# Patient Record
Sex: FEMALE | Race: WHITE | HISPANIC OR LATINO | Employment: OTHER | URBAN - METROPOLITAN AREA
[De-identification: names, ages, dates, MRNs, and addresses within clinical notes are randomized per-mention and may not be internally consistent; named-entity substitution may affect disease eponyms.]

---

## 2017-01-23 RX ORDER — LEVOTHYROXINE SODIUM 50 UG/1
TABLET ORAL
Qty: 30 TABLET | Refills: 11 | Status: SHIPPED | OUTPATIENT
Start: 2017-01-23 | End: 2017-11-20 | Stop reason: SDUPTHER

## 2017-01-25 ENCOUNTER — ANTI-COAG VISIT (OUTPATIENT)
Dept: CARDIOLOGY | Facility: CLINIC | Age: 82
End: 2017-01-25
Payer: MEDICARE

## 2017-01-25 DIAGNOSIS — I48.0 PAROXYSMAL ATRIAL FIBRILLATION: ICD-10-CM

## 2017-01-25 DIAGNOSIS — Z79.01 LONG TERM (CURRENT) USE OF ANTICOAGULANTS: Primary | ICD-10-CM

## 2017-01-25 DIAGNOSIS — Z86.718 HISTORY OF DVT OF LOWER EXTREMITY: ICD-10-CM

## 2017-01-25 LAB
CTP QC/QA: YES
INR PPP: 2.2 (ref 1.5–2.5)

## 2017-01-25 PROCEDURE — 85610 PROTHROMBIN TIME: CPT | Mod: QW,S$GLB,,

## 2017-01-25 NOTE — PROGRESS NOTES
INR remains therapeutic. Patient is in a wheelchair today. C/o pain. No medication changes. Will continue current dose and diet until follow-up.

## 2017-01-25 NOTE — MR AVS SNAPSHOT
[facility]  [address] [place] [address]  Phone: [phone]  Fax: [phone]                  Josy Posey   2017 1:30 PM   Anti-coag visit    Description:  Female : 1924   Provider:  Vandana Childress PharmD   [facility]           Diagnoses this Visit        Comments    Long term (current) use of anticoagulants    -  Primary     History of DVT of lower extremity         Paroxysmal atrial fibrillation                To Do List           Future Appointments        Provider Department Dept Phone    2017 1:30 PM Vandana Childress, [facility] [phone]    3/10/2017 10:00 AM [facility], CARDIOLOGY [facility] - Arrhythmia [phone]      Goals (5 Years of Data)     None      [facility]     [facility] -  Assistance  Registered nurses in the [facility] provide clinical advisement, health education, appointment booking, and other advisory services.  Call for this free service at [phone].             Medications           Message regarding Medications     Verify the changes and/or additions to your medication regime listed below are the same as discussed with your clinician today.  If any of these changes or additions are incorrect, please notify your healthcare provider.             Verify that the below list of medications is an accurate representation of the medications you are currently taking.  If none reported, the list may be blank. If incorrect, please contact your healthcare provider. Carry this list with you in case of emergency.           Current Medications     alendronate (FOSAMAX) 70 MG tablet take 1 tablet by mouth every week    ascorbic acid (VITAMIN C) 1000 MG tablet Take 1 tablet by mouth once daily once daily.    biotin 2,500 mcg Cap Take by mouth.    econazole nitrate 1 % cream     escitalopram oxalate (LEXAPRO) 10 MG tablet take 1 tablet by mouth once daily    estradiol  (ESTRACE) 0.01 % (0.1 mg/gram) vaginal cream Place 1 g vaginally 3 (three) times a week. Place small amount on the outside of the urethra nightly for 3 weeks , then twice weekly    fluticasone (FLONASE) 50 mcg/actuation nasal spray instill 2 sprays into each nostril once daily    folic acid (FOLVITE) 1 MG tablet Take 1 tablet by mouth once daily once daily.    iron-vitamin C 100-250 mg, ICAR-C, (ICAR-C) 100-250 mg Tab Take 1 tablet by mouth once daily.    levothyroxine (SYNTHROID) 50 MCG tablet take 1 tablet by mouth once daily    midodrine (PROAMATINE) 10 MG tablet take 1 tablet by mouth four times a day    omega-3 fatty acids-vitamin E (FISH OIL) 1,000 mg Cap Take by mouth once daily.    pravastatin (PRAVACHOL) 80 MG tablet Take 1 tablet (80 mg total) by mouth once daily.    propafenone (RHTHYMOL) 150 MG Tab 75 mg(half tablet) every 8 hrs    spironolactone (ALDACTONE) 25 MG tablet take 1 tablet by mouth once daily    triamcinolone acetonide 0.1% (KENALOG) 0.1 % cream apply THINLY to affected area ON LEGS, ARMS, AND TRUCK twice a day if needed for eczema    vitamin B12-folic acid 0.5-1 mg Tab Take 1 tablet by mouth once daily once daily.    warfarin (COUMADIN) 1 MG tablet Take 1/2 tablet Monday through Saturday and NONE on Sunday as directed by the coumadin clinic.           Clinical Reference Information           Allergies as of 1/25/2017     Sulfa (Sulfonamide Antibiotics)    Morphine    Dronedarone      Immunizations Administered on Date of Encounter - 1/25/2017     None      Orders Placed During Today's Visit      Normal Orders This Visit    POCT INR          1/25/2017  1:23 PM - [facility]      Component Results     Component Value Flag Ref Range Units Status    INR 2.2  1.5 - 2.5  Final     Acceptable Yes    Final      December 2016 Details    Sun Mon Tue Wed Thu Fri Sat         1               2               3                 4               5               6               7                8               9               10                 11               12               13               14               15               16               17                 18               19               20               21               22               23               24                 25               26      0.5 mg         27      0.5 mg         28   1.8   0.5 mg   See details      29      0.5 mg         30      0.5 mg         31      0.5 mg          Date Details   12/28 Last INR check   INR: 1.8                 January 2017 Details    Sun Mon Tue Wed Thu Fri Sat     1      0 mg         2      0.5 mg         3      0.5 mg         4      0.5 mg         5      0.5 mg         6      0.5 mg         7      0.5 mg           8      0 mg         9      0.5 mg         10      0.5 mg         11      0.5 mg         12      0.5 mg         13      0.5 mg         14      0.5 mg           15      0 mg         16      0.5 mg         17      0.5 mg         18      0.5 mg         19      0.5 mg         20      0.5 mg         21      0.5 mg           22      0 mg         23      0.5 mg         24      0.5 mg         25   2.2   0.5 mg   See details      26      0.5 mg         27      0.5 mg         28      0.5 mg           29      0 mg         30      0.5 mg         31      0.5 mg              Date Details   01/25 This INR check   INR: 2.2                     How to take your warfarin dose     To take:  0.5 mg Take 0.5 of a 1 mg tablet.           February 2017 Details    Sun Mon Tue Wed Thu Fri Sat        1      0.5 mg         2      0.5 mg         3      0.5 mg         4      0.5 mg           5      0 mg         6      0.5 mg         7      0.5 mg         8      0.5 mg         9      0.5 mg         10      0.5 mg         11      0.5 mg           12      0 mg         13      0.5 mg         14      0.5 mg         15      0.5 mg         16      0.5 mg         17      0.5 mg         18      0.5 mg           19      0  mg         20      0.5 mg         21      0.5 mg         22      0.5 mg         23      0.5 mg         24      0.5 mg         25      0.5 mg           26      0 mg         27      0.5 mg         28      0.5 mg              Date Details   No additional details            How to take your warfarin dose     To take:  0.5 mg Take 0.5 of a 1 mg tablet.           March 2017 Details    Sun Mon Tue Wed Thu Fri Sat        1            2               3               4                 5               6               7               8               9               10               11                 12               13               14               15               16               17               18                 19               20               21               22               23               24               25                 26               27               28               29               30               31                 Date Details   No additional details    Date of next INR:  3/1/2017         How to take your warfarin dose     To take:  0.5 mg Take 0.5 of a 1 mg tablet.           Anticoagulation Summary as of 1/25/2017     Maintenance plan 0 mg on Sun; 0.5 mg (1 mg x 0.5) all other days    Full instructions 0 mg on Sun; 0.5 mg all other days    Next INR check 3/1/2017      Anticoagulation Episode Summary     Comments       Patient Findings     Negatives Signs/symptoms of thrombosis, Signs/symptoms of bleeding, Laboratory test error suspected, Change in health, Change in alcohol use, Change in activity, Upcoming invasive procedure, Emergency department visit, Upcoming dental procedure, Missed doses, Extra doses, Change in medications, Change in diet/appetite, Hospital admission, Bruising, Other complaints

## 2017-03-01 ENCOUNTER — ANTI-COAG VISIT (OUTPATIENT)
Dept: CARDIOLOGY | Facility: CLINIC | Age: 82
End: 2017-03-01
Payer: MEDICARE

## 2017-03-01 DIAGNOSIS — Z86.718 HISTORY OF DVT OF LOWER EXTREMITY: ICD-10-CM

## 2017-03-01 DIAGNOSIS — I48.91 ATRIAL FIBRILLATION, UNSPECIFIED TYPE: ICD-10-CM

## 2017-03-01 DIAGNOSIS — Z79.01 LONG TERM (CURRENT) USE OF ANTICOAGULANTS: Primary | ICD-10-CM

## 2017-03-01 DIAGNOSIS — Z95.0 CARDIAC PACEMAKER IN SITU: Primary | ICD-10-CM

## 2017-03-01 DIAGNOSIS — I44.2 CHB (COMPLETE HEART BLOCK): ICD-10-CM

## 2017-03-01 DIAGNOSIS — I48.0 PAROXYSMAL ATRIAL FIBRILLATION: ICD-10-CM

## 2017-03-01 LAB — INR PPP: 2.2 (ref 1.5–2.5)

## 2017-03-01 PROCEDURE — 85610 PROTHROMBIN TIME: CPT | Mod: QW,S$GLB,,

## 2017-03-01 NOTE — MR AVS SNAPSHOT
[facility]  [address] [place] [address]  Phone: [phone]  Fax: [phone]                  Josy Posey   3/1/2017 1:30 PM   Anti-coag visit    Description:  Female : 1924   Provider:  Vandana Childress PharmD   Department:  [facility]           Diagnoses this Visit        Comments    Long term (current) use of anticoagulants    -  Primary     History of DVT of lower extremity         Paroxysmal atrial fibrillation                To Do List           Future Appointments        Provider Department Dept Phone    3/1/2017 1:30 PM [facility] [phone]    3/17/2017 10:45 AM [facility] Arrhythmia Procedures [phone]    2017 1:30 PM [facility]and[facility] Childress [facility]D [facility] [phone]      Goals (5 Years of Data)     None      [facility]     [facility] On Call Nurse [facility] Emergency Department Line -  Assistance  Registered nurses in the [facility] On Call Center provide clinical advisement, health education, appointment booking, and other advisory services.  Call for this free service at [phone].             Medications           Message regarding Medications     Verify the changes and/or additions to your medication regime listed below are the same as discussed with your clinician today.  If any of these changes or additions are incorrect, please notify your healthcare provider.             Verify that the below list of medications is an accurate representation of the medications you are currently taking.  If none reported, the list may be blank. If incorrect, please contact your healthcare provider. Carry this list with you in case of emergency.           Current Medications     alendronate (FOSAMAX) 70 MG tablet take 1 tablet by mouth every week    ascorbic acid (VITAMIN C) 1000 MG tablet Take 1 tablet by mouth once daily once daily.    biotin 2,500 mcg Cap Take by mouth.    econazole nitrate 1 % cream     escitalopram  oxalate (LEXAPRO) 10 MG tablet take 1 tablet by mouth once daily    estradiol (ESTRACE) 0.01 % (0.1 mg/gram) vaginal cream Place 1 g vaginally 3 (three) times a week. Place small amount on the outside of the urethra nightly for 3 weeks , then twice weekly    fluticasone (FLONASE) 50 mcg/actuation nasal spray instill 2 sprays into each nostril once daily    folic acid (FOLVITE) 1 MG tablet Take 1 tablet by mouth once daily once daily.    iron-vitamin C 100-250 mg, ICAR-C, (ICAR-C) 100-250 mg Tab Take 1 tablet by mouth once daily.    levothyroxine (SYNTHROID) 50 MCG tablet take 1 tablet by mouth once daily    midodrine (PROAMATINE) 10 MG tablet take 1 tablet by mouth four times a day    omega-3 fatty acids-vitamin E (FISH OIL) 1,000 mg Cap Take by mouth once daily.    pravastatin (PRAVACHOL) 80 MG tablet Take 1 tablet (80 mg total) by mouth once daily.    propafenone (RHTHYMOL) 150 MG Tab 75 mg(half tablet) every 8 hrs    spironolactone (ALDACTONE) 25 MG tablet take 1 tablet by mouth once daily    triamcinolone acetonide 0.1% (KENALOG) 0.1 % cream apply THINLY to affected area ON LEGS, ARMS, AND TRUCK twice a day if needed for eczema    vitamin B12-folic acid 0.5-1 mg Tab Take 1 tablet by mouth once daily once daily.    warfarin (COUMADIN) 1 MG tablet Take 1/2 tablet Monday through Saturday and NONE on Sunday as directed by the coumadin clinic.           Clinical Reference Information           Allergies as of 3/1/2017     Sulfa (Sulfonamide Antibiotics)    Morphine    Dronedarone      Immunizations Administered on Date of Encounter - 3/1/2017     None      Orders Placed During Today's Visit      Normal Orders This Visit    POCT INR          3/1/2017  1:17 PM - Vandana Childress, PharmD      Component Results     Component Value Flag Ref Range Units Status    INR 2.2  1.5 - 2.5  Final      January 2017 Details    Sun Mon Tue Wed Thu Fri Sat     1               2               3               4               5                6               7                 8               9               10               11               12               13               14                 15               16               17               18               19               20               21                 22               23               24               25   2.2   0.5 mg   See details      26      0.5 mg         27      0.5 mg         28      0.5 mg           29      0 mg         30      0.5 mg         31      0.5 mg              Date Details   01/25 Last INR check   INR: 2.2                 February 2017 Details    Sun Mon Tue Wed Thu Fri Sat        1      0.5 mg         2      0.5 mg         3      0.5 mg         4      0.5 mg           5      0 mg         6      0.5 mg         7      0.5 mg         8      0.5 mg         9      0.5 mg         10      0.5 mg         11      0.5 mg           12      0 mg         13      0.5 mg         14      0.5 mg         15      0.5 mg         16      0.5 mg         17      0.5 mg         18      0.5 mg           19      0 mg         20      0.5 mg         21      0.5 mg         22      0.5 mg         23      0.5 mg         24      0.5 mg         25      0.5 mg           26      0 mg         27      0.5 mg         28      0.5 mg              Date Details   No additional details              March 2017 Details    Sun Mon Tue Wed Thu Fri Sat        1   2.2   0.5 mg   See details      2      0.5 mg         3      0.5 mg         4      0.5 mg           5      0 mg         6      0.5 mg         7      0.5 mg         8      0.5 mg         9      0.5 mg         10      0.5 mg         11      0.5 mg           12      0 mg         13      0.5 mg         14      0.5 mg         15      0.5 mg         16      0.5 mg         17      0.5 mg         18      0.5 mg           19      0 mg         20      0.5 mg         21      0.5 mg         22      0.5 mg         23      0.5 mg         24      0.5 mg         25      0.5  mg           26      0 mg         27      0.5 mg         28      0.5 mg         29      0.5 mg         30      0.5 mg         31      0.5 mg           Date Details   03/01 This INR check   INR: 2.2                     How to take your warfarin dose     To take:  0.5 mg Take 0.5 of a 1 mg tablet.           April 2017 Details    Sun Mon Tue Wed Thu Fri Sat           1      0.5 mg           2      0 mg         3      0.5 mg         4      0.5 mg         5            6               7               8                 9               10               11               12               13               14               15                 16               17               18               19               20               21               22                 23               24               25               26               27               28               29                 30                      Date Details   No additional details    Date of next INR:  4/5/2017         How to take your warfarin dose     To take:  0.5 mg Take 0.5 of a 1 mg tablet.           Anticoagulation Summary as of 3/1/2017     Maintenance plan 0 mg on Sun; 0.5 mg (1 mg x 0.5) all other days    Full instructions 0 mg on Sun; 0.5 mg all other days    Next INR check 4/5/2017      Anticoagulation Episode Summary     Comments       Patient Findings     Negatives Signs/symptoms of thrombosis, Signs/symptoms of bleeding, Laboratory test error suspected, Change in health, Change in alcohol use, Change in activity, Upcoming invasive procedure, Emergency department visit, Upcoming dental procedure, Missed doses, Extra doses, Change in medications, Change in diet/appetite, Hospital admission, Bruising, Other complaints      Language Assistance Services     ATTENTION: Language assistance services are available, free of charge. Please call [phone].      ATENCIÓN: Si habla irvin, tiene a patel disposición servicios gratuitos de asistencia lingüística.  [place] [phone].     DORITA Ý: N?u b?n nói Ti?ng Vi?t, có các d?ch v? h? tr? ngôn ng? mi?n phí dành cho b?n. G?i s? [phone].         [facility] complies with applicable Federal civil rights laws and does not discriminate on the basis of race, color, national origin, age, disability, or sex.

## 2017-03-17 ENCOUNTER — CLINICAL SUPPORT (OUTPATIENT)
Dept: CARDIOLOGY | Facility: CLINIC | Age: 82
End: 2017-03-17
Payer: MEDICARE

## 2017-03-17 DIAGNOSIS — I48.91 ATRIAL FIBRILLATION, UNSPECIFIED TYPE: ICD-10-CM

## 2017-03-17 DIAGNOSIS — Z95.0 CARDIAC PACEMAKER IN SITU: ICD-10-CM

## 2017-03-17 DIAGNOSIS — I44.2 CHB (COMPLETE HEART BLOCK): ICD-10-CM

## 2017-03-17 PROCEDURE — 93000 ELECTROCARDIOGRAM COMPLETE: CPT | Mod: S$GLB,,, | Performed by: INTERNAL MEDICINE

## 2017-03-17 PROCEDURE — 93281 PM DEVICE PROGR EVAL MULTI: CPT | Mod: 26,,, | Performed by: INTERNAL MEDICINE

## 2017-03-20 RX ORDER — ESCITALOPRAM OXALATE 10 MG/1
TABLET ORAL
Qty: 30 TABLET | Refills: 10 | Status: SHIPPED | OUTPATIENT
Start: 2017-03-20 | End: 2017-11-20 | Stop reason: SDUPTHER

## 2017-04-05 ENCOUNTER — ANTI-COAG VISIT (OUTPATIENT)
Dept: CARDIOLOGY | Facility: CLINIC | Age: 82
End: 2017-04-05
Payer: MEDICARE

## 2017-04-05 DIAGNOSIS — Z79.01 LONG TERM (CURRENT) USE OF ANTICOAGULANTS: Primary | ICD-10-CM

## 2017-04-05 LAB — INR PPP: 2 (ref 1.5–2.5)

## 2017-04-05 PROCEDURE — 85610 PROTHROMBIN TIME: CPT | Mod: QW,S$GLB,,

## 2017-04-05 NOTE — MR AVS SNAPSHOT
[facility]  [address] [place] [address]  Phone: [phone]  Fax: [phone]                  Josy Posey   2017 1:30 PM   Anti-coag visit    Description:  Female : 1924   Provider:  Vandana Childress, PharmD   Department:  [facility] - Coumadin           Diagnoses this Visit        Comments    Long term (current) use of anticoagulants    -  Primary            To Do List           Future Appointments        Provider Department Dept Phone    2017 1:30 PM Leslie Alvarado MD [facility] Cardiology [phone]      Goals (5 Years of Data)     None      [facility]     [facility] On Call Nurse Care Line -  Assistance  Unless otherwise directed by your provider, please contact [facility], our nurse care line that is available for  assistance.     Registered nurses in the [facility] Center provide: appointment scheduling, clinical advisement, health education, and other advisory services.  Call: [phone] (toll free)               Medications           Message regarding Medications     Verify the changes and/or additions to your medication regime listed below are the same as discussed with your clinician today.  If any of these changes or additions are incorrect, please notify your healthcare provider.             Verify that the below list of medications is an accurate representation of the medications you are currently taking.  If none reported, the list may be blank. If incorrect, please contact your healthcare provider. Carry this list with you in case of emergency.           Current Medications     alendronate (FOSAMAX) 70 MG tablet take 1 tablet by mouth every week    ascorbic acid (VITAMIN C) 1000 MG tablet Take 1 tablet by mouth once daily once daily.    biotin 2,500 mcg Cap Take by mouth.    econazole nitrate 1 % cream     escitalopram oxalate (LEXAPRO) 10 MG tablet take 1 tablet by mouth once daily    estradiol (ESTRACE) 0.01 % (0.1 mg/gram) vaginal  cream Place 1 g vaginally 3 (three) times a week. Place small amount on the outside of the urethra nightly for 3 weeks , then twice weekly    fluticasone (FLONASE) 50 mcg/actuation nasal spray instill 2 sprays into each nostril once daily    folic acid (FOLVITE) 1 MG tablet Take 1 tablet by mouth once daily once daily.    iron-vitamin C 100-250 mg, ICAR-C, (ICAR-C) 100-250 mg Tab Take 1 tablet by mouth once daily.    levothyroxine (SYNTHROID) 50 MCG tablet take 1 tablet by mouth once daily    midodrine (PROAMATINE) 10 MG tablet take 1 tablet by mouth four times a day    omega-3 fatty acids-vitamin E (FISH OIL) 1,000 mg Cap Take by mouth once daily.    pravastatin (PRAVACHOL) 80 MG tablet Take 1 tablet (80 mg total) by mouth once daily.    propafenone (RHTHYMOL) 150 MG Tab 75 mg(half tablet) every 8 hrs    spironolactone (ALDACTONE) 25 MG tablet take 1 tablet by mouth once daily    triamcinolone acetonide 0.1% (KENALOG) 0.1 % cream apply THINLY to affected area ON LEGS, ARMS, AND TRUCK twice a day if needed for eczema    vitamin B12-folic acid 0.5-1 mg Tab Take 1 tablet by mouth once daily once daily.    warfarin (COUMADIN) 1 MG tablet Take 1/2 tablet Monday through Saturday and NONE on Sunday as directed by the coumadin clinic.           Clinical Reference Information           Allergies as of 4/5/2017     Sulfa (Sulfonamide Antibiotics)    Morphine    Dronedarone      Immunizations Administered on Date of Encounter - 4/5/2017     None      Orders Placed During Today's Visit      Normal Orders This Visit    POCT INR          4/5/2017  1:42 PM - Vandana Childress, PharmD      Component Results     Component Value Flag Ref Range Units Status    INR 2.0  1.5 - 2.5  Final      March 2017 Details    Sun Mon Tue Wed Thu Fri Sat        1   2.2   0.5 mg   See details      2      0.5 mg         3      0.5 mg         4      0.5 mg           5      0 mg         6      0.5 mg         7      0.5 mg         8      0.5 mg          9      0.5 mg         10      0.5 mg         11      0.5 mg           12      0 mg         13      0.5 mg         14      0.5 mg         15      0.5 mg         16      0.5 mg         17      0.5 mg         18      0.5 mg           19      0 mg         20      0.5 mg         21      0.5 mg         22      0.5 mg         23      0.5 mg         24      0.5 mg         25      0.5 mg           26      0 mg         27      0.5 mg         28      0.5 mg         29      0.5 mg         30      0.5 mg         31      0.5 mg           Date Details   03/01 Last INR check   INR: 2.2                 April 2017 Details    Sun Mon Tue Wed Thu Fri Sat           1      0.5 mg           2      0 mg         3      0.5 mg         4      0.5 mg         5   2.0   0.5 mg   See details      6      0.5 mg         7      0.5 mg         8      0.5 mg           9      0 mg         10      0.5 mg         11      0.5 mg         12      0.5 mg         13      0.5 mg         14      0.5 mg         15      0.5 mg           16      0 mg         17      0.5 mg         18      0.5 mg         19      0.5 mg         20      0.5 mg         21      0.5 mg         22      0.5 mg           23      0 mg         24      0.5 mg         25      0.5 mg         26      0.5 mg         27      0.5 mg         28      0.5 mg         29      0.5 mg           30      0 mg                Date Details   04/05 This INR check   INR: 2.0                     How to take your warfarin dose     To take:  0.5 mg Take 0.5 of a 1 mg tablet.           May 2017 Details    Sun Mon Tue Wed Thu Fri Sat      1      0.5 mg         2      0.5 mg         3      0.5 mg         4      0.5 mg         5      0.5 mg         6      0.5 mg           7      0 mg         8      0.5 mg         9      0.5 mg         10            11               12               13                 14               15               16               17               18               19               20                  21               22               23               24               25               26               27                 28               29               30               31                   Date Details   No additional details    Date of next INR:  5/10/2017         How to take your warfarin dose     To take:  0.5 mg Take 0.5 of a 1 mg tablet.           Anticoagulation Summary as of 4/5/2017     Maintenance plan 0 mg on Sun; 0.5 mg (1 mg x 0.5) all other days    Full instructions 0 mg on Sun; 0.5 mg all other days    Next INR check 5/10/2017      Anticoagulation Episode Summary     Comments       Patient Findings     Negatives Signs/symptoms of thrombosis, Signs/symptoms of bleeding, Laboratory test error suspected, Change in health, Change in alcohol use, Change in activity, Upcoming invasive procedure, Emergency department visit, Upcoming dental procedure, Missed doses, Extra doses, Change in medications, Change in diet/appetite, Hospital admission, Bruising, Other complaints      Language Assistance Services     ATTENTION: Language assistance services are available, free of charge. Please call [phone].      ATENCIÓN: Si habla irvin, tiene a patel disposición servicios gratuitos de asistencia lingüística. Llame al [phone].     CHÚ Ý: N?u b?n nói Ti?ng Vi?t, có các d?ch v? h? tr? ngôn ng? mi?n phí dành cho b?n. G?i s? [phone].         [facility] complies with applicable Federal civil rights laws and does not discriminate on the basis of race, color, national origin, age, disability, or sex.

## 2017-04-27 ENCOUNTER — HOSPITAL ENCOUNTER (OUTPATIENT)
Dept: RADIOLOGY | Facility: HOSPITAL | Age: 82
Discharge: HOME | End: 2017-04-27
Attending: FAMILY MEDICINE
Payer: MEDICARE

## 2017-04-27 ENCOUNTER — OFFICE VISIT (OUTPATIENT)
Dept: INTERNAL MEDICINE | Facility: CLINIC | Age: 82
End: 2017-04-27
Payer: MEDICARE

## 2017-04-27 VITALS
TEMPERATURE: 99 F | DIASTOLIC BLOOD PRESSURE: 66 MMHG | BODY MASS INDEX: 27.19 KG/M2 | HEIGHT: 63 IN | HEART RATE: 75 BPM | OXYGEN SATURATION: 97 % | SYSTOLIC BLOOD PRESSURE: 120 MMHG | WEIGHT: 153.44 LBS

## 2017-04-27 DIAGNOSIS — B35.4 TINEA CORPORIS: ICD-10-CM

## 2017-04-27 DIAGNOSIS — J01.90 ACUTE NON-RECURRENT SINUSITIS, UNSPECIFIED LOCATION: Primary | ICD-10-CM

## 2017-04-27 DIAGNOSIS — R05.9 COUGH: ICD-10-CM

## 2017-04-27 DIAGNOSIS — R50.9 FEVER, UNSPECIFIED FEVER CAUSE: ICD-10-CM

## 2017-04-27 DIAGNOSIS — R50.9 FEVER, UNSPECIFIED FEVER CAUSE: Primary | ICD-10-CM

## 2017-04-27 LAB
FLUAV AG SPEC QL IA: NEGATIVE
FLUBV AG SPEC QL IA: NEGATIVE
SPECIMEN SOURCE: NORMAL

## 2017-04-27 PROCEDURE — 1159F MED LIST DOCD IN RCRD: CPT | Mod: S$GLB,,, | Performed by: PHYSICIAN ASSISTANT

## 2017-04-27 PROCEDURE — 1160F RVW MEDS BY RX/DR IN RCRD: CPT | Mod: S$GLB,,, | Performed by: PHYSICIAN ASSISTANT

## 2017-04-27 PROCEDURE — 71020 XR CHEST PA AND LATERAL: CPT | Mod: 26,,, | Performed by: RADIOLOGY

## 2017-04-27 PROCEDURE — 71020 XR CHEST PA AND LATERAL: CPT | Mod: TC,PO

## 2017-04-27 PROCEDURE — 1157F ADVNC CARE PLAN IN RCRD: CPT | Mod: S$GLB,,, | Performed by: PHYSICIAN ASSISTANT

## 2017-04-27 PROCEDURE — 99999 PR PBB SHADOW E&M-EST. PATIENT-LVL V: CPT | Mod: PBBFAC,,, | Performed by: PHYSICIAN ASSISTANT

## 2017-04-27 PROCEDURE — 1126F AMNT PAIN NOTED NONE PRSNT: CPT | Mod: S$GLB,,, | Performed by: PHYSICIAN ASSISTANT

## 2017-04-27 PROCEDURE — 99213 OFFICE O/P EST LOW 20 MIN: CPT | Mod: S$GLB,,, | Performed by: PHYSICIAN ASSISTANT

## 2017-04-27 RX ORDER — AMOXICILLIN AND CLAVULANATE POTASSIUM 875; 125 MG/1; MG/1
1 TABLET, FILM COATED ORAL 2 TIMES DAILY
Qty: 20 TABLET | Refills: 0 | Status: SHIPPED | OUTPATIENT
Start: 2017-04-27 | End: 2017-05-08

## 2017-04-27 RX ORDER — CLOTRIMAZOLE AND BETAMETHASONE DIPROPIONATE 10; .64 MG/G; MG/G
CREAM TOPICAL 2 TIMES DAILY
Qty: 45 G | Refills: 1 | Status: SHIPPED | OUTPATIENT
Start: 2017-04-27 | End: 2018-07-06 | Stop reason: SDUPTHER

## 2017-04-27 NOTE — PATIENT INSTRUCTIONS
Fungal Skin Infection (Tinea)  A fungal infection is when too much fungus grows on or in the body. Fungus normally lives on the skin in small amounts and does not cause harm. But when too much grows on the skin, it causes an infection. This is also known as tinea. Fungal skin infections are common and not often serious.  The infection often starts as a small red area the size of a pea. The skin may turn dry and flaky. The area may itch. As the fungus grows, it spreads out in a red [place]. Because of how it looks, fungal skin infection is often called ringworm, but it is not caused by a worm. Fungal skin infections can occur on many parts of the body. They can grow on the head, chest, arms, or legs. They can occur on the buttocks. On the feet, fungal infection is known as athletes foot. It causes itchy, sometimes painful sores between the toes and the bottom or sides of the feet. In the groin, the rash is called jock itch.  People with weakened immune systems can get a fungal infection more easily. This includes people with diabetes or HIV, or who are being treated for cancer. In these cases, the fungal infection can spread and cause severe illness. Fungal infections are also more common in people who are obese.  In most cases, treatment is done with antifungal cream or ointment. If the infection is on your scalp, you may take oral medication. In some cases, a tiny piece of the skin (biopsy) may be taken. This is so it can be tested in a lab.  Common fungal infections are treated with creams on the skin or oral medicine.  Home care  Follow all instructions when using antifungal cream or ointment on your skin. The health care provider may advise using cornstarch powder to keep your skin dry or petroleum jelly to provide a barrier.  General care:  · If you were prescribed an oral medicine, read the patient information. Talk with the health care provider about the risks and side effects.  · Let your skin dry  completely after bathing. Carefully dry your feet and between your toes.  · Dress in loose cotton clothing.  · Dont scratch the affected area. This can delay healing and may spread the infection. It can also cause a bacterial infection.  · Keep your skin clean, but dont wash the skin too much. This can irritate your skin.  · Keep in mind that it may take a week before the fungus starts to go away. It can take 2 to 4 weeks to fully clear. To prevent it from coming back, use the medicine until the rash is all gone.  Follow-up care  Follow up with your health care provider if the rash does not get better after 10 days of treatment. Also follow up if the rash spreads to other parts of your body.  When to seek medical advice  Call your health care provider right away if any of these occur:  · Fever of 100.4°F (38°C) or higher  · Redness or swelling that gets worse  · Pain that gets worse  · Foul-smelling fluid leaking from the skin  Date Last Reviewed: 7/23/2014  © 8330-4160 [employer]. [address] [place] [address], [place] [address]. All rights reserved. This information is not intended as a substitute for professional medical care. Always follow your healthcare professional's instructions.

## 2017-04-27 NOTE — MR AVS SNAPSHOT
[facility] Internal Medicine  [address] [address]  Phone: [phone]  Fax: [phone]                  Josy Posey   2017 11:40 AM   Office Visit    Description:  Female : 1924   Provider:  Ania Holt PA-C   Department:  [facility] - Internal Medicine           Reason for Visit     Fever     Cough     Dizziness     Rash           Diagnoses this Visit        Comments    Fever, unspecified fever cause    -  Primary     Cough         Tinea corporis                To Do List           Future Appointments        Provider Department Dept Phone    2017 1:30 PM SUM XR2 [facility] [phone]    5/10/2017 1:30 PM Vandana Childress PharmD [facility] Coumadin [phone]    2017 1:30 PM Leslie Alvarado MD [facility] Cardiology [phone]      Goals (5 Years of Data)     None      Follow-Up and Disposition     Return if symptoms worsen or fail to improve.       These Medications        Disp Refills Start End    clotrimazole-betamethasone 1-0.05% (LOTRISONE) cream 45 g 1 2017     Apply topically 2 (two) times daily. - Topical (\[facility] [place] [facility]\"")    Pharmacy: [address] #: [phone]         [facility]     [facility] Care [facility] - 24/ Assistance  Unless otherwise directed by your provider, please contact [facility], our nurse care line that is available for 24/7 assistance.     Registered nurses in the [facility] provide: appointment scheduling, clinical advisement, health education, and other advisory services.  Call: [phone] (toll free)               Medications           Message regarding Medications     Verify the changes and/or additions to your medication regime listed below are the same as discussed with your clinician today.  If any of these changes or additions are incorrect, please notify your healthcare provider.        START taking these  NEW medications        Refills    clotrimazole-betamethasone 1-0.05% (LOTRISONE) cream 1    Sig: Apply topically 2 (two) times daily.    Class: Normal    Route: Topical (Top)           Verify that the below list of medications is an accurate representation of the medications you are currently taking.  If none reported, the list may be blank. If incorrect, please contact your healthcare provider. Carry this list with you in case of emergency.           Current Medications     alendronate (FOSAMAX) 70 MG tablet take 1 tablet by mouth every week    ascorbic acid (VITAMIN C) 1000 MG tablet Take 1 tablet by mouth once daily once daily.    econazole nitrate 1 % cream     escitalopram oxalate (LEXAPRO) 10 MG tablet take 1 tablet by mouth once daily    estradiol (ESTRACE) 0.01 % (0.1 mg/gram) vaginal cream Place 1 g vaginally 3 (three) times a week. Place small amount on the outside of the urethra nightly for 3 weeks , then twice weekly    fluticasone (FLONASE) 50 mcg/actuation nasal spray instill 2 sprays into each nostril once daily    folic acid (FOLVITE) 1 MG tablet Take 1 tablet by mouth once daily once daily.    iron-vitamin C 100-250 mg, ICAR-C, (ICAR-C) 100-250 mg Tab Take 1 tablet by mouth once daily.    levothyroxine (SYNTHROID) 50 MCG tablet take 1 tablet by mouth once daily    midodrine (PROAMATINE) 10 MG tablet take 1 tablet by mouth four times a day    omega-3 fatty acids-vitamin E (FISH OIL) 1,000 mg Cap Take by mouth once daily.    pravastatin (PRAVACHOL) 80 MG tablet Take 1 tablet (80 mg total) by mouth once daily.    propafenone (RHTHYMOL) 150 MG Tab 75 mg(half tablet) every 8 hrs    spironolactone (ALDACTONE) 25 MG tablet take 1 tablet by mouth once daily    triamcinolone acetonide 0.1% (KENALOG) 0.1 % cream apply THINLY to affected area ON LEGS, ARMS, AND TRUCK twice a day if needed for eczema    vitamin B12-folic acid 0.5-1 mg Tab Take 1 tablet by mouth once daily once daily.    warfarin (COUMADIN) 1 MG  "tablet Take 1/2 tablet Monday through Saturday and NONE on Sunday as directed by the coumadin clinic.    biotin 2,500 mcg Cap Take by mouth.    clotrimazole-betamethasone 1-0.05% (LOTRISONE) cream Apply topically 2 (two) times daily.           Clinical Reference Information           Your Vitals Were     BP Pulse Temp Height Weight SpO2    120/66 (BP Location: Right arm, Patient Position: Sitting, BP Method: Manual) 75 99 °F (37.2 °C) (Tympanic) 5' 3" (1.6 m) 69.6 kg (153 lb 7 oz) 97%    BMI                27.18 kg/m2          Blood Pressure          Most Recent Value    BP  120/66      Allergies as of 4/27/2017     Sulfa (Sulfonamide Antibiotics)    Morphine    Dronedarone      Immunizations Administered on Date of Encounter - 4/27/2017     None      Orders Placed During Today's Visit      Normal Orders This Visit    Influenza antigen Nasopharyngeal Swab     Future Labs/Procedures Expected by Expires    X-Ray Chest PA And Lateral  4/27/2017 4/27/2018      Instructions      Fungal Skin Infection (Tinea)  A fungal infection is when too much fungus grows on or in the body. Fungus normally lives on the skin in small amounts and does not cause harm. But when too much grows on the skin, it causes an infection. This is also known as tinea. Fungal skin infections are common and not often serious.  The infection often starts as a small red area the size of a pea. The skin may turn dry and flaky. The area may itch. As the fungus grows, it spreads out in a red [place]. Because of how it looks, fungal skin infection is often called ringworm, but it is not caused by a worm. Fungal skin infections can occur on many parts of the body. They can grow on the head, chest, arms, or legs. They can occur on the buttocks. On the feet, fungal infection is known as athletes foot. It causes itchy, sometimes painful sores between the toes and the bottom or sides of the feet. In the groin, the rash is called jock itch.  People with " weakened immune systems can get a fungal infection more easily. This includes people with diabetes or HIV, or who are being treated for cancer. In these cases, the fungal infection can spread and cause severe illness. Fungal infections are also more common in people who are obese.  In most cases, treatment is done with antifungal cream or ointment. If the infection is on your scalp, you may take oral medication. In some cases, a tiny piece of the skin (biopsy) may be taken. This is so it can be tested in a lab.  Common fungal infections are treated with creams on the skin or oral medicine.  Home care  Follow all instructions when using antifungal cream or ointment on your skin. The health care provider may advise using cornstarch powder to keep your skin dry or petroleum jelly to provide a barrier.  General care:  · If you were prescribed an oral medicine, read the patient information. Talk with the health care provider about the risks and side effects.  · Let your skin dry completely after bathing. Carefully dry your feet and between your toes.  · Dress in loose cotton clothing.  · Dont scratch the affected area. This can delay healing and may spread the infection. It can also cause a bacterial infection.  · Keep your skin clean, but dont wash the skin too much. This can irritate your skin.  · Keep in mind that it may take a week before the fungus starts to go away. It can take 2 to 4 weeks to fully clear. To prevent it from coming back, use the medicine until the rash is all gone.  Follow-up care  Follow up with your health care provider if the rash does not get better after 10 days of treatment. Also follow up if the rash spreads to other parts of your body.  When to seek medical advice  Call your health care provider right away if any of these occur:  · Fever of 100.4°F (38°C) or higher  · Redness or swelling that gets worse  · Pain that gets worse  · Foul-smelling fluid leaking from the skin  Date Last  Reviewed: 7/23/2014  © 7994-2942 [employer]. [address] [place] [address], [place] [address]. All rights reserved. This information is not intended as a substitute for professional medical care. Always follow your healthcare professional's instructions.             Language Assistance Services     ATTENTION: Language assistance services are available, free of charge. Please call [phone].      ATENCIÓN: Si habla español, tiene a patel disposición servicios gratuitos de asistencia lingüística. Llame al [phone].     [facility] Ý: N?u b?n nói Ti?ng Vi?t, có các d?ch v? h? tr? ngôn ng? mi?n phí dành cho b?n. G?i s? [phone].         [facility] complies with applicable Federal civil rights laws and does not discriminate on the basis of race, color, national origin, age, disability, or sex.

## 2017-04-27 NOTE — PROGRESS NOTES
"  Subjective:      Patient ID: Josy Posey is a 89 y.o. female.    Chief Complaint: Fever; Cough; Dizziness; and Rash    Fever    This is a new problem. The current episode started in the past 7 days. The problem occurs constantly. The problem has been gradually worsening. The maximum temperature noted was 99 to 99.9 F. Associated symptoms include coughing, muscle aches, a rash (under breast) and sleepiness. Pertinent negatives include no abdominal pain, chest pain, congestion, diarrhea, ear pain, headaches, nausea, sore throat, urinary pain, vomiting or wheezing. She has tried nothing for the symptoms.   Risk factors: no contaminated food, no contaminated water, no hx of cancer, no immunosuppression, no occupational exposure, no recent sickness, no recent travel and no sick contacts    Also reports an itchy rash under her breasts.     Review of Systems   Constitutional: Positive for activity change, appetite change, chills, diaphoresis, fatigue and fever. Negative for unexpected weight change.   HENT: Negative for congestion, ear pain, postnasal drip, rhinorrhea, sinus pressure, sneezing, sore throat and tinnitus.    Respiratory: Positive for cough and chest tightness. Negative for wheezing.    Cardiovascular: Negative for chest pain and leg swelling.   Gastrointestinal: Negative for abdominal distention, abdominal pain, diarrhea, nausea and vomiting.   Genitourinary: Negative for dysuria.   Skin: Positive for rash (under breast).   Neurological: Negative for headaches.     Objective:   /66 (BP Location: Right arm, Patient Position: Sitting, BP Method: Manual)  Pulse 75  Temp 99 °F (37.2 °C) (Tympanic)   Ht 5' 3" (1.6 m)  Wt 69.6 kg (153 lb 7 oz)  SpO2 97%  BMI 27.18 kg/m2    Physical Exam   Constitutional: She is oriented to person, place, and time. She appears well-developed and well-nourished.   HENT:   Head: Normocephalic and atraumatic.   Right Ear: External ear normal.   Left Ear: External " ear normal.   Nose: Nose normal.   Mouth/Throat: Oropharynx is clear and moist.   Eyes: Conjunctivae and EOM are normal. Pupils are equal, round, and reactive to light.   Neck: Normal range of motion. Neck supple.   Cardiovascular: Normal rate, regular rhythm and normal heart sounds.  Exam reveals no gallop and no friction rub.    No murmur heard.  Pulmonary/Chest: Effort normal. No accessory muscle usage. No tachypnea and no bradypnea. No respiratory distress. She has decreased breath sounds (course breath sounds). She has no wheezes. She has no rales. She exhibits no tenderness.   Abdominal: Soft. She exhibits no distension. There is no tenderness.   Musculoskeletal: Normal range of motion.   Lymphadenopathy:     She has no cervical adenopathy.   Neurological: She is alert and oriented to person, place, and time.   Skin: Skin is warm and dry. Rash noted. Rash is pustular and urticarial. There is erythema.        Psychiatric: She has a normal mood and affect. Her behavior is normal. Judgment and thought content normal.   Vitals reviewed.    Results for orders placed or performed in visit on 04/27/17   Influenza antigen Nasopharyngeal Swab   Result Value Ref Range    Influenza A Ag, EIA Negative Negative    Influenza B Ag, EIA Negative Negative    Flu A & B Source Nasopharyngeal Swab      Cxr: no acute findings  Assessment:     1. Fever, unspecified fever cause    2. Cough    3. Tinea corporis      Plan:   Fever, unspecified fever cause  -     Influenza antigen Nasopharyngeal Swab  -     X-Ray Chest PA And Lateral; Future; Expected date: 4/27/17  -flu negative, will send augmentin to pharmacy on file.   Cough  -     Influenza antigen Nasopharyngeal Swab  -     X-Ray Chest PA And Lateral; Future; Expected date: 4/27/17    Tinea corporis  -     clotrimazole-betamethasone 1-0.05% (LOTRISONE) cream; Apply topically 2 (two) times daily.  Dispense: 45 g; Refill: 1    -Educational handout on over-the-counter medications  and at-home conservative care, pertinent to the patients diagnosis today, was handed to the patient and discussed in detail.    Return if symptoms worsen or fail to improve.

## 2017-05-01 ENCOUNTER — TELEPHONE (OUTPATIENT)
Dept: CARDIOLOGY | Facility: CLINIC | Age: 82
End: 2017-05-01

## 2017-05-01 NOTE — TELEPHONE ENCOUNTER
"Received phone call from patient with complaints of dizziness, unable to walk, nauseated, right eye vision blurred, feeling weak  108/61, I think it might have been a TIA or something "if I moved my head I became dizzier"  "I don't want to stay in the hospital can I just come in tomorrow around 1:00 PM to be checked ?"    Returned phone call and scheduled appointment for 5/2/17 as requested but insisted that if symptoms continue or get worse proceed to nearest ER.      "

## 2017-05-02 ENCOUNTER — HOSPITAL ENCOUNTER (INPATIENT)
Facility: HOSPITAL | Age: 82
LOS: 1 days | Discharge: HOME | DRG: 312 | End: 2017-05-04
Attending: EMERGENCY MEDICINE | Admitting: INTERNAL MEDICINE
Payer: MEDICARE

## 2017-05-02 DIAGNOSIS — I95.1 ORTHOSTATIC HYPOTENSION DYSAUTONOMIC SYNDROME: ICD-10-CM

## 2017-05-02 DIAGNOSIS — R42 VERTIGO: Primary | ICD-10-CM

## 2017-05-02 DIAGNOSIS — R74.01 TRANSAMINITIS: ICD-10-CM

## 2017-05-02 DIAGNOSIS — G45.9 TRANSIENT CEREBRAL ISCHEMIA, UNSPECIFIED TYPE: ICD-10-CM

## 2017-05-02 DIAGNOSIS — I48.0 PAROXYSMAL ATRIAL FIBRILLATION: ICD-10-CM

## 2017-05-02 DIAGNOSIS — I50.9 CHF (CONGESTIVE HEART FAILURE): ICD-10-CM

## 2017-05-02 LAB
ALBUMIN SERPL BCP-MCNC: 3.5 G/DL
ALP SERPL-CCNC: 184 U/L
ALT SERPL W/O P-5'-P-CCNC: 169 U/L
AMMONIA PLAS-SCNC: 20 UMOL/L
ANION GAP SERPL CALC-SCNC: 10 MMOL/L
AST SERPL-CCNC: 162 U/L
BASOPHILS # BLD AUTO: 0.02 K/UL
BASOPHILS NFR BLD: 0.3 %
BILIRUB SERPL-MCNC: 1 MG/DL
BILIRUB UR QL STRIP: NEGATIVE
BNP SERPL-MCNC: 209 PG/ML
BUN SERPL-MCNC: 24 MG/DL
CALCIUM SERPL-MCNC: 9.2 MG/DL
CHLORIDE SERPL-SCNC: 107 MMOL/L
CLARITY UR: CLEAR
CO2 SERPL-SCNC: 24 MMOL/L
COLOR UR: YELLOW
CREAT SERPL-MCNC: 1.6 MG/DL
DIFFERENTIAL METHOD: ABNORMAL
EOSINOPHIL # BLD AUTO: 0.3 K/UL
EOSINOPHIL NFR BLD: 3.6 %
ERYTHROCYTE [DISTWIDTH] IN BLOOD BY AUTOMATED COUNT: 13.5 %
EST. GFR  (AFRICAN AMERICAN): 32 ML/MIN/1.73 M^2
EST. GFR  (NON AFRICAN AMERICAN): 28 ML/MIN/1.73 M^2
GLUCOSE SERPL-MCNC: 109 MG/DL
GLUCOSE UR QL STRIP: NEGATIVE
HCT VFR BLD AUTO: 37.9 %
HGB BLD-MCNC: 12.9 G/DL
HGB UR QL STRIP: NEGATIVE
INR PPP: 2.7
KETONES UR QL STRIP: NEGATIVE
LEUKOCYTE ESTERASE UR QL STRIP: NEGATIVE
LYMPHOCYTES # BLD AUTO: 1.8 K/UL
LYMPHOCYTES NFR BLD: 26.1 %
MCH RBC QN AUTO: 32.3 PG
MCHC RBC AUTO-ENTMCNC: 34 %
MCV RBC AUTO: 95 FL
MONOCYTES # BLD AUTO: 0.9 K/UL
MONOCYTES NFR BLD: 13.2 %
NEUTROPHILS # BLD AUTO: 3.9 K/UL
NEUTROPHILS NFR BLD: 56.8 %
NITRITE UR QL STRIP: NEGATIVE
PH UR STRIP: 6 [PH] (ref 5–8)
PLATELET # BLD AUTO: 193 K/UL
PMV BLD AUTO: 11.3 FL
POTASSIUM SERPL-SCNC: 4.4 MMOL/L
PROT SERPL-MCNC: 7.5 G/DL
PROT UR QL STRIP: NEGATIVE
PROTHROMBIN TIME: 27.4 SEC
RBC # BLD AUTO: 3.99 M/UL
SODIUM SERPL-SCNC: 141 MMOL/L
SP GR UR STRIP: 1.01 (ref 1–1.03)
TROPONIN I SERPL DL<=0.01 NG/ML-MCNC: 0.01 NG/ML
TROPONIN I SERPL DL<=0.01 NG/ML-MCNC: 0.03 NG/ML
URN SPEC COLLECT METH UR: NORMAL
UROBILINOGEN UR STRIP-ACNC: NEGATIVE EU/DL
WBC # BLD AUTO: 6.89 K/UL

## 2017-05-02 PROCEDURE — G0378 HOSPITAL OBSERVATION PER HR: HCPCS

## 2017-05-02 PROCEDURE — 93005 ELECTROCARDIOGRAM TRACING: CPT

## 2017-05-02 PROCEDURE — 85610 PROTHROMBIN TIME: CPT

## 2017-05-02 PROCEDURE — 96361 HYDRATE IV INFUSION ADD-ON: CPT

## 2017-05-02 PROCEDURE — 85025 COMPLETE CBC W/AUTO DIFF WBC: CPT

## 2017-05-02 PROCEDURE — 80053 COMPREHEN METABOLIC PANEL: CPT

## 2017-05-02 PROCEDURE — 25000003 PHARM REV CODE 250: Performed by: EMERGENCY MEDICINE

## 2017-05-02 PROCEDURE — 99285 EMERGENCY DEPT VISIT HI MDM: CPT | Mod: 25

## 2017-05-02 PROCEDURE — 82140 ASSAY OF AMMONIA: CPT

## 2017-05-02 PROCEDURE — 84484 ASSAY OF TROPONIN QUANT: CPT

## 2017-05-02 PROCEDURE — 96374 THER/PROPH/DIAG INJ IV PUSH: CPT

## 2017-05-02 PROCEDURE — 93010 ELECTROCARDIOGRAM REPORT: CPT | Mod: ,,, | Performed by: INTERNAL MEDICINE

## 2017-05-02 PROCEDURE — 83880 ASSAY OF NATRIURETIC PEPTIDE: CPT

## 2017-05-02 PROCEDURE — 81003 URINALYSIS AUTO W/O SCOPE: CPT

## 2017-05-02 PROCEDURE — 63600175 PHARM REV CODE 636 W HCPCS: Performed by: EMERGENCY MEDICINE

## 2017-05-02 RX ORDER — ONDANSETRON 4 MG/1
4 TABLET, ORALLY DISINTEGRATING ORAL
Status: COMPLETED | OUTPATIENT
Start: 2017-05-02 | End: 2017-05-02

## 2017-05-02 RX ORDER — HYDRALAZINE HYDROCHLORIDE 20 MG/ML
10 INJECTION INTRAMUSCULAR; INTRAVENOUS
Status: COMPLETED | OUTPATIENT
Start: 2017-05-02 | End: 2017-05-02

## 2017-05-02 RX ORDER — MECLIZINE HYDROCHLORIDE 25 MG/1
25 TABLET ORAL
Status: COMPLETED | OUTPATIENT
Start: 2017-05-02 | End: 2017-05-02

## 2017-05-02 RX ORDER — PANTOPRAZOLE SODIUM 40 MG/1
40 TABLET, DELAYED RELEASE ORAL DAILY
Status: DISCONTINUED | OUTPATIENT
Start: 2017-05-03 | End: 2017-05-04 | Stop reason: HOSPADM

## 2017-05-02 RX ORDER — ASPIRIN 325 MG
325 TABLET, DELAYED RELEASE (ENTERIC COATED) ORAL DAILY
Status: DISCONTINUED | OUTPATIENT
Start: 2017-05-03 | End: 2017-05-04 | Stop reason: HOSPADM

## 2017-05-02 RX ORDER — PRAVASTATIN SODIUM 20 MG/1
80 TABLET ORAL DAILY
Status: DISCONTINUED | OUTPATIENT
Start: 2017-05-03 | End: 2017-05-04 | Stop reason: HOSPADM

## 2017-05-02 RX ORDER — ONDANSETRON 2 MG/ML
4 INJECTION INTRAMUSCULAR; INTRAVENOUS EVERY 12 HOURS PRN
Status: DISCONTINUED | OUTPATIENT
Start: 2017-05-02 | End: 2017-05-04 | Stop reason: HOSPADM

## 2017-05-02 RX ORDER — LEVOTHYROXINE SODIUM 50 UG/1
50 TABLET ORAL
Status: DISCONTINUED | OUTPATIENT
Start: 2017-05-03 | End: 2017-05-04 | Stop reason: HOSPADM

## 2017-05-02 RX ORDER — SODIUM CHLORIDE 9 MG/ML
500 INJECTION, SOLUTION INTRAVENOUS
Status: COMPLETED | OUTPATIENT
Start: 2017-05-02 | End: 2017-05-02

## 2017-05-02 RX ORDER — PROPAFENONE HYDROCHLORIDE 150 MG/1
75 TABLET, COATED ORAL EVERY 8 HOURS
Status: DISCONTINUED | OUTPATIENT
Start: 2017-05-02 | End: 2017-05-02

## 2017-05-02 RX ADMIN — HYDRALAZINE HYDROCHLORIDE 10 MG: 20 INJECTION INTRAMUSCULAR; INTRAVENOUS at 08:05

## 2017-05-02 RX ADMIN — MECLIZINE HYDROCHLORIDE 25 MG: 25 TABLET ORAL at 04:05

## 2017-05-02 RX ADMIN — ONDANSETRON 4 MG: 4 TABLET, ORALLY DISINTEGRATING ORAL at 05:05

## 2017-05-02 RX ADMIN — SODIUM CHLORIDE 500 ML: 0.9 INJECTION, SOLUTION INTRAVENOUS at 05:05

## 2017-05-02 NOTE — ED NOTES
Received report from QUETA marin Pt. In NAD, VSS, RR equal and unlabored. Pt awaiting  Results and disposition. Pt's bed is low, locked, and call light in reach. SR up X2. Will continue to monitor pt.

## 2017-05-02 NOTE — IP AVS SNAPSHOT
[address] [place] [address]           Patient Discharge Instructions   Our goal is to set you up for success. This packet includes information on your condition, medications, and your home care.  It will help you care for yourself to prevent having to return to the hospital.     Please ask your nurse if you have any questions.      There are many details to remember when preparing to leave the hospital. Here is what you will need to do:    1. Take your medicine. If you are prescribed medications, review your Medication List on the following pages. You may have new medications to  at the pharmacy and others that you'll need to stop taking. Review the instructions for how and when to take your medications. Talk with your doctor or nurses if you are unsure of what to do.     2. Go to your follow-up appointments. Specific follow-up information is listed in the following pages. Your may be contacted by a nurse or clinical provider about future appointments. Be sure we have all of the phone numbers to reach you. Please contact your provider's office if you are unable to make an appointment.     3. Watch for warning signs. Your doctor or nurse will give you detailed warning signs to watch for and when to call for assistance. These instructions may also include educational information about your condition. If you experience any of warning signs to your health, call your doctor.               ** Verify the list of medication(s) below is accurate and up to date. Carry this with you in case of emergency. If your medications have changed, please notify your healthcare provider.             Medication List      START taking these medications        Additional Info                      meclizine 25 mg tablet   Commonly known as:  ANTIVERT   Quantity:  15 tablet   Refills:  0   Dose:  25 mg    Last time this was given:  25 mg on 5/2/2017  4:44 PM   Instructions:  Take 1  tablet (25 mg total) by mouth 3 (three) times daily as needed for Dizziness.     Begin Date    AM    Noon    PM    Bedtime         CONTINUE taking these medications        Additional Info                      alendronate 70 MG tablet   Commonly known as:  FOSAMAX   Quantity:  4 tablet   Refills:  11    Instructions:  take 1 tablet by mouth every week     Begin Date    AM    Noon    PM    Bedtime       amoxicillin-clavulanate 875-125mg 875-125 mg per tablet   Commonly known as:  AUGMENTIN   Quantity:  20 tablet   Refills:  0   Dose:  1 tablet    Instructions:  Take 1 tablet by mouth 2 (two) times daily.     Begin Date    AM    Noon    PM    Bedtime       biotin 2,500 mcg Cap   Refills:  0    Instructions:  Take by mouth.     Begin Date    AM    Noon    PM    Bedtime       clotrimazole-betamethasone 1-0.05% cream   Commonly known as:  LOTRISONE   Quantity:  45 g   Refills:  1    Instructions:  Apply topically 2 (two) times daily.     Begin Date    AM    Noon    PM    Bedtime       econazole nitrate 1 % cream   Refills:  0      Begin Date    AM    Noon    PM    Bedtime       escitalopram oxalate 10 MG tablet   Commonly known as:  LEXAPRO   Quantity:  30 tablet   Refills:  10    Instructions:  take 1 tablet by mouth once daily     Begin Date    AM    Noon    PM    Bedtime       estradiol 0.01 % (0.1 mg/gram) vaginal cream   Commonly known as:  ESTRACE   Quantity:  42.5 g   Refills:  12   Dose:  1 g    Instructions:  Place 1 g vaginally 3 (three) times a week. Place small amount on the outside of the urethra nightly for 3 weeks , then twice weekly     Begin Date    AM    Noon    PM    Bedtime       FISH OIL 1,000 mg Cap   Refills:  0   Generic drug:  omega-3 fatty acids-vitamin E    Instructions:  Take by mouth once daily.     Begin Date    AM    Noon    PM    Bedtime       fluticasone 50 mcg/actuation nasal spray   Commonly known as:  FLONASE   Quantity:  16 g   Refills:  11    Instructions:  instill 2 sprays into each  nostril once daily     Begin Date    AM    Noon    PM    Bedtime       folic acid 1 MG tablet   Commonly known as:  FOLVITE   Refills:  0   Dose:  1 tablet    Instructions:  Take 1 tablet by mouth once daily once daily.     Begin Date    AM    Noon    PM    Bedtime       iron-vitamin C 100-250 mg (ICAR-C) 100-250 mg Tab   Commonly known as:  ICAR-C   Quantity:  30 tablet   Refills:  3   Dose:  1 tablet    Instructions:  Take 1 tablet by mouth once daily.     Begin Date    AM    Noon    PM    Bedtime       levothyroxine 50 MCG tablet   Commonly known as:  SYNTHROID   Quantity:  30 tablet   Refills:  11    Last time this was given:  50 mcg on 5/4/2017  6:04 AM   Instructions:  take 1 tablet by mouth once daily     Begin Date    AM    Noon    PM    Bedtime       midodrine 10 MG tablet   Commonly known as:  PROAMATINE   Quantity:  120 tablet   Refills:  3    Last time this was given:  10 mg on 5/4/2017  6:04 AM   Instructions:  take 1 tablet by mouth four times a day     Begin Date    AM    Noon    PM    Bedtime       pravastatin 80 MG tablet   Commonly known as:  PRAVACHOL   Quantity:  30 tablet   Refills:  6   Dose:  80 mg    Last time this was given:  80 mg on 5/4/2017  9:30 AM   Instructions:  Take 1 tablet (80 mg total) by mouth once daily.     Begin Date    AM    Noon    PM    Bedtime       propafenone 150 MG Tab   Commonly known as:  RHTHYMOL   Quantity:  45 tablet   Refills:  6    Instructions:  75 mg(half tablet) every 8 hrs     Begin Date    AM    Noon    PM    Bedtime       spironolactone 25 MG tablet   Commonly known as:  ALDACTONE   Quantity:  30 tablet   Refills:  11    Instructions:  take 1 tablet by mouth once daily     Begin Date    AM    Noon    PM    Bedtime       triamcinolone acetonide 0.1% 0.1 % cream   Commonly known as:  KENALOG   Refills:  0    Instructions:  apply THINLY to affected area ON LEGS, ARMS, AND TRUCK twice a day if needed for eczema     Begin Date    AM    Noon    PM    Bedtime        vitamin B12-folic acid 0.5-1 mg Tab   Refills:  0   Dose:  1 tablet    Instructions:  Take 1 tablet by mouth once daily once daily.     Begin Date    AM    Noon    PM    Bedtime       VITAMIN C 1000 MG tablet   Refills:  0   Dose:  1 tablet   Generic drug:  ascorbic acid (vitamin C)    Instructions:  Take 1 tablet by mouth once daily once daily.     Begin Date    AM    Noon    PM    Bedtime       warfarin 1 MG tablet   Commonly known as:  COUMADIN   Quantity:  15 tablet   Refills:  3    Instructions:  Take 1/2 tablet Monday through Saturday and NONE on Sunday as directed by the coumadin clinic.     Begin Date    AM    Noon    PM    Bedtime            Where to Get Your Medications      These medications were sent to [address] [address]     Phone:  [phone]     meclizine 25 mg tablet                  Please bring to all follow up appointments:    1. A copy of your discharge instructions.  2. All medicines you are currently taking in their original bottles.  3. Identification and insurance card.    Please arrive 15 minutes ahead of scheduled appointment time.    Please call 24 hours in advance if you must reschedule your appointment and/or time.        Your Scheduled Appointments     May 10, 2017  1:30 PM CDT   Coumadin with Vandana Childress PharmD   [facility] - Coumadin ([facility])    [address]  [place] [address] [phone]            May 19, 2017  1:30 PM CDT   Established Patient Visit with Leslie Alvarado MD   [facility] - Cardiology ([facility])    [address] [place] [address] [phone]              Follow-up Information     Follow up with CHARO Rosales Jr, MD In 3 days.    Specialties:  Internal Medicine, Pediatrics    Why:  hospital follow up     Contact information:    [address] [place] [address] [phone]          Follow up with Joe Rivera,  "MD In 1 week.    Specialties:  Cardiology, Electrophysiology    Why:  hospital follow up     Contact information:    [address]  [facility] [address]  [phone]          Discharge Instructions     Future Orders    Activity as tolerated     Call MD for:  difficulty breathing or increased cough     Call MD for:  increased confusion or weakness     Call MD for:  persistent dizziness, light-headedness, or visual disturbances     Call MD for:  persistent nausea and vomiting or diarrhea     Call MD for:  severe persistent headache     Call MD for:  severe uncontrolled pain     Call MD for:  temperature >100.4     Diet general     Questions:    Total calories:      Fat restriction, if any:      Protein restriction, if any:      Na restriction, if any:  2gNa    Fluid restriction:      Additional restrictions:          Primary Diagnosis     Your primary diagnosis was:  Spinning Sensation      Admission Information     Date & Time Provider Department CSN    5/2/2017  2:51 PM Sonny Daniels MD [facility] -  [id number]      Care Providers     Provider Role Specialty Primary office phone    Sonny Daniels MD Attending Provider General Practice [phone]    Sonny Daniels MD Team Attending  General Practice [phone]      Your Vitals Were     BP Pulse Temp Resp Height Weight    166/90 (BP Location: Left arm, Patient Position: Lying, BP Method: Automatic) 76 98.1 °F (36.7 °C) 18 5' 3" (1.6 m) 68.4 kg (150 lb 11.2 oz)    SpO2 BMI             94% 26.7 kg/m2         Recent Lab Values     No lab values to display.      Allergies as of 5/4/2017        Reactions    Sulfa (Sulfonamide Antibiotics) Anaphylaxis    Other reaction(s): Angioedema    Morphine     Other reaction(s): Unknown    Dronedarone Diarrhea, Nausea Only, Rash    DIZZINESS      [facility]     [facility] Nurse Care Line - 24/7 Assistance  Unless otherwise directed by your provider, please contact [facility], our nurse care line that is " available for 24/7 assistance.     Registered nurses in the [facility] provide clinical advisement, health education, appointment booking, and other advisory services.  Call for this free service at [phone].        Advance Directives     An advance directive is a document which, in the event you are no longer able to make decisions for yourself, tells your healthcare team what kind of treatment you do or do not want to receive, or who you would like to make those decisions for you.  If you do not currently have an advance directive, [facility] encourages you to create one.  For more information call:  [phone]), [phone] ([phone]),  or log on to [email or web address].        Language Assistance Services     ATTENTION: Language assistance services are available, free of charge. Please call [phone].      ATENCIÓN: Si habla español, tiene a patel disposición servicios gratuitos de asistencia lingüística. Llame al [phone].     [facility] Ý: N?u b?n nói Ti?ng Vi?t, có các d?ch v? h? tr? ngôn ng? mi?n phí dành cho b?n. G?i s? [phone].        Heart Failure Education       Heart Failure: Being Active  You have a condition called heart failure. Being active doesnt mean that you have to wear yourself out. Even a little movement each day helps to strengthen your heart. If you cant get out to exercise, you can do simple stretching and strengthening exercises at home. These are good ways to keep you well-conditioned and prevent you and your heart from becoming excessively weak.    Ideas to get you started  · Add a little movement to things you do now. Walk to mail letters. Park your car at the far end of the parking lot and walk to the store. Walk up a flight of stairs instead of taking the elevator.  · Choose activities you enjoy. You might walk, swim, or ride an exercise bike. Things like gardening and washing the car count, too. Other possibilities include: washing  dishes, walking the dog, walking around the mall, and doing aerobic activities with friends.  · Join a group exercise program at a [facility] or [facility], a senior center, or a community center. Or look into a hospital cardiac rehabilitation program. Ask your doctor if you qualify.  Tips to keep you going  · Get up and get dressed each day. Go to a coffee shop and read a newspaper or go somewhere that you'll be in the presence of other active people. Youll feel more like being active.  · Make a plan. Choose one or more activities that you enjoy and that you can easily do. Then plan to do at least one each day. You might write your plan on a calendar.  · Go with a friend or a group if you like company. This can help you feel supported and stay motivated, too.  · Plan social events that you enjoy. This will keep you mentally engaged as well as physically motivated to do things you find pleasure in.  For your safety  · Talk with your healthcare provider before starting an exercise program.  · Exercise indoors when its too hot or too cold outside, or when the air quality is poor. Try walking at a shopping mall.  · Wear socks and sturdy shoes to maintain your balance and prevent falls.  · Start slowly. Do a few minutes several times a day at first. Increase your time and speed little by little.  · Stop and rest whenever you feel tired or get short of breath.  · Dont push yourself on days when you dont feel well.  Date Last Reviewed: 3/20/2016  © 1541-8372 [employer]. [address] [place] [address], [place] [address]. All rights reserved. This information is not intended as a substitute for professional medical care. Always follow your healthcare professional's instructions.              Heart Failure: Evaluating Your Heart  You have a condition called heart failure. To evaluate your condition, your doctor will examine you, ask questions, and do some tests. Along with looking for signs of heart failure, the doctor looks  for any other health problems that may have led to heart failure. The results of your evaluation will help your doctor form a treatment plan.  Health history and physical exam  Your visit will start with a health history. Tell the doctor about any symptoms youve noticed and about all medicines you take. Then youll have a physical exam. This includes listening to your heartbeat and breathing. Youll also be checked for swelling (edema) in your legs and neck. When you have fluid buildup or fluid in the lungs, it may be called congestive heart failure.  Diagnosing heart failure     During an echocardiogram, sound waves bounce off the heart. These are converted into a picture on the screen.   The following may be done to help your doctor form a diagnosis:  · X-rays show the size and shape of your heart. These pictures can also show fluid in your lungs.  · An electrocardiogram (ECG or EKG) shows the pattern of your heartbeat. Small pads (electrodes) are placed on your chest, arms, and legs. Wires connect the pads to the ECG machine, which records your hearts electrical signals. This can give the doctor information about heart function.  · An echocardiogram uses ultrasound waves to show the structure and movement of your heart muscle. This shows how well the heart pumps. It also shows the thickness of the heart walls, and if the heart is enlarged. It is one of the most useful, non-invasive tests as it provides information about the heart's general function. This helps your doctor make treatment decisions.  · Lab tests evaluate small amounts of blood or urine for signs of problems. A BNP lab test can help diagnose and evaluate heart failure. BNP stands for B-type natriuretic peptide. The ventricles secrete more BNP when heart failure worsens. Lab tests can also provide information about metabolic dysfunction or heart dysfunction.  Your treatment plan  Based on the results of your evaluation and tests, your doctor will  develop a treatment plan. This plan is designed to relieve some of your heart failure symptoms and help make you more comfortable. Your treatment plan may include:  · Medicine to help your heart work better and improve your quality of life  · Changes in what you eat and drink to help prevent fluid from backing up in your body  · Daily monitoring of your weight and heart failure symptoms to see how well your treatment plan is working  · Exercise to help you stay healthy  · Help with quitting smoking  · Emotional and psychological support to help adjust to the changes  · Referrals to other specialists to make sure you are being treated comprehensively  Date Last Reviewed: 3/21/2016  © 9494-0593 [employer]. [address] [place] [address], [place] [address]. All rights reserved. This information is not intended as a substitute for professional medical care. Always follow your healthcare professional's instructions.              Heart Failure: Making Changes to Your Diet  You have a condition called heart failure. When you have heart failure, excess fluid is more likely to build up in your body because your heart isn't working well. This makes the heart work harder to pump blood. Fluid buildup causes symptoms such as shortness of breath and swelling (edema). This is often referred to as congestive heart failure or CHF. Controlling the amount of salt (sodium) you eat may help stop fluid from building up. Your doctor may also tell you to reduce the amount of fluid you drink.  Reading food labels    Your healthcare provider will tell you how much sodium you can eat each day. Read food labels to keep track. Keep in mind that certain foods are high in salt. These include canned, frozen, and processed foods. Check the amount of sodium in each serving. Watch out for high-sodium ingredients. These include MSG (monosodium glutamate), baking soda, and sodium phosphate.   Eating less salt  Give yourself time to get used to  eating less salt. It may take a little while. Here are some tips to help:  · Take the saltshaker off the table. Replace it with salt-free herb mixes and spices.  · Eat fresh or plain frozen vegetables. These have much less salt than canned vegetables.  · Choose low-sodium snacks like sodium-free pretzels, crackers, or air-popped popcorn.  · Dont add salt to your food when youre cooking. Instead, season your foods with pepper, lemon, garlic, or onion.  · When you eat out, ask that your food be cooked without added salt.  · Avoid eating fried foods as these often have a great deal of salt.  If youre told to limit fluids  You may need to limit how much fluid you have to help prevent swelling. This includes anything that is liquid at room temperature, such as ice cream and soup. If your doctor tells you to limit fluid, try these tips:  · Measure drinks in a measuring cup before you drink them. This will help you meet daily goals.  · Chill drinks to make them more refreshing.  · Suck on frozen lemon wedges to quench thirst.  · Only drink when youre thirsty.  · Chew sugarless gum or suck on hard candy to keep your mouth moist.  · Weigh yourself daily to know if your body's fluid content is rising.  My sodium goal  Your healthcare provider may give you a sodium goal to meet each day. This includes sodium found in food as well as salt that you add. My goal is to eat no more than ___________ mg of sodium per day.     When to call your doctor  Call your doctor right away if you have any symptoms of worsening heart failure. These can include:  · Sudden weight gain  · Increased swelling of your legs or ankles  · Trouble breathing when youre resting or at night  · Increase in the number of pillows you have to sleep on  · Chest pain, pressure, discomfort, or pain in the jaw, neck, or back   Date Last Reviewed: 3/21/2016  © 6163-7843 [employer]. [address] [place] [address], [place] [address]. All rights reserved.  This information is not intended as a substitute for professional medical care. Always follow your healthcare professional's instructions.              Heart Failure: Medicines to Help Your Heart    You have a condition called heart failure (also known as congestive heart failure, or CHF). Your doctor will likely prescribe medicines for heart failure and any underlying health problems you have. Most heart failure patients take one or more types of medicinen. Your healthcare provider will work to find the combination of medicines that works best for you.  Heart failure medicines  Here are the most common heart failure medicines:  · ACE inhibitors lower blood pressure and decrease strain on the heart. This makes it easier for the heart to pump. Angiotensin receptor blockers have similar effects. These are prescribed for some patients instead of ACE inhibitors.  · Beta-blockers relieve stress on the heart. They also improve symptoms. They may also improve the heart's pumping action over time.  · Diuretics (also called water pills) help rid your body of excess water. This can help rid your body of swelling (edema). Having less fluid to pump means your heart doesnt have to work as hard. Some diuretics make your body lose a mineral called potassium. Your doctor will tell you if you need to take supplements or eat more foods high in potassium.  · Digoxin helps your heart pump with more strength. This helps your heart pump more blood with each beat. So, more oxygen-rich blood travels to the rest of the body.  · Aldosterone antagonists help alter hormones and decrease strain on the heart.  · Hydralazine and nitrates are two separate medicines used together to treat heart failure. They may come in one combination pill. They lower blood pressure and decrease how hard the heart has to pump.  Medicines for related conditions  Controlling other heart problems helps keep heart failure under control, too. Depending on other  heart problems you have, medicines may be prescribed to:  · Lower blood pressure (antihypertensives).  · Lower cholesterol levels (statins).  · Prevent blood clots (anticoagulants or aspirin).  · Keep the heartbeat steady (antiarrhythmics).  Date Last Reviewed: 3/5/2016  © 8049-3158 [employer]. [address] [place] [address], [place] [address]. All rights reserved. This information is not intended as a substitute for professional medical care. Always follow your healthcare professional's instructions.              Heart Failure: Procedures That May Help    The heart is a muscle that pumps oxygen-rich blood to all parts of the body. When you have heart failure, the heart is not able to pump as well as it should. Blood and fluid may back up into the lungs (congestive heart failure), and some parts of the body dont get enough oxygen-rich blood to work normally. These problems lead to the symptoms of heart failure.     Certain procedures may help the heart pump better in some cases of heart failure. Some procedures are done to treat health problems that may have caused the heart failure such as coronary artery disease or heart rhythm problems. For more serious heart failure, other options are available.  Treating artery and valve problems  If you have coronary artery disease or valve disease, procedures may be done to improve blood flow. This helps the heart pump better, which can improve heart failure symptoms. First, your doctor may do a cardiac catheterization to help detect clogged blood vessels or valve damage. During this procedure, a  thin tube (catheter) in inserted into a blood vessel and guided to the heart. There a dye is injected and a special type of X-ray (angiogram) is taken of the blood vessels. Procedures to open a blocked artery or fix damaged valves can also be done using catheterization.  · Angioplasty uses a balloon-tipped instrument at the end of the catheter. The balloon is inflated to  widen the narrowed artery. In many cases, a stent is expanded to further support the narrowed artery. A stent is a metal mesh tube.  · Valve surgery repairs or replacement of faulty valves can also be done during catheterization so blood can flow properly through the chambers of the heart.  Bypass surgery is another option to help treat blocked arteries. It uses a healthy blood vessel from elsewhere in the body. The healthy blood vessel is attached above and below the blocked area so that blood can flow around the blocked artery.  Treating heart rhythm problems  A device may be placed in the chest to help a weak heart maintain a healthy, heartbeat so the heart can pump more effectively:  · Pacemaker. A pacemaker is an implanted device that regulates your heartbeat electronically. It monitors your heart's rhythm and generates a painless electric impulse that helps the heart beat in a regular rhythm. A pacemaker is programmed to meet your specific heart rhythm needs.  · Biventricular pacing/cardiac resynchronization therapy. A type of pacemaker that paces both pumping chambers of the heart at the same time to coordinate contractions and to improve the heart's function. Some people with heart failure are candidates for this therapy.  · Implantable cardioverter defibrillator. A device similar to a pacemaker that senses when the heart is beating too fast and delivers an electrical shock to convert the fast rhythm to a normal rhythm. This can be a life saving device.  In severe cases  In more serious cases of heart failure when other treatments no longer work, other options may include:  · Ventricular assist devices (VADs). These are mechanical devices used to take over the pumping function for one or both of the heart's ventricles, or pumping chambers. A VAD may be necessary when heart failure progresses to the point that medicines and other treatments no longer help. In some cases, a VAD may be used as a bridge to  transplant.  · Heart transplant. This is replacing the diseased heart with a healthy one from a donor. This is an option for a few people who are very sick. A heart transplant is very serious and not an option for all patients. Your doctor can tell you more.  Date Last Reviewed: 3/20/2016  © 0020-9989 [employer]. [address] [place] [address], [place] [address]. All rights reserved. This information is not intended as a substitute for professional medical care. Always follow your healthcare professional's instructions.              Heart Failure: Tracking Your Weight  You have a condition called heart failure. When you have heart failure, a sudden weight gain or a steady rise in weight is a warning sign that your body is retaining too much water and salt. This could mean your heart failure is getting worse. If left untreated, it can cause problems for your lungs and result in shortness of breath. Weighing yourself each day is the best way to know if youre retaining water. If your weight goes up quickly, call your doctor. You will be given instructions on how to get rid of the excess water. You will likely need medicines and to avoid salt. This will help your heart work better.  Call your doctor if you gain more than 2 pounds in 1 day, more than 5 pounds in 1 week, or whatever weight gain you were told to report by your doctor. This is often a sign of worsening heart failure and needs to be evaluated and treated. Your doctor will tell you what to do next.   Tips for weighing yourself    · Weigh yourself at the same time each morning, wearing the same clothes. Weigh yourself after urinating and before eating.  · Use the same scale each day. Make sure the numbers are easy to read. Put the scale on a flat, hard surface -- not on a rug or carpet.  · Do not stop weighing yourself. If you forget one day, weigh again the next morning.  How to use your weight chart  · Keep your weight chart near the scale. Write  your weight on the chart as soon as you get off the scale.  · Fill in the month and the start date on the chart. Then write down your weight each day. Your chart will look like this:    · If you miss a day, leave the space blank. Weigh yourself the next day and write your weight in the next space.  · Take your weight chart with you when you go to see your doctor.  Date Last Reviewed: 3/20/2016  © 6825-9989 [employer]. [address] [place] [address]. All rights reserved. This information is not intended as a substitute for professional medical care. Always follow your healthcare professional's instructions.              Heart Failure: Warning Signs of a Flare-Up  You have a condition called heart failure. Once you have heart failure, flare-ups can happen. Below are signs that can mean your heart failure is getting worse. If you notice any of these warning signs, call your healthcare provider.  Swelling    · Your feet, ankles, or lower legs get puffier.  · You notice skin changes on your lower legs.  · Your shoes feel too tight.  · Your clothes are tighter in the waist.  · You have trouble getting rings on or off your fingers.  Shortness of breath  · You have to breathe harder even when youre doing your normal activities or when youre resting.  · You are short of breath walking up stairs or even short distances.  · You wake up at night short of breath or coughing.  · You need to use more pillows or sit up to sleep.  · You wake up tired or restless.  Other warning signs  · You feel weaker, dizzy, or more tired.  · You have chest pain or changes in your heartbeat.  · You have a cough that wont go away.  · You cant remember things or dont feel like eating.  Tracking your weight  Gaining weight is often the first warning sign that heart failure is getting worse. Gaining even a few pounds can be a sign that your body is retaining excess water and salt. Weighing yourself each day in the  morning after you urinate and before you eat, is the best way to know if you're retaining water. Get a scale that is easy to read and make sure you wear the same clothes and use the same scale every time you weigh. Your healthcare provider will show you how to track your weight. Call your doctor if you gain more than 2 pounds in 1 day, 5 pounds in 1 week, or whatever weight gain you were told to report by your doctor. This is often a sign of worsening heart failure and needs to be evaluated and treated before it compromises your breathing. Your doctor will tell you what to do next.    Date Last Reviewed: 3/15/2016  © 3019-4254 [employer]. [address] [place] [address], [place] [address]. All rights reserved. This information is not intended as a substitute for professional medical care. Always follow your healthcare professional's instructions.              Coumadin Discharge Instructions                         Chronic Kindey Disease Education              [facility] -  complies with applicable Federal civil rights laws and does not discriminate on the basis of race, color, national origin, age, disability, or sex.

## 2017-05-02 NOTE — ED NOTES
Bed rails are up and call light is within patient reach. Family at the bedside, will continue to monitor and assist.

## 2017-05-02 NOTE — ED PROVIDER NOTES
SCRIBE #1 NOTE: I, Aaron Perez, am scribing for, and in the presence of, Dennis Castillo Jr., MD. I have scribed the HPI, ROS, and PEx.     SCRIBE #2 NOTE: I, Nader Elkins/Yolanda Mann, am scribing for, and in the presence of,  Livan Rivers MD. I have scribed the remaining portions of the note not scribed by Scribe #1.     History      Chief Complaint   Patient presents with    Cerebrovascular Accident     Pts daughter states pt began exhibiting symptoms of a stroke since Saturday night.        Review of patient's allergies indicates:   Allergen Reactions    Sulfa (sulfonamide antibiotics) Anaphylaxis     Other reaction(s): Angioedema    Morphine      Other reaction(s): Unknown    Dronedarone Diarrhea, Nausea Only and Rash     DIZZINESS          HPI   HPI    5/2/2017, 2:56 PM   History obtained from the patient      History of Present Illness: Josy Posey is a 89 y.o. female patient who presents to the Emergency Department for possible CVA via [facility]. Pt reported to Cardiology clinic for further evaluation of generalized weakness and decreased hearing from her R ear which onset 3 days ago, worsening today. Symptoms are constant and moderate in severity. Sx are exacerbated by nothing and relieved by nothing. Associated sxs include dizziness and N/V onset this AM. Pt states she feels dizzy when standing and describes her sxs as the room spinning. Pt reports relief of the dizziness when sitting still. No other sxs reported. Pt states she takes coumadin daily. Patient denies any fever, N/V/D, chills, abd pain, CP, SOB, numbness, lightheadedness and all other sxs at this time. No further complaints or concerns at this time.     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 fuCT 1/3- stable,  CT's later - no residual    Lens replaced by other means 6/25/2014    Melena     Memory loss     PET scan consistent with Alzzheimers.   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    1 vessel LIMA to LAD    S/P MVR (mitral valve replacement) 9/27/2013    Skin ulcer     Squamous cell carcinoma     skin cancer X 2    Stroke     Syncope and collapse     Unspecified essential hypertension 9/16/2013    Unspecified transient cerebral ischemia     Urinary incontinence     wears briefs       Past Surgical History:  Past Surgical History:   Procedure Laterality Date    ADENOIDECTOMY      APPENDECTOMY      BREAST SURGERY  1950    right lumpectomy     CARDIAC VALVE SURGERY  10/04/2007    MVR    CATARACT EXTRACTION      CHOLECYSTECTOMY      CORONARY ARTERY BYPASS GRAFT      EYE SURGERY      cataracts bilateral    HYSTERECTOMY      for hydatiform mole    INSERT / REPLACE / REMOVE PACEMAKER  09/11/2001    TONSILLECTOMY           Family History:  Family History   Problem Relation Age of Onset    Tuberculosis Mother     Tuberculosis Father     Stroke Sister     Hypertension Sister     Stroke Brother     " Hypertension Daughter     COPD Brother        Social History:  Social History     Social History Main Topics    Smoking status: Never Smoker    Smokeless tobacco: Never Used    Alcohol use No    Drug use: No    Sexual activity: No       ROS   Review of Systems   Constitutional: Negative for chills and fever.        (+)generalized weakness   HENT: Negative for congestion and sore throat.         (+)difficulty hearing from the R ear   Respiratory: Negative for chest tightness and shortness of breath.    Cardiovascular: Negative for chest pain.   Gastrointestinal: Positive for nausea and vomiting. Negative for abdominal pain, constipation and diarrhea.   Musculoskeletal: Negative for back pain and neck pain.   Skin: Negative for rash.   Neurological: Positive for dizziness. Negative for numbness and headaches.   Psychiatric/Behavioral: Negative for agitation and confusion.   All other systems reviewed and are negative.      Physical Exam    Initial Vitals   BP Pulse Resp Temp SpO2   05/02/17 1356 05/02/17 1356 05/02/17 1356 05/02/17 1356 05/02/17 1356   166/85 75 18 97.7 °F (36.5 °C) 95 %      Physical Exam  Nursing Notes and Vital Signs Reviewed.  Constitutional: Patient is in no apparent distress. Awake and alert. Well-developed and well-nourished.  Head: Atraumatic. Normocephalic.  Eyes: PERRL. EOM intact. Conjunctivae are not pale. No scleral icterus.  ENT: Mucous membranes are moist. Oropharynx is clear and symmetric. L TM is unremarkable. Cerumen noted to the R TM.  Neck: Supple. Full ROM. No lymphadenopathy.  Cardiovascular: Regular rate. Regular rhythm. No murmurs, rubs, or gallops. Distal pulses are 2+ and symmetric.  Pulmonary/Chest: No respiratory distress. Clear to auscultation bilaterally. No wheezing, rales, or rhonchi.  Abdominal: Soft and non-distended.  There is no tenderness.  No rebound, guarding, or rigidity. Good bowel sounds.  Musculoskeletal: Moves all extremities. No obvious deformities.  "No edema. No calf tenderness.  Skin: Warm and dry.  Neurological: Patient is alert and oriented to person, place and time. Pupils ERRL and EOM normal. Decreased hearing noted on the R ear. Strength is full bilaterally; it is equal and 5/5 in bilateral upper and lower extremities. There is no pronator drift of outstretched arms. Light touch sense is intact. Speech is clear and normal.   Psychiatric: Normal affect. Good eye contact. Appropriate in content.    ED Course    Procedures  ED Vital Signs:  Vitals:    05/02/17 1356 05/02/17 1501 05/02/17 1536 05/02/17 1600   BP: (!) 166/85 (!) 159/82     Pulse: 75 75 74 75   Resp: 18 (!) 21  16   Temp: 97.7 °F (36.5 °C)      TempSrc: Oral      SpO2: 95% 96%  97%   Weight: 66.7 kg (147 lb)      Height: 5' 4" (1.626 m)       05/02/17 1732 05/02/17 1747   BP: (!) 169/81 (!) 183/90   Pulse: 75 75   Resp: (!) 21 16   Temp:     TempSrc:     SpO2: 98% 98%   Weight:     Height:         Abnormal Lab Results:  Labs Reviewed   CBC W/ AUTO DIFFERENTIAL - Abnormal; Notable for the following:        Result Value    RBC 3.99 (*)     MCH 32.3 (*)     All other components within normal limits    Narrative:     PLEASE REVIEW ORDER START TIME BEFORE MARKING SPECIMEN  COLLECTED.   COMPREHENSIVE METABOLIC PANEL - Abnormal; Notable for the following:     Creatinine 1.6 (*)     Alkaline Phosphatase 184 (*)      (*)      (*)     eGFR if  32 (*)     eGFR if non  28 (*)     All other components within normal limits    Narrative:     PLEASE REVIEW ORDER START TIME BEFORE MARKING SPECIMEN  COLLECTED.   PROTIME-INR - Abnormal; Notable for the following:     Prothrombin Time 27.4 (*)     INR 2.7 (*)     All other components within normal limits    Narrative:     PLEASE REVIEW ORDER START TIME BEFORE MARKING SPECIMEN  COLLECTED.   B-TYPE NATRIURETIC PEPTIDE - Abnormal; Notable for the following:      (*)     All other components within normal limits    " Narrative:     PLEASE REVIEW ORDER START TIME BEFORE MARKING SPECIMEN  COLLECTED.   TROPONIN I    Narrative:     PLEASE REVIEW ORDER START TIME BEFORE MARKING SPECIMEN  COLLECTED.   URINALYSIS   TROPONIN I    Narrative:     PLEASE REVIEW ORDER START TIME BEFORE MARKING SPECIMEN  COLLECTED.   AMMONIA   AMMONIA    Narrative:     PLEASE REVIEW ORDER START TIME BEFORE MARKING SPECIMEN  COLLECTED.        All Lab Results:  Results for orders placed or performed during the hospital encounter of 05/02/17   CBC auto differential   Result Value Ref Range    WBC 6.89 3.90 - 12.70 K/uL    RBC 3.99 (L) 4.00 - 5.40 M/uL    Hemoglobin 12.9 12.0 - 16.0 g/dL    Hematocrit 37.9 37.0 - 48.5 %    MCV 95 82 - 98 fL    MCH 32.3 (H) 27.0 - 31.0 pg    MCHC 34.0 32.0 - 36.0 %    RDW 13.5 11.5 - 14.5 %    Platelets 193 150 - 350 K/uL    MPV 11.3 9.2 - 12.9 fL    Gran # 3.9 1.8 - 7.7 K/uL    Lymph # 1.8 1.0 - 4.8 K/uL    Mono # 0.9 0.3 - 1.0 K/uL    Eos # 0.3 0.0 - 0.5 K/uL    Baso # 0.02 0.00 - 0.20 K/uL    Gran% 56.8 38.0 - 73.0 %    Lymph% 26.1 18.0 - 48.0 %    Mono% 13.2 4.0 - 15.0 %    Eosinophil% 3.6 0.0 - 8.0 %    Basophil% 0.3 0.0 - 1.9 %    Differential Method Automated    Comprehensive metabolic panel   Result Value Ref Range    Sodium 141 136 - 145 mmol/L    Potassium 4.4 3.5 - 5.1 mmol/L    Chloride 107 95 - 110 mmol/L    CO2 24 23 - 29 mmol/L    Glucose 109 70 - 110 mg/dL    BUN, Bld 24 10 - 30 mg/dL    Creatinine 1.6 (H) 0.5 - 1.4 mg/dL    Calcium 9.2 8.7 - 10.5 mg/dL    Total Protein 7.5 6.0 - 8.4 g/dL    Albumin 3.5 3.5 - 5.2 g/dL    Total Bilirubin 1.0 0.1 - 1.0 mg/dL    Alkaline Phosphatase 184 (H) 55 - 135 U/L     (H) 10 - 40 U/L     (H) 10 - 44 U/L    Anion Gap 10 8 - 16 mmol/L    eGFR if African American 32 (A) >60 mL/min/1.73 m^2    eGFR if non African American 28 (A) >60 mL/min/1.73 m^2   Protime-INR   Result Value Ref Range    Prothrombin Time 27.4 (H) 9.0 - 12.5 sec    INR 2.7 (H) 0.8 - 1.2   Troponin I    Result Value Ref Range    Troponin I 0.011 0.000 - 0.026 ng/mL   B-Type natriuretic peptide (BNP)   Result Value Ref Range     (H) 0 - 99 pg/mL   Urinalysis   Result Value Ref Range    Specimen UA Urine, Clean Catch     Color, UA Yellow Yellow, Straw, Tanisha    Appearance, UA Clear Clear    pH, UA 6.0 5.0 - 8.0    Specific Gravity, UA 1.010 1.005 - 1.030    Protein, UA Negative Negative    Glucose, UA Negative Negative    Ketones, UA Negative Negative    Bilirubin (UA) Negative Negative    Occult Blood UA Negative Negative    Nitrite, UA Negative Negative    Urobilinogen, UA Negative <2.0 EU/dL    Leukocytes, UA Negative Negative   Troponin I   Result Value Ref Range    Troponin I 0.025 0.000 - 0.026 ng/mL   Ammonia   Result Value Ref Range    Ammonia 20 10 - 50 umol/L         Imaging Results:  Imaging Results         X-Ray Chest AP Portable (Final result) Result time:  05/02/17 15:46:40    Final result by JAIME Ochoa Sr., MD (05/02/17 15:46:40)    Impression:        1. The lungs are clear.   2. There is a curvilinear sclerotic area in the left humeral head. This is characteristic of avascular necrosis.  3. Surgical changes  4. There is mild elevation of the left hemidiaphragm.       Electronically signed by: JAIME OCHOA MD  Date:     05/02/17  Time:    15:46     Narrative:    One-view chest x-ray    Clinical History: Chest pain    Finding: Comparison was made to prior examination performed on 4/27/2017. There are multiple sternotomy wires in place. There are multiple surgical clips projected over the mediastinum. A cardiac pacemaker is in place. The leads appear to be intact. The size of the heart is normal. The lungs are clear. There is no pneumothorax.  The costophrenic angles are sharp. There is mild elevation of the left hemidiaphragm. There is a curvilinear sclerotic area in the left humeral head.            CT Head Without Contrast (Final result) Result time:  05/02/17 14:42:55    Final result by  JAIME Ochoa Sr., MD (05/02/17 14:42:55)    Impression:      1. There is no evidence of an acute ischemic event.  2. There is no intracranial hemorrhage.    All CT scans at this facility use dose modulation, iterative reconstruction, and/or weight base dosing when appropriate to reduce radiation dose when appropriate to reduce radiation dose to as low as reasonably achievable.      Electronically signed by: JAIME OCHOA MD  Date:     05/02/17  Time:    14:42     Narrative:    CT of Head without IV contrast    History: Stroke    Technique: Standard brain CT protocol without IV contrast was performed.     Finding: Comparison was made to a prior examination performed on 10/22/2012. There is a mild amount of generalized cerebral and cerebellar atrophy. There is no evidence of an acute ischemic event. There is no intracranial hemorrhage. There is no skull fracture. The paranasal sinuses are normal in appearance.               The EKG was ordered, reviewed, and independently interpreted by the ED provider.  Interpretation time: 1515  Rate: 75 BPM  Rhythm: Electronic ventricular pacemaker  Interpretation: No STEMI.         The Emergency Provider reviewed the vital signs and test results, which are outlined above.    ED Discussion     3:52 PM: Dr. Castillo transfers care of pt to Dr. Rivers, pending lab results.    4:35 PM: Dr. Rivers at bedside with pt. Pt is resting comfortably and is in no acute distress.  Pt states she was sent from cardiology clinic appt this morning for further evaluation after having weakness and hearing loss to right ear. She reports 1 episode of N/V this morning. Finger-to-nose exam is normal. PERRL. Nystagmus to right eye. Pt reports recent use of abx for sinus infection. There is some cerumen noted to right ear canal. D/w pt all pertinent results. D/w pt any concerns expressed at this time. Answered all questions. Pt expresses understanding at this time.    5:19 PM. Discussed case with  Ana Lilia  [facility] ([facility]); who agrees with current care and management of pt and accepts admission.  Admitting Service: Hospital Medicine  Admitting Physician: Dr. Hinojosa  Admit to: obs    6:38 PM: Re-evaluated pt. I have discussed test results, shared treatment plan, and the need for admission with patient and family at bedside. Pt and family express understanding at this time and agree with all information. All questions answered. Pt and family have no further questions or concerns at this time. Pt is ready for admit.    ED Medication(s):  Medications   meclizine tablet 25 mg (25 mg Oral Given 5/2/17 1644)   ondansetron disintegrating tablet 4 mg (4 mg Oral Given 5/2/17 1743)   0.9%  NaCl infusion (500 mLs Intravenous New Bag 5/2/17 1744)       New Prescriptions    No medications on file             Medical Decision Making    Medical Decision Making:   Clinical Tests:   Lab Tests: Ordered and Reviewed  Radiological Study: Reviewed and Ordered  Medical Tests: Ordered and Reviewed           Scribe Attestation:   Scribe #1: I performed the above scribed service and the documentation accurately describes the services I performed. I attest to the accuracy of the note.    Attending:   Physician Attestation Statement for Scribe #1: I, Dennis Castillo Jr., MD, personally performed the services described in this documentation, as scribed by Aaron Perez, in my presence, and it is both accurate and complete.       Scribe Attestation:   Scribe #2: I performed the above scribed service and the documentation accurately describes the services I performed. I attest to the accuracy of the note.    Attending Attestation:           Physician Attestation for Scribe:    Physician Attestation Statement for Scribe #2: I, Livan Rivers MD, reviewed documentation, as scribed by Nader Elkins/Yolanda Mann in my presence, and it is both accurate and complete. I also acknowledge and confirm the content of the note done by Efren  #1.          Clinical Impression       ICD-10-CM ICD-9-CM   1. Vertigo R42 780.4   2. Paroxysmal atrial fibrillation I48.0 427.31   3. Transaminitis R74.0 790.4   \      Disposition:   Disposition: Placed in Observation  Condition: Prem Rivers Jr., MD  05/02/17 9968

## 2017-05-03 PROBLEM — I95.1 ORTHOSTATIC HYPOTENSION DYSAUTONOMIC SYNDROME: Status: ACTIVE | Noted: 2017-05-03

## 2017-05-03 LAB
ALBUMIN SERPL BCP-MCNC: 3.1 G/DL
ALP SERPL-CCNC: 176 U/L
ALT SERPL W/O P-5'-P-CCNC: 147 U/L
ANION GAP SERPL CALC-SCNC: 9 MMOL/L
AST SERPL-CCNC: 125 U/L
BASOPHILS # BLD AUTO: 0.02 K/UL
BASOPHILS NFR BLD: 0.3 %
BILIRUB SERPL-MCNC: 1.2 MG/DL
BUN SERPL-MCNC: 20 MG/DL
CALCIUM SERPL-MCNC: 8.7 MG/DL
CHLORIDE SERPL-SCNC: 109 MMOL/L
CO2 SERPL-SCNC: 22 MMOL/L
CREAT SERPL-MCNC: 1.4 MG/DL
DIFFERENTIAL METHOD: ABNORMAL
EOSINOPHIL # BLD AUTO: 0.3 K/UL
EOSINOPHIL NFR BLD: 4.3 %
ERYTHROCYTE [DISTWIDTH] IN BLOOD BY AUTOMATED COUNT: 13.6 %
EST. GFR  (AFRICAN AMERICAN): 38 ML/MIN/1.73 M^2
EST. GFR  (NON AFRICAN AMERICAN): 33 ML/MIN/1.73 M^2
ESTIMATED PA SYSTOLIC PRESSURE: 46.79
GLUCOSE SERPL-MCNC: 75 MG/DL
HCT VFR BLD AUTO: 36.3 %
HGB BLD-MCNC: 12.3 G/DL
LYMPHOCYTES # BLD AUTO: 1.7 K/UL
LYMPHOCYTES NFR BLD: 25.2 %
MAGNESIUM SERPL-MCNC: 1.4 MG/DL
MCH RBC QN AUTO: 32.1 PG
MCHC RBC AUTO-ENTMCNC: 33.9 %
MCV RBC AUTO: 95 FL
MONOCYTES # BLD AUTO: 0.8 K/UL
MONOCYTES NFR BLD: 12.3 %
NEUTROPHILS # BLD AUTO: 3.8 K/UL
NEUTROPHILS NFR BLD: 57.9 %
PHOSPHATE SERPL-MCNC: 2.6 MG/DL
PLATELET # BLD AUTO: 192 K/UL
PMV BLD AUTO: 11.6 FL
POTASSIUM SERPL-SCNC: 4.4 MMOL/L
PROT SERPL-MCNC: 6.6 G/DL
RBC # BLD AUTO: 3.83 M/UL
RETIRED EF AND QEF - SEE NOTES: 60 (ref 55–65)
SODIUM SERPL-SCNC: 140 MMOL/L
TROPONIN I SERPL DL<=0.01 NG/ML-MCNC: 0.01 NG/ML
WBC # BLD AUTO: 6.56 K/UL

## 2017-05-03 PROCEDURE — 25000003 PHARM REV CODE 250: Performed by: INTERNAL MEDICINE

## 2017-05-03 PROCEDURE — 80053 COMPREHEN METABOLIC PANEL: CPT

## 2017-05-03 PROCEDURE — 25000003 PHARM REV CODE 250: Performed by: EMERGENCY MEDICINE

## 2017-05-03 PROCEDURE — 85025 COMPLETE CBC W/AUTO DIFF WBC: CPT

## 2017-05-03 PROCEDURE — 25000003 PHARM REV CODE 250: Performed by: NURSE PRACTITIONER

## 2017-05-03 PROCEDURE — 93306 TTE W/DOPPLER COMPLETE: CPT | Mod: 26,,, | Performed by: INTERNAL MEDICINE

## 2017-05-03 PROCEDURE — 84484 ASSAY OF TROPONIN QUANT: CPT

## 2017-05-03 PROCEDURE — 93306 TTE W/DOPPLER COMPLETE: CPT

## 2017-05-03 PROCEDURE — 25000003 PHARM REV CODE 250

## 2017-05-03 PROCEDURE — 83735 ASSAY OF MAGNESIUM: CPT

## 2017-05-03 PROCEDURE — 21400001 HC TELEMETRY ROOM

## 2017-05-03 PROCEDURE — 84100 ASSAY OF PHOSPHORUS: CPT

## 2017-05-03 PROCEDURE — 36415 COLL VENOUS BLD VENIPUNCTURE: CPT

## 2017-05-03 RX ORDER — GUAIFENESIN/DEXTROMETHORPHAN 100-10MG/5
5 SYRUP ORAL EVERY 6 HOURS PRN
Status: DISCONTINUED | OUTPATIENT
Start: 2017-05-03 | End: 2017-05-04 | Stop reason: HOSPADM

## 2017-05-03 RX ORDER — IBUPROFEN 200 MG
24 TABLET ORAL
Status: DISCONTINUED | OUTPATIENT
Start: 2017-05-03 | End: 2017-05-04 | Stop reason: HOSPADM

## 2017-05-03 RX ORDER — GLUCAGON 1 MG
1 KIT INJECTION
Status: DISCONTINUED | OUTPATIENT
Start: 2017-05-03 | End: 2017-05-04 | Stop reason: HOSPADM

## 2017-05-03 RX ORDER — MECLIZINE HYDROCHLORIDE 25 MG/1
25 TABLET ORAL 3 TIMES DAILY PRN
Status: DISCONTINUED | OUTPATIENT
Start: 2017-05-03 | End: 2017-05-04 | Stop reason: HOSPADM

## 2017-05-03 RX ORDER — MIDODRINE HYDROCHLORIDE 2.5 MG/1
10 TABLET ORAL 3 TIMES DAILY
Status: DISCONTINUED | OUTPATIENT
Start: 2017-05-03 | End: 2017-05-04 | Stop reason: HOSPADM

## 2017-05-03 RX ORDER — SODIUM CHLORIDE 9 MG/ML
INJECTION, SOLUTION INTRAVENOUS ONCE
Status: COMPLETED | OUTPATIENT
Start: 2017-05-03 | End: 2017-05-03

## 2017-05-03 RX ORDER — SODIUM CHLORIDE 9 MG/ML
INJECTION, SOLUTION INTRAVENOUS CONTINUOUS
Status: DISCONTINUED | OUTPATIENT
Start: 2017-05-03 | End: 2017-05-04

## 2017-05-03 RX ORDER — IBUPROFEN 200 MG
16 TABLET ORAL
Status: DISCONTINUED | OUTPATIENT
Start: 2017-05-03 | End: 2017-05-04 | Stop reason: HOSPADM

## 2017-05-03 RX ADMIN — ASPIRIN 325 MG: 325 TABLET, DELAYED RELEASE ORAL at 08:05

## 2017-05-03 RX ADMIN — PRAVASTATIN SODIUM 80 MG: 20 TABLET ORAL at 08:05

## 2017-05-03 RX ADMIN — SODIUM CHLORIDE 500 ML: 0.9 INJECTION, SOLUTION INTRAVENOUS at 10:05

## 2017-05-03 RX ADMIN — LEVOTHYROXINE SODIUM 50 MCG: 50 TABLET ORAL at 05:05

## 2017-05-03 RX ADMIN — GUAIFENESIN AND DEXTROMETHORPHAN 5 ML: 100; 10 SYRUP ORAL at 05:05

## 2017-05-03 RX ADMIN — SODIUM CHLORIDE: 0.9 INJECTION, SOLUTION INTRAVENOUS at 12:05

## 2017-05-03 RX ADMIN — GUAIFENESIN AND DEXTROMETHORPHAN 5 ML: 100; 10 SYRUP ORAL at 12:05

## 2017-05-03 RX ADMIN — MIDODRINE HYDROCHLORIDE 10 MG: 2.5 TABLET ORAL at 05:05

## 2017-05-03 RX ADMIN — PANTOPRAZOLE SODIUM 40 MG: 40 TABLET, DELAYED RELEASE ORAL at 08:05

## 2017-05-03 RX ADMIN — MIDODRINE HYDROCHLORIDE 10 MG: 2.5 TABLET ORAL at 01:05

## 2017-05-03 RX ADMIN — MIDODRINE HYDROCHLORIDE 10 MG: 2.5 TABLET ORAL at 10:05

## 2017-05-03 RX ADMIN — SODIUM CHLORIDE: 0.9 INJECTION, SOLUTION INTRAVENOUS at 10:05

## 2017-05-03 NOTE — H&P
"[facility] Hospital Medicine  History & Physical    Patient Name: Josy Posey  MRN: [id number]  Admission Date: 5/2/2017  Attending Physician: Avery Hinojosa MD   Primary Care Provider: TIKA Rosales Jr, MD         Patient information was obtained from patient, past medical records and ER records.     Subjective:     Principal Problem:Vertigo    Chief Complaint:   Chief Complaint   Patient presents with    Cerebrovascular Accident     Pts daughter states pt began exhibiting symptoms of a stroke since Saturday night.         HPI: Came to the ER today for severe dizziness that started two days ago.  She was at an appointment with her Cardiologist and the nurse practitioner advised her to go.  She also has right sided blurry vision and hearing loss with unsteady gait.  This morning she had several episodes of nausea and one episode of vomiting.  While laying down and resting or sitting still she feels ok.  Sitting up or standing makes her feel like the room is spinning.  Denies any recent illness.  No fevers or chills.  Some abdominal pain, but no chest pain or shortness of breath.  Endorses generalized weakness but no specific weakness.    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 fuCT 1/3- stable,  CT's " later - no residual    Lens replaced by other means 6/25/2014    Melena     Memory loss     PET scan consistent with Alzzheimers.   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    1 vessel LIMA to LAD    S/P MVR (mitral valve replacement) 9/27/2013    Skin ulcer     Squamous cell carcinoma     skin cancer X 2    Stroke     Syncope and collapse     Unspecified essential hypertension 9/16/2013    Unspecified transient cerebral ischemia     Urinary incontinence     wears briefs       Past Surgical History:   Procedure Laterality Date    ADENOIDECTOMY      APPENDECTOMY      BREAST SURGERY  1950    right lumpectomy     CARDIAC VALVE SURGERY  10/04/2007    MVR    CATARACT EXTRACTION      CHOLECYSTECTOMY      CORONARY ARTERY BYPASS GRAFT      EYE SURGERY      cataracts bilateral    HYSTERECTOMY      for hydatiform mole    INSERT / REPLACE / REMOVE PACEMAKER  09/11/2001    TONSILLECTOMY         Review of patient's allergies indicates:   Allergen Reactions    Sulfa (sulfonamide antibiotics) Anaphylaxis     Other reaction(s): Angioedema    Morphine      Other reaction(s): Unknown    Dronedarone Diarrhea, Nausea Only and Rash     DIZZINESS         No current facility-administered medications on file prior to encounter.      Current Outpatient Prescriptions on File Prior to Encounter   Medication Sig    alendronate (FOSAMAX) 70 MG tablet take 1 tablet by mouth every week    amoxicillin-clavulanate 875-125mg (AUGMENTIN) 875-125 mg per tablet Take 1 tablet by mouth 2 (two) times daily.    ascorbic acid (VITAMIN C) 1000 MG tablet Take 1 tablet by mouth once daily once daily.    biotin 2,500 mcg Cap Take by mouth.    clotrimazole-betamethasone 1-0.05% (LOTRISONE) cream Apply topically 2  (two) times daily.    econazole nitrate 1 % cream     escitalopram oxalate (LEXAPRO) 10 MG tablet take 1 tablet by mouth once daily    fluticasone (FLONASE) 50 mcg/actuation nasal spray instill 2 sprays into each nostril once daily    folic acid (FOLVITE) 1 MG tablet Take 1 tablet by mouth once daily once daily.    iron-vitamin C 100-250 mg, ICAR-C, (ICAR-C) 100-250 mg Tab Take 1 tablet by mouth once daily.    levothyroxine (SYNTHROID) 50 MCG tablet take 1 tablet by mouth once daily    midodrine (PROAMATINE) 10 MG tablet take 1 tablet by mouth four times a day    omega-3 fatty acids-vitamin E (FISH OIL) 1,000 mg Cap Take by mouth once daily.    pravastatin (PRAVACHOL) 80 MG tablet Take 1 tablet (80 mg total) by mouth once daily.    propafenone (RHTHYMOL) 150 MG Tab 75 mg(half tablet) every 8 hrs    spironolactone (ALDACTONE) 25 MG tablet take 1 tablet by mouth once daily    triamcinolone acetonide 0.1% (KENALOG) 0.1 % cream apply THINLY to affected area ON LEGS, ARMS, AND TRUCK twice a day if needed for eczema    vitamin B12-folic acid 0.5-1 mg Tab Take 1 tablet by mouth once daily once daily.    warfarin (COUMADIN) 1 MG tablet Take 1/2 tablet Monday through Saturday and NONE on Sunday as directed by the coumadin clinic.    estradiol (ESTRACE) 0.01 % (0.1 mg/gram) vaginal cream Place 1 g vaginally 3 (three) times a week. Place small amount on the outside of the urethra nightly for 3 weeks , then twice weekly     Family History     Problem Relation (Age of Onset)    COPD Brother    Hypertension Sister, Daughter    Stroke Sister, Brother    Tuberculosis Mother, Father        Social History Main Topics    Smoking status: Never Smoker    Smokeless tobacco: Never Used    Alcohol use No    Drug use: No    Sexual activity: No     Review of Systems   Constitutional: Negative for chills and fever.   HENT: Negative for congestion and sore throat.    Eyes: Negative for visual disturbance.   Respiratory:  Negative for cough, shortness of breath and wheezing.    Cardiovascular: Negative for chest pain, palpitations and leg swelling.   Gastrointestinal: Positive for nausea and vomiting. Negative for abdominal pain, blood in stool, constipation and diarrhea.   Genitourinary: Negative for dysuria and hematuria.   Musculoskeletal: Negative for arthralgias and back pain.   Skin: Negative for rash and wound.   Neurological: Positive for dizziness, weakness and light-headedness. Negative for numbness.   Hematological: Negative for adenopathy.     Objective:     Vital Signs (Most Recent):  Temp: 98.2 °F (36.8 °C) (05/03/17 0027)  Pulse: 65 (05/03/17 0027)  Resp: 16 (05/03/17 0027)  BP: (!) 99/53 (05/03/17 0027)  SpO2: 96 % (05/03/17 0027) Vital Signs (24h Range):  Temp:  [97.7 °F (36.5 °C)-98.6 °F (37 °C)] 98.2 °F (36.8 °C)  Pulse:  [65-75] 65  Resp:  [15-21] 16  SpO2:  [95 %-98 %] 96 %  BP: ()/(53-90) 99/53     Weight: 67.3 kg (148 lb 5.9 oz)  Body mass index is 26.28 kg/(m^2).    Physical Exam   Constitutional: She is oriented to person, place, and time. She appears well-developed and well-nourished. No distress.   HENT:   Head: Normocephalic and atraumatic.   Mouth/Throat: Oropharynx is clear and moist.   Eyes: Conjunctivae and EOM are normal. Pupils are equal, round, and reactive to light.   Neck: Neck supple. No JVD present. No thyromegaly present.   Cardiovascular: Normal rate and regular rhythm.  Exam reveals no gallop and no friction rub.    No murmur heard.  Pulmonary/Chest: Effort normal and breath sounds normal. She has no wheezes. She has no rales.   Abdominal: Soft. Bowel sounds are normal. She exhibits no distension. There is no tenderness. There is no rebound and no guarding.   Musculoskeletal: Normal range of motion. She exhibits no edema or deformity.   Lymphadenopathy:     She has no cervical adenopathy.   Neurological: She is alert and oriented to person, place, and time. She has normal reflexes.  Coordination abnormal.   Skin: Skin is warm and dry. No rash noted.   Psychiatric: She has a normal mood and affect. Her behavior is normal. Judgment and thought content normal.   Nursing note and vitals reviewed.       Significant Labs:   CBC:   Recent Labs  Lab 05/02/17  1522   WBC 6.89   HGB 12.9   HCT 37.9        CMP:   Recent Labs  Lab 05/02/17  1522      K 4.4      CO2 24      BUN 24   CREATININE 1.6*   CALCIUM 9.2   PROT 7.5   ALBUMIN 3.5   BILITOT 1.0   ALKPHOS 184*   *   *   ANIONGAP 10   EGFRNONAA 28*       Significant Imaging: I have reviewed all pertinent imaging results/findings within the past 24 hours.    Assessment/Plan:     * Vertigo  Symptoms suggest BPPV but is also concerning for stroke.  CT head and Carotid US.  Neuro checks.  Check orthostatics after IV fluids.  Consider trial of Diazepam pending workup and further evaluation.  Consider consult to Neurology and Brain MRI.    Orthostatic hypotension  She is taking scheduled Midodrine but her blood pressure is high on admission.  Hold and follow blood pressures.  Recheck orthostatics in the morning.    Atrial fibrillation  Continue Coumadin and follow PT/INR.  Therapeutic at 2.7 on admission.    CKD (chronic kidney disease) stage 3, GFR 30-59 ml/min  Renal chemistries near baseline with a slightly elevated BNP.  Careful IV fluid resuscitation.    VTE Risk Mitigation         Ordered     warfarin (COUMADIN) split tablet 0.5 mg  Daily     Route:  Oral        05/02/17 2105     Medium Risk of VTE  Once      05/02/17 2105     Place sequential compression device  Until discontinued      05/02/17 2105     Place ANTELMO hose  Until discontinued      05/02/17 2105     Reason for No Pharmacological VTE Prophylaxis  Once      05/02/17 2105        Avery Hinojosa MD  Department of Hospital Medicine   [facility]

## 2017-05-03 NOTE — PLAN OF CARE
Problem: Patient Care Overview  Goal: Plan of Care Review  Outcome: Ongoing (interventions implemented as appropriate)  Patient remains free from injury or falls during shift; plan of care reviewed with patient; pt verbalized understanding; patient paced on monitor, no complaints overnight; Will continue to monitor with hourly rounding.

## 2017-05-03 NOTE — ED NOTES
No change in pt status, she is resting quietly with zero complaints and no distress noted. Pt remains awake, alert and oriented x 4.   Will continue to monitor

## 2017-05-03 NOTE — CONSULTS
"Consult Note  Neurology      Consult Requested By: Livan Rivers  Reason for Consult: dizziness    SUBJECTIVE:     History of Present Illness:  Josy Posey is a 89 y.o. female who began experiencing dizziness 4 days ago. The symptoms only occur when standing or sitting. She describes the symptoms as lightheadedness and a feeling like she might pass out. No lateralized neurological associated symptoms. She has a history of orthostasis for which she takes midodrine. Walks with a walker at baseline. Has tried ANTELMO hose before and she thinks that it helped her orthostasis.     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 fuCT 1/3- stable,  CT's later - no residual    Lens replaced by other means 6/25/2014    Melena     Memory loss     PET scan consistent with Alzzheimers.   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    1 vessel LIMA to LAD    S/P MVR (mitral valve replacement) 9/27/2013    Skin ulcer     Squamous cell carcinoma     skin cancer X 2    Stroke     Syncope and collapse     Unspecified essential " hypertension 9/16/2013    Unspecified transient cerebral ischemia     Urinary incontinence     wears briefs        Past Surgical History:   Procedure Laterality Date    ADENOIDECTOMY      APPENDECTOMY      BREAST SURGERY  1950    right lumpectomy     CARDIAC VALVE SURGERY  10/04/2007    MVR    CATARACT EXTRACTION      CHOLECYSTECTOMY      CORONARY ARTERY BYPASS GRAFT      EYE SURGERY      cataracts bilateral    HYSTERECTOMY      for hydatiform mole    INSERT / REPLACE / REMOVE PACEMAKER  09/11/2001    TONSILLECTOMY          Social History     Social History    Marital status:      Spouse name: N/A    Number of children: N/A    Years of education: N/A     Social History Main Topics    Smoking status: Never Smoker    Smokeless tobacco: Never Used    Alcohol use No    Drug use: No    Sexual activity: No     Other Topics Concern    None     Social History Narrative        FAMILY HISTORY:  Family History   Problem Relation Age of Onset    Tuberculosis Mother     Tuberculosis Father     Stroke Sister     Hypertension Sister     Stroke Brother     Hypertension Daughter     COPD Brother         ALLERGIES:  Review of patient's allergies indicates:   Allergen Reactions    Sulfa (sulfonamide antibiotics) Anaphylaxis     Other reaction(s): Angioedema    Morphine      Other reaction(s): Unknown    Dronedarone Diarrhea, Nausea Only and Rash     DIZZINESS         MEDICATIONS:  No current facility-administered medications on file prior to encounter.      Current Outpatient Prescriptions on File Prior to Encounter   Medication Sig Dispense Refill    alendronate (FOSAMAX) 70 MG tablet take 1 tablet by mouth every week 4 tablet 11    amoxicillin-clavulanate 875-125mg (AUGMENTIN) 875-125 mg per tablet Take 1 tablet by mouth 2 (two) times daily. 20 tablet 0    ascorbic acid (VITAMIN C) 1000 MG tablet Take 1 tablet by mouth once daily once daily.      biotin 2,500 mcg Cap Take by mouth.       clotrimazole-betamethasone 1-0.05% (LOTRISONE) cream Apply topically 2 (two) times daily. 45 g 1    econazole nitrate 1 % cream       escitalopram oxalate (LEXAPRO) 10 MG tablet take 1 tablet by mouth once daily 30 tablet 10    fluticasone (FLONASE) 50 mcg/actuation nasal spray instill 2 sprays into each nostril once daily 16 g 11    folic acid (FOLVITE) 1 MG tablet Take 1 tablet by mouth once daily once daily.      iron-vitamin C 100-250 mg, ICAR-C, (ICAR-C) 100-250 mg Tab Take 1 tablet by mouth once daily. 30 tablet 3    levothyroxine (SYNTHROID) 50 MCG tablet take 1 tablet by mouth once daily 30 tablet 11    midodrine (PROAMATINE) 10 MG tablet take 1 tablet by mouth four times a day 120 tablet 3    omega-3 fatty acids-vitamin E (FISH OIL) 1,000 mg Cap Take by mouth once daily.      pravastatin (PRAVACHOL) 80 MG tablet Take 1 tablet (80 mg total) by mouth once daily. 30 tablet 6    propafenone (RHTHYMOL) 150 MG Tab 75 mg(half tablet) every 8 hrs 45 tablet 6    spironolactone (ALDACTONE) 25 MG tablet take 1 tablet by mouth once daily 30 tablet 11    triamcinolone acetonide 0.1% (KENALOG) 0.1 % cream apply THINLY to affected area ON LEGS, ARMS, AND TRUCK twice a day if needed for eczema  0    vitamin B12-folic acid 0.5-1 mg Tab Take 1 tablet by mouth once daily once daily.      warfarin (COUMADIN) 1 MG tablet Take 1/2 tablet Monday through Saturday and NONE on Sunday as directed by the coumadin clinic. 15 tablet 3    estradiol (ESTRACE) 0.01 % (0.1 mg/gram) vaginal cream Place 1 g vaginally 3 (three) times a week. Place small amount on the outside of the urethra nightly for 3 weeks , then twice weekly 42.5 g 12         OBJECTIVE:     VITAL SIGNS (Last 24H):  Temp:  [97.6 °F (36.4 °C)-98.6 °F (37 °C)]   Pulse:  [65-75]   Resp:  [15-21]   BP: ()/(39-90)   SpO2:  [94 %-98 %]     Standing blood pressure: 67/39     Review of Systems:  Constitutional: Negative for chills and fever.   (+)generalized  weakness   HENT: Negative for congestion and sore throat.   (+)difficulty hearing from the R ear   Respiratory: Negative for chest tightness and shortness of breath.   Cardiovascular: Negative for chest pain.   Gastrointestinal: Positive for nausea and vomiting. Negative for abdominal pain, constipation and diarrhea.   Musculoskeletal: Negative for back pain and neck pain.   Skin: Negative for rash.   Neurological: Positive for dizziness. Negative for numbness and headaches.   Psychiatric/Behavioral: Negative for agitation and confusion.   All other systems reviewed and are negative.    Physical Exam:  Constitutional: Elderly female. Appears well-developed, well-nourished and in no acute distress.    Head: Normocephalic and atraumatic.   Mouth: Mucous membranes moist.  Eyes: No conjunctival injection.  Neck: Supple  Pulmonary/Chest: Effort normal. No respiratory distress.   Abdomen: Soft. Non-tender and non-distended.   Neurological: Alert, fluent speech, following commands, hearing impaired; CNs II-XII WNL; power 5/5, no pronator drift; sensation intact to LT; DTRs symmetrical, toes downgoing; FNF WNL  Skin: Warm and dry. No lesions.  Extremities: No clubbing, cyanosis or edema.    Laboratory:  INR 2.7      Diagnostic Results:    Exam: US CAROTID BILATERAL    Clinical History:    Transient ischemic attack.  Vertigo.    Findings:     Sonographic evaluation of the carotid systems was performed.     Extensive calcified plaque at the bilateral carotid bulbs and proximal internal carotid arteries, right greater than left.    The peak systolic velocity in the right internal carotid artery was approximately 159 cm/sec.    The peak systolic velocity in the left internal carotid artery was approximately 124 cm/sec.    Antegrade flow noted in both vertebral arteries.   Impression    Moderate bilateral proximal internal carotid artery stenosis.    Right internal carotid artery stenosis of 50-59 %  Left internal carotid artery  stenosis 20-50% - validated velocity measurements with angiographic measurements, velocity criteria are extrapolated from diameter data as defined by the Society of Radiologists in Ultrasound Consensus Conference Radiology 2003; 229;340-346.         CT of Head without IV contrast    History: Stroke    Technique: Standard brain CT protocol without IV contrast was performed.     Finding: Comparison was made to a prior examination performed on 10/22/2012. There is a mild amount of generalized cerebral and cerebellar atrophy. There is no evidence of an acute ischemic event. There is no intracranial hemorrhage. There is no skull fracture. The paranasal sinuses are normal in appearance.   Impression     1. There is no evidence of an acute ischemic event.  2. There is no intracranial hemorrhage.    All CT scans at this facility use dose modulation, iterative reconstruction, and/or weight base dosing when appropriate to reduce radiation dose when appropriate to reduce radiation dose to as low as reasonably achievable.         ASSESSMENT/PLAN:     Dizziness - most logically secondary to orthostasis     - start ANTELMO hose  - IVF hydration per hospital medicine  - continue midodrine; consider adding Florinef - patient would like cardiology to make this decision

## 2017-05-03 NOTE — ASSESSMENT & PLAN NOTE
Symptoms suggest BPPV but is also concerning for stroke.    CT head negative and Carotid US showed Moderate bilateral proximal internal carotid artery stenosis    Neuro checks   Check orthostatics after IV fluids bolous    Consider consult to Neurology and Brain MRI.  Meclizine PRN

## 2017-05-03 NOTE — PLAN OF CARE
CM meet with patient with caretaker at the bedside regarding discharge planning.  Patient reports she lives with her daughter and has a caretaker and plans to return home upon discharge.  Patient denies any post hospital needs at this time.     05/03/17 1217   Discharge Assessment   Assessment Type Discharge Planning Assessment   Confirmed/corrected address and phone number on facesheet? Yes   Assessment information obtained from? Patient   Expected Length of Stay (days) 3   Communicated expected length of stay with patient/caregiver yes   Prior to hospitilization cognitive status: Alert/Oriented   Prior to hospitalization functional status: Needs Assistance   Current cognitive status: Alert/Oriented   Current Functional Status: Needs Assistance   Arrived From home or self-care   Lives With child(gemma), adult   Able to Return to Prior Arrangements yes   Is patient able to care for self after discharge? Yes   How many people do you have in your home that can help with your care after discharge? 1   Who are your caregiver(s) and their phone number(s)? Abrahan Gray [phone]   Patient's perception of discharge disposition admitted as an inpatient;home or selfcare   Readmission Within The Last 30 Days no previous admission in last 30 days   Patient currently being followed by outpatient case management? No   Patient currently receives home health services? No   Does the patient currently use HME? Yes   Patient currently receives private duty nursing? No   Patient currently receives any other outside agency services? No   Equipment Currently Used at Home wheelchair;cane, straight;walker, rolling   Do you have any problems affording any of your prescribed medications? No   Is the patient taking medications as prescribed? yes   Do you have any financial concerns preventing you from receiving the healthcare you need? No   Does the patient have transportation to healthcare appointments? Yes   Transportation Available  family or friend will provide   On Dialysis? No   Does the patient receive services at the Coumadin Clinic? No   Are there any open cases? No   Discharge Plan A Home   Discharge Plan B Home   Patient/Family In Agreement With Plan yes

## 2017-05-03 NOTE — CONSULTS
Clinical Pharmacy Consult Note: Coumadin Dosing and Education     Josy Posey 's Coumadin will be dosed and monitored by Pharmacy at the request of Lorena Lamar NP.   Indication: Afib, DVT   Target INR is 1.5-2.5     INR: 2.7 today. Today's dose will be held. Patient's goal INR for this admission will be 2-2.5     Patient will be initiated on her home Coumadin dose of 0.5 mg per day every day except for Sunday, on which a dose will be held.  PT/INR will be monitored daily. Dose adjustments will be made accordingly.      Pharmacy has educated the patient and her daughter on Coumadin this admission. We discussed dietary recommendations and vitamin K content of certain foods as well as patient's goal INR per [facility]. Handout was given to patient's daughter. They had no further questions.     Thank you for allowing us to participate in this patient's care.     Mayelin Anguiano 5/3/2017 3:16 PM

## 2017-05-03 NOTE — ED NOTES
Pt to bed 9 with c/o dizziness upon standing; she denies chest pain or shortness of breath.   No neuro deficits noted.. no slurred speech, no facial drooping and bilat  strengths are strong and even. Pt does c/o weakness to bilat lower extremities but states its chronic and she ambulates with a cane with minimal to no assistance.   Pt reports decreased hearing to right ear. Right ear irrigated per Dr Castillo's verbal order. Pt denied any improvement in dizziness after irrigation; scant amount of ear wax removed.

## 2017-05-03 NOTE — SUBJECTIVE & OBJECTIVE
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 fuCT 1/3- stable,  CT's later - no residual    Lens replaced by other means 6/25/2014    Melena     Memory loss     PET scan consistent with Alzzheimers.   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    1 vessel LIMA to LAD    S/P MVR (mitral valve replacement) 9/27/2013    Skin ulcer     Squamous cell carcinoma     skin cancer X 2    Stroke     Syncope and collapse     Unspecified essential hypertension 9/16/2013    Unspecified transient cerebral ischemia     Urinary incontinence     wears briefs       Past Surgical History:   Procedure Laterality Date    ADENOIDECTOMY      APPENDECTOMY      BREAST SURGERY  1950    right lumpectomy     CARDIAC VALVE SURGERY  10/04/2007    MVR    CATARACT EXTRACTION      CHOLECYSTECTOMY      CORONARY ARTERY BYPASS GRAFT      EYE SURGERY      cataracts bilateral    HYSTERECTOMY      for hydatiform mole    INSERT / REPLACE / REMOVE PACEMAKER  09/11/2001    TONSILLECTOMY         Review of patient's allergies indicates: "   Allergen Reactions    Sulfa (sulfonamide antibiotics) Anaphylaxis     Other reaction(s): Angioedema    Morphine      Other reaction(s): Unknown    Dronedarone Diarrhea, Nausea Only and Rash     DIZZINESS         No current facility-administered medications on file prior to encounter.      Current Outpatient Prescriptions on File Prior to Encounter   Medication Sig    alendronate (FOSAMAX) 70 MG tablet take 1 tablet by mouth every week    amoxicillin-clavulanate 875-125mg (AUGMENTIN) 875-125 mg per tablet Take 1 tablet by mouth 2 (two) times daily.    ascorbic acid (VITAMIN C) 1000 MG tablet Take 1 tablet by mouth once daily once daily.    biotin 2,500 mcg Cap Take by mouth.    clotrimazole-betamethasone 1-0.05% (LOTRISONE) cream Apply topically 2 (two) times daily.    econazole nitrate 1 % cream     escitalopram oxalate (LEXAPRO) 10 MG tablet take 1 tablet by mouth once daily    fluticasone (FLONASE) 50 mcg/actuation nasal spray instill 2 sprays into each nostril once daily    folic acid (FOLVITE) 1 MG tablet Take 1 tablet by mouth once daily once daily.    iron-vitamin C 100-250 mg, ICAR-C, (ICAR-C) 100-250 mg Tab Take 1 tablet by mouth once daily.    levothyroxine (SYNTHROID) 50 MCG tablet take 1 tablet by mouth once daily    midodrine (PROAMATINE) 10 MG tablet take 1 tablet by mouth four times a day    omega-3 fatty acids-vitamin E (FISH OIL) 1,000 mg Cap Take by mouth once daily.    pravastatin (PRAVACHOL) 80 MG tablet Take 1 tablet (80 mg total) by mouth once daily.    propafenone (RHTHYMOL) 150 MG Tab 75 mg(half tablet) every 8 hrs    spironolactone (ALDACTONE) 25 MG tablet take 1 tablet by mouth once daily    triamcinolone acetonide 0.1% (KENALOG) 0.1 % cream apply THINLY to affected area ON LEGS, ARMS, AND TRUCK twice a day if needed for eczema    vitamin B12-folic acid 0.5-1 mg Tab Take 1 tablet by mouth once daily once daily.    warfarin (COUMADIN) 1 MG tablet Take 1/2 tablet Monday  through Saturday and NONE on Sunday as directed by the coumadin clinic.    estradiol (ESTRACE) 0.01 % (0.1 mg/gram) vaginal cream Place 1 g vaginally 3 (three) times a week. Place small amount on the outside of the urethra nightly for 3 weeks , then twice weekly     Family History     Problem Relation (Age of Onset)    COPD Brother    Hypertension Sister, Daughter    Stroke Sister, Brother    Tuberculosis Mother, Father        Social History Main Topics    Smoking status: Never Smoker    Smokeless tobacco: Never Used    Alcohol use No    Drug use: No    Sexual activity: No     Review of Systems   Constitutional: Negative for chills and fever.   HENT: Negative for congestion and sore throat.    Eyes: Negative for visual disturbance.   Respiratory: Negative for cough, shortness of breath and wheezing.    Cardiovascular: Negative for chest pain, palpitations and leg swelling.   Gastrointestinal: Positive for nausea and vomiting. Negative for abdominal pain, blood in stool, constipation and diarrhea.   Genitourinary: Negative for dysuria and hematuria.   Musculoskeletal: Negative for arthralgias and back pain.   Skin: Negative for rash and wound.   Neurological: Positive for dizziness, weakness and light-headedness. Negative for numbness.   Hematological: Negative for adenopathy.     Objective:     Vital Signs (Most Recent):  Temp: 98.2 °F (36.8 °C) (05/03/17 0027)  Pulse: 65 (05/03/17 0027)  Resp: 16 (05/03/17 0027)  BP: (!) 99/53 (05/03/17 0027)  SpO2: 96 % (05/03/17 0027) Vital Signs (24h Range):  Temp:  [97.7 °F (36.5 °C)-98.6 °F (37 °C)] 98.2 °F (36.8 °C)  Pulse:  [65-75] 65  Resp:  [15-21] 16  SpO2:  [95 %-98 %] 96 %  BP: ()/(53-90) 99/53     Weight: 67.3 kg (148 lb 5.9 oz)  Body mass index is 26.28 kg/(m^2).    Physical Exam   Constitutional: She is oriented to person, place, and time. She appears well-developed and well-nourished. No distress.   HENT:   Head: Normocephalic and atraumatic.    Mouth/Throat: Oropharynx is clear and moist.   Eyes: Conjunctivae and EOM are normal. Pupils are equal, round, and reactive to light.   Neck: Neck supple. No JVD present. No thyromegaly present.   Cardiovascular: Normal rate and regular rhythm.  Exam reveals no gallop and no friction rub.    No murmur heard.  Pulmonary/Chest: Effort normal and breath sounds normal. She has no wheezes. She has no rales.   Abdominal: Soft. Bowel sounds are normal. She exhibits no distension. There is no tenderness. There is no rebound and no guarding.   Musculoskeletal: Normal range of motion. She exhibits no edema or deformity.   Lymphadenopathy:     She has no cervical adenopathy.   Neurological: She is alert and oriented to person, place, and time. She has normal reflexes. Coordination abnormal.   Skin: Skin is warm and dry. No rash noted.   Psychiatric: She has a normal mood and affect. Her behavior is normal. Judgment and thought content normal.   Nursing note and vitals reviewed.       Significant Labs:   CBC:   Recent Labs  Lab 05/02/17  1522   WBC 6.89   HGB 12.9   HCT 37.9        CMP:   Recent Labs  Lab 05/02/17  1522      K 4.4      CO2 24      BUN 24   CREATININE 1.6*   CALCIUM 9.2   PROT 7.5   ALBUMIN 3.5   BILITOT 1.0   ALKPHOS 184*   *   *   ANIONGAP 10   EGFRNONAA 28*       Significant Imaging: I have reviewed all pertinent imaging results/findings within the past 24 hours.

## 2017-05-03 NOTE — ASSESSMENT & PLAN NOTE
Symptoms suggest BPPV but is also concerning for stroke.  CT head and Carotid US.  Neuro checks.  Check orthostatics after IV fluids.  Consider trial of Diazepam pending workup and further evaluation.  Consider consult to Neurology and Brain MRI.

## 2017-05-03 NOTE — PROGRESS NOTES
[facility] Hospital Medicine  Progress Note    Patient Name: Josy Posey  MRN: [id number]  Patient Class: IP- Inpatient   Admission Date: 5/2/2017  Length of Stay: 0 days  Attending Physician: Sonny Daniels MD  Primary Care Provider: TIKA Rosales Jr, MD        Subjective:     Principal Problem:Vertigo    HPI:  Came to the ER today for severe dizziness that started two days ago.  She was at an appointment with her Cardiologist and the nurse practitioner advised her to go.  She also has right sided blurry vision and hearing loss with unsteady gait.  This morning she had several episodes of nausea and one episode of vomiting.  While laying down and resting or sitting still she feels ok.  Sitting up or standing makes her feel like the room is spinning.  Denies any recent illness.  No fevers or chills.  Some abdominal pain, but no chest pain or shortness of breath.  Endorses generalized weakness but no specific weakness.    Hospital Course:  Josy Posey 89 y.o. Female admitted for ongoing dizziness and weakness. Patient was found to have elevated AST and ALT. Ultrasound of abdomen negative for acute findings. AST and ALT trending down. CT of head showed no acute findings. Usman Carotid ultrasound shows moderate bilateral proximal internal carotid artery stenosis. Currently patient is still dizzy and weak. Patient found to be orthostatic hypotensive this morning. 500 cc bolus of Normal Saline ordered. Dizziness worsening with sitting up in bed. Patient reports she typically runs 90's over 50's. Continue midodrine as scheduled. Meclizine ordered for dizziness.          Interval History:     Review of Systems   Constitutional: Positive for activity change. Negative for appetite change, chills, diaphoresis, fatigue and fever.   HENT: Negative.  Negative for congestion, dental problem, ear pain, sinus pressure and sore throat.    Eyes: Negative.  Negative for photophobia and pain.   Respiratory: Negative.   Negative for cough, chest tightness, shortness of breath and stridor.    Cardiovascular: Negative.  Negative for chest pain and leg swelling.   Gastrointestinal: Negative.  Negative for abdominal pain.   Endocrine: Negative.    Genitourinary: Negative.    Musculoskeletal: Negative.  Negative for back pain.   Skin: Negative.    Allergic/Immunologic: Negative.    Neurological: Positive for dizziness, weakness and light-headedness.   Hematological: Negative.    Psychiatric/Behavioral: Negative.  Negative for behavioral problems and confusion.     Objective:     Vital Signs (Most Recent):  Temp: 98.3 °F (36.8 °C) (05/03/17 0701)  Pulse: 74 (05/03/17 0710)  Resp: 18 (05/03/17 0701)  BP: (!) 67/39 (05/03/17 0710)  SpO2: (!) 94 % (05/03/17 0701) Vital Signs (24h Range):  Temp:  [97.6 °F (36.4 °C)-98.6 °F (37 °C)] 98.3 °F (36.8 °C)  Pulse:  [65-75] 74  Resp:  [15-21] 18  SpO2:  [94 %-98 %] 94 %  BP: ()/(39-90) 67/39     Weight: 67.3 kg (148 lb 5.9 oz)  Body mass index is 26.28 kg/(m^2).    Intake/Output Summary (Last 24 hours) at 05/03/17 1124  Last data filed at 05/03/17 0800   Gross per 24 hour   Intake              300 ml   Output              300 ml   Net                0 ml      Physical Exam   Constitutional: She is oriented to person, place, and time. She appears well-developed and well-nourished.   HENT:   Head: Normocephalic and atraumatic.   Eyes: Conjunctivae and EOM are normal. Pupils are equal, round, and reactive to light.   Neck: Normal range of motion. Neck supple.   Cardiovascular: Normal rate, regular rhythm, normal heart sounds and intact distal pulses.    Pulmonary/Chest: Effort normal and breath sounds normal.   Abdominal: Soft. Bowel sounds are normal.   Musculoskeletal: Normal range of motion.   Neurological: She is alert and oriented to person, place, and time. She has normal reflexes. Coordination abnormal.   Skin: Skin is warm and dry.   Psychiatric: She has a normal mood and affect. Her  behavior is normal. Judgment and thought content normal.   Nursing note and vitals reviewed.      Significant Labs:   CBC:   Recent Labs  Lab 05/02/17  1522 05/03/17  0502   WBC 6.89 6.56   HGB 12.9 12.3   HCT 37.9 36.3*    192     CMP:   Recent Labs  Lab 05/02/17  1522 05/03/17  0502    140   K 4.4 4.4    109   CO2 24 22*    75   BUN 24 20   CREATININE 1.6* 1.4   CALCIUM 9.2 8.7   PROT 7.5 6.6   ALBUMIN 3.5 3.1*   BILITOT 1.0 1.2*   ALKPHOS 184* 176*   * 125*   * 147*   ANIONGAP 10 9   EGFRNONAA 28* 33*     Cardiac Markers:   Recent Labs  Lab 05/02/17  1522   *     Coagulation:   Recent Labs  Lab 05/02/17  1522   INR 2.7*     Urine Studies:   Recent Labs  Lab 05/02/17  1817   COLORU Yellow   APPEARANCEUA Clear   PHUR 6.0   SPECGRAV 1.010   PROTEINUA Negative   GLUCUA Negative   KETONESU Negative   BILIRUBINUA Negative   OCCULTUA Negative   NITRITE Negative   UROBILINOGEN Negative   LEUKOCYTESUR Negative     All pertinent labs within the past 24 hours have been reviewed.    Significant Imaging:   Imaging Results         US Carotid Bilateral (Final result) Result time:  05/03/17 10:52:46    Final result by Isa Andrea MD (05/03/17 10:52:46)    Impression:     Moderate bilateral proximal internal carotid artery stenosis.    Right internal carotid artery stenosis of 50-59 %  Left internal carotid artery stenosis 20-50% - validated velocity measurements with angiographic measurements, velocity criteria are extrapolated from diameter data as defined by the Society of Radiologists in Ultrasound Consensus Conference Radiology 2003; 229;340-346.      Electronically signed by: ISA ANDREA MD  Date:     05/03/17  Time:    10:52     Narrative:    Exam: US CAROTID BILATERAL    Clinical History:    Transient ischemic attack.  Vertigo.    Findings:     Sonographic evaluation of the carotid systems was performed.     Extensive calcified plaque at the bilateral carotid bulbs and  proximal internal carotid arteries, right greater than left.    The peak systolic velocity in the right internal carotid artery was approximately 159 cm/sec.    The peak systolic velocity in the left internal carotid artery was approximately 124 cm/sec.    Antegrade flow noted in both vertebral arteries.            US Abdomen Limited (Final result) Result time:  05/02/17 19:48:11    Final result by Geovany Taylor III, MD (05/02/17 19:48:11)    Impression:        1. Normal limited ultrasound of right upper quadrant status post cholecystectomy.      Electronically signed by: GEOVANY TAYLOR MD  Date:     05/02/17  Time:    19:48     Narrative:    Limited ultrasound of the abdomen.    Clinical indication: Abdominal pain.    The liver is normal. Portal vein is patent. Doppler analysis shows hepatopedal flow. Right kidney is normal. The gallbladder has been removed. The common duct is not dilated.    Pancreas was obscured by bowel gas.            X-Ray Chest AP Portable (Final result) Result time:  05/02/17 15:46:40    Final result by JAIME Ochoa Sr., MD (05/02/17 15:46:40)    Impression:        1. The lungs are clear.   2. There is a curvilinear sclerotic area in the left humeral head. This is characteristic of avascular necrosis.  3. Surgical changes  4. There is mild elevation of the left hemidiaphragm.       Electronically signed by: JAIME OCHOA MD  Date:     05/02/17  Time:    15:46     Narrative:    One-view chest x-ray    Clinical History: Chest pain    Finding: Comparison was made to prior examination performed on 4/27/2017. There are multiple sternotomy wires in place. There are multiple surgical clips projected over the mediastinum. A cardiac pacemaker is in place. The leads appear to be intact. The size of the heart is normal. The lungs are clear. There is no pneumothorax.  The costophrenic angles are sharp. There is mild elevation of the left hemidiaphragm. There is a curvilinear sclerotic area  in the left humeral head.            CT Head Without Contrast (Final result) Result time:  05/02/17 14:42:55    Final result by JAIME Ochoa Sr., MD (05/02/17 14:42:55)    Impression:      1. There is no evidence of an acute ischemic event.  2. There is no intracranial hemorrhage.    All CT scans at this facility use dose modulation, iterative reconstruction, and/or weight base dosing when appropriate to reduce radiation dose when appropriate to reduce radiation dose to as low as reasonably achievable.      Electronically signed by: JAIME OCHOA MD  Date:     05/02/17  Time:    14:42     Narrative:    CT of Head without IV contrast    History: Stroke    Technique: Standard brain CT protocol without IV contrast was performed.     Finding: Comparison was made to a prior examination performed on 10/22/2012. There is a mild amount of generalized cerebral and cerebellar atrophy. There is no evidence of an acute ischemic event. There is no intracranial hemorrhage. There is no skull fracture. The paranasal sinuses are normal in appearance.            Assessment/Plan:      * Vertigo  Symptoms suggest BPPV but is also concerning for stroke.    CT head negative and Carotid US showed Moderate bilateral proximal internal carotid artery stenosis    Neuro checks   Check orthostatics after IV fluids bolous    Consider consult to Neurology and Brain MRI.  Meclizine PRN        Orthostatic hypotension  She is taking scheduled Midodrine but her blood pressure is high on admission.    Hold and follow blood pressures.    Recheck orthostatics after fluid bolus     Atrial fibrillation  Continue Coumadin and follow PT/INR.    Therapeutic at 2.7 on admission.    CKD (chronic kidney disease) stage 3, GFR 30-59 ml/min  Renal chemistries near baseline with a slightly elevated BNP.    Careful IV fluid resuscitation.    VTE Risk Mitigation         Ordered     warfarin (COUMADIN) split tablet 0.5 mg  Daily     Route:  Oral        05/02/17 8270      Medium Risk of VTE  Once      05/02/17 2105     Place sequential compression device  Until discontinued      05/02/17 2105     Place ANTELMO hose  Until discontinued      05/02/17 2105     Reason for No Pharmacological VTE Prophylaxis  Once      05/02/17 2105          Lorena Lamar NP  Department of Hospital Medicine   [facility]

## 2017-05-03 NOTE — SUBJECTIVE & OBJECTIVE
Interval History:     Review of Systems   Constitutional: Positive for activity change. Negative for appetite change, chills, diaphoresis, fatigue and fever.   HENT: Negative.  Negative for congestion, dental problem, ear pain, sinus pressure and sore throat.    Eyes: Negative.  Negative for photophobia and pain.   Respiratory: Negative.  Negative for cough, chest tightness, shortness of breath and stridor.    Cardiovascular: Negative.  Negative for chest pain and leg swelling.   Gastrointestinal: Negative.  Negative for abdominal pain.   Endocrine: Negative.    Genitourinary: Negative.    Musculoskeletal: Negative.  Negative for back pain.   Skin: Negative.    Allergic/Immunologic: Negative.    Neurological: Positive for dizziness, weakness and light-headedness.   Hematological: Negative.    Psychiatric/Behavioral: Negative.  Negative for behavioral problems and confusion.     Objective:     Vital Signs (Most Recent):  Temp: 98.3 °F (36.8 °C) (05/03/17 0701)  Pulse: 74 (05/03/17 0710)  Resp: 18 (05/03/17 0701)  BP: (!) 67/39 (05/03/17 0710)  SpO2: (!) 94 % (05/03/17 0701) Vital Signs (24h Range):  Temp:  [97.6 °F (36.4 °C)-98.6 °F (37 °C)] 98.3 °F (36.8 °C)  Pulse:  [65-75] 74  Resp:  [15-21] 18  SpO2:  [94 %-98 %] 94 %  BP: ()/(39-90) 67/39     Weight: 67.3 kg (148 lb 5.9 oz)  Body mass index is 26.28 kg/(m^2).    Intake/Output Summary (Last 24 hours) at 05/03/17 1124  Last data filed at 05/03/17 0800   Gross per 24 hour   Intake              300 ml   Output              300 ml   Net                0 ml      Physical Exam   Constitutional: She is oriented to person, place, and time. She appears well-developed and well-nourished.   HENT:   Head: Normocephalic and atraumatic.   Eyes: Conjunctivae and EOM are normal. Pupils are equal, round, and reactive to light.   Neck: Normal range of motion. Neck supple.   Cardiovascular: Normal rate, regular rhythm, normal heart sounds and intact distal pulses.     Pulmonary/Chest: Effort normal and breath sounds normal.   Abdominal: Soft. Bowel sounds are normal.   Musculoskeletal: Normal range of motion.   Neurological: She is alert and oriented to person, place, and time. She has normal reflexes. Coordination abnormal.   Skin: Skin is warm and dry.   Psychiatric: She has a normal mood and affect. Her behavior is normal. Judgment and thought content normal.   Nursing note and vitals reviewed.      Significant Labs:   CBC:   Recent Labs  Lab 05/02/17  1522 05/03/17  0502   WBC 6.89 6.56   HGB 12.9 12.3   HCT 37.9 36.3*    192     CMP:   Recent Labs  Lab 05/02/17  1522 05/03/17  0502    140   K 4.4 4.4    109   CO2 24 22*    75   BUN 24 20   CREATININE 1.6* 1.4   CALCIUM 9.2 8.7   PROT 7.5 6.6   ALBUMIN 3.5 3.1*   BILITOT 1.0 1.2*   ALKPHOS 184* 176*   * 125*   * 147*   ANIONGAP 10 9   EGFRNONAA 28* 33*     Cardiac Markers:   Recent Labs  Lab 05/02/17  1522   *     Coagulation:   Recent Labs  Lab 05/02/17  1522   INR 2.7*     Urine Studies:   Recent Labs  Lab 05/02/17  1817   COLORU Yellow   APPEARANCEUA Clear   PHUR 6.0   SPECGRAV 1.010   PROTEINUA Negative   GLUCUA Negative   KETONESU Negative   BILIRUBINUA Negative   OCCULTUA Negative   NITRITE Negative   UROBILINOGEN Negative   LEUKOCYTESUR Negative     All pertinent labs within the past 24 hours have been reviewed.    Significant Imaging:   Imaging Results         US Carotid Bilateral (Final result) Result time:  05/03/17 10:52:46    Final result by Danial Andrea MD (05/03/17 10:52:46)    Impression:     Moderate bilateral proximal internal carotid artery stenosis.    Right internal carotid artery stenosis of 50-59 %  Left internal carotid artery stenosis 20-50% - validated velocity measurements with angiographic measurements, velocity criteria are extrapolated from diameter data as defined by the Society of Radiologists in Ultrasound Consensus Conference Radiology 2003;  229;340-346.      Electronically signed by: ISA BAUGH MD  Date:     05/03/17  Time:    10:52     Narrative:    Exam: US CAROTID BILATERAL    Clinical History:    Transient ischemic attack.  Vertigo.    Findings:     Sonographic evaluation of the carotid systems was performed.     Extensive calcified plaque at the bilateral carotid bulbs and proximal internal carotid arteries, right greater than left.    The peak systolic velocity in the right internal carotid artery was approximately 159 cm/sec.    The peak systolic velocity in the left internal carotid artery was approximately 124 cm/sec.    Antegrade flow noted in both vertebral arteries.            US Abdomen Limited (Final result) Result time:  05/02/17 19:48:11    Final result by Geovany Lion III, MD (05/02/17 19:48:11)    Impression:        1. Normal limited ultrasound of right upper quadrant status post cholecystectomy.      Electronically signed by: GEOVANY LION MD  Date:     05/02/17  Time:    19:48     Narrative:    Limited ultrasound of the abdomen.    Clinical indication: Abdominal pain.    The liver is normal. Portal vein is patent. Doppler analysis shows hepatopedal flow. Right kidney is normal. The gallbladder has been removed. The common duct is not dilated.    Pancreas was obscured by bowel gas.            X-Ray Chest AP Portable (Final result) Result time:  05/02/17 15:46:40    Final result by JAIME Ochoa Sr., MD (05/02/17 15:46:40)    Impression:        1. The lungs are clear.   2. There is a curvilinear sclerotic area in the left humeral head. This is characteristic of avascular necrosis.  3. Surgical changes  4. There is mild elevation of the left hemidiaphragm.       Electronically signed by: JAIME OCHOA MD  Date:     05/02/17  Time:    15:46     Narrative:    One-view chest x-ray    Clinical History: Chest pain    Finding: Comparison was made to prior examination performed on 4/27/2017. There are multiple sternotomy wires  in place. There are multiple surgical clips projected over the mediastinum. A cardiac pacemaker is in place. The leads appear to be intact. The size of the heart is normal. The lungs are clear. There is no pneumothorax.  The costophrenic angles are sharp. There is mild elevation of the left hemidiaphragm. There is a curvilinear sclerotic area in the left humeral head.            CT Head Without Contrast (Final result) Result time:  05/02/17 14:42:55    Final result by JAIME Ochoa Sr., MD (05/02/17 14:42:55)    Impression:      1. There is no evidence of an acute ischemic event.  2. There is no intracranial hemorrhage.    All CT scans at this facility use dose modulation, iterative reconstruction, and/or weight base dosing when appropriate to reduce radiation dose when appropriate to reduce radiation dose to as low as reasonably achievable.      Electronically signed by: JAIME OCHOA MD  Date:     05/02/17  Time:    14:42     Narrative:    CT of Head without IV contrast    History: Stroke    Technique: Standard brain CT protocol without IV contrast was performed.     Finding: Comparison was made to a prior examination performed on 10/22/2012. There is a mild amount of generalized cerebral and cerebellar atrophy. There is no evidence of an acute ischemic event. There is no intracranial hemorrhage. There is no skull fracture. The paranasal sinuses are normal in appearance.

## 2017-05-03 NOTE — PROGRESS NOTES
Patient arrived to room 230 from ED via stretcher; vital signs stable; patient placed on cardiac monitor; no signs of distress noted; family at bedside; patient awake, alert, and oriented; patient and family oriented to room/unit; patient instructed to call for assistance out of bed, patient verbalized understanding

## 2017-05-03 NOTE — ASSESSMENT & PLAN NOTE
She is taking scheduled Midodrine but her blood pressure is high on admission.    Hold and follow blood pressures.    Recheck orthostatics after fluid bolus

## 2017-05-04 VITALS
HEIGHT: 63 IN | BODY MASS INDEX: 26.7 KG/M2 | HEART RATE: 75 BPM | SYSTOLIC BLOOD PRESSURE: 166 MMHG | TEMPERATURE: 98 F | DIASTOLIC BLOOD PRESSURE: 90 MMHG | OXYGEN SATURATION: 94 % | RESPIRATION RATE: 18 BRPM | WEIGHT: 150.69 LBS

## 2017-05-04 LAB
INR PPP: 2.5
PROTHROMBIN TIME: 24.8 SEC

## 2017-05-04 PROCEDURE — 25000003 PHARM REV CODE 250: Performed by: EMERGENCY MEDICINE

## 2017-05-04 PROCEDURE — 36415 COLL VENOUS BLD VENIPUNCTURE: CPT

## 2017-05-04 PROCEDURE — 25000003 PHARM REV CODE 250: Performed by: INTERNAL MEDICINE

## 2017-05-04 PROCEDURE — 85610 PROTHROMBIN TIME: CPT

## 2017-05-04 PROCEDURE — 25000003 PHARM REV CODE 250

## 2017-05-04 PROCEDURE — 25000003 PHARM REV CODE 250: Performed by: NURSE PRACTITIONER

## 2017-05-04 RX ORDER — SODIUM CHLORIDE 9 MG/ML
INJECTION, SOLUTION INTRAVENOUS CONTINUOUS
Status: DISCONTINUED | OUTPATIENT
Start: 2017-05-04 | End: 2017-05-04 | Stop reason: HOSPADM

## 2017-05-04 RX ORDER — MECLIZINE HYDROCHLORIDE 25 MG/1
25 TABLET ORAL 3 TIMES DAILY PRN
Qty: 15 TABLET | Refills: 0 | Status: SHIPPED | OUTPATIENT
Start: 2017-05-04 | End: 2018-10-31 | Stop reason: HOSPADM

## 2017-05-04 RX ADMIN — LEVOTHYROXINE SODIUM 50 MCG: 50 TABLET ORAL at 06:05

## 2017-05-04 RX ADMIN — SODIUM CHLORIDE: 0.9 INJECTION, SOLUTION INTRAVENOUS at 12:05

## 2017-05-04 RX ADMIN — MIDODRINE HYDROCHLORIDE 10 MG: 2.5 TABLET ORAL at 06:05

## 2017-05-04 RX ADMIN — ASPIRIN 325 MG: 325 TABLET, DELAYED RELEASE ORAL at 09:05

## 2017-05-04 RX ADMIN — PANTOPRAZOLE SODIUM 40 MG: 40 TABLET, DELAYED RELEASE ORAL at 09:05

## 2017-05-04 RX ADMIN — PRAVASTATIN SODIUM 80 MG: 20 TABLET ORAL at 09:05

## 2017-05-04 NOTE — PROGRESS NOTES
Pt discharged to daughter care, no distress noted, no c/o pain, heart monitor removed, IV access removed, pt wheeled to daughter car, caregiver present.

## 2017-05-04 NOTE — DISCHARGE SUMMARY
[facility] Hospital Medicine  Discharge Summary      Patient Name: Josy Posey  MRN: [id number]  Admission Date: 5/2/2017  Hospital Length of Stay: 1 days  Discharge Date and Time: 5/4/2017  7:50 PM  Attending Physician: Dr. Sonny Daniels   Discharging Provider: Gisele Banegas NP  Primary Care Provider: TIKA Rosales Jr, MD      HPI:   Came to the ER today for severe dizziness that started two days ago.  She was at an appointment with her Cardiologist and the nurse practitioner advised her to go.  She also has right sided blurry vision and hearing loss with unsteady gait.  This morning she had several episodes of nausea and one episode of vomiting.  While laying down and resting or sitting still she feels ok.  Sitting up or standing makes her feel like the room is spinning.  Denies any recent illness.  No fevers or chills.  Some abdominal pain, but no chest pain or shortness of breath.  Endorses generalized weakness but no specific weakness.    * No surgery found *      Indwelling Lines/Drains at time of discharge:   Lines/Drains/Airways          No matching active lines, drains, or airways        Hospital Course:   Josy Posey 89 y.o. Female admitted for ongoing dizziness and weakness. Patient was found to have elevated AST and ALT. Ultrasound of abdomen negative for acute findings. AST and ALT trending down. CT of head showed no acute findings. Usman Carotid ultrasound shows moderate bilateral proximal internal carotid artery stenosis. Patient found to be orthostatic with gentle hydration provided.  Coumadin for Atrial Fibrillation continued with INR therapeutic.  Dizziness improved with Meclizine.  BP increased with Midodrine.  Creatinine normalized.  Pt verbalized symptom improvement.  Pt seen and examined on the date of discharge and deemed suitable for discharge to home accompanied by family.  Current medications resumed with Midodrine and Meclizine controlled. Pt instructed to remain proper  hydration and to follow up with PCP within 3 days and Dr.Abi Robb (Electrophysiology) in 2 weeks.          Consults:       Significant Diagnostic Studies:   Imaging Results         US Carotid Bilateral (Final result) Result time:  05/03/17 10:52:46    Final result by Isa Baugh MD (05/03/17 10:52:46)    Impression:     Moderate bilateral proximal internal carotid artery stenosis.    Right internal carotid artery stenosis of 50-59 %  Left internal carotid artery stenosis 20-50% - validated velocity measurements with angiographic measurements, velocity criteria are extrapolated from diameter data as defined by the Society of Radiologists in Ultrasound Consensus Conference Radiology 2003; 229;340-346.      Electronically signed by: ISA BAUGH MD  Date:     05/03/17  Time:    10:52     Narrative:    Exam: US CAROTID BILATERAL    Clinical History:    Transient ischemic attack.  Vertigo.    Findings:     Sonographic evaluation of the carotid systems was performed.     Extensive calcified plaque at the bilateral carotid bulbs and proximal internal carotid arteries, right greater than left.    The peak systolic velocity in the right internal carotid artery was approximately 159 cm/sec.    The peak systolic velocity in the left internal carotid artery was approximately 124 cm/sec.    Antegrade flow noted in both vertebral arteries.            US Abdomen Limited (Final result) Result time:  05/02/17 19:48:11    Final result by Geovany Lion III, MD (05/02/17 19:48:11)    Impression:        1. Normal limited ultrasound of right upper quadrant status post cholecystectomy.      Electronically signed by: GEOVANY LION MD  Date:     05/02/17  Time:    19:48     Narrative:    Limited ultrasound of the abdomen.    Clinical indication: Abdominal pain.    The liver is normal. Portal vein is patent. Doppler analysis shows hepatopedal flow. Right kidney is normal. The gallbladder has been removed. The common duct is  not dilated.    Pancreas was obscured by bowel gas.            X-Ray Chest AP Portable (Final result) Result time:  05/02/17 15:46:40    Final result by JAIME Ochoa Sr., MD (05/02/17 15:46:40)    Impression:        1. The lungs are clear.   2. There is a curvilinear sclerotic area in the left humeral head. This is characteristic of avascular necrosis.  3. Surgical changes  4. There is mild elevation of the left hemidiaphragm.       Electronically signed by: JAIME OCHOA MD  Date:     05/02/17  Time:    15:46     Narrative:    One-view chest x-ray    Clinical History: Chest pain    Finding: Comparison was made to prior examination performed on 4/27/2017. There are multiple sternotomy wires in place. There are multiple surgical clips projected over the mediastinum. A cardiac pacemaker is in place. The leads appear to be intact. The size of the heart is normal. The lungs are clear. There is no pneumothorax.  The costophrenic angles are sharp. There is mild elevation of the left hemidiaphragm. There is a curvilinear sclerotic area in the left humeral head.            CT Head Without Contrast (Final result) Result time:  05/02/17 14:42:55    Final result by JAIME Ochoa Sr., MD (05/02/17 14:42:55)    Impression:      1. There is no evidence of an acute ischemic event.  2. There is no intracranial hemorrhage.    All CT scans at this facility use dose modulation, iterative reconstruction, and/or weight base dosing when appropriate to reduce radiation dose when appropriate to reduce radiation dose to as low as reasonably achievable.      Electronically signed by: JAIME OCHOA MD  Date:     05/02/17  Time:    14:42     Narrative:    CT of Head without IV contrast    History: Stroke    Technique: Standard brain CT protocol without IV contrast was performed.     Finding: Comparison was made to a prior examination performed on 10/22/2012. There is a mild amount of generalized cerebral and cerebellar atrophy. There is no  evidence of an acute ischemic event. There is no intracranial hemorrhage. There is no skull fracture. The paranasal sinuses are normal in appearance.              Pending Diagnostic Studies:     None        Final Active Diagnoses:    Diagnosis Date Noted POA    PRINCIPAL PROBLEM:  Vertigo [R42] 05/02/2017 Yes    CKD (chronic kidney disease) stage 3, GFR 30-59 ml/min [N18.3] 09/03/2014 Yes    Atrial fibrillation [I48.91] 09/27/2013 Yes    Orthostatic hypotension [I95.1] 09/27/2013 Yes      Problems Resolved During this Admission:    Diagnosis Date Noted Date Resolved POA      Discharged Condition: stable    Disposition: Home or Self Care    Follow Up:  Follow-up Information     Follow up with TIKA Rosales Jr, MD In 3 days.    Specialties:  Internal Medicine, Pediatrics    Why:  hospital follow up     Contact information:    [address]  [facility] [address] [phone]          Follow up with Joe Rivera MD In 1 week.    Specialties:  Cardiology, Electrophysiology    Why:  hospital follow up     Contact information:    [address] [facility] [address] [phone]          Patient Instructions:     Diet general   Order Specific Question Answer Comments   Na restriction, if any: 2gNa      Activity as tolerated     Call MD for:  temperature >100.4     Call MD for:  persistent nausea and vomiting or diarrhea     Call MD for:  severe uncontrolled pain     Call MD for:  difficulty breathing or increased cough     Call MD for:  severe persistent headache     Call MD for:  persistent dizziness, light-headedness, or visual disturbances     Call MD for:  increased confusion or weakness       Medications:  Reconciled Home Medications:   Discharge Medication List as of 5/4/2017  4:00 PM      START taking these medications    Details   meclizine (ANTIVERT) 25 mg tablet Take 1 tablet (25 mg total) by mouth 3 (three) times daily as needed for Dizziness., Starting 5/4/2017, Until Discontinued,  Normal         CONTINUE these medications which have NOT CHANGED    Details   alendronate (FOSAMAX) 70 MG tablet take 1 tablet by mouth every week, Normal      amoxicillin-clavulanate 875-125mg (AUGMENTIN) 875-125 mg per tablet Take 1 tablet by mouth 2 (two) times daily., Starting 4/27/2017, Until Sun 5/7/17, Normal      ascorbic acid (VITAMIN C) 1000 MG tablet Take 1 tablet by mouth once daily once daily., Until Discontinued, Historical Med      biotin 2,500 mcg Cap Take by mouth., Until Discontinued, Historical Med      clotrimazole-betamethasone 1-0.05% (LOTRISONE) cream Apply topically 2 (two) times daily., Starting 4/27/2017, Until Discontinued, Normal      econazole nitrate 1 % cream Starting 7/15/2016, Until Discontinued, Historical Med      escitalopram oxalate (LEXAPRO) 10 MG tablet take 1 tablet by mouth once daily, Normal      estradiol (ESTRACE) 0.01 % (0.1 mg/gram) vaginal cream Place 1 g vaginally 3 (three) times a week. Place small amount on the outside of the urethra nightly for 3 weeks , then twice weekly, Starting 7/15/2015, Until u 4/27/17, Normal      fluticasone (FLONASE) 50 mcg/actuation nasal spray instill 2 sprays into each nostril once daily, Normal      folic acid (FOLVITE) 1 MG tablet Take 1 tablet by mouth once daily once daily., Until Discontinued, Historical Med      iron-vitamin C 100-250 mg, ICAR-C, (ICAR-C) 100-250 mg Tab Take 1 tablet by mouth once daily., Starting 9/11/2014, Until Discontinued, Normal      levothyroxine (SYNTHROID) 50 MCG tablet take 1 tablet by mouth once daily, Normal      midodrine (PROAMATINE) 10 MG tablet take 1 tablet by mouth four times a day, Normal      omega-3 fatty acids-vitamin E (FISH OIL) 1,000 mg Cap Take by mouth once daily., Until Discontinued, Historical Med      pravastatin (PRAVACHOL) 80 MG tablet Take 1 tablet (80 mg total) by mouth once daily., Starting 12/21/2016, Until Discontinued, Normal      propafenone (RHTHYMOL) 150 MG Tab 75 mg(half  tablet) every 8 hrs, Phone In      spironolactone (ALDACTONE) 25 MG tablet take 1 tablet by mouth once daily, Normal      triamcinolone acetonide 0.1% (KENALOG) 0.1 % cream apply THINLY to affected area ON LEGS, ARMS, AND TRUCK twice a day if needed for eczema, Historical Med      vitamin B12-folic acid 0.5-1 mg Tab Take 1 tablet by mouth once daily once daily., Until Discontinued, Historical Med      warfarin (COUMADIN) 1 MG tablet Take 1/2 tablet Monday through Saturday and NONE on Sunday as directed by the coumadin clinic., Normal           Time spent on the discharge of patient: 40 minutes    Gisele Banegas NP  Department of Hospital Medicine  [facility]

## 2017-05-04 NOTE — PLAN OF CARE
Problem: Patient Care Overview  Goal: Plan of Care Review  Outcome: Ongoing (interventions implemented as appropriate)  NS infusing at 125ml. Pt v-paced on monitor.Pt c/o some dizziness when she gets up.No distress noted. VSS. Neuro checks completed.  Remained free of falls. Denies Pain and denies SOB. Sitter at bedside. Left pt bed low, call light in reach, side rails up,bed alarm on. Will continue to monitor.

## 2017-05-04 NOTE — PROGRESS NOTES
Orthostatic blood pressure performed, pt c/o mild dizziness when moving from lying to sitting position, after a few min dizziness subsided, pt assisted to standing position, c/o dizziness for a few sec, dizziness subsided.

## 2017-05-04 NOTE — CONSULTS
Pharmacy Warfarin Consult Note    INR=2.5  Hx of Afib/DVT Goal INR=1.5-2.5  Pharmacy is consulted to dose.   Patient has been educated.    Patient's home dose: Coumadin 0.5mg daily except on Sundays (no dose) .  Will skip patient's dose today.   Dose can be resumed once INR<2.5.    Thank you for allowing us to participate in this patient's care.    Luma Galindo, Pharm D 5/4/2017 10:32 AM

## 2017-05-05 ENCOUNTER — TELEPHONE (OUTPATIENT)
Dept: INTERNAL MEDICINE | Facility: CLINIC | Age: 82
End: 2017-05-05

## 2017-05-05 NOTE — PLAN OF CARE
Pt discharged home with family with no further CM needs as expected.       05/04/17 0400   Final Note   Assessment Type Final Discharge Note   Discharge Disposition Home   Discharge planning education complete? Yes   Discharge plans and expectations educations in teach back method with documentation complete? Yes   Offered [facility] -- Bedside Delivery? n/a   Discharge/Hospital Encounter Summary to (non-[facility]) PCP n/a   Referral to Outpatient Case Management complete? n/a   Referral to / orders for Home Health Complete? n/a   30 day supply of medicines given at discharge, if documented non-compliance / non-adherence? n/a   Any social issues identified prior to discharge? No   Did you assess the readiness or willingness of the family or caregiver to support self management of care? No

## 2017-05-08 ENCOUNTER — LAB VISIT (OUTPATIENT)
Dept: LAB | Facility: HOSPITAL | Age: 82
End: 2017-05-08
Attending: PEDIATRICS
Payer: MEDICARE

## 2017-05-08 ENCOUNTER — PATIENT OUTREACH (OUTPATIENT)
Dept: ADMINISTRATIVE | Facility: CLINIC | Age: 82
End: 2017-05-08
Payer: MEDICARE

## 2017-05-08 ENCOUNTER — OFFICE VISIT (OUTPATIENT)
Dept: INTERNAL MEDICINE | Facility: CLINIC | Age: 82
End: 2017-05-08
Payer: MEDICARE

## 2017-05-08 VITALS
BODY MASS INDEX: 26.95 KG/M2 | DIASTOLIC BLOOD PRESSURE: 68 MMHG | HEIGHT: 63 IN | HEART RATE: 75 BPM | WEIGHT: 152.13 LBS | SYSTOLIC BLOOD PRESSURE: 110 MMHG | OXYGEN SATURATION: 95 % | TEMPERATURE: 97 F

## 2017-05-08 DIAGNOSIS — I95.1 ORTHOSTATIC HYPOTENSION: Primary | ICD-10-CM

## 2017-05-08 DIAGNOSIS — R79.89 ELEVATED LFTS: ICD-10-CM

## 2017-05-08 DIAGNOSIS — N18.30 CKD (CHRONIC KIDNEY DISEASE) STAGE 3, GFR 30-59 ML/MIN: ICD-10-CM

## 2017-05-08 PROCEDURE — 99499 UNLISTED E&M SERVICE: CPT | Mod: S$GLB,,, | Performed by: PEDIATRICS

## 2017-05-08 PROCEDURE — 36415 COLL VENOUS BLD VENIPUNCTURE: CPT | Mod: PO

## 2017-05-08 PROCEDURE — 80074 ACUTE HEPATITIS PANEL: CPT

## 2017-05-08 PROCEDURE — 99999 PR PBB SHADOW E&M-EST. PATIENT-LVL III: CPT | Mod: PBBFAC,,, | Performed by: PEDIATRICS

## 2017-05-08 PROCEDURE — 85025 COMPLETE CBC W/AUTO DIFF WBC: CPT

## 2017-05-08 PROCEDURE — 80053 COMPREHEN METABOLIC PANEL: CPT

## 2017-05-08 NOTE — PATIENT INSTRUCTIONS
Fall Prevention  Falls often occur due to slipping, tripping or losing your balance. Millions of people fall every year and injure themselves. Here are ways to reduce your risk of falling again.  · Think about your fall, was there anything that caused your fall that can be fixed, removed, or replaced?  · Make your home safe by keeping walkways clear of objects you may trip over.  · Use non-slip pads under rugs. Do not use area rugs or small throw rugs.  · Use non-slip mats in bathtubs and showers.  · Install handrails and lights on staircases.  · Do not walk in poorly lit areas.  · Do not stand on chairs or wobbly ladders.  · Use caution when reaching overhead or looking upward. This position can cause a loss of balance.  · Be sure your shoes fit properly, have non-slip bottoms and are in good condition.   · Wear shoes both inside and out. Avoid going barefoot or wearing slippers.  · Be cautious when going up and down stairs, curbs, and when walking on uneven sidewalks.  · If your balance is poor, consider using a cane or walker.  · If your fall was related to alcohol use, stop or limit alcohol intake.   · If your fall was related to use of sleeping medicines, talk to your doctor about this. You may need to reduce your dosage at bedtime if you awaken during the night to go to the bathroom.    · To reduce the need for nighttime bathroom trips:  ¨ Avoid drinking fluids for several hours before going to bed  ¨ Empty your bladder before going to bed  ¨ Men can keep a urinal at the bedside  · Stay as active as you can. Balance, flexibility, strength, and endurance all come from exercise. They all play a role in preventing falls. Ask your healthcare provider which types of activity are right for you.  · Get your vision checked on a regular basis.  · If you have pets, know where they are before you stand up or walk so you don't trip over them.  · Use night lights.  Date Last Reviewed: 11/5/2015  © 0087-6130 [employer]. [address] [place] [address], [place] [address]. All rights reserved. This information is not intended as a substitute for professional medical care. Always follow your healthcare professional's instructions.

## 2017-05-08 NOTE — MR AVS SNAPSHOT
[facility] Internal Medicine  [address] [place] [address]  Phone: [phone]  Fax: [phone]                  Josy Posey   2017 3:20 PM   Office Visit    Description:  Female : 1924   Provider:  FAM Rosales Jr., MD   Department:  [facility] Internal Medicine           Reason for Visit     Follow-up           Diagnoses this Visit        Comments    Orthostatic hypotension    -  Primary     CKD (chronic kidney disease) stage 3, GFR 30-59 ml/min         Elevated LFTs                To Do List           Future Appointments        Provider Department Dept Phone    2017 7:30 PM LAB, SAME DAY [facility] [phone]    5/10/2017 1:30 PM Vandana Childress PharmD [facility] Coumadin [phone]    2017 1:30 PM Leslie Alvarado MD [facility] Cardiology [phone]      Goals (5 Years of Data)     None      Follow-Up and Disposition     Follow-up and Disposition History      [facility]     [facility] -  Assistance  Unless otherwise directed by your provider, please contact [facility], our nurse care line that is available for  assistance.     Registered nurses in the [facility] provide: appointment scheduling, clinical advisement, health education, and other advisory services.  Call: [phone] (toll free)               Medications           Message regarding Medications     Verify the changes and/or additions to your medication regime listed below are the same as discussed with your clinician today.  If any of these changes or additions are incorrect, please notify your healthcare provider.             Verify that the below list of medications is an accurate representation of the medications you are currently taking.  If none reported, the list may be blank. If incorrect, please contact your healthcare provider. Carry this list with you in case of emergency.           Current Medications      amoxicillin-clavulanate 875-125mg (AUGMENTIN) 875-125 mg per tablet Take 1 tablet by mouth 2 (two) times daily.    ascorbic acid (VITAMIN C) 1000 MG tablet Take 1 tablet by mouth once daily once daily.    biotin 2,500 mcg Cap Take by mouth.    clotrimazole-betamethasone 1-0.05% (LOTRISONE) cream Apply topically 2 (two) times daily.    econazole nitrate 1 % cream     escitalopram oxalate (LEXAPRO) 10 MG tablet take 1 tablet by mouth once daily    estradiol (ESTRACE) 0.01 % (0.1 mg/gram) vaginal cream Place 1 g vaginally 3 (three) times a week. Place small amount on the outside of the urethra nightly for 3 weeks , then twice weekly    fluticasone (FLONASE) 50 mcg/actuation nasal spray instill 2 sprays into each nostril once daily    folic acid (FOLVITE) 1 MG tablet Take 1 tablet by mouth once daily once daily.    iron-vitamin C 100-250 mg, ICAR-C, (ICAR-C) 100-250 mg Tab Take 1 tablet by mouth once daily.    levothyroxine (SYNTHROID) 50 MCG tablet take 1 tablet by mouth once daily    meclizine (ANTIVERT) 25 mg tablet Take 1 tablet (25 mg total) by mouth 3 (three) times daily as needed for Dizziness.    midodrine (PROAMATINE) 10 MG tablet take 1 tablet by mouth four times a day    omega-3 fatty acids-vitamin E (FISH OIL) 1,000 mg Cap Take by mouth once daily.    pravastatin (PRAVACHOL) 80 MG tablet Take 1 tablet (80 mg total) by mouth once daily.    propafenone (RHTHYMOL) 150 MG Tab 75 mg(half tablet) every 8 hrs    spironolactone (ALDACTONE) 25 MG tablet take 1 tablet by mouth once daily    triamcinolone acetonide 0.1% (KENALOG) 0.1 % cream apply THINLY to affected area ON LEGS, ARMS, AND TRUCK twice a day if needed for eczema    vitamin B12-folic acid 0.5-1 mg Tab Take 1 tablet by mouth once daily once daily.    warfarin (COUMADIN) 1 MG tablet Take 1/2 tablet Monday through Saturday and NONE on Sunday as directed by the coumadin clinic.    alendronate (FOSAMAX) 70 MG tablet take 1 tablet by mouth every week          "  Clinical Reference Information           Your Vitals Were     BP Pulse Temp Height Weight SpO2    110/68 (BP Location: Left arm, Patient Position: Standing, BP Method: Manual) 75 96.6 °F (35.9 °C) (Tympanic) 5' 3" (1.6 m) 69 kg (152 lb 1.9 oz) 95%    BMI                26.95 kg/m2          Blood Pressure          Most Recent Value    BP  110/68      Allergies as of 5/8/2017     Sulfa (Sulfonamide Antibiotics)    Morphine    Dronedarone      Immunizations Administered on Date of Encounter - 5/8/2017     None      Orders Placed During Today's Visit     Future Labs/Procedures Expected by Expires    CBC auto differential  5/8/2017 7/7/2018    Comprehensive metabolic panel  5/8/2017 7/7/2018    HEPATITIS PANEL, ACUTE  5/8/2017 7/7/2018      Language Assistance Services     ATTENTION: Language assistance services are available, free of charge. Please call [phone].      ATENCIÓN: Si habla español, tiene a patel disposición servicios gratuitos de asistencia lingüística. Llame al [phone].     CHÚ Ý: N?u b?n nói Ti?ng Vi?t, có các d?ch v? h? tr? ngôn ng? mi?n phí dành cho b?n. G?i s? [phone].         [facility] - Internal Medicine complies with applicable Federal civil rights laws and does not discriminate on the basis of race, color, national origin, age, disability, or sex.        "

## 2017-05-08 NOTE — PROGRESS NOTES
Subjective:       Patient ID: Josy Posey is a 89 y.o. female.    Chief Complaint: Follow-up (ER)    HPI Comments: F/U from [facility]. Feels beeter but still with wide swings in B/P from 70/50 to 170/100. She is less vertiginous and eating well but still has some episodes. Taking midodrine full in AM and then 1/2 at lunch and then 1/2 at supper. Is finishing augmentin for bronchitis.     Review of Systems   Constitutional: Negative for fever and unexpected weight change.   HENT: Negative for congestion and rhinorrhea.    Eyes: Negative for discharge and redness.   Respiratory: Negative for cough and wheezing.    Cardiovascular: Negative for chest pain, palpitations and leg swelling.   Gastrointestinal: Negative for constipation, diarrhea and vomiting.   Endocrine: Negative for cold intolerance, heat intolerance, polydipsia, polyphagia and polyuria.   Genitourinary: Negative for decreased urine volume, difficulty urinating and menstrual problem.   Musculoskeletal: Negative for arthralgias and joint swelling.   Skin: Negative for rash and wound.   Neurological: Positive for dizziness, weakness and light-headedness. Negative for syncope and headaches.   Psychiatric/Behavioral: Negative for behavioral problems and sleep disturbance.       Objective:      Physical Exam   Constitutional: She is oriented to person, place, and time. She appears well-developed and well-nourished. No distress.   Neck: Normal range of motion. Neck supple. No JVD present. No thyromegaly present.   Cardiovascular: Normal rate, regular rhythm and normal heart sounds.    No murmur heard.  Pulmonary/Chest: Effort normal and breath sounds normal. No respiratory distress.   Abdominal: Soft. She exhibits no distension and no mass. No hernia.   Lymphadenopathy:     She has no cervical adenopathy.   Neurological: She is alert and oriented to person, place, and time. She displays normal reflexes. No cranial nerve deficit. She exhibits normal muscle  tone. Coordination normal.   Skin: No rash noted.   Psychiatric: She has a normal mood and affect. Her behavior is normal. Judgment and thought content normal.       Assessment:       1. Orthostatic hypotension    2. CKD (chronic kidney disease) stage 3, GFR 30-59 ml/min    3. Elevated LFTs        Plan:       Orthostatic hypotension    CKD (chronic kidney disease) stage 3, GFR 30-59 ml/min    Elevated LFTs  -     Comprehensive metabolic panel; Future; Expected date: 5/8/17  -     CBC auto differential; Future; Expected date: 5/8/17  -     HEPATITIS PANEL, ACUTE; Future; Expected date: 5/8/17    I suspect she had a viral illness worsening her baseline illnesses. She is better, liberalize salt intake if she does get edema. Slow rising. Wear support hose with zipper. Keep cardiology f/u. If LFts still elevated, consider CT abd and GI consult.    Transitional Care Note    Family and/or Caretaker present at visit?  Yes.  Diagnostic tests reviewed/disposition: I have reviewed all completed as well as pending diagnostic tests at the time of discharge.  Disease/illness education: given  Home health/community services discussion/referrals: Patient does not have home health established from hospital visit.  They do not need home health.  If needed, we will set up home health for the patient.   Establishment or re-establishment of referral orders for community resources: No other necessary community resources.   Discussion with other health care providers: she has f/u in place..

## 2017-05-09 LAB
ALBUMIN SERPL BCP-MCNC: 3.5 G/DL
ALP SERPL-CCNC: 295 U/L
ALT SERPL W/O P-5'-P-CCNC: 181 U/L
ANION GAP SERPL CALC-SCNC: 11 MMOL/L
AST SERPL-CCNC: 98 U/L
BASOPHILS # BLD AUTO: 0.03 K/UL
BASOPHILS NFR BLD: 0.4 %
BILIRUB SERPL-MCNC: 1 MG/DL
BUN SERPL-MCNC: 26 MG/DL
CALCIUM SERPL-MCNC: 9.2 MG/DL
CHLORIDE SERPL-SCNC: 104 MMOL/L
CO2 SERPL-SCNC: 24 MMOL/L
CREAT SERPL-MCNC: 1.4 MG/DL
DIFFERENTIAL METHOD: ABNORMAL
EOSINOPHIL # BLD AUTO: 0.2 K/UL
EOSINOPHIL NFR BLD: 3 %
ERYTHROCYTE [DISTWIDTH] IN BLOOD BY AUTOMATED COUNT: 14.3 %
EST. GFR  (AFRICAN AMERICAN): 37.6 ML/MIN/1.73 M^2
EST. GFR  (NON AFRICAN AMERICAN): 32.6 ML/MIN/1.73 M^2
GLUCOSE SERPL-MCNC: 73 MG/DL
HCT VFR BLD AUTO: 38.3 %
HGB BLD-MCNC: 12.3 G/DL
LYMPHOCYTES # BLD AUTO: 2 K/UL
LYMPHOCYTES NFR BLD: 26.2 %
MCH RBC QN AUTO: 31.1 PG
MCHC RBC AUTO-ENTMCNC: 32.1 %
MCV RBC AUTO: 97 FL
MONOCYTES # BLD AUTO: 0.7 K/UL
MONOCYTES NFR BLD: 9.3 %
NEUTROPHILS # BLD AUTO: 4.7 K/UL
NEUTROPHILS NFR BLD: 60.5 %
PLATELET # BLD AUTO: 229 K/UL
PMV BLD AUTO: 13.1 FL
POTASSIUM SERPL-SCNC: 4.8 MMOL/L
PROT SERPL-MCNC: 7.2 G/DL
RBC # BLD AUTO: 3.95 M/UL
SODIUM SERPL-SCNC: 139 MMOL/L
WBC # BLD AUTO: 7.72 K/UL

## 2017-05-10 ENCOUNTER — TELEPHONE (OUTPATIENT)
Dept: CARDIOLOGY | Facility: CLINIC | Age: 82
End: 2017-05-10

## 2017-05-10 ENCOUNTER — ANTI-COAG VISIT (OUTPATIENT)
Dept: CARDIOLOGY | Facility: CLINIC | Age: 82
End: 2017-05-10
Payer: MEDICARE

## 2017-05-10 DIAGNOSIS — Z79.01 LONG TERM (CURRENT) USE OF ANTICOAGULANTS: Primary | ICD-10-CM

## 2017-05-10 LAB
HAV IGM SERPL QL IA: NEGATIVE
HBV CORE IGM SERPL QL IA: NEGATIVE
HBV SURFACE AG SERPL QL IA: NEGATIVE
HCV AB SERPL QL IA: NEGATIVE
INR PPP: 1.7 (ref 1.5–2.5)

## 2017-05-10 PROCEDURE — 99211 OFF/OP EST MAY X REQ PHY/QHP: CPT | Mod: 25,S$GLB,,

## 2017-05-10 PROCEDURE — 85610 PROTHROMBIN TIME: CPT | Mod: QW,S$GLB,,

## 2017-05-10 RX ORDER — WARFARIN 1 MG/1
TABLET ORAL
Qty: 15 TABLET | Refills: 3 | Status: SHIPPED | OUTPATIENT
Start: 2017-05-10 | End: 2017-08-23 | Stop reason: SDUPTHER

## 2017-05-10 NOTE — MR AVS SNAPSHOT
[facility] - Coumadin  [address] [place] [address]  Phone: [phone]  Fax: [phone]                  Josy Posey   5/10/2017 1:30 PM   Anti-coag visit    Description:  Female : 1924   Provider:  Vandana Childress, PharmDEBORAH   Department:  [facility] - Coumadin           Diagnoses this Visit        Comments    Long term (current) use of anticoagulants    -  Primary            To Do List           Future Appointments        Provider Department Dept Phone    2017 1:30 PM Leslie Alvarado MD [facility] Cardiology [phone]    2017 1:30 PM Vandana Childress PharmD [facility] Coumadin [phone]    2017 1:15 PM [facility] Arrhythmia Procedures [phone]      Goals (5 Years of Data)     None      [facility]     [facility] On [facility] Nurse Care Line -  Assistance  Unless otherwise directed by your provider, please contact [facility], our nurse care line that is available for  assistance.     Registered nurses in the [facility] provide: appointment scheduling, clinical advisement, health education, and other advisory services.  Call: [phone] (toll free)               Medications           Message regarding Medications     Verify the changes and/or additions to your medication regime listed below are the same as discussed with your clinician today.  If any of these changes or additions are incorrect, please notify your healthcare provider.             Verify that the below list of medications is an accurate representation of the medications you are currently taking.  If none reported, the list may be blank. If incorrect, please contact your healthcare provider. Carry this list with you in case of emergency.           Current Medications     alendronate (FOSAMAX) 70 MG tablet take 1 tablet by mouth every week    ascorbic acid (VITAMIN C) 1000 MG tablet Take 1 tablet by mouth once daily once daily.    biotin 2,500 mcg Cap Take by mouth.     clotrimazole-betamethasone 1-0.05% (LOTRISONE) cream Apply topically 2 (two) times daily.    econazole nitrate 1 % cream     escitalopram oxalate (LEXAPRO) 10 MG tablet take 1 tablet by mouth once daily    estradiol (ESTRACE) 0.01 % (0.1 mg/gram) vaginal cream Place 1 g vaginally 3 (three) times a week. Place small amount on the outside of the urethra nightly for 3 weeks , then twice weekly    fluticasone (FLONASE) 50 mcg/actuation nasal spray instill 2 sprays into each nostril once daily    folic acid (FOLVITE) 1 MG tablet Take 1 tablet by mouth once daily once daily.    iron-vitamin C 100-250 mg, ICAR-C, (ICAR-C) 100-250 mg Tab Take 1 tablet by mouth once daily.    levothyroxine (SYNTHROID) 50 MCG tablet take 1 tablet by mouth once daily    meclizine (ANTIVERT) 25 mg tablet Take 1 tablet (25 mg total) by mouth 3 (three) times daily as needed for Dizziness.    midodrine (PROAMATINE) 10 MG tablet take 1 tablet by mouth four times a day    omega-3 fatty acids-vitamin E (FISH OIL) 1,000 mg Cap Take by mouth once daily.    pravastatin (PRAVACHOL) 80 MG tablet Take 1 tablet (80 mg total) by mouth once daily.    propafenone (RHTHYMOL) 150 MG Tab 75 mg(half tablet) every 8 hrs    spironolactone (ALDACTONE) 25 MG tablet take 1 tablet by mouth once daily    triamcinolone acetonide 0.1% (KENALOG) 0.1 % cream apply THINLY to affected area ON LEGS, ARMS, AND TRUCK twice a day if needed for eczema    vitamin B12-folic acid 0.5-1 mg Tab Take 1 tablet by mouth once daily once daily.    warfarin (COUMADIN) 1 MG tablet Take 1/2 tablet Monday through Saturday and NONE on Sunday as directed by the coumadin clinic.           Clinical Reference Information           Allergies as of 5/10/2017     Sulfa (Sulfonamide Antibiotics)    Morphine    Dronedarone      Immunizations Administered on Date of Encounter - 5/10/2017     None      Orders Placed During Today's Visit      Normal Orders This Visit    POCT INR          5/10/2017  1:39 PM -  Vandana Childress, PharmD      Component Results     Component Value Flag Ref Range Units Status    INR 1.7  1.5 - 2.5  Final      April 2017 Details    Sun Mon Tue Wed Thu Fri Sat           1                 2               3               4               5   2.0   0.5 mg   See details      6      0.5 mg         7      0.5 mg         8      0.5 mg           9      0 mg         10      0.5 mg         11      0.5 mg         12      0.5 mg         13      0.5 mg         14      0.5 mg         15      0.5 mg           16      0 mg         17      0.5 mg         18      0.5 mg         19      0.5 mg         20      0.5 mg         21      0.5 mg         22      0.5 mg           23      0 mg         24      0.5 mg         25      0.5 mg         26      0.5 mg         27      0.5 mg         28      0.5 mg         29      0.5 mg           30      0 mg                Date Details   04/05 Last INR check   INR: 2.0                 May 2017 Details    Sun Mon Tue Wed Thu Fri Sat      1      0.5 mg         2   2.7   0.5 mg   See details      3      0.5 mg         4   2.5   0.5 mg   See details      5      0.5 mg         6      0.5 mg           7      0 mg         8      0.5 mg         9      0.5 mg         10   1.7   0.5 mg   See details      11      0.5 mg         12      0.5 mg         13      0.5 mg           14      0 mg         15      0.5 mg         16      0.5 mg         17      0.5 mg         18      0.5 mg         19      0.5 mg         20      0.5 mg           21      0 mg         22      0.5 mg         23      0.5 mg         24      0.5 mg         25      0.5 mg         26      0.5 mg         27      0.5 mg           28      0 mg         29      0.5 mg         30      0.5 mg         31      0.5 mg             Date Details   05/02 INR: 2.7   Coumadin Therapy: 2.0 - 3.0 for INR for all indicators except mechanical heart valves and antiphospholipid syndromes which should use 2.5 - 3.5.       05/04 INR: 2.5   Coumadin  Therapy: 2.0 - 3.0 for INR for all indicators except mechanical heart valves and antiphospholipid syndromes which should use 2.5 - 3.5.       05/10 This INR check   INR: 1.7                     How to take your warfarin dose     To take:  0.5 mg Take 0.5 of a 1 mg tablet.           June 2017 Details    Sun Mon Tue Wed Thu Fri Sat         1      0.5 mg         2      0.5 mg         3      0.5 mg           4      0 mg         5      0.5 mg         6      0.5 mg         7      0.5 mg         8      0.5 mg         9      0.5 mg         10      0.5 mg           11      0 mg         12      0.5 mg         13      0.5 mg         14            15               16               17                 18               19               20               21               22               23               24                 25               26               27               28               29               30                 Date Details   No additional details    Date of next INR:  6/14/2017         How to take your warfarin dose     To take:  0.5 mg Take 0.5 of a 1 mg tablet.           Anticoagulation Summary as of 5/10/2017     Maintenance plan 0 mg on Sun; 0.5 mg (1 mg x 0.5) all other days    Full instructions 0 mg on Sun; 0.5 mg all other days    Next INR check 6/14/2017      Anticoagulation Episode Summary     Comments       Language Assistance Services     ATTENTION: Language assistance services are available, free of charge. Please call [phone].      ATENCIÓN: Si habla irvin, tiene a patel disposición servicios gratuitos de asistencia lingüística. Llame al [phone].     CHÚ Ý: N?u b?n nói Ti?ng Vi?t, có các d?ch v? h? tr? ngôn ng? mi?n phí dành cho b?n. G?i s? [phone].         [facility] - [facility] complies with applicable Federal civil rights laws and does not discriminate on the basis of race, color, national origin, age, disability, or sex.

## 2017-05-10 NOTE — TELEPHONE ENCOUNTER
"----- Message from Erin Machuca MA sent at 5/8/2017 11:39 AM CDT -----      ----- Message -----     From: Nickie Ochoa RN     Sent: 5/8/2017   9:14 AM       To: [facility] ENGLISH Staff    Morning,  I just spoke with Abrahan, the daughter of Josy Posey for a "transition of care" call.  The patient was recently discharged from [facility] and was told to see Dr. Rivera in 1 week. When you get a moment, please call the patient's daughter, Abrahan at [phone] to set this appointment up?  Thanks so much.  Nickie Ochoa RN  Care Coordination Call [facility]    "

## 2017-05-10 NOTE — TELEPHONE ENCOUNTER
Patient is scheduled to see Dr. Alvarado on 5/19/17 and if Dr. Webb visit is needed will schedule.

## 2017-05-10 NOTE — PROGRESS NOTES
INR remains therapeutic. Recently discharged diagnosis of Vertigo and orthostatic hypotension now taking meclizine. No other changes in medications. Will continue dose and diet until follow-up.

## 2017-05-12 ENCOUNTER — TELEPHONE (OUTPATIENT)
Dept: INTERNAL MEDICINE | Facility: CLINIC | Age: 82
End: 2017-05-12

## 2017-05-12 DIAGNOSIS — R79.89 ELEVATED LFTS: Primary | ICD-10-CM

## 2017-05-12 NOTE — TELEPHONE ENCOUNTER
----- Message from Kallie Villeda sent at 5/12/2017 11:56 AM CDT -----  Pt is requesting a call from nurse to discuss lab results. Pt states she isn't feel well.        Please call pt back at [phone]

## 2017-05-12 NOTE — TELEPHONE ENCOUNTER
Pt called states she is feeling bad today coughing does not have a good appetite but she is eating fruit and wants to know if there is anything she can take to start having more energy? Also wants clarification on her lab results sent to her on [facility] and do you think she needs to see you for an appointment. Informed pt that I will forward this to physician to contact her on Monday.  she verbalized understanding.

## 2017-05-13 NOTE — TELEPHONE ENCOUNTER
She may still have cough for a while, use mucinex DM. LFTs are still elevated, orders for GI consult placed, staff to set up.

## 2017-05-19 ENCOUNTER — OFFICE VISIT (OUTPATIENT)
Dept: CARDIOLOGY | Facility: CLINIC | Age: 82
End: 2017-05-19
Payer: MEDICARE

## 2017-05-19 VITALS
HEIGHT: 63 IN | WEIGHT: 149.94 LBS | DIASTOLIC BLOOD PRESSURE: 64 MMHG | HEART RATE: 68 BPM | BODY MASS INDEX: 26.57 KG/M2 | SYSTOLIC BLOOD PRESSURE: 90 MMHG

## 2017-05-19 DIAGNOSIS — G62.9 POLYNEUROPATHY: ICD-10-CM

## 2017-05-19 DIAGNOSIS — I48.0 PAROXYSMAL ATRIAL FIBRILLATION: ICD-10-CM

## 2017-05-19 DIAGNOSIS — Z86.718 HISTORY OF DVT OF LOWER EXTREMITY: ICD-10-CM

## 2017-05-19 DIAGNOSIS — N18.30 CKD (CHRONIC KIDNEY DISEASE) STAGE 3, GFR 30-59 ML/MIN: ICD-10-CM

## 2017-05-19 DIAGNOSIS — Z95.0 STATUS CARDIAC PACEMAKER: ICD-10-CM

## 2017-05-19 DIAGNOSIS — I95.1 ORTHOSTATIC HYPOTENSION DYSAUTONOMIC SYNDROME: ICD-10-CM

## 2017-05-19 DIAGNOSIS — I25.10 CORONARY ARTERY DISEASE INVOLVING NATIVE CORONARY ARTERY OF NATIVE HEART WITHOUT ANGINA PECTORIS: Primary | ICD-10-CM

## 2017-05-19 DIAGNOSIS — Z86.73 H/O: CVA (CEREBROVASCULAR ACCIDENT): ICD-10-CM

## 2017-05-19 DIAGNOSIS — R42 VERTIGO: ICD-10-CM

## 2017-05-19 DIAGNOSIS — E03.4 HYPOTHYROIDISM DUE TO ACQUIRED ATROPHY OF THYROID: ICD-10-CM

## 2017-05-19 DIAGNOSIS — Z79.01 CHRONIC ANTICOAGULATION: ICD-10-CM

## 2017-05-19 DIAGNOSIS — I51.89 LEFT VENTRICULAR DIASTOLIC DYSFUNCTION WITH PRESERVED SYSTOLIC FUNCTION: ICD-10-CM

## 2017-05-19 DIAGNOSIS — Z95.2 S/P MITRAL VALVE REPLACEMENT: ICD-10-CM

## 2017-05-19 DIAGNOSIS — I77.9 RIGHT-SIDED CAROTID ARTERY DISEASE: ICD-10-CM

## 2017-05-19 PROCEDURE — 1159F MED LIST DOCD IN RCRD: CPT | Mod: S$GLB,,, | Performed by: INTERNAL MEDICINE

## 2017-05-19 PROCEDURE — 1157F ADVNC CARE PLAN IN RCRD: CPT | Mod: S$GLB,,, | Performed by: INTERNAL MEDICINE

## 2017-05-19 PROCEDURE — 1160F RVW MEDS BY RX/DR IN RCRD: CPT | Mod: S$GLB,,, | Performed by: INTERNAL MEDICINE

## 2017-05-19 PROCEDURE — 99214 OFFICE O/P EST MOD 30 MIN: CPT | Mod: S$GLB,,, | Performed by: INTERNAL MEDICINE

## 2017-05-19 PROCEDURE — 99999 PR PBB SHADOW E&M-EST. PATIENT-LVL III: CPT | Mod: PBBFAC,,, | Performed by: INTERNAL MEDICINE

## 2017-05-19 PROCEDURE — 99499 UNLISTED E&M SERVICE: CPT | Mod: S$GLB,,, | Performed by: INTERNAL MEDICINE

## 2017-05-19 RX ORDER — MIDODRINE HYDROCHLORIDE 5 MG/1
5 TABLET ORAL 2 TIMES DAILY WITH MEALS
Qty: 60 TABLET | Refills: 11 | Status: SHIPPED | OUTPATIENT
Start: 2017-05-19 | End: 2018-01-31 | Stop reason: SDUPTHER

## 2017-05-19 NOTE — MR AVS SNAPSHOT
[facility] - Cardiology  [address]  [facility] [address]  Phone: [phone]  Fax: [phone]                  Josy Posey   2017 1:30 PM   Office Visit    Description:  Female : 1924   Provider:  Leslie Alvarado MD   Department:  [facility] - Cardiology           Reason for Visit     Atrial Fibrillation     Coronary Artery Disease           Diagnoses this Visit        Comments    Coronary artery disease involving native coronary artery of native heart without angina pectoris    -  Primary     S/P mitral valve replacement         Status cardiac pacemaker         Right-sided carotid artery disease         History of DVT of lower extremity         Chronic anticoagulation         Hypothyroidism due to acquired atrophy of thyroid         CKD (chronic kidney disease) stage 3, GFR 30-59 ml/min         Paroxysmal atrial fibrillation         Orthostatic hypotension dysautonomic syndrome         Vertigo         Left ventricular diastolic dysfunction with preserved systolic function         H/O: CVA (cerebrovascular accident)         Polyneuropathy                To Do List           Future Appointments        Provider Department Dept Phone    2017 1:30 PM Vandana Childress, PharmD [facility] - Coumadin [phone]    2017 1:15 PM PACEM[facility]ER CLINIC [facility] - Arrhythmia Procedures [phone]      Goals (5 Years of Data)     None      Follow-Up and Disposition     Return in about 3 months (around 2017).       These Medications        Disp Refills Start End    midodrine (PROAMATINE) 5 MG Tab 60 tablet 11 2017    Take 1 tablet (5 mg total) by mouth 2 (two) times daily with meals. - Oral    Pharmacy: [facility] #: [phone]         [facility]     [facility] Nurse [facility] -  Assistance  Unless otherwise directed by your provider, please contact [facility], our nurse care line that is  available for 24/7 assistance.     Registered nurses in the [facility] provide: appointment scheduling, clinical advisement, health education, and other advisory services.  Call: [phone] (toll free)               Medications           Message regarding Medications     Verify the changes and/or additions to your medication regime listed below are the same as discussed with your clinician today.  If any of these changes or additions are incorrect, please notify your healthcare provider.        START taking these NEW medications        Refills    midodrine (PROAMATINE) 5 MG Tab 11    Sig: Take 1 tablet (5 mg total) by mouth 2 (two) times daily with meals.    Class: Normal    Route: Oral      STOP taking these medications     pravastatin (PRAVACHOL) 80 MG tablet Take 1 tablet (80 mg total) by mouth once daily.           Verify that the below list of medications is an accurate representation of the medications you are currently taking.  If none reported, the list may be blank. If incorrect, please contact your healthcare provider. Carry this list with you in case of emergency.           Current Medications     alendronate (FOSAMAX) 70 MG tablet take 1 tablet by mouth every week    ascorbic acid (VITAMIN C) 1000 MG tablet Take 1 tablet by mouth once daily once daily.    biotin 2,500 mcg Cap Take by mouth.    clotrimazole-betamethasone 1-0.05% (LOTRISONE) cream Apply topically 2 (two) times daily.    econazole nitrate 1 % cream     escitalopram oxalate (LEXAPRO) 10 MG tablet take 1 tablet by mouth once daily    fluticasone (FLONASE) 50 mcg/actuation nasal spray instill 2 sprays into each nostril once daily    folic acid (FOLVITE) 1 MG tablet Take 1 tablet by mouth once daily once daily.    iron-vitamin C 100-250 mg, ICAR-C, (ICAR-C) 100-250 mg Tab Take 1 tablet by mouth once daily.    levothyroxine (SYNTHROID) 50 MCG tablet take 1 tablet by mouth once daily    meclizine (ANTIVERT) 25 mg tablet Take 1  "tablet (25 mg total) by mouth 3 (three) times daily as needed for Dizziness.    omega-3 fatty acids-vitamin E (FISH OIL) 1,000 mg Cap Take by mouth once daily.    propafenone (RHTHYMOL) 150 MG Tab 75 mg(half tablet) every 8 hrs    spironolactone (ALDACTONE) 25 MG tablet take 1 tablet by mouth once daily    triamcinolone acetonide 0.1% (KENALOG) 0.1 % cream apply THINLY to affected area ON LEGS, ARMS, AND TRUCK twice a day if needed for eczema    vitamin B12-folic acid 0.5-1 mg Tab Take 1 tablet by mouth once daily once daily.    warfarin (COUMADIN) 1 MG tablet Take 1/2 tablet Monday through Saturday and NONE on Sunday as directed by the coumadin clinic.    estradiol (ESTRACE) 0.01 % (0.1 mg/gram) vaginal cream Place 1 g vaginally 3 (three) times a week. Place small amount on the outside of the urethra nightly for 3 weeks , then twice weekly    midodrine (PROAMATINE) 5 MG Tab Take 1 tablet (5 mg total) by mouth 2 (two) times daily with meals.           Clinical Reference Information           Your Vitals Were     BP Pulse Height Weight BMI    90/64 68 5' 3" (1.6 m) 68 kg (149 lb 14.6 oz) 26.56 kg/m2      Blood Pressure          Most Recent Value    Right Arm BP - Sitting  90/64    Left Arm BP - Sitting  90/60    BP  90/64      Allergies as of 5/19/2017     Sulfa (Sulfonamide Antibiotics)    Morphine    Dronedarone      Immunizations Administered on Date of Encounter - 5/19/2017     None      Orders Placed During Today's Visit      Normal Orders This Visit    Ambulatory Referral to Physical/Occupational Therapy     Future Labs/Procedures Expected by Expires    Comprehensive metabolic panel  8/17/2017 (Approximate) 5/19/2018      Language Assistance Services     ATTENTION: Language assistance services are available, free of charge. Please call [phone].      ATENCIÓN: Si yelenala irvin, tiene a patel disposición servicios gratuitos de asistencia lingüística. Llame al [phone].     CHÚ Ý: N?u b?n nói Ti?ng Vi?t, " có các d?ch v? h? tr? ngôn ng? mi?n phí dành cho b?n. G?i s? [phone].         [facility] complies with applicable Federal civil rights laws and does not discriminate on the basis of race, color, national origin, age, disability, or sex.

## 2017-05-19 NOTE — PROGRESS NOTES
"Subjective:   Patient ID:  Josy Posey is a 89 y.o. female who presents for follow up of Atrial Fibrillation (Patient presents to clinic today for 6 month f/u.) and Coronary Artery Disease      HPI  A 89 yo female with orthostatic hypotension afib mvr cabg is here for f/u after an admission with htn had a dizziness with it now she is feeling her bp very elevated ion the morning takes midodrine on prn basis she si wearing her stockings she feels weak.  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 fuCT 1/3- stable,  CT's later - no residual    Lens replaced by other means 6/25/2014    Melena     Memory loss     PET scan consistent with Alzzheimers.   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    1 vessel LIMA to LAD    S/P MVR (mitral valve replacement) 9/27/2013    Skin ulcer     Squamous cell carcinoma     skin cancer X 2    Stroke     Syncope and collapse     Unspecified essential hypertension 9/16/2013    Unspecified transient cerebral ischemia     Urinary " incontinence     wears briefs       Past Surgical History:   Procedure Laterality Date    ADENOIDECTOMY      APPENDECTOMY      BREAST SURGERY  1950    right lumpectomy     CARDIAC VALVE SURGERY  10/04/2007    MVR    CATARACT EXTRACTION      CHOLECYSTECTOMY      CORONARY ARTERY BYPASS GRAFT      EYE SURGERY      cataracts bilateral    HYSTERECTOMY      for hydatiform mole    INSERT / REPLACE / REMOVE PACEMAKER  09/11/2001    TONSILLECTOMY         Social History   Substance Use Topics    Smoking status: Never Smoker    Smokeless tobacco: Never Used    Alcohol use No       Family History   Problem Relation Age of Onset    Tuberculosis Mother     Tuberculosis Father     Stroke Sister     Hypertension Sister     Stroke Brother     Hypertension Daughter     COPD Brother        Current Outpatient Prescriptions   Medication Sig    alendronate (FOSAMAX) 70 MG tablet take 1 tablet by mouth every week    ascorbic acid (VITAMIN C) 1000 MG tablet Take 1 tablet by mouth once daily once daily.    biotin 2,500 mcg Cap Take by mouth.    clotrimazole-betamethasone 1-0.05% (LOTRISONE) cream Apply topically 2 (two) times daily.    econazole nitrate 1 % cream     escitalopram oxalate (LEXAPRO) 10 MG tablet take 1 tablet by mouth once daily    fluticasone (FLONASE) 50 mcg/actuation nasal spray instill 2 sprays into each nostril once daily    folic acid (FOLVITE) 1 MG tablet Take 1 tablet by mouth once daily once daily.    iron-vitamin C 100-250 mg, ICAR-C, (ICAR-C) 100-250 mg Tab Take 1 tablet by mouth once daily.    levothyroxine (SYNTHROID) 50 MCG tablet take 1 tablet by mouth once daily    meclizine (ANTIVERT) 25 mg tablet Take 1 tablet (25 mg total) by mouth 3 (three) times daily as needed for Dizziness.    midodrine (PROAMATINE) 10 MG tablet take 1 tablet by mouth four times a day (Patient taking differently: as needed. take 1 tablet by mouth four times a day)    omega-3 fatty acids-vitamin E (FISH  OIL) 1,000 mg Cap Take by mouth once daily.    pravastatin (PRAVACHOL) 80 MG tablet Take 1 tablet (80 mg total) by mouth once daily.    propafenone (RHTHYMOL) 150 MG Tab 75 mg(half tablet) every 8 hrs    spironolactone (ALDACTONE) 25 MG tablet take 1 tablet by mouth once daily    triamcinolone acetonide 0.1% (KENALOG) 0.1 % cream apply THINLY to affected area ON LEGS, ARMS, AND TRUCK twice a day if needed for eczema    vitamin B12-folic acid 0.5-1 mg Tab Take 1 tablet by mouth once daily once daily.    warfarin (COUMADIN) 1 MG tablet Take 1/2 tablet Monday through Saturday and NONE on Sunday as directed by the coumadin clinic.    estradiol (ESTRACE) 0.01 % (0.1 mg/gram) vaginal cream Place 1 g vaginally 3 (three) times a week. Place small amount on the outside of the urethra nightly for 3 weeks , then twice weekly     No current facility-administered medications for this visit.      Current Outpatient Prescriptions on File Prior to Visit   Medication Sig    alendronate (FOSAMAX) 70 MG tablet take 1 tablet by mouth every week    ascorbic acid (VITAMIN C) 1000 MG tablet Take 1 tablet by mouth once daily once daily.    biotin 2,500 mcg Cap Take by mouth.    clotrimazole-betamethasone 1-0.05% (LOTRISONE) cream Apply topically 2 (two) times daily.    econazole nitrate 1 % cream     escitalopram oxalate (LEXAPRO) 10 MG tablet take 1 tablet by mouth once daily    fluticasone (FLONASE) 50 mcg/actuation nasal spray instill 2 sprays into each nostril once daily    folic acid (FOLVITE) 1 MG tablet Take 1 tablet by mouth once daily once daily.    iron-vitamin C 100-250 mg, ICAR-C, (ICAR-C) 100-250 mg Tab Take 1 tablet by mouth once daily.    levothyroxine (SYNTHROID) 50 MCG tablet take 1 tablet by mouth once daily    meclizine (ANTIVERT) 25 mg tablet Take 1 tablet (25 mg total) by mouth 3 (three) times daily as needed for Dizziness.    midodrine (PROAMATINE) 10 MG tablet take 1 tablet by mouth four times a  day (Patient taking differently: as needed. take 1 tablet by mouth four times a day)    omega-3 fatty acids-vitamin E (FISH OIL) 1,000 mg Cap Take by mouth once daily.    pravastatin (PRAVACHOL) 80 MG tablet Take 1 tablet (80 mg total) by mouth once daily.    propafenone (RHTHYMOL) 150 MG Tab 75 mg(half tablet) every 8 hrs    spironolactone (ALDACTONE) 25 MG tablet take 1 tablet by mouth once daily    triamcinolone acetonide 0.1% (KENALOG) 0.1 % cream apply THINLY to affected area ON LEGS, ARMS, AND TRUCK twice a day if needed for eczema    vitamin B12-folic acid 0.5-1 mg Tab Take 1 tablet by mouth once daily once daily.    warfarin (COUMADIN) 1 MG tablet Take 1/2 tablet Monday through Saturday and NONE on Sunday as directed by the coumadin clinic.    estradiol (ESTRACE) 0.01 % (0.1 mg/gram) vaginal cream Place 1 g vaginally 3 (three) times a week. Place small amount on the outside of the urethra nightly for 3 weeks , then twice weekly     No current facility-administered medications on file prior to visit.      Review of patient's allergies indicates:   Allergen Reactions    Sulfa (sulfonamide antibiotics) Anaphylaxis     Other reaction(s): Angioedema    Morphine      Other reaction(s): Unknown    Dronedarone Diarrhea, Nausea Only and Rash     DIZZINESS         Review of Systems   Constitution: Positive for weakness and malaise/fatigue. Negative for diaphoresis and weight gain.   HENT: Negative for headaches and hoarse voice.    Eyes: Negative for double vision and visual disturbance.   Cardiovascular: Negative for chest pain, claudication, cyanosis, dyspnea on exertion, irregular heartbeat, leg swelling, near-syncope, orthopnea, palpitations, paroxysmal nocturnal dyspnea and syncope.   Respiratory: Negative for cough, hemoptysis, shortness of breath and snoring.    Hematologic/Lymphatic: Negative for bleeding problem. Does not bruise/bleed easily.   Skin: Negative for color change and poor wound  "healing.   Musculoskeletal: Negative for muscle cramps, muscle weakness and myalgias.   Gastrointestinal: Negative for bloating, abdominal pain, change in bowel habit, diarrhea, heartburn, hematemesis, hematochezia, melena and nausea.   Neurological: Positive for dizziness and light-headedness. Negative for excessive daytime sleepiness, loss of balance and numbness.   Psychiatric/Behavioral: Negative for memory loss. The patient does not have insomnia.    Allergic/Immunologic: Negative for hives.       Objective:   Physical Exam  Vitals:    05/19/17 1334   BP: 90/64   Pulse: 68   Weight: 68 kg (149 lb 14.6 oz)   Height: 5' 3" (1.6 m)   Constitutional: She is oriented to person, place, and time. She appears well-developed and well-nourished. No distress.   HENT:   Head: Normocephalic and atraumatic.   Eyes: Right eye exhibits no discharge. Left eye exhibits no discharge.   Neck: Neck supple. No JVD present.   Cardiovascular: Normal rate and regular rhythm. Exam reveals no friction rub. Soft systolic mitral regurgitation murmur g1/6.  Pulmonary/Chest: Effort normal and breath sounds normal. No respiratory distress. She has no wheezes. She has no rales.   Scar cabg well healed. Pacer site well healed.   Abdominal: Soft. Bowel sounds are normal. She exhibits no distension. There is no tenderness. There is no rebound.   Musculoskeletal: She exhibits no edema. Bruises noted  Neurological: She is alert and oriented to person, place, and time. No cranial nerve deficit.   Skin: Skin is warm and dry. No rash noted. She is not diaphoretic. No erythema. Has spider veins.  Psychiatric: She has a normal mood and affect. Her behavior is normal  Lab Results   Component Value Date    CHOL 165 05/06/2016    CHOL 157 11/06/2015    CHOL 148 10/20/2014     Lab Results   Component Value Date    HDL 46 05/06/2016    HDL 43 11/06/2015    HDL 42 10/20/2014     Lab Results   Component Value Date    LDLCALC 94.0 05/06/2016    LDLCALC 96.2 " 11/06/2015    LDLCALC 91.4 10/20/2014     Lab Results   Component Value Date    TRIG 125 05/06/2016    TRIG 89 11/06/2015    TRIG 73 10/20/2014     Lab Results   Component Value Date    CHOLHDL 27.9 05/06/2016    CHOLHDL 27.4 11/06/2015    CHOLHDL 28.4 10/20/2014       Chemistry        Component Value Date/Time     05/08/2017 1702    K 4.8 05/08/2017 1702     05/08/2017 1702    CO2 24 05/08/2017 1702    BUN 26 05/08/2017 1702    CREATININE 1.4 05/08/2017 1702    GLU 73 05/08/2017 1702        Component Value Date/Time    CALCIUM 9.2 05/08/2017 1702    ALKPHOS 295 (H) 05/08/2017 1702    AST 98 (H) 05/08/2017 1702     (H) 05/08/2017 1702    BILITOT 1.0 05/08/2017 1702          Lab Results   Component Value Date    TSH 1.516 11/04/2015     Lab Results   Component Value Date    INR 1.7 05/10/2017    INR 2.5 (H) 05/04/2017    INR 2.7 (H) 05/02/2017     Lab Results   Component Value Date    WBC 7.72 05/08/2017    HGB 12.3 05/08/2017    HCT 38.3 05/08/2017    MCV 97 05/08/2017     05/08/2017     BMP  Sodium   Date Value Ref Range Status   05/08/2017 139 136 - 145 mmol/L Final     Potassium   Date Value Ref Range Status   05/08/2017 4.8 3.5 - 5.1 mmol/L Final     Chloride   Date Value Ref Range Status   05/08/2017 104 95 - 110 mmol/L Final     CO2   Date Value Ref Range Status   05/08/2017 24 23 - 29 mmol/L Final     BUN, Bld   Date Value Ref Range Status   05/08/2017 26 10 - 30 mg/dL Final     Creatinine   Date Value Ref Range Status   05/08/2017 1.4 0.5 - 1.4 mg/dL Final     Calcium   Date Value Ref Range Status   05/08/2017 9.2 8.7 - 10.5 mg/dL Final     Anion Gap   Date Value Ref Range Status   05/08/2017 11 8 - 16 mmol/L Final     eGFR if    Date Value Ref Range Status   05/08/2017 37.6 (A) >60 mL/min/1.73 m^2 Final     eGFR if non    Date Value Ref Range Status   05/08/2017 32.6 (A) >60 mL/min/1.73 m^2 Final     Comment:     Calculation used to obtain the  estimated glomerular filtration  rate (eGFR) is the CKD-EPI equation. Since race is unknown   in our information system, the eGFR values for   -American and Non--American patients are given   for each creatinine result.       Estimated Creatinine Clearance: 23.7 mL/min (based on Cr of 1.4).    Assessment:     1. Coronary artery disease involving native coronary artery of native heart without angina pectoris    2. S/P mitral valve replacement    3. Status cardiac pacemaker for complete heartblock     4. Right-sided carotid artery disease    5. History of DVT of lower extremity right    6. Chronic anticoagulation    7. Hypothyroidism due to acquired atrophy of thyroid    8. CKD (chronic kidney disease) stage 3, GFR 30-59 ml/min    9. Paroxysmal atrial fibrillation    10. Orthostatic hypotension dysautonomic syndrome    11. Vertigo    12. Left ventricular diastolic dysfunction with preserved systolic function    13. H/O: CVA (cerebrovascular accident)    14. Polyneuropathy      The patient is weak recovering from her illness. Her lft  Are up will hold pravastatin.  Needs her stockings and will keep midodrine 5 mg po as needed.  Will benefit form physical therapy.  Plan:     Midodrine prn  Record bp chart   Stop pravastatin   Repeat lft in 3 months.

## 2017-05-26 ENCOUNTER — OFFICE VISIT (OUTPATIENT)
Dept: OPHTHALMOLOGY | Facility: CLINIC | Age: 82
End: 2017-05-26
Payer: MEDICARE

## 2017-05-26 DIAGNOSIS — H04.123 DRY EYES, BILATERAL: ICD-10-CM

## 2017-05-26 DIAGNOSIS — H10.13 ALLERGIC CONJUNCTIVITIS, BILATERAL: Primary | ICD-10-CM

## 2017-05-26 DIAGNOSIS — Z96.1 PSEUDOPHAKIA, BOTH EYES: ICD-10-CM

## 2017-05-26 PROCEDURE — 99999 PR PBB SHADOW E&M-EST. PATIENT-LVL II: CPT | Mod: PBBFAC,,, | Performed by: OPHTHALMOLOGY

## 2017-05-26 PROCEDURE — 92012 INTRM OPH EXAM EST PATIENT: CPT | Mod: S$GLB,,, | Performed by: OPHTHALMOLOGY

## 2017-05-26 NOTE — PROGRESS NOTES
SUBJECTIVE:   Josy Posey is a 89 y.o. female   Uncorrected distance visual acuity was 20/30 in the right eye and 20/20 in the left eye. Corrected distance visual acuity was 20/40 in the right eye and 20/30 in the left eye.   Chief Complaint   Patient presents with    Eye Problem     itchy, watery, scratchy OU        HPI:  HPI     Eye Problem    Additional comments: itchy, watery, scratchy OU           Comments   Pt states that when she wakes up, her eyes feel stuck together. At night,   her eyes feel scratchy and watery. Has been going on for about 3 weeks.    1. PCIOL OD before 2001 w/YAG  PCIOL OS 2001 w/Dr. Celestin  2. Pacemaker  3. Dry Eyes    AT'S PRN OU    Flonase       Last edited by Derek Dawn, Patient Care Assistant on 5/26/2017 10:42   AM. (History)        Assessment /Plan :  1. Allergic conjunctivitis, bilateral recommend Zaditor OU BID prn and Tobradex 3 to 4 times a day prn   2. Dry eyes, bilateral Findings and symptoms consistent with moderate dry eyes. Dry eye instruction sheet reviewed with patient recommending:AT's qid/titrate prn, Lubricating Oint qhs and prn and [place] 3 Fish Oils [id number] mg po bid     3. Pseudophakia, both eyes stable     RTC as scheduled or prn

## 2017-06-02 ENCOUNTER — CLINICAL SUPPORT (OUTPATIENT)
Dept: REHABILITATION | Facility: HOSPITAL | Age: 82
End: 2017-06-02
Attending: INTERNAL MEDICINE
Payer: MEDICARE

## 2017-06-02 DIAGNOSIS — R53.1 GENERALIZED WEAKNESS: Primary | ICD-10-CM

## 2017-06-02 DIAGNOSIS — I95.1 ORTHOSTATIC HYPOTENSION DYSAUTONOMIC SYNDROME: ICD-10-CM

## 2017-06-02 DIAGNOSIS — I25.10 CORONARY ARTERY DISEASE INVOLVING NATIVE CORONARY ARTERY OF NATIVE HEART WITHOUT ANGINA PECTORIS: ICD-10-CM

## 2017-06-02 PROCEDURE — 97162 PT EVAL MOD COMPLEX 30 MIN: CPT | Performed by: PHYSICAL THERAPIST

## 2017-06-02 PROCEDURE — G8978 MOBILITY CURRENT STATUS: HCPCS | Mod: CL | Performed by: PHYSICAL THERAPIST

## 2017-06-02 PROCEDURE — G8979 MOBILITY GOAL STATUS: HCPCS | Mod: CL | Performed by: PHYSICAL THERAPIST

## 2017-06-02 PROCEDURE — 99496 TRANSJ CARE MGMT HIGH F2F 7D: CPT | Mod: S$GLB,,, | Performed by: PEDIATRICS

## 2017-06-02 NOTE — PROGRESS NOTES
"PHYSICAL THERAPY INITIAL OUTPATIENT EVALUATION    Referring Provider:  Dr. Leslie Alvarado    Diagnosis:         ICD-10-CM ICD-9-CM    1. Generalized weakness R53.1 780.79    2. Orthostatic hypotension dysautonomic syndrome G90.3 333.0    3. Coronary artery disease involving native coronary artery of native heart without angina pectoris I25.10 414.01      Orders:  Evaluate and Treat    Date of Initial Evaluation:  2017    Orders :  2017    Coding Cycle Visit # 1    SUBJECTIVE:  Patient reports she was walking up to 15 minutes on the treadmill before she became dehydrated at the end of April, presented for hospital stay during May, and returned home to her daughter's home with dry eyes and nose, which is improving slowly.  She reports she has not had any falls, but reports her blood pressure has been sporadic between high, low, and normal.  Patient reports she uses a rollator walker in home to walk from her room to the bathroom, but has to sit multiple times to recover.  She reports that she uses a cane sometimes, and points to her aide Milli.  She reports her sensation has improved.  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 fuCT 1/3- stable,  CT's later - no residual    Lens " replaced by other means 6/25/2014    Melena     Memory loss     PET scan consistent with Alzzheimers.   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    1 vessel LIMA to LAD    S/P MVR (mitral valve replacement) 9/27/2013    Skin ulcer     Squamous cell carcinoma     skin cancer X 2    Stroke     Syncope and collapse     Unspecified essential hypertension 9/16/2013    Unspecified transient cerebral ischemia     Urinary incontinence     wears briefs       Patient Active Problem List   Diagnosis    Depression, major, single episode, mild    Pure hypercholesterolemia    Orthostatic hypotension    Atrial fibrillation    CKD (chronic kidney disease) stage 3, GFR 30-59 ml/min    Iron deficiency    Atherosclerosis of aorta    CAD (coronary artery disease)s/p CABG 1v    Left ventricular diastolic dysfunction with preserved systolic function    Osteoporosis    Internal hemorrhoids    Diverticulosis of sigmoid colon    Hypothyroid    Chronic anticoagulation    History of DVT of lower extremity right    Right-sided carotid artery disease    Status cardiac pacemaker for complete heartblock     S/P mitral valve replacement    Polyneuropathy    Meralgia paresthetica of right side    H/O: CVA (cerebrovascular accident)    Pseudophakia    Bilateral dry eyes    Vertigo    Orthostatic hypotension dysautonomic syndrome       Current Outpatient Prescriptions:     alendronate (FOSAMAX) 70 MG tablet, take 1 tablet by mouth every week, Disp: 4 tablet, Rfl: 11    ascorbic acid (VITAMIN C) 1000 MG tablet, Take 1 tablet by mouth once daily once daily., Disp: , Rfl:     biotin 2,500 mcg Cap, Take by mouth., Disp: , Rfl:     clotrimazole-betamethasone 1-0.05% (LOTRISONE) cream, Apply  topically 2 (two) times daily., Disp: 45 g, Rfl: 1    econazole nitrate 1 % cream, , Disp: , Rfl:     escitalopram oxalate (LEXAPRO) 10 MG tablet, take 1 tablet by mouth once daily, Disp: 30 tablet, Rfl: 10    estradiol (ESTRACE) 0.01 % (0.1 mg/gram) vaginal cream, Place 1 g vaginally 3 (three) times a week. Place small amount on the outside of the urethra nightly for 3 weeks , then twice weekly, Disp: 42.5 g, Rfl: 12    fluticasone (FLONASE) 50 mcg/actuation nasal spray, instill 2 sprays into each nostril once daily, Disp: 16 g, Rfl: 11    folic acid (FOLVITE) 1 MG tablet, Take 1 tablet by mouth once daily once daily., Disp: , Rfl:     iron-vitamin C 100-250 mg, ICAR-C, (ICAR-C) 100-250 mg Tab, Take 1 tablet by mouth once daily., Disp: 30 tablet, Rfl: 3    levothyroxine (SYNTHROID) 50 MCG tablet, take 1 tablet by mouth once daily, Disp: 30 tablet, Rfl: 11    meclizine (ANTIVERT) 25 mg tablet, Take 1 tablet (25 mg total) by mouth 3 (three) times daily as needed for Dizziness., Disp: 15 tablet, Rfl: 0    midodrine (PROAMATINE) 5 MG Tab, Take 1 tablet (5 mg total) by mouth 2 (two) times daily with meals., Disp: 60 tablet, Rfl: 11    omega-3 fatty acids-vitamin E (FISH OIL) 1,000 mg Cap, Take by mouth once daily., Disp: , Rfl:     propafenone (RHTHYMOL) 150 MG Tab, 75 mg(half tablet) every 8 hrs, Disp: 45 tablet, Rfl: 6    spironolactone (ALDACTONE) 25 MG tablet, take 1 tablet by mouth once daily, Disp: 30 tablet, Rfl: 11    tobramycin-dexamethasone 0.3-0.05 % DrpS, Place 1 drop into both eyes 4 (four) times daily as needed., Disp: 1 Bottle, Rfl: 1    triamcinolone acetonide 0.1% (KENALOG) 0.1 % cream, apply THINLY to affected area ON LEGS, ARMS, AND TRUCK twice a day if needed for eczema, Disp: , Rfl: 0    vitamin B12-folic acid 0.5-1 mg Tab, Take 1 tablet by mouth once daily once daily., Disp: , Rfl:     warfarin (COUMADIN) 1 MG tablet, Take 1/2 tablet Monday through Saturday and NONE on Sunday as  directed by the coumadin clinic., Disp: 15 tablet, Rfl: 3    OBJECTIVE:    Vitals:  /72 94-95% SaO2  75 bpm    Pain: N/A /10    Sensation:  intact to light touch     ROM:  Noted patient is lacking 7 degrees of left knee extension.  Otherwise, within functional limits for bilateral lower extremities    Strength:    Right ankle dorsiflexion  4+/5   left  4+/5,   right ankle plantarflexion  4+/5   left  4+/5,   right ankle inversion  4+/5   left  4+/5,   right ankle eversion  4+/5   left  4+/5,   right quadriceps  4+/5,   left  4+/5,   right hamstrings  4+/5,   left  4/5,   right hip abduction  4+/5,   left  4+/5,   right hip adduction  4+/5,   left  4+/5,   right hip flexion  4/5,   left  4/5    Function:  Optimal Instrument     The following scores are patient-reported values, with 1 representing the ability to do the activity without any difficulty, 2 representing the ability to do the activity with little difficulty, 3 representing the ability to do with moderate difficulty, 4 representing the ability to do the activity with much difficulty, 5 representing the inability to do do the activity, and 9 representing that the activity is not applicable.    1.  Lying flat   1  2.  Rolling over  1  3.  Moving - lying to sitting 4  4.  Sitting   1  5.  Squatting   4  6.  Bending/stooping  4  7.  Balancing   4  8.  Kneeling   5  9.  Standing   3  10.  Walking - short distance 3  11.  Walking - long distance 5  12.  Walking - outdoors 5  13.  Climbing stairs  5  14.  Hopping   5  15.  Jumping   5  16.  Running   5  17.  Pushing   5  18.  Pulling   5  19.  Reaching   4  20.  Grasping   4  21.  Lifting   5  22.  Carrying   5      Total  88    Patient reports 75% impairment on the Optimal Instrument, or [id number].    Balance:  Upon initial standing, patient demonstrates posterior LOB and recovers with back of legs on the front of the chair.      Other:  She is unable to ambulate steadily without assistive device or the  assistance of someone.     ASSESSMENT:  The patient is a 89 y.o. year old female who presents to physical therapy with complaints of generalized weakness and decreased cardiovascular endurance.  Patient's impairments include decreased lower extremity strength, impaired balance, and shortened left hamstring muscle.  These impairments are limiting patient's ability to ambulate safely for distances longer than 30 ft indoors.  Patient's prognosis is fair.  Patient will benefit from skilled physical therapy intervention to strengthen weakened muscles and lengthen shortened muscles as well as improve cardiovascular endurance with therapeutic exercise, to facilitate improved balance with neuromuscular re-education, and decrease fall risk with gait training, and to educate the patient to better enable her to achieve her goals.    Co-morbidities which may impact the plan of care and potentially impede the patient's progress in therapy include:  CAD, CHF, Osteoporosis, Polyneuropathy, History of MI, History of CVA, syncope, A-fib    The patient's clinical presentation is evolving.  Based on patient's evolving clinical presentation, eight co-morbidities, and examination of multiple body systems, patient presents with moderate complexity.    Short Term Goals:  (2 weeks)  1.  Patient will demonstrate increased left knee extension to 5 degrees for safe gait.  2.  Patient will demonstrate ability to stand with modified independence and steady herself safely to reduce fall risk.  3.  Patient will be independent with her home exercise program.    Long Term Goals:  (4 weeks)  1.  Patient will demonstrate increased strength in the lower extremities to 4+/5 or better for decreased fall risk.  2.  Patient will demonstrate ability to ambulate 1 x 30 ft with rollator walker modified independence, with controlled change in SaO2, for decreased fall risk.  3.  Patient will report improved functional impairment level to 70% impaired, or  [id number], in ten treatment days.      TREATMENT PROVIDED:    Initial evaluation completed.      Patient was educated on using rollator for safety and waiting upon standing before ambulating to reduce fall risk.    PLAN:  Patient will benefit from physical therapy (1-2) x/week for (4) weeks including neuromuscular re-education, therapeutic exercise, gait training, functional activities, and patient education.    Thank you for this referral.    These services are reasonable and necessary for the conditions set forth above while under my care.

## 2017-06-14 ENCOUNTER — ANTI-COAG VISIT (OUTPATIENT)
Dept: CARDIOLOGY | Facility: CLINIC | Age: 82
End: 2017-06-14
Payer: MEDICARE

## 2017-06-14 DIAGNOSIS — Z79.01 LONG TERM (CURRENT) USE OF ANTICOAGULANTS: Primary | ICD-10-CM

## 2017-06-14 LAB — INR PPP: 1.6 (ref 1.5–2.5)

## 2017-06-14 PROCEDURE — 85610 PROTHROMBIN TIME: CPT | Mod: QW,S$GLB,,

## 2017-06-14 PROCEDURE — 99211 OFF/OP EST MAY X REQ PHY/QHP: CPT | Mod: 25,S$GLB,,

## 2017-06-14 NOTE — PROGRESS NOTES
INR remains therapeutic. Pravastatin discontinued. No other changes in medications. Continue dose and diet until follow-up.

## 2017-06-16 ENCOUNTER — CLINICAL SUPPORT (OUTPATIENT)
Dept: CARDIOLOGY | Facility: CLINIC | Age: 82
End: 2017-06-16
Payer: MEDICARE

## 2017-06-16 DIAGNOSIS — Z95.0 CARDIAC PACEMAKER IN SITU: ICD-10-CM

## 2017-06-16 DIAGNOSIS — I44.2 CHB (COMPLETE HEART BLOCK): ICD-10-CM

## 2017-06-16 DIAGNOSIS — I48.91 ATRIAL FIBRILLATION, UNSPECIFIED TYPE: ICD-10-CM

## 2017-06-16 PROCEDURE — 93281 PM DEVICE PROGR EVAL MULTI: CPT | Mod: S$GLB,,, | Performed by: INTERNAL MEDICINE

## 2017-06-16 RX ORDER — SPIRONOLACTONE 25 MG/1
TABLET ORAL
Qty: 30 TABLET | Refills: 11 | Status: SHIPPED | OUTPATIENT
Start: 2017-06-16 | End: 2017-09-07 | Stop reason: SDUPTHER

## 2017-06-29 ENCOUNTER — HOSPITAL ENCOUNTER (OUTPATIENT)
Dept: RADIOLOGY | Facility: HOSPITAL | Age: 82
Discharge: HOME | End: 2017-06-29
Attending: FAMILY MEDICINE
Payer: MEDICARE

## 2017-06-29 ENCOUNTER — OFFICE VISIT (OUTPATIENT)
Dept: INTERNAL MEDICINE | Facility: CLINIC | Age: 82
End: 2017-06-29
Payer: MEDICARE

## 2017-06-29 VITALS
OXYGEN SATURATION: 95 % | TEMPERATURE: 98 F | DIASTOLIC BLOOD PRESSURE: 58 MMHG | WEIGHT: 147.5 LBS | SYSTOLIC BLOOD PRESSURE: 100 MMHG | HEART RATE: 75 BPM | BODY MASS INDEX: 26.13 KG/M2 | HEIGHT: 63 IN

## 2017-06-29 DIAGNOSIS — S60.211A CONTUSION OF RIGHT WRIST, INITIAL ENCOUNTER: ICD-10-CM

## 2017-06-29 DIAGNOSIS — R07.81 RIB PAIN ON LEFT SIDE: ICD-10-CM

## 2017-06-29 DIAGNOSIS — S22.32XA CLOSED FRACTURE OF ONE RIB OF LEFT SIDE, INITIAL ENCOUNTER: Primary | ICD-10-CM

## 2017-06-29 PROCEDURE — 71100 X-RAY EXAM RIBS UNI 2 VIEWS: CPT | Mod: TC,PO

## 2017-06-29 PROCEDURE — 99999 PR PBB SHADOW E&M-EST. PATIENT-LVL V: CPT | Mod: PBBFAC,,, | Performed by: PHYSICIAN ASSISTANT

## 2017-06-29 PROCEDURE — 71020 XR CHEST PA AND LATERAL: CPT | Mod: TC,PO

## 2017-06-29 PROCEDURE — 99499 UNLISTED E&M SERVICE: CPT | Mod: S$GLB,,, | Performed by: PHYSICIAN ASSISTANT

## 2017-06-29 PROCEDURE — 99213 OFFICE O/P EST LOW 20 MIN: CPT | Mod: S$GLB,,, | Performed by: PHYSICIAN ASSISTANT

## 2017-06-29 PROCEDURE — 71100 X-RAY EXAM RIBS UNI 2 VIEWS: CPT | Mod: 26,,, | Performed by: RADIOLOGY

## 2017-06-29 PROCEDURE — 1159F MED LIST DOCD IN RCRD: CPT | Mod: S$GLB,,, | Performed by: PHYSICIAN ASSISTANT

## 2017-06-29 PROCEDURE — 1125F AMNT PAIN NOTED PAIN PRSNT: CPT | Mod: S$GLB,,, | Performed by: PHYSICIAN ASSISTANT

## 2017-06-29 PROCEDURE — 71020 XR CHEST PA AND LATERAL: CPT | Mod: 26,,, | Performed by: RADIOLOGY

## 2017-06-29 NOTE — PATIENT INSTRUCTIONS
Rib Fracture    You broke one or more ribs. This is called a rib fracture. Rib fractures do not require a cast like other bones. They will heal by themselves in about 4-6 weeks. The first 3-4 weeks will be the most painful because deep breathing, coughing, or changing position from sitting to lying down, may cause the broken ends to move slightly.  Home care  · Rest. You should not be doing any heavy lifting or strenuous exertion until the pain goes away.  · It hurts to breathe when you have a broken rib. This puts you at risk of getting pneumonia from poor airflow through your lungs. To prevent this:  ¨ Take several very deep breaths once an hour while you're awake. Exhale through pursed lips as if you are blowing up a balloon. If possible, actually blow up a balloon or a rubber glove. This exercise builds up pressure inside the lung and prevents collapse of the small air sacs of the lung. This exercise may cause some pain at the site of injury, which is normal.  ¨ You may have gotten a breathing exercise device called an incentive spirometer. Use it at least 4 times a day, or as directed.  · Apply an ice pack over the injured area for 15 to 20 minutes every 1 to 2 hours. You should do this for the first 24 to 48 hours. You can make an ice pack by filling a plastic bag that seals at the top with ice cubes and then wrapping it with a thin towel. Continue with ice packs as needed for the relief of pain and swelling.  · You may use over-the-counter pain medicine to control pain, unless another pain medicine was prescribed. If you have chronic liver or kidney disease or ever had a stomach ulcer or GI bleeding, talk with your healthcare provider before using these medicines.  · If your pain is not controlled, contact your healthcare provider. Sometimes a stronger pain medicine may be needed. A nerve block can be done in case of severe pain. It will numb the nerve between the ribs.  Follow-up care  Follow up with your  healthcare provider, or as advised. Rarely, a broken rib will cause complications within the first few days that may not be evident during your initial exam. This can include collapsed lung, bleeding around the lung or into the abdomen, or pneumonia. Therefore, watch for the signs below.  If X-rays were taken, you will be told of any new findings that may affect your care.  Call 911  Call 911 if you have:  · Dizziness, weakness or fainting  · Shortness of breath with or without chest discomfort  · New or worsening abdominal pain  · Discomfort in other areas of your upper body such as your shoulders, jaw, neck, or arms  When to seek medical advice  Call your healthcare provider right away if any of these occur:  · Increasing chest pain with breathing  · Fever of 100.4°F (38°C) or above lasting for 24 to 48 hours  · Congested cough  Date Last Reviewed: 12/3/2015  © 3168-8787 [employer]. [address] [place] [address]. All rights reserved. This information is not intended as a substitute for professional medical care. Always follow your healthcare professional's instructions.        Rib Fracture (Broken Rib)  Your ribs are curved bones in your chest. They help protect your lungs and expand and contract when you breathe. Children's ribs bend easily and can often withstand a blow or fall. But adult ribs are more likely to break (fracture) under stress. Even coughing or a hard sneeze can fracture a rib.    When to go to the Emergency Room (ER)  Although they can be painful, most rib fractures aren't serious. But they often make it hard to cough or breathe deeply. Get medical care right away if you have:  · Trouble breathing.  · Nausea, vomiting, or stomach pain with a sore or bruised rib.  · Pain that worsens over time.  · An injury to the chest or stomach.  What to expect in the ER  Here is what will happen in the ER:   · A healthcare provider will ask about your injury and examine you  carefully.  · An X-ray of your chest will likely be taken to show any major damage to ribs and lungs. However, ribs can undergo small breaks that do not show up on X-rays, even though they still hurt.  · You may be given medicine to ease your discomfort.  · Rarely, rib fractures can cause a lung to collapse or lead to bleeding in the chest. In these cases, a tube will be inserted into the chest to reinflate the lung or drain the blood.  Follow-up  You are likely to heal in 6 to 8 weeks. Most rib fractures heal on their own with no lasting effects. Call your healthcare provider right away if you notice any of these symptoms:  · Increased chest pain  · Shortness of breath  · Fever  · Coughing up blood  Date Last Reviewed: 9/26/2015  © 2844-1951 [employer]. [address] [place] [address]. All rights reserved. This information is not intended as a substitute for professional medical care. Always follow your healthcare professional's instructions.        Rib Contusion or Minor Fracture    A rib contusion is a bruise to one or more rib bones. It may cause pain, tenderness, swelling, and a purplish tint to the skin. There may be a sharp pain with each breath. A rib contusion takes anywhere from a few days to a few weeks to heal. A minor rib fracture or break may cause the same symptoms as a rib contusion. The small crack may not be seen on a regular chest X-ray. Treatment for both problems is the same.  Home care  · You may use over-the-counter pain medicine to control pain, unless another pain medicine was prescribed. If you have chronic liver or kidney disease or ever had a stomach ulcer or GI bleeding, talk with your healthcare provider before using these medicines.  · Rest. Do not lift anything heavy or do any activity that causes pain.  · Apply an ice pack over the injured area for 15 to 20 minutes every 1 to 2 hours. You should do this for the first 24 to 48 hours. You can make an ice pack  by filling a plastic bag that seals at the top with ice cubes and then wrapping it with a thin towel. Continue with ice packs as needed for the relief of pain and swelling.  · The first 3 to 4 weeks of healing will be the most painful. If your pain is not under control with the treatment given, call your healthcare provider. Sometimes a stronger pain medicine may be needed. A nerve block can be done in case of severe pain. It will numb the nerve between the ribs.  Follow-up care  Follow up with your healthcare provider, or as advised.  If X-rays were taken, you will be told of any new findings that may affect your care.  Call 911  Call 911 if you have:  · Dizziness, weakness or fainting  · Shortness of breath with or without chest discomfort  · New or worsening pain  When to seek medical advice  Call your healthcare provider right away if any of these occur:  · Fever of 100.4°F (38°C) or above lasting for 24 to 48 hours  · Stomach pain  Date Last Reviewed: 12/2/2015 © 2000-2016 [employer]. [address] [place] [address]. All rights reserved. This information is not intended as a substitute for professional medical care. Always follow your healthcare professional's instructions.        Abrasions  Abrasions are skin scrapes. Their treatment depends on how large and deep the abrasion is.  Home care  You may be prescribed an antibiotic cream or ointment to apply to the wound. This helps prevent infection. Follow instructions when using this medication.  General care  · To care for the abrasion, do the following each day for as long as directed by your health care provider.  ¨ If you were given a bandage, change it once a day. If your bandage sticks to the wound, soak it in warm water until it loosens.  ¨ Wash the area with soap and warm water. You may do this in a sink or under a tub faucet or shower. Rinse off the soap. Then pat the area dry with a clean towel.  ¨ If antibiotic ointment or  cream was prescribed, reapply it to the wound as directed. Cover the wound with a fresh non-stick bandage. If the bandage becomes wet or dirty, change it as soon as possible.  · You may use acetaminophen or ibuprofen to control pain unless another pain medication was prescribed. Note: If you have chronic liver or kidney disease or ever had a stomach ulcer or GI bleeding, talk with your health care provider before using these medications. Do not use ibuprofen in children under six months of age.  · Most skin wounds heal within ten days. But an infection may occur despite treatment. Therefore, monitor the wound for signs of infection as listed below.  Follow-up care  Follow up with your health care provider, or as advised.  When to seek medical advice  Call your health care provider right away if any of these occur:  · Fever of 101ºF (38.3ºC) or higher, or as directed by your health care provider  · Increasing pain, redness, swelling, or drainage from the wound  · Bleeding from the wound that does not stop after a few minutes of steady, firm pressure  · Decreased ability to move any body part near wound  Date Last Reviewed: 3/22/2015  © 2998-6563 [employer]. [address] [place] [address], [place] [address]. All rights reserved. This information is not intended as a substitute for professional medical care. Always follow your healthcare professional's instructions.

## 2017-06-29 NOTE — PROGRESS NOTES
Subjective:      Patient ID: Josy Posey is a 89 y.o. female.    Chief Complaint: Fall    Fall   The accident occurred 12 to 24 hours ago. The fall occurred while standing (in bathroom). The volume of blood lost was minimal. Point of impact: chin and left side. The pain is present in the chin (left side). The pain is at a severity of 9/10. The pain is severe. The symptoms are aggravated by pressure on injury (deep breath, hiccups). Pertinent negatives include no abdominal pain, bowel incontinence, fever, headaches, hearing loss, hematuria, loss of consciousness, nausea, numbness, tingling, visual change or vomiting. She has tried nothing for the symptoms.       Review of Systems   Constitutional: Negative for activity change, appetite change, chills, diaphoresis, fatigue, fever and unexpected weight change.   HENT: Negative.  Negative for congestion, dental problem, drooling, ear discharge, ear pain, facial swelling, hearing loss, postnasal drip, rhinorrhea, sinus pressure, sneezing, sore throat, tinnitus, trouble swallowing and voice change.    Eyes: Negative.  Negative for visual disturbance.   Respiratory: Negative.  Negative for apnea, cough, choking, chest tightness, shortness of breath, wheezing and stridor.    Cardiovascular: Positive for chest pain. Negative for palpitations and leg swelling.   Gastrointestinal: Negative for abdominal distention, abdominal pain, blood in stool, bowel incontinence, constipation, diarrhea, nausea and vomiting.   Endocrine: Negative for cold intolerance, heat intolerance, polydipsia and polyuria.   Genitourinary: Negative.  Negative for difficulty urinating, frequency and hematuria.   Musculoskeletal: Positive for arthralgias. Negative for back pain, gait problem, joint swelling and myalgias.   Skin: Positive for color change and wound. Negative for pallor and rash.   Neurological: Negative for dizziness, tingling, tremors, loss of consciousness, weakness,  "light-headedness, numbness and headaches.   Hematological: Negative for adenopathy.   Psychiatric/Behavioral: Negative for behavioral problems, confusion, self-injury, sleep disturbance and suicidal ideas. The patient is not nervous/anxious.      Objective:   BP (!) 100/58   Pulse 75   Temp 97.5 °F (36.4 °C) (Tympanic)   Ht 5' 3" (1.6 m)   Wt 66.9 kg (147 lb 7.8 oz)   SpO2 95%   BMI 26.13 kg/m²     Physical Exam   Constitutional: She is oriented to person, place, and time. She appears well-developed and well-nourished. No distress.   HENT:   Head: Normocephalic and atraumatic.       Cardiovascular: Normal rate, regular rhythm and normal heart sounds.    Pulmonary/Chest: Effort normal and breath sounds normal. No respiratory distress. She has no wheezes. She has no rales. Chest wall is not dull to percussion. She exhibits tenderness. She exhibits no mass, no bony tenderness, no laceration, no crepitus, no edema, no deformity, no swelling and no retraction.       Abdominal: Soft. Bowel sounds are normal. She exhibits no distension and no mass. There is no tenderness. There is no rebound and no guarding.   Musculoskeletal: Normal range of motion. She exhibits no edema or deformity.        Right elbow: Normal.She exhibits normal range of motion, no swelling, no effusion, no deformity and no laceration.        Left elbow: She exhibits laceration. She exhibits normal range of motion, no swelling, no effusion and no deformity. No tenderness found.        Right wrist: She exhibits tenderness. She exhibits normal range of motion, no bony tenderness, no swelling, no effusion, no crepitus, no deformity and no laceration.        Right hip: Normal.        Left hip: Normal.        Arms:       Left hand: She exhibits laceration. She exhibits normal range of motion, no tenderness, no bony tenderness, normal capillary refill, no deformity and no swelling.        Hands:  Neurological: She is alert and oriented to person, place, " and time.   Skin: Skin is warm. Capillary refill takes less than 2 seconds. Ecchymosis noted. No rash noted. She is not diaphoretic. No erythema.   Psychiatric: She has a normal mood and affect. Her behavior is normal. Judgment and thought content normal.     X-ray results:   Chest two views and left ribs two views.    Findings: Heart size is within normal limits with changes of prior median sternotomy and CABG.  Bipolar cardiac pacer in place in the left.  Aorta is tortuous and calcified.    Lungs are essentially clear bilaterally and free of active infiltrate, effusion, or pneumothorax.    There is subtle deformity and suspected nondisplaced fracture of the anterolateral aspect of one lower left rib.    Assessment:     1. Closed fracture of one rib of left side, initial encounter    2. Contusion of right wrist, initial encounter      Plan:   Closed fracture of one rib of left side, initial encounter  -     X-Ray Chest PA And Lateral; Future; Expected date: 06/29/2017  -     X-Ray Ribs 2 View Left; Future; Expected date: 06/29/2017    Contusion of right wrist, initial encounter    -no heavy lifting, ice, rest  -apply neosporin to abrasions and keep covered and clean.   -Educational handout on over-the-counter medications and at-home conservative care, pertinent to the patients diagnosis today, was handed to the patient and discussed in detail.    Return if symptoms worsen or fail to improve.

## 2017-07-02 ENCOUNTER — HOSPITAL ENCOUNTER (EMERGENCY)
Facility: HOSPITAL | Age: 82
Discharge: HOME | End: 2017-07-02
Attending: INTERNAL MEDICINE
Payer: MEDICARE

## 2017-07-02 VITALS
OXYGEN SATURATION: 100 % | RESPIRATION RATE: 18 BRPM | WEIGHT: 147 LBS | HEIGHT: 64 IN | SYSTOLIC BLOOD PRESSURE: 184 MMHG | BODY MASS INDEX: 25.1 KG/M2 | HEART RATE: 75 BPM | DIASTOLIC BLOOD PRESSURE: 90 MMHG | TEMPERATURE: 98 F

## 2017-07-02 DIAGNOSIS — S22.42XA: Primary | ICD-10-CM

## 2017-07-02 LAB
ABO + RH BLD: NORMAL
ALBUMIN SERPL BCP-MCNC: 3.5 G/DL
ALP SERPL-CCNC: 64 U/L
ALT SERPL W/O P-5'-P-CCNC: 20 U/L
ANION GAP SERPL CALC-SCNC: 10 MMOL/L
AST SERPL-CCNC: 25 U/L
BASOPHILS # BLD AUTO: 0.02 K/UL
BASOPHILS NFR BLD: 0.3 %
BILIRUB SERPL-MCNC: 0.9 MG/DL
BILIRUB UR QL STRIP: NEGATIVE
BLD GP AB SCN CELLS X3 SERPL QL: NORMAL
BUN SERPL-MCNC: 30 MG/DL
CALCIUM SERPL-MCNC: 9.1 MG/DL
CHLORIDE SERPL-SCNC: 102 MMOL/L
CLARITY UR: CLEAR
CO2 SERPL-SCNC: 26 MMOL/L
COLOR UR: NORMAL
CREAT SERPL-MCNC: 1.5 MG/DL
DIFFERENTIAL METHOD: ABNORMAL
EOSINOPHIL # BLD AUTO: 0.3 K/UL
EOSINOPHIL NFR BLD: 3.5 %
ERYTHROCYTE [DISTWIDTH] IN BLOOD BY AUTOMATED COUNT: 14 %
EST. GFR  (AFRICAN AMERICAN): 35 ML/MIN/1.73 M^2
EST. GFR  (NON AFRICAN AMERICAN): 30 ML/MIN/1.73 M^2
GLUCOSE SERPL-MCNC: 88 MG/DL
GLUCOSE UR QL STRIP: NEGATIVE
HCT VFR BLD AUTO: 36.6 %
HGB BLD-MCNC: 12.6 G/DL
HGB UR QL STRIP: NEGATIVE
INR PPP: 2
KETONES UR QL STRIP: NEGATIVE
LEUKOCYTE ESTERASE UR QL STRIP: NEGATIVE
LIPASE SERPL-CCNC: 50 U/L
LYMPHOCYTES # BLD AUTO: 2.1 K/UL
LYMPHOCYTES NFR BLD: 26.9 %
MCH RBC QN AUTO: 32.1 PG
MCHC RBC AUTO-ENTMCNC: 34.4 %
MCV RBC AUTO: 93 FL
MONOCYTES # BLD AUTO: 0.8 K/UL
MONOCYTES NFR BLD: 9.9 %
NEUTROPHILS # BLD AUTO: 4.5 K/UL
NEUTROPHILS NFR BLD: 59.4 %
NITRITE UR QL STRIP: NEGATIVE
PH UR STRIP: 6 [PH] (ref 5–8)
PLATELET # BLD AUTO: 173 K/UL
PMV BLD AUTO: 11.1 FL
POTASSIUM SERPL-SCNC: 4 MMOL/L
PROT SERPL-MCNC: 7.1 G/DL
PROT UR QL STRIP: NEGATIVE
PROTHROMBIN TIME: 20.2 SEC
RBC # BLD AUTO: 3.93 M/UL
SODIUM SERPL-SCNC: 138 MMOL/L
SP GR UR STRIP: 1 (ref 1–1.03)
URN SPEC COLLECT METH UR: NORMAL
UROBILINOGEN UR STRIP-ACNC: NEGATIVE EU/DL
WBC # BLD AUTO: 7.65 K/UL

## 2017-07-02 PROCEDURE — 86901 BLOOD TYPING SEROLOGIC RH(D): CPT

## 2017-07-02 PROCEDURE — 25000003 PHARM REV CODE 250: Performed by: NURSE PRACTITIONER

## 2017-07-02 PROCEDURE — 83690 ASSAY OF LIPASE: CPT

## 2017-07-02 PROCEDURE — 80053 COMPREHEN METABOLIC PANEL: CPT

## 2017-07-02 PROCEDURE — 85610 PROTHROMBIN TIME: CPT

## 2017-07-02 PROCEDURE — 86900 BLOOD TYPING SEROLOGIC ABO: CPT

## 2017-07-02 PROCEDURE — 81003 URINALYSIS AUTO W/O SCOPE: CPT

## 2017-07-02 PROCEDURE — 36000 PLACE NEEDLE IN VEIN: CPT

## 2017-07-02 PROCEDURE — 99284 EMERGENCY DEPT VISIT MOD MDM: CPT | Mod: 25

## 2017-07-02 PROCEDURE — 85025 COMPLETE CBC W/AUTO DIFF WBC: CPT

## 2017-07-02 RX ORDER — TRAMADOL HYDROCHLORIDE 50 MG/1
50 TABLET ORAL EVERY 8 HOURS PRN
Qty: 14 TABLET | Refills: 0 | Status: SHIPPED | OUTPATIENT
Start: 2017-07-02 | End: 2017-07-07 | Stop reason: SDUPTHER

## 2017-07-02 RX ORDER — OXYCODONE AND ACETAMINOPHEN 5; 325 MG/1; MG/1
1 TABLET ORAL ONCE
Status: COMPLETED | OUTPATIENT
Start: 2017-07-02 | End: 2017-07-02

## 2017-07-02 RX ADMIN — OXYCODONE HYDROCHLORIDE AND ACETAMINOPHEN 1 TABLET: 5; 325 TABLET ORAL at 09:07

## 2017-07-02 NOTE — ED PROVIDER NOTES
"Encounter Date: 7/2/2017       History     Chief Complaint   Patient presents with    Rib Injury     pt was dx with L sided rib fx on Wednesday and now its hurting      89 year old female with complaint of left rib pain and left flank pain since fall 3 days ago.  Pt reports that she lost her balance arising from toilet and landed on left side.  Pt had x-rays 3 days ago and diagnosed with left fractured rib but did not receive pain medication.  Pt reports pain worsening and not getting any relief.  Pain worsened with any movement.  No relief with rest.  Denies vomiting or shortness of breath.           Review of patient's allergies indicates:   Allergen Reactions    Sulfa (sulfonamide antibiotics) Anaphylaxis     Other reaction(s): Angioedema    Morphine      Other reaction(s): Unknown    Dronedarone Diarrhea, Nausea Only and Rash     DIZZINESS       Past Medical History:   Diagnosis Date    *Atrial fibrillation     Anemia     Angina pectoris     Arthritis     Atrial fibrillation 9/27/2013    Atrioventricular block, complete     CAD (coronary artery disease) 5/6/2015    Cardiac pacemaker 9/27/2013    CHF (congestive heart failure)     Coronary artery disease     Cystocele     prior pessary use    Depression     DVT (deep venous thrombosis) 3/1/10 approx    s/p rt embolectomy    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 fuCT 1/3- stable,  CT's later - no residual    Lens replaced by other means 6/25/2014    Melena     Memory loss     PET scan consistent with Alzzheimers.    Mitral regurgitation 9/27/2013    Myocardial infarction     Ocular migraine 6/25/2014    Old myocardial infarct 7/1/2015    Orthostatic hypotension 9/27/2013    Osteoporosis " 7/1/2015    Polyneuropathy     S/P CABG (coronary artery bypass graft) 9/27/2013    1 vessel LIMA to LAD    S/P MVR (mitral valve replacement) 9/27/2013    Skin ulcer     Squamous cell carcinoma     skin cancer X 2    Stroke     Syncope and collapse     Unspecified essential hypertension 9/16/2013    Unspecified transient cerebral ischemia     Urinary incontinence     wears briefs     Past Surgical History:   Procedure Laterality Date    ADENOIDECTOMY      APPENDECTOMY      BREAST SURGERY  1950    right lumpectomy     CARDIAC VALVE SURGERY  10/04/2007    MVR    CATARACT EXTRACTION      CHOLECYSTECTOMY      CORONARY ARTERY BYPASS GRAFT      EYE SURGERY      cataracts bilateral    HYSTERECTOMY      for hydatiform mole    INSERT / REPLACE / REMOVE PACEMAKER  09/11/2001    TONSILLECTOMY       Family History   Problem Relation Age of Onset    Tuberculosis Mother     Tuberculosis Father     Stroke Sister     Hypertension Sister     Stroke Brother     Hypertension Daughter     COPD Brother      Social History   Substance Use Topics    Smoking status: Never Smoker    Smokeless tobacco: Never Used    Alcohol use No     Review of Systems   Constitutional: Negative for fever.   HENT: Negative for sore throat.    Respiratory: Negative for shortness of breath.    Cardiovascular: Negative for chest pain.   Gastrointestinal: Negative for nausea.   Genitourinary: Negative for dysuria.   Musculoskeletal: Positive for back pain.        Left rib pain    Skin: Negative for rash.   Neurological: Negative for weakness.   Hematological: Does not bruise/bleed easily.       Physical Exam     Initial Vitals [07/02/17 0823]   BP Pulse Resp Temp SpO2   (!) 184/90 75 18 98.1 °F (36.7 °C) 100 %      MAP       121.33         Physical Exam    Nursing note and vitals reviewed.  Constitutional: She appears well-developed and well-nourished.   HENT:   Head: Normocephalic and atraumatic.   Eyes: Conjunctivae and EOM are  normal. Pupils are equal, round, and reactive to light.   Neck: Normal range of motion. Neck supple.   Cardiovascular: Normal rate, normal heart sounds and intact distal pulses.   Pulmonary/Chest: Breath sounds normal.   Left inferior lateral rib tenderness   Abdominal: Soft. There is no tenderness. There is no rebound and no guarding.   Mild left upper abdominal tenderness   Musculoskeletal: Normal range of motion.   No spine tenderness, left flank tenderness   Neurological: She is alert and oriented to person, place, and time. She has normal strength and normal reflexes.   Skin: Skin is warm and dry.   Moderate ecchymosis left inferior lateral rib region    Psychiatric: She has a normal mood and affect. Her behavior is normal. Thought content normal.         ED Course   Procedures  Labs Reviewed   CBC W/ AUTO DIFFERENTIAL - Abnormal; Notable for the following:        Result Value    RBC 3.93 (*)     Hematocrit 36.6 (*)     MCH 32.1 (*)     All other components within normal limits   COMPREHENSIVE METABOLIC PANEL - Abnormal; Notable for the following:     Creatinine 1.5 (*)     eGFR if  35 (*)     eGFR if non  30 (*)     All other components within normal limits   PROTIME-INR - Abnormal; Notable for the following:     Prothrombin Time 20.2 (*)     INR 2.0 (*)     All other components within normal limits   LIPASE   URINALYSIS   TYPE & SCREEN              Labs Reviewed   CBC W/ AUTO DIFFERENTIAL - Abnormal; Notable for the following:        Result Value    RBC 3.93 (*)     Hematocrit 36.6 (*)     MCH 32.1 (*)     All other components within normal limits   COMPREHENSIVE METABOLIC PANEL - Abnormal; Notable for the following:     Creatinine 1.5 (*)     eGFR if  35 (*)     eGFR if non  30 (*)     All other components within normal limits   PROTIME-INR - Abnormal; Notable for the following:     Prothrombin Time 20.2 (*)     INR 2.0 (*)     All other  components within normal limits   LIPASE   URINALYSIS   TYPE & SCREEN     Imaging Results          CT Abdomen Pelvis  Without Contrast (Final result)  Result time 07/02/17 10:05:10   Procedure changed from CT Abdomen Pelvis With Contrast     Final result by Oswaldo Romano MD (07/02/17 10:05:10)                 Impression:         There are fractures of left 9th and 10th ribs posteriorly.  Some associated pleural thickening is present.  No pneumothorax is demonstrated.  No acute findings are seen in the abdomen or pelvis.        All CT scans at this facility use dose modulation, iterative reconstruction and/or weight based dosing when appropriate to reduce radiation dose to as low as reasonably achievable.       Electronically signed by: OSWALDO ROMANO MD  Date:     07/02/17  Time:    10:05              Narrative:    CT CHEST ABDOMEN AND PELVIS WITHOUT CONTRAST, 07/02/17 09:40:00    History:    chest trauma     Technique: Standard noncontrast CT of the chest, abdomen and pelvis.  Images were obtained without contrast.  Coronal and sagittal images were generated.  Comparison is made with previous plain film studies of 06/29/2017.    Findings:     Chest:        The lungs are free of any active infiltrate or effusion.  There is a calcific granuloma in the left lung.    There is a pacemaker present on the left.  There sternal sutures from previous heart surgery.  The mediastinum appears free of any mass, hemorrhage, or acute abnormality.  Extensive multilevel mild chronic arthritic change of the spine is present with spurring.  No compression or acute vertebral fracture is demonstrated.  There are nondisplaced fractures of the posterior aspects of the left 10th and 9th ribs.  Mild pleural thickening is seen in this region.  There is no associated pneumothorax.    The right ribs appear intact.    Abdomen and pelvis:    No free air is seen within the peritoneal cavity.  There surgical clips at the site of previous  cholecystectomy.  The liver, spleen, pancreas, and adrenal glands are free of acute abnormality.  The kidneys appear free of acute abnormality.    Within the pelvis, the bladder and ureters are normal in appearance.  The uterus appears to be absent.  There is diverticulosis of the colon but no acute inflammatory change is demonstrated.  The small bowel and stomach appear free of acute abnormality.  There is atherosclerosis of the aorta.  No aneurysm is demonstrated.  Images demonstrate old healed fractures of the right superior and inferior pubic rami.                             CT Chest Without Contrast (Final result)  Result time 07/02/17 10:05:11   Procedure changed from CT Chest With Contrast     Final result by Oswaldo Romano MD (07/02/17 10:05:11)                 Impression:         There are fractures of left 9th and 10th ribs posteriorly.  Some associated pleural thickening is present.  No pneumothorax is demonstrated.  No acute findings are seen in the abdomen or pelvis.        All CT scans at this facility use dose modulation, iterative reconstruction and/or weight based dosing when appropriate to reduce radiation dose to as low as reasonably achievable.       Electronically signed by: OSWALDO ROMANO MD  Date:     07/02/17  Time:    10:05              Narrative:    CT CHEST ABDOMEN AND PELVIS WITHOUT CONTRAST, 07/02/17 09:40:00    History:    chest trauma     Technique: Standard noncontrast CT of the chest, abdomen and pelvis.  Images were obtained without contrast.  Coronal and sagittal images were generated.  Comparison is made with previous plain film studies of 06/29/2017.    Findings:     Chest:        The lungs are free of any active infiltrate or effusion.  There is a calcific granuloma in the left lung.    There is a pacemaker present on the left.  There sternal sutures from previous heart surgery.  The mediastinum appears free of any mass, hemorrhage, or acute abnormality.  Extensive  multilevel mild chronic arthritic change of the spine is present with spurring.  No compression or acute vertebral fracture is demonstrated.  There are nondisplaced fractures of the posterior aspects of the left 10th and 9th ribs.  Mild pleural thickening is seen in this region.  There is no associated pneumothorax.    The right ribs appear intact.    Abdomen and pelvis:    No free air is seen within the peritoneal cavity.  There surgical clips at the site of previous cholecystectomy.  The liver, spleen, pancreas, and adrenal glands are free of acute abnormality.  The kidneys appear free of acute abnormality.    Within the pelvis, the bladder and ureters are normal in appearance.  The uterus appears to be absent.  There is diverticulosis of the colon but no acute inflammatory change is demonstrated.  The small bowel and stomach appear free of acute abnormality.  There is atherosclerosis of the aorta.  No aneurysm is demonstrated.  Images demonstrate old healed fractures of the right superior and inferior pubic rami.                            10:23 AM  Pt reports feeling better, discussed results with pt and care giver, requesting pain medication for d/c.  Discussed concern of using pain medication and risk of fall. Pt and care giver agreed to use pain medication cautiously and under supervision of care taker                    ED Course     Clinical Impression:   The encounter diagnosis was Traumatic closed nondisplaced fracture of two ribs on left side, initial encounter.                           Luis E Jackson NP  07/02/17 1026       Luis E Jackson NP  07/02/17 1027

## 2017-07-02 NOTE — ED NOTES
Pt resting in ER stretcher, aaox4, rr e/u, NAD noted. vss noted. Bed low and locked, call light in reach, side rails up x2. Pt verbalized understanding of status and POC; denies further needs. Will continue to monitor.

## 2017-07-02 NOTE — ED NOTES
Pt states she fell in the bathroom on Wednesday - states pain 10/10.  Bruises noted to left ribs, right lower arm, and under chin.

## 2017-07-07 ENCOUNTER — PATIENT MESSAGE (OUTPATIENT)
Dept: INTERNAL MEDICINE | Facility: CLINIC | Age: 82
End: 2017-07-07

## 2017-07-07 RX ORDER — TRAMADOL HYDROCHLORIDE 50 MG/1
TABLET ORAL
Qty: 14 TABLET | Refills: 0 | Status: SHIPPED | OUTPATIENT
Start: 2017-07-07 | End: 2018-10-03

## 2017-07-15 DIAGNOSIS — I48.0 PAF (PAROXYSMAL ATRIAL FIBRILLATION): ICD-10-CM

## 2017-07-15 DIAGNOSIS — E78.00 HYPERCHOLESTEROLEMIA: ICD-10-CM

## 2017-07-16 RX ORDER — PRAVASTATIN SODIUM 80 MG/1
TABLET ORAL
Qty: 30 TABLET | Refills: 6 | Status: SHIPPED | OUTPATIENT
Start: 2017-07-16 | End: 2017-07-19

## 2017-07-16 RX ORDER — PROPAFENONE HYDROCHLORIDE 150 MG/1
TABLET, COATED ORAL
Qty: 45 TABLET | Refills: 6 | Status: SHIPPED | OUTPATIENT
Start: 2017-07-16 | End: 2018-02-14 | Stop reason: SDUPTHER

## 2017-07-19 ENCOUNTER — ANTI-COAG VISIT (OUTPATIENT)
Dept: CARDIOLOGY | Facility: CLINIC | Age: 82
End: 2017-07-19
Payer: MEDICARE

## 2017-07-19 ENCOUNTER — OFFICE VISIT (OUTPATIENT)
Dept: INTERNAL MEDICINE | Facility: CLINIC | Age: 82
End: 2017-07-19
Payer: MEDICARE

## 2017-07-19 VITALS
OXYGEN SATURATION: 95 % | TEMPERATURE: 97 F | DIASTOLIC BLOOD PRESSURE: 70 MMHG | SYSTOLIC BLOOD PRESSURE: 102 MMHG | HEART RATE: 75 BPM | HEIGHT: 64 IN | BODY MASS INDEX: 25.03 KG/M2 | WEIGHT: 146.63 LBS

## 2017-07-19 DIAGNOSIS — Z78.9 STATIN INTOLERANCE: ICD-10-CM

## 2017-07-19 DIAGNOSIS — E03.4 HYPOTHYROIDISM DUE TO ACQUIRED ATROPHY OF THYROID: Primary | ICD-10-CM

## 2017-07-19 DIAGNOSIS — N18.30 CKD (CHRONIC KIDNEY DISEASE) STAGE 3, GFR 30-59 ML/MIN: ICD-10-CM

## 2017-07-19 DIAGNOSIS — S22.49XS CLOSED FRACTURE OF MULTIPLE RIBS, UNSPECIFIED LATERALITY, SEQUELA: ICD-10-CM

## 2017-07-19 DIAGNOSIS — G62.9 POLYNEUROPATHY: ICD-10-CM

## 2017-07-19 DIAGNOSIS — Z79.01 LONG TERM (CURRENT) USE OF ANTICOAGULANTS: Primary | ICD-10-CM

## 2017-07-19 DIAGNOSIS — F32.0 DEPRESSION, MAJOR, SINGLE EPISODE, MILD: ICD-10-CM

## 2017-07-19 DIAGNOSIS — E78.00 PURE HYPERCHOLESTEROLEMIA: ICD-10-CM

## 2017-07-19 LAB — INR PPP: 1.5 (ref 1.5–2.5)

## 2017-07-19 PROCEDURE — 99211 OFF/OP EST MAY X REQ PHY/QHP: CPT | Mod: 25,S$GLB,,

## 2017-07-19 PROCEDURE — 99214 OFFICE O/P EST MOD 30 MIN: CPT | Mod: S$GLB,,, | Performed by: PEDIATRICS

## 2017-07-19 PROCEDURE — 1126F AMNT PAIN NOTED NONE PRSNT: CPT | Mod: S$GLB,,, | Performed by: PEDIATRICS

## 2017-07-19 PROCEDURE — 99999 PR PBB SHADOW E&M-EST. PATIENT-LVL III: CPT | Mod: PBBFAC,,, | Performed by: PEDIATRICS

## 2017-07-19 PROCEDURE — 85610 PROTHROMBIN TIME: CPT | Mod: QW,S$GLB,,

## 2017-07-19 PROCEDURE — 99499 UNLISTED E&M SERVICE: CPT | Mod: S$GLB,,, | Performed by: PEDIATRICS

## 2017-07-19 PROCEDURE — 1159F MED LIST DOCD IN RCRD: CPT | Mod: S$GLB,,, | Performed by: PEDIATRICS

## 2017-07-19 NOTE — PROGRESS NOTES
Subjective:       Patient ID: Josy Posey is a 89 y.o. female.    Chief Complaint: Hospital Follow Up    She is here for f/u rib fracture from fall. She is at fall risk due to cardiac debility, she has tried to maintain activity and fall prevention, she has assistant. Her rib pain is now markedly better, only certain twisting/lifting and deep nreath or sneezing/coughing painful. No further tramadol use. No SOB, cough, fever. Her other medical problems were discussed with patient and stable, now off pravastatin due to LFTs and now normalized.      Review of Systems   Constitutional: Negative for fever and unexpected weight change.   HENT: Negative for congestion and rhinorrhea.    Eyes: Negative for discharge and redness.   Respiratory: Negative for cough and wheezing.    Cardiovascular: Positive for chest pain. Negative for palpitations and leg swelling.   Gastrointestinal: Negative for constipation, diarrhea and vomiting.   Endocrine: Negative for cold intolerance, heat intolerance, polydipsia, polyphagia and polyuria.   Genitourinary: Negative for decreased urine volume, difficulty urinating and menstrual problem.   Musculoskeletal: Negative for arthralgias and joint swelling.   Skin: Negative for rash and wound.   Neurological: Positive for weakness (chronic). Negative for syncope and headaches.   Psychiatric/Behavioral: Negative for behavioral problems and sleep disturbance.       Objective:      Physical Exam   Constitutional: She is oriented to person, place, and time. She appears well-developed and well-nourished. She is cooperative.   HENT:   Head: Normocephalic and atraumatic.   Eyes: Conjunctivae, EOM and lids are normal. Pupils are equal, round, and reactive to light.   Neck: Trachea normal and normal range of motion. Neck supple. No JVD present. Carotid bruit is not present. No Brudzinski's sign and no Kernig's sign noted. No thyroid mass and no thyromegaly present.   Cardiovascular: Normal rate,  regular rhythm, normal heart sounds and normal pulses.    No murmur heard.  Pulmonary/Chest: Effort normal and breath sounds normal. She has no wheezes. She has no rhonchi. She has no rales. She exhibits tenderness (tender at rib fracture site.).   Abdominal: Soft. Normal appearance. There is no hepatosplenomegaly. There is no tenderness. There is no rebound, no guarding and no CVA tenderness.   Musculoskeletal: She exhibits no edema.   Lymphadenopathy:     She has no cervical adenopathy.   Neurological: She is alert and oriented to person, place, and time. She has normal strength and normal reflexes. No cranial nerve deficit or sensory deficit. Coordination and gait normal.   Skin: Skin is warm. No abrasion and no rash noted.   Psychiatric: She has a normal mood and affect. Her speech is normal and behavior is normal. Judgment and thought content normal. Her mood appears not anxious. Cognition and memory are normal. She does not exhibit a depressed mood.       Assessment:       1. Hypothyroidism due to acquired atrophy of thyroid    2. CKD (chronic kidney disease) stage 3, GFR 30-59 ml/min    3. Pure hypercholesterolemia    4. Depression, major, single episode, mild    5. Polyneuropathy    6. Statin intolerance    7. Closed fracture of multiple ribs, unspecified laterality, sequela        Plan:       Hypothyroidism due to acquired atrophy of thyroid  -     TSH; Future; Expected date: 07/19/2017  -     CBC auto differential; Future; Expected date: 07/19/2017    CKD (chronic kidney disease) stage 3, GFR 30-59 ml/min    Pure hypercholesterolemia  -     Lipid panel; Future; Expected date: 07/19/2017    Depression, major, single episode, mild    Polyneuropathy    Statin intolerance    Closed fracture of multiple ribs, unspecified laterality, sequela    She seems to be doing well now with pain and easy work of resp. Use pillow bracing ribs with deep inspiration q 2hour while await. Stay off pravastatin, lfts have  normalized. Keep subspecialty care. F/U 4-6 weeks with repeat labs.

## 2017-07-19 NOTE — PROGRESS NOTES
INR remains therapeutic, on lower end of target range however. Hospital admission after a fall 7/2, no changes in medications. No other changes noted since last visit. Will re-challenge current dose until follow-up.

## 2017-08-23 ENCOUNTER — ANTI-COAG VISIT (OUTPATIENT)
Dept: CARDIOLOGY | Facility: CLINIC | Age: 82
End: 2017-08-23
Payer: MEDICARE

## 2017-08-23 ENCOUNTER — LAB VISIT (OUTPATIENT)
Dept: LAB | Facility: HOSPITAL | Age: 82
End: 2017-08-23
Attending: INTERNAL MEDICINE
Payer: MEDICARE

## 2017-08-23 DIAGNOSIS — Z79.01 LONG TERM (CURRENT) USE OF ANTICOAGULANTS: Primary | ICD-10-CM

## 2017-08-23 DIAGNOSIS — E78.00 PURE HYPERCHOLESTEROLEMIA: ICD-10-CM

## 2017-08-23 DIAGNOSIS — I25.10 CORONARY ARTERY DISEASE INVOLVING NATIVE CORONARY ARTERY OF NATIVE HEART WITHOUT ANGINA PECTORIS: ICD-10-CM

## 2017-08-23 DIAGNOSIS — E03.4 HYPOTHYROIDISM DUE TO ACQUIRED ATROPHY OF THYROID: ICD-10-CM

## 2017-08-23 LAB
ALBUMIN SERPL BCP-MCNC: 3.3 G/DL
ALP SERPL-CCNC: 65 U/L
ALT SERPL W/O P-5'-P-CCNC: 11 U/L
ANION GAP SERPL CALC-SCNC: 11 MMOL/L
AST SERPL-CCNC: 18 U/L
BASOPHILS # BLD AUTO: 0.02 K/UL
BASOPHILS NFR BLD: 0.4 %
BILIRUB SERPL-MCNC: 0.6 MG/DL
BUN SERPL-MCNC: 27 MG/DL
CALCIUM SERPL-MCNC: 9.3 MG/DL
CHLORIDE SERPL-SCNC: 105 MMOL/L
CHOLEST/HDLC SERPL: 6.8 {RATIO}
CO2 SERPL-SCNC: 24 MMOL/L
CREAT SERPL-MCNC: 1.5 MG/DL
DIFFERENTIAL METHOD: ABNORMAL
EOSINOPHIL # BLD AUTO: 0.2 K/UL
EOSINOPHIL NFR BLD: 3.5 %
ERYTHROCYTE [DISTWIDTH] IN BLOOD BY AUTOMATED COUNT: 13.5 %
EST. GFR  (AFRICAN AMERICAN): 34.6 ML/MIN/1.73 M^2
EST. GFR  (NON AFRICAN AMERICAN): 30 ML/MIN/1.73 M^2
GLUCOSE SERPL-MCNC: 100 MG/DL
HCT VFR BLD AUTO: 38.1 %
HDL/CHOLESTEROL RATIO: 14.6 %
HDLC SERPL-MCNC: 287 MG/DL
HDLC SERPL-MCNC: 42 MG/DL
HGB BLD-MCNC: 12.9 G/DL
INR PPP: 1.9 (ref 1.5–2.5)
LDLC SERPL CALC-MCNC: 210.8 MG/DL
LYMPHOCYTES # BLD AUTO: 1.8 K/UL
LYMPHOCYTES NFR BLD: 34.8 %
MCH RBC QN AUTO: 32.2 PG
MCHC RBC AUTO-ENTMCNC: 33.9 G/DL
MCV RBC AUTO: 95 FL
MONOCYTES # BLD AUTO: 0.5 K/UL
MONOCYTES NFR BLD: 9.1 %
NEUTROPHILS # BLD AUTO: 2.7 K/UL
NEUTROPHILS NFR BLD: 52 %
NONHDLC SERPL-MCNC: 245 MG/DL
PLATELET # BLD AUTO: 204 K/UL
PMV BLD AUTO: 11.5 FL
POTASSIUM SERPL-SCNC: 4.2 MMOL/L
PROT SERPL-MCNC: 6.8 G/DL
RBC # BLD AUTO: 4.01 M/UL
SODIUM SERPL-SCNC: 140 MMOL/L
TRIGL SERPL-MCNC: 171 MG/DL
TSH SERPL DL<=0.005 MIU/L-ACNC: 3.33 UIU/ML
WBC # BLD AUTO: 5.17 K/UL

## 2017-08-23 PROCEDURE — 85610 PROTHROMBIN TIME: CPT | Mod: QW,S$GLB,,

## 2017-08-23 PROCEDURE — 99211 OFF/OP EST MAY X REQ PHY/QHP: CPT | Mod: 25,S$GLB,,

## 2017-08-23 PROCEDURE — 36415 COLL VENOUS BLD VENIPUNCTURE: CPT | Mod: PO

## 2017-08-23 PROCEDURE — 80053 COMPREHEN METABOLIC PANEL: CPT

## 2017-08-23 PROCEDURE — 84443 ASSAY THYROID STIM HORMONE: CPT

## 2017-08-23 PROCEDURE — 85025 COMPLETE CBC W/AUTO DIFF WBC: CPT

## 2017-08-23 PROCEDURE — 80061 LIPID PANEL: CPT

## 2017-08-23 RX ORDER — WARFARIN 1 MG/1
TABLET ORAL
Qty: 15 TABLET | Refills: 3 | Status: SHIPPED | OUTPATIENT
Start: 2017-08-23 | End: 2017-10-09 | Stop reason: SDUPTHER

## 2017-08-23 NOTE — PROGRESS NOTES
INR remains therapeutic. Pravastatin discontinued. No other changes noted. Will continue dose and diet until follow-up. Patient reports no bleeding or bruising, no new medications and no diet changes.  I reminded the patient to call with any problems, changes or questions before the next visit.

## 2017-09-01 ENCOUNTER — OFFICE VISIT (OUTPATIENT)
Dept: CARDIOLOGY | Facility: CLINIC | Age: 82
End: 2017-09-01
Payer: MEDICARE

## 2017-09-01 VITALS
DIASTOLIC BLOOD PRESSURE: 66 MMHG | SYSTOLIC BLOOD PRESSURE: 104 MMHG | WEIGHT: 148.56 LBS | HEART RATE: 78 BPM | HEIGHT: 63 IN | BODY MASS INDEX: 26.32 KG/M2

## 2017-09-01 DIAGNOSIS — I95.1 ORTHOSTATIC HYPOTENSION DYSAUTONOMIC SYNDROME: ICD-10-CM

## 2017-09-01 DIAGNOSIS — Z79.01 CHRONIC ANTICOAGULATION: ICD-10-CM

## 2017-09-01 DIAGNOSIS — I25.10 CORONARY ARTERY DISEASE INVOLVING NATIVE CORONARY ARTERY OF NATIVE HEART WITHOUT ANGINA PECTORIS: Primary | ICD-10-CM

## 2017-09-01 DIAGNOSIS — N18.30 CKD (CHRONIC KIDNEY DISEASE) STAGE 3, GFR 30-59 ML/MIN: ICD-10-CM

## 2017-09-01 DIAGNOSIS — I77.9 RIGHT-SIDED CAROTID ARTERY DISEASE: ICD-10-CM

## 2017-09-01 DIAGNOSIS — E78.00 PURE HYPERCHOLESTEROLEMIA: ICD-10-CM

## 2017-09-01 DIAGNOSIS — E61.1 IRON DEFICIENCY: ICD-10-CM

## 2017-09-01 DIAGNOSIS — I95.1 ORTHOSTATIC HYPOTENSION: ICD-10-CM

## 2017-09-01 DIAGNOSIS — I48.0 PAROXYSMAL ATRIAL FIBRILLATION: ICD-10-CM

## 2017-09-01 DIAGNOSIS — I51.89 LEFT VENTRICULAR DIASTOLIC DYSFUNCTION WITH PRESERVED SYSTOLIC FUNCTION: ICD-10-CM

## 2017-09-01 DIAGNOSIS — Z86.718 HISTORY OF DVT OF LOWER EXTREMITY: ICD-10-CM

## 2017-09-01 DIAGNOSIS — Z86.73 H/O: CVA (CEREBROVASCULAR ACCIDENT): ICD-10-CM

## 2017-09-01 PROCEDURE — 99214 OFFICE O/P EST MOD 30 MIN: CPT | Mod: S$GLB,,, | Performed by: INTERNAL MEDICINE

## 2017-09-01 PROCEDURE — 1126F AMNT PAIN NOTED NONE PRSNT: CPT | Mod: S$GLB,,, | Performed by: INTERNAL MEDICINE

## 2017-09-01 PROCEDURE — 99999 PR PBB SHADOW E&M-EST. PATIENT-LVL III: CPT | Mod: PBBFAC,,, | Performed by: INTERNAL MEDICINE

## 2017-09-01 PROCEDURE — 99499 UNLISTED E&M SERVICE: CPT | Mod: S$GLB,,, | Performed by: INTERNAL MEDICINE

## 2017-09-01 PROCEDURE — 3008F BODY MASS INDEX DOCD: CPT | Mod: S$GLB,,, | Performed by: INTERNAL MEDICINE

## 2017-09-01 PROCEDURE — 1159F MED LIST DOCD IN RCRD: CPT | Mod: S$GLB,,, | Performed by: INTERNAL MEDICINE

## 2017-09-01 RX ORDER — PRAVASTATIN SODIUM 20 MG/1
20 TABLET ORAL DAILY
Qty: 30 TABLET | Refills: 6 | Status: ON HOLD | OUTPATIENT
Start: 2017-09-01 | End: 2017-09-04

## 2017-09-01 NOTE — PROGRESS NOTES
"Subjective:   Patient ID:  Josy Posey is a 89 y.o. female who presents for follow up of Coronary Artery Disease; Atrial Fibrillation; Shortness of Breath; and Dizziness      HPI  A 89 yo female with h/o cad mvr afib orthosttatic hypotension afib is  Here for f/u. She si doingw ell clinically she had increased lft resolved after stopping statins. She is doing well clinically she is still walking on the treadmill 5 minutes she can not do more she is getting dizzy.  She fell down was dehydrated.she is not compliant with diet . 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 fuCT 1/3- stable,  CT's later - no residual    Lens replaced by other means 6/25/2014    Melena     Memory loss     PET scan consistent with Alzzheimers.   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    1 vessel LIMA to LAD    S/P MVR (mitral valve replacement) 9/27/2013    Skin ulcer     Squamous cell carcinoma     skin cancer X 2    Stroke     Syncope and " collapse     Unspecified essential hypertension 9/16/2013    Unspecified transient cerebral ischemia     Urinary incontinence     wears briefs       Past Surgical History:   Procedure Laterality Date    ADENOIDECTOMY      APPENDECTOMY      BREAST SURGERY  1950    right lumpectomy     CARDIAC VALVE SURGERY  10/04/2007    MVR    CATARACT EXTRACTION      CHOLECYSTECTOMY      CORONARY ARTERY BYPASS GRAFT      EYE SURGERY      cataracts bilateral    HYSTERECTOMY      for hydatiform mole    INSERT / REPLACE / REMOVE PACEMAKER  09/11/2001    TONSILLECTOMY         Social History   Substance Use Topics    Smoking status: Never Smoker    Smokeless tobacco: Never Used    Alcohol use No       Family History   Problem Relation Age of Onset    Tuberculosis Mother     Tuberculosis Father     Stroke Sister     Hypertension Sister     Stroke Brother     Hypertension Daughter     COPD Brother        Current Outpatient Prescriptions   Medication Sig    alendronate (FOSAMAX) 70 MG tablet take 1 tablet by mouth every week    ascorbic acid (VITAMIN C) 1000 MG tablet Take 1 tablet by mouth once daily once daily.    biotin 2,500 mcg Cap Take by mouth.    clotrimazole-betamethasone 1-0.05% (LOTRISONE) cream Apply topically 2 (two) times daily.    econazole nitrate 1 % cream     escitalopram oxalate (LEXAPRO) 10 MG tablet take 1 tablet by mouth once daily    estradiol (ESTRACE) 0.01 % (0.1 mg/gram) vaginal cream Place 1 g vaginally 3 (three) times a week. Place small amount on the outside of the urethra nightly for 3 weeks , then twice weekly    fluticasone (FLONASE) 50 mcg/actuation nasal spray instill 2 sprays into each nostril once daily    folic acid (FOLVITE) 1 MG tablet Take 1 tablet by mouth once daily once daily.    iron-vitamin C 100-250 mg, ICAR-C, (ICAR-C) 100-250 mg Tab Take 1 tablet by mouth once daily.    levothyroxine (SYNTHROID) 50 MCG tablet take 1 tablet by mouth once daily    meclizine  (ANTIVERT) 25 mg tablet Take 1 tablet (25 mg total) by mouth 3 (three) times daily as needed for Dizziness.    midodrine (PROAMATINE) 5 MG Tab Take 1 tablet (5 mg total) by mouth 2 (two) times daily with meals.    omega-3 fatty acids-vitamin E (FISH OIL) 1,000 mg Cap Take by mouth once daily.    propafenone (RHTHYMOL) 150 MG Tab take 1/2 tablet by mouth three times a day    spironolactone (ALDACTONE) 25 MG tablet take 1 tablet by mouth once daily    tobramycin-dexamethasone 0.3-0.05 % DrpS Place 1 drop into both eyes 4 (four) times daily as needed.    tramadol (ULTRAM) 50 mg tablet take 1 tablet by mouth every 8 hours if needed    triamcinolone acetonide 0.1% (KENALOG) 0.1 % cream apply THINLY to affected area ON LEGS, ARMS, AND TRUCK twice a day if needed for eczema    vitamin B12-folic acid 0.5-1 mg Tab Take 1 tablet by mouth once daily once daily.    warfarin (COUMADIN) 1 MG tablet Take 1/2 tablet Monday through Saturday and NONE on Sunday as directed by the coumadin clinic.     No current facility-administered medications for this visit.      Current Outpatient Prescriptions on File Prior to Visit   Medication Sig    alendronate (FOSAMAX) 70 MG tablet take 1 tablet by mouth every week    ascorbic acid (VITAMIN C) 1000 MG tablet Take 1 tablet by mouth once daily once daily.    biotin 2,500 mcg Cap Take by mouth.    clotrimazole-betamethasone 1-0.05% (LOTRISONE) cream Apply topically 2 (two) times daily.    econazole nitrate 1 % cream     escitalopram oxalate (LEXAPRO) 10 MG tablet take 1 tablet by mouth once daily    estradiol (ESTRACE) 0.01 % (0.1 mg/gram) vaginal cream Place 1 g vaginally 3 (three) times a week. Place small amount on the outside of the urethra nightly for 3 weeks , then twice weekly    fluticasone (FLONASE) 50 mcg/actuation nasal spray instill 2 sprays into each nostril once daily    folic acid (FOLVITE) 1 MG tablet Take 1 tablet by mouth once daily once daily.     iron-vitamin C 100-250 mg, ICAR-C, (ICAR-C) 100-250 mg Tab Take 1 tablet by mouth once daily.    levothyroxine (SYNTHROID) 50 MCG tablet take 1 tablet by mouth once daily    meclizine (ANTIVERT) 25 mg tablet Take 1 tablet (25 mg total) by mouth 3 (three) times daily as needed for Dizziness.    midodrine (PROAMATINE) 5 MG Tab Take 1 tablet (5 mg total) by mouth 2 (two) times daily with meals.    omega-3 fatty acids-vitamin E (FISH OIL) 1,000 mg Cap Take by mouth once daily.    propafenone (RHTHYMOL) 150 MG Tab take 1/2 tablet by mouth three times a day    spironolactone (ALDACTONE) 25 MG tablet take 1 tablet by mouth once daily    tobramycin-dexamethasone 0.3-0.05 % DrpS Place 1 drop into both eyes 4 (four) times daily as needed.    tramadol (ULTRAM) 50 mg tablet take 1 tablet by mouth every 8 hours if needed    triamcinolone acetonide 0.1% (KENALOG) 0.1 % cream apply THINLY to affected area ON LEGS, ARMS, AND TRUCK twice a day if needed for eczema    vitamin B12-folic acid 0.5-1 mg Tab Take 1 tablet by mouth once daily once daily.    warfarin (COUMADIN) 1 MG tablet Take 1/2 tablet Monday through Saturday and NONE on Sunday as directed by the coumadin clinic.     No current facility-administered medications on file prior to visit.      Review of patient's allergies indicates:   Allergen Reactions    Sulfa (sulfonamide antibiotics) Anaphylaxis     Other reaction(s): Angioedema    Morphine      Other reaction(s): Unknown    Statins-hmg-coa reductase inhibitors Other (See Comments)     Elevated LFTs    Dronedarone Diarrhea, Nausea Only and Rash     DIZZINESS         ROS  Constitution: Positive for weakness and malaise/fatigue. Negative for diaphoresis and weight gain.   HENT: Negative for headaches and hoarse voice.    Eyes: Negative for double vision and visual disturbance.   Cardiovascular: Negative for chest pain, claudication, cyanosis, dyspnea on exertion, irregular heartbeat, leg swelling,  "near-syncope, orthopnea, palpitations, paroxysmal nocturnal dyspnea and syncope.   Respiratory: Negative for cough, hemoptysis, shortness of breath and snoring.    Hematologic/Lymphatic: Negative for bleeding problem. Does not bruise/bleed easily.   Skin: Negative for color change and poor wound healing.   Musculoskeletal: Negative for muscle cramps, muscle weakness and myalgias.   Gastrointestinal: Negative for bloating, abdominal pain, change in bowel habit, diarrhea, heartburn, hematemesis, hematochezia, melena and nausea.   Neurological: Positive for dizziness and light-headedness. Negative for excessive daytime sleepiness, loss of balance and numbness.   Psychiatric/Behavioral: Negative for memory loss. The patient does not have insomnia.    Allergic/Immunologic: Negative for hives.   Objective:   Physical Exam  Vitals:    09/01/17 1617 09/01/17 1619   BP: 104/60 104/66   BP Location: Right arm Left arm   Patient Position: Sitting Sitting   BP Method: Medium (Manual) Medium (Manual)   Pulse: 78    Weight: 67.4 kg (148 lb 9.4 oz)    Height: 5' 3" (1.6 m)    Constitutional: She is oriented to person, place, and time. She appears well-developed and well-nourished. No distress.   HENT:   Head: Normocephalic and atraumatic.   Eyes: Right eye exhibits no discharge. Left eye exhibits no discharge.   Neck: Neck supple. No JVD present.   Cardiovascular: Normal rate and regular rhythm. Exam reveals no friction rub. Soft systolic mitral regurgitation murmur g1/6.  Pulmonary/Chest: Effort normal and breath sounds normal. No respiratory distress. She has no wheezes. She has no rales.   Scar cabg well healed. Pacer site well healed.   Abdominal: Soft. Bowel sounds are normal. She exhibits no distension. There is no tenderness. There is no rebound.   Musculoskeletal: She exhibits no edema. Bruises noted  Neurological: She is alert and oriented to person, place, and time. No cranial nerve deficit.   Skin: Skin is warm and dry. " No rash noted. She is not diaphoretic. No erythema. Has spider veins.  Psychiatric: She has a normal mood and affect. Her behavior is normal  Lab Results   Component Value Date    CHOL 287 (H) 08/23/2017    CHOL 165 05/06/2016    CHOL 157 11/06/2015     Lab Results   Component Value Date    HDL 42 08/23/2017    HDL 46 05/06/2016    HDL 43 11/06/2015     Lab Results   Component Value Date    LDLCALC 210.8 (H) 08/23/2017    LDLCALC 94.0 05/06/2016    LDLCALC 96.2 11/06/2015     Lab Results   Component Value Date    TRIG 171 (H) 08/23/2017    TRIG 125 05/06/2016    TRIG 89 11/06/2015     Lab Results   Component Value Date    CHOLHDL 14.6 (L) 08/23/2017    CHOLHDL 27.9 05/06/2016    CHOLHDL 27.4 11/06/2015       Chemistry        Component Value Date/Time     08/23/2017 0850    K 4.2 08/23/2017 0850     08/23/2017 0850    CO2 24 08/23/2017 0850    BUN 27 08/23/2017 0850    CREATININE 1.5 (H) 08/23/2017 0850     08/23/2017 0850        Component Value Date/Time    CALCIUM 9.3 08/23/2017 0850    ALKPHOS 65 08/23/2017 0850    AST 18 08/23/2017 0850    ALT 11 08/23/2017 0850    BILITOT 0.6 08/23/2017 0850    ESTGFRAFRICA 34.6 (A) 08/23/2017 0850    EGFRNONAA 30.0 (A) 08/23/2017 0850          Lab Results   Component Value Date    TSH 3.326 08/23/2017     Lab Results   Component Value Date    INR 1.9 08/23/2017    INR 1.5 07/19/2017    INR 2.0 (H) 07/02/2017     Lab Results   Component Value Date    WBC 5.17 08/23/2017    HGB 12.9 08/23/2017    HCT 38.1 08/23/2017    MCV 95 08/23/2017     08/23/2017     BMP  Sodium   Date Value Ref Range Status   08/23/2017 140 136 - 145 mmol/L Final     Potassium   Date Value Ref Range Status   08/23/2017 4.2 3.5 - 5.1 mmol/L Final     Chloride   Date Value Ref Range Status   08/23/2017 105 95 - 110 mmol/L Final     CO2   Date Value Ref Range Status   08/23/2017 24 23 - 29 mmol/L Final     BUN, Bld   Date Value Ref Range Status   08/23/2017 27 10 - 30 mg/dL Final      Creatinine   Date Value Ref Range Status   08/23/2017 1.5 (H) 0.5 - 1.4 mg/dL Final     Calcium   Date Value Ref Range Status   08/23/2017 9.3 8.7 - 10.5 mg/dL Final     Anion Gap   Date Value Ref Range Status   08/23/2017 11 8 - 16 mmol/L Final     eGFR if    Date Value Ref Range Status   08/23/2017 34.6 (A) >60 mL/min/1.73 m^2 Final     eGFR if non    Date Value Ref Range Status   08/23/2017 30.0 (A) >60 mL/min/1.73 m^2 Final     Comment:     Calculation used to obtain the estimated glomerular filtration  rate (eGFR) is the CKD-EPI equation. Since race is unknown   in our information system, the eGFR values for   -American and Non--American patients are given   for each creatinine result.       CrCl cannot be calculated (Patient's most recent lab result is older than the maximum 7 days allowed.).    Assessment:     1. Coronary artery disease involving native coronary artery of native heart without angina pectoris    2. Pure hypercholesterolemia    3. Orthostatic hypotension    4. Paroxysmal atrial fibrillation    5. CKD (chronic kidney disease) stage 3, GFR 30-59 ml/min    6. Iron deficiency    7. Left ventricular diastolic dysfunction with preserved systolic function    8. Chronic anticoagulation    9. History of DVT of lower extremity right    10. Right-sided carotid artery disease    11. Orthostatic hypotension dysautonomic syndrome    12. H/O: CVA (cerebrovascular accident)      Stable clinically needs her compression stockings.  Her lipids are elevated now her lft are normal will try pravastatin 20 mg po daily.  Plan:   Pravastatin 20 mg po daily  coompression stockings  F/u in 3 months.lipid cmp

## 2017-09-03 ENCOUNTER — HOSPITAL ENCOUNTER (OUTPATIENT)
Facility: HOSPITAL | Age: 82
Discharge: HOME HEALTH | End: 2017-09-04
Attending: EMERGENCY MEDICINE | Admitting: FAMILY MEDICINE
Payer: MEDICARE

## 2017-09-03 DIAGNOSIS — R27.0 ATAXIA: ICD-10-CM

## 2017-09-03 DIAGNOSIS — R42 VERTIGO: ICD-10-CM

## 2017-09-03 DIAGNOSIS — R42 DIZZINESS: Primary | ICD-10-CM

## 2017-09-03 DIAGNOSIS — I48.0 PAROXYSMAL ATRIAL FIBRILLATION: Chronic | ICD-10-CM

## 2017-09-03 DIAGNOSIS — I25.10 CORONARY ARTERY DISEASE INVOLVING NATIVE CORONARY ARTERY OF NATIVE HEART WITHOUT ANGINA PECTORIS: ICD-10-CM

## 2017-09-03 LAB
ALBUMIN SERPL BCP-MCNC: 3.7 G/DL
ALP SERPL-CCNC: 69 U/L
ALT SERPL W/O P-5'-P-CCNC: 13 U/L
ANION GAP SERPL CALC-SCNC: 8 MMOL/L
AST SERPL-CCNC: 21 U/L
BASOPHILS # BLD AUTO: 0.02 K/UL
BASOPHILS NFR BLD: 0.3 %
BILIRUB SERPL-MCNC: 0.4 MG/DL
BNP SERPL-MCNC: 224 PG/ML
BUN SERPL-MCNC: 33 MG/DL
CALCIUM SERPL-MCNC: 9.5 MG/DL
CHLORIDE SERPL-SCNC: 105 MMOL/L
CK SERPL-CCNC: 43 U/L
CO2 SERPL-SCNC: 25 MMOL/L
CREAT SERPL-MCNC: 1.4 MG/DL
DIFFERENTIAL METHOD: ABNORMAL
EOSINOPHIL # BLD AUTO: 0.2 K/UL
EOSINOPHIL NFR BLD: 3 %
ERYTHROCYTE [DISTWIDTH] IN BLOOD BY AUTOMATED COUNT: 13.7 %
EST. GFR  (AFRICAN AMERICAN): 38 ML/MIN/1.73 M^2
EST. GFR  (NON AFRICAN AMERICAN): 33 ML/MIN/1.73 M^2
GLUCOSE SERPL-MCNC: 94 MG/DL
HCT VFR BLD AUTO: 39.6 %
HGB BLD-MCNC: 13.4 G/DL
LYMPHOCYTES # BLD AUTO: 2.5 K/UL
LYMPHOCYTES NFR BLD: 33.4 %
MAGNESIUM SERPL-MCNC: 1.6 MG/DL
MCH RBC QN AUTO: 32.4 PG
MCHC RBC AUTO-ENTMCNC: 33.8 G/DL
MCV RBC AUTO: 96 FL
MONOCYTES # BLD AUTO: 0.7 K/UL
MONOCYTES NFR BLD: 8.9 %
NEUTROPHILS # BLD AUTO: 4.1 K/UL
NEUTROPHILS NFR BLD: 54.4 %
PLATELET # BLD AUTO: 175 K/UL
PMV BLD AUTO: 11.3 FL
POTASSIUM SERPL-SCNC: 4.3 MMOL/L
PROT SERPL-MCNC: 7.7 G/DL
RBC # BLD AUTO: 4.13 M/UL
SODIUM SERPL-SCNC: 138 MMOL/L
TROPONIN I SERPL DL<=0.01 NG/ML-MCNC: 0.02 NG/ML
WBC # BLD AUTO: 7.45 K/UL

## 2017-09-03 PROCEDURE — A4216 STERILE WATER/SALINE, 10 ML: HCPCS | Performed by: EMERGENCY MEDICINE

## 2017-09-03 PROCEDURE — 82550 ASSAY OF CK (CPK): CPT

## 2017-09-03 PROCEDURE — 83735 ASSAY OF MAGNESIUM: CPT

## 2017-09-03 PROCEDURE — G0378 HOSPITAL OBSERVATION PER HR: HCPCS

## 2017-09-03 PROCEDURE — 80053 COMPREHEN METABOLIC PANEL: CPT

## 2017-09-03 PROCEDURE — 84443 ASSAY THYROID STIM HORMONE: CPT

## 2017-09-03 PROCEDURE — 84484 ASSAY OF TROPONIN QUANT: CPT

## 2017-09-03 PROCEDURE — 99285 EMERGENCY DEPT VISIT HI MDM: CPT | Mod: 25

## 2017-09-03 PROCEDURE — 81000 URINALYSIS NONAUTO W/SCOPE: CPT

## 2017-09-03 PROCEDURE — 36415 COLL VENOUS BLD VENIPUNCTURE: CPT

## 2017-09-03 PROCEDURE — 93005 ELECTROCARDIOGRAM TRACING: CPT

## 2017-09-03 PROCEDURE — 93010 ELECTROCARDIOGRAM REPORT: CPT | Mod: ,,, | Performed by: INTERNAL MEDICINE

## 2017-09-03 PROCEDURE — 83880 ASSAY OF NATRIURETIC PEPTIDE: CPT

## 2017-09-03 PROCEDURE — 80061 LIPID PANEL: CPT

## 2017-09-03 PROCEDURE — 25000003 PHARM REV CODE 250: Performed by: EMERGENCY MEDICINE

## 2017-09-03 PROCEDURE — 83036 HEMOGLOBIN GLYCOSYLATED A1C: CPT

## 2017-09-03 PROCEDURE — G0426 INPT/ED TELECONSULT50: HCPCS | Mod: GT,,, | Performed by: PSYCHIATRY & NEUROLOGY

## 2017-09-03 PROCEDURE — 85025 COMPLETE CBC W/AUTO DIFF WBC: CPT

## 2017-09-03 RX ORDER — SODIUM CHLORIDE 0.9 % (FLUSH) 0.9 %
3 SYRINGE (ML) INJECTION EVERY 8 HOURS
Status: DISCONTINUED | OUTPATIENT
Start: 2017-09-03 | End: 2017-09-04 | Stop reason: HOSPADM

## 2017-09-03 RX ORDER — SPIRONOLACTONE 25 MG/1
25 TABLET ORAL DAILY
Status: DISCONTINUED | OUTPATIENT
Start: 2017-09-04 | End: 2017-09-04 | Stop reason: HOSPADM

## 2017-09-03 RX ORDER — PRAVASTATIN SODIUM 20 MG/1
20 TABLET ORAL DAILY
Status: DISCONTINUED | OUTPATIENT
Start: 2017-09-04 | End: 2017-09-04 | Stop reason: HOSPADM

## 2017-09-03 RX ORDER — LEVOTHYROXINE SODIUM 50 UG/1
50 TABLET ORAL
Status: DISCONTINUED | OUTPATIENT
Start: 2017-09-04 | End: 2017-09-04 | Stop reason: HOSPADM

## 2017-09-03 RX ORDER — ESCITALOPRAM OXALATE 10 MG/1
10 TABLET ORAL DAILY
Status: DISCONTINUED | OUTPATIENT
Start: 2017-09-04 | End: 2017-09-04 | Stop reason: HOSPADM

## 2017-09-03 RX ORDER — MECLIZINE HYDROCHLORIDE 25 MG/1
25 TABLET ORAL 3 TIMES DAILY PRN
Status: DISCONTINUED | OUTPATIENT
Start: 2017-09-03 | End: 2017-09-04

## 2017-09-03 RX ORDER — HYDRALAZINE HYDROCHLORIDE 20 MG/ML
10 INJECTION INTRAMUSCULAR; INTRAVENOUS EVERY 8 HOURS PRN
Status: DISCONTINUED | OUTPATIENT
Start: 2017-09-04 | End: 2017-09-04 | Stop reason: HOSPADM

## 2017-09-03 RX ADMIN — SODIUM CHLORIDE, PRESERVATIVE FREE 3 ML: 5 INJECTION INTRAVENOUS at 10:09

## 2017-09-04 VITALS
HEART RATE: 75 BPM | HEIGHT: 64 IN | SYSTOLIC BLOOD PRESSURE: 113 MMHG | BODY MASS INDEX: 24.72 KG/M2 | OXYGEN SATURATION: 96 % | WEIGHT: 144.81 LBS | TEMPERATURE: 98 F | DIASTOLIC BLOOD PRESSURE: 62 MMHG | RESPIRATION RATE: 18 BRPM

## 2017-09-04 PROBLEM — I95.1 ORTHOSTATIC HYPOTENSION DYSAUTONOMIC SYNDROME: Chronic | Status: ACTIVE | Noted: 2017-05-03

## 2017-09-04 PROBLEM — I95.1 ORTHOSTATIC HYPOTENSION DYSAUTONOMIC SYNDROME: Chronic | Status: RESOLVED | Noted: 2017-05-03 | Resolved: 2017-09-04

## 2017-09-04 PROBLEM — Z78.9 STATIN INTOLERANCE: Status: RESOLVED | Noted: 2017-07-19 | Resolved: 2017-09-04

## 2017-09-04 LAB
ANION GAP SERPL CALC-SCNC: 9 MMOL/L
BACTERIA #/AREA URNS HPF: ABNORMAL /HPF
BASOPHILS # BLD AUTO: 0.02 K/UL
BASOPHILS NFR BLD: 0.3 %
BILIRUB UR QL STRIP: NEGATIVE
BUN SERPL-MCNC: 28 MG/DL
CALCIUM SERPL-MCNC: 9.2 MG/DL
CHLORIDE SERPL-SCNC: 106 MMOL/L
CHOLEST SERPL-MCNC: 283 MG/DL
CHOLEST/HDLC SERPL: 6.9 {RATIO}
CLARITY UR: CLEAR
CO2 SERPL-SCNC: 25 MMOL/L
COLOR UR: YELLOW
CREAT SERPL-MCNC: 1.2 MG/DL
DIFFERENTIAL METHOD: ABNORMAL
EOSINOPHIL # BLD AUTO: 0.2 K/UL
EOSINOPHIL NFR BLD: 2.6 %
ERYTHROCYTE [DISTWIDTH] IN BLOOD BY AUTOMATED COUNT: 13.6 %
EST. GFR  (AFRICAN AMERICAN): 45 ML/MIN/1.73 M^2
EST. GFR  (NON AFRICAN AMERICAN): 39 ML/MIN/1.73 M^2
ESTIMATED AVG GLUCOSE: 105 MG/DL
GLUCOSE SERPL-MCNC: 94 MG/DL
GLUCOSE UR QL STRIP: NEGATIVE
HBA1C MFR BLD HPLC: 5.3 %
HCT VFR BLD AUTO: 36.9 %
HDLC SERPL-MCNC: 41 MG/DL
HDLC SERPL: 14.5 %
HGB BLD-MCNC: 12.4 G/DL
HGB UR QL STRIP: NEGATIVE
INR PPP: 1.9
INR PPP: 2.1
KETONES UR QL STRIP: NEGATIVE
LDLC SERPL CALC-MCNC: 211 MG/DL
LEUKOCYTE ESTERASE UR QL STRIP: ABNORMAL
LYMPHOCYTES # BLD AUTO: 2.6 K/UL
LYMPHOCYTES NFR BLD: 36 %
MAGNESIUM SERPL-MCNC: 2.4 MG/DL
MCH RBC QN AUTO: 32 PG
MCHC RBC AUTO-ENTMCNC: 33.6 G/DL
MCV RBC AUTO: 95 FL
MICROSCOPIC COMMENT: ABNORMAL
MONOCYTES # BLD AUTO: 0.6 K/UL
MONOCYTES NFR BLD: 8.4 %
NEUTROPHILS # BLD AUTO: 3.8 K/UL
NEUTROPHILS NFR BLD: 52.7 %
NITRITE UR QL STRIP: POSITIVE
NONHDLC SERPL-MCNC: 242 MG/DL
PH UR STRIP: 6 [PH] (ref 5–8)
PLATELET # BLD AUTO: 167 K/UL
PMV BLD AUTO: 11.2 FL
POTASSIUM SERPL-SCNC: 4.2 MMOL/L
PROT UR QL STRIP: NEGATIVE
PROTHROMBIN TIME: 19.5 SEC
PROTHROMBIN TIME: 21.4 SEC
RBC # BLD AUTO: 3.88 M/UL
SODIUM SERPL-SCNC: 140 MMOL/L
SP GR UR STRIP: 1.01 (ref 1–1.03)
TRIGL SERPL-MCNC: 155 MG/DL
TSH SERPL DL<=0.005 MIU/L-ACNC: 2.35 UIU/ML
URN SPEC COLLECT METH UR: ABNORMAL
UROBILINOGEN UR STRIP-ACNC: NEGATIVE EU/DL
WBC # BLD AUTO: 7.25 K/UL
WBC #/AREA URNS HPF: 4 /HPF (ref 0–5)
WBC CLUMPS URNS QL MICRO: ABNORMAL

## 2017-09-04 PROCEDURE — 83735 ASSAY OF MAGNESIUM: CPT

## 2017-09-04 PROCEDURE — G8998 SWALLOW D/C STATUS: HCPCS | Mod: CI

## 2017-09-04 PROCEDURE — 25000003 PHARM REV CODE 250: Performed by: NURSE PRACTITIONER

## 2017-09-04 PROCEDURE — 87186 SC STD MICRODIL/AGAR DIL: CPT

## 2017-09-04 PROCEDURE — G8996 SWALLOW CURRENT STATUS: HCPCS | Mod: CI

## 2017-09-04 PROCEDURE — 87088 URINE BACTERIA CULTURE: CPT

## 2017-09-04 PROCEDURE — 97165 OT EVAL LOW COMPLEX 30 MIN: CPT

## 2017-09-04 PROCEDURE — 92610 EVALUATE SWALLOWING FUNCTION: CPT

## 2017-09-04 PROCEDURE — 36415 COLL VENOUS BLD VENIPUNCTURE: CPT

## 2017-09-04 PROCEDURE — G8997 SWALLOW GOAL STATUS: HCPCS | Mod: CI

## 2017-09-04 PROCEDURE — 97802 MEDICAL NUTRITION INDIV IN: CPT

## 2017-09-04 PROCEDURE — G8987 SELF CARE CURRENT STATUS: HCPCS | Mod: CL

## 2017-09-04 PROCEDURE — 87077 CULTURE AEROBIC IDENTIFY: CPT

## 2017-09-04 PROCEDURE — G0378 HOSPITAL OBSERVATION PER HR: HCPCS

## 2017-09-04 PROCEDURE — 85610 PROTHROMBIN TIME: CPT | Mod: 91

## 2017-09-04 PROCEDURE — A4216 STERILE WATER/SALINE, 10 ML: HCPCS | Performed by: EMERGENCY MEDICINE

## 2017-09-04 PROCEDURE — 25000003 PHARM REV CODE 250: Performed by: EMERGENCY MEDICINE

## 2017-09-04 PROCEDURE — 63600175 PHARM REV CODE 636 W HCPCS: Performed by: NURSE PRACTITIONER

## 2017-09-04 PROCEDURE — 87086 URINE CULTURE/COLONY COUNT: CPT

## 2017-09-04 PROCEDURE — 96374 THER/PROPH/DIAG INJ IV PUSH: CPT

## 2017-09-04 PROCEDURE — 85610 PROTHROMBIN TIME: CPT

## 2017-09-04 PROCEDURE — 96375 TX/PRO/DX INJ NEW DRUG ADDON: CPT

## 2017-09-04 PROCEDURE — 85025 COMPLETE CBC W/AUTO DIFF WBC: CPT

## 2017-09-04 PROCEDURE — G8978 MOBILITY CURRENT STATUS: HCPCS | Mod: CK

## 2017-09-04 PROCEDURE — 97116 GAIT TRAINING THERAPY: CPT

## 2017-09-04 PROCEDURE — G8988 SELF CARE GOAL STATUS: HCPCS | Mod: CJ

## 2017-09-04 PROCEDURE — 97530 THERAPEUTIC ACTIVITIES: CPT

## 2017-09-04 PROCEDURE — G8979 MOBILITY GOAL STATUS: HCPCS | Mod: CJ

## 2017-09-04 PROCEDURE — 80048 BASIC METABOLIC PNL TOTAL CA: CPT

## 2017-09-04 PROCEDURE — 97163 PT EVAL HIGH COMPLEX 45 MIN: CPT

## 2017-09-04 RX ORDER — MECLIZINE HYDROCHLORIDE 25 MG/1
25 TABLET ORAL EVERY 6 HOURS
Status: DISCONTINUED | OUTPATIENT
Start: 2017-09-04 | End: 2017-09-04 | Stop reason: HOSPADM

## 2017-09-04 RX ORDER — MAGNESIUM SULFATE HEPTAHYDRATE 40 MG/ML
2 INJECTION, SOLUTION INTRAVENOUS ONCE
Status: COMPLETED | OUTPATIENT
Start: 2017-09-04 | End: 2017-09-04

## 2017-09-04 RX ORDER — PRAVASTATIN SODIUM 20 MG/1
20 TABLET ORAL DAILY
Qty: 30 TABLET | Refills: 6 | Status: SHIPPED | OUTPATIENT
Start: 2017-09-04 | End: 2018-10-31 | Stop reason: SDUPTHER

## 2017-09-04 RX ORDER — ONDANSETRON 2 MG/ML
4 INJECTION INTRAMUSCULAR; INTRAVENOUS EVERY 8 HOURS PRN
Status: DISCONTINUED | OUTPATIENT
Start: 2017-09-04 | End: 2017-09-04 | Stop reason: HOSPADM

## 2017-09-04 RX ORDER — CIPROFLOXACIN 500 MG/1
500 TABLET ORAL 2 TIMES DAILY
Qty: 6 TABLET | Refills: 0 | Status: SHIPPED | OUTPATIENT
Start: 2017-09-04 | End: 2017-09-07

## 2017-09-04 RX ADMIN — SODIUM CHLORIDE, PRESERVATIVE FREE 3 ML: 5 INJECTION INTRAVENOUS at 05:09

## 2017-09-04 RX ADMIN — ESCITALOPRAM OXALATE 10 MG: 10 TABLET, FILM COATED ORAL at 08:09

## 2017-09-04 RX ADMIN — WARFARIN SODIUM 0.5 MG: 1 TABLET ORAL at 05:09

## 2017-09-04 RX ADMIN — MECLIZINE HYDROCHLORIDE 25 MG: 25 TABLET ORAL at 05:09

## 2017-09-04 RX ADMIN — MECLIZINE HYDROCHLORIDE 25 MG: 25 TABLET ORAL at 08:09

## 2017-09-04 RX ADMIN — LEVOTHYROXINE SODIUM 50 MCG: 50 TABLET ORAL at 05:09

## 2017-09-04 RX ADMIN — SPIRONOLACTONE 25 MG: 25 TABLET ORAL at 08:09

## 2017-09-04 RX ADMIN — MAGNESIUM SULFATE IN WATER 2 G: 40 INJECTION, SOLUTION INTRAVENOUS at 02:09

## 2017-09-04 RX ADMIN — CEFTRIAXONE 1 G: 1 INJECTION, SOLUTION INTRAVENOUS at 09:09

## 2017-09-04 RX ADMIN — PRAVASTATIN SODIUM 20 MG: 20 TABLET ORAL at 08:09

## 2017-09-04 NOTE — PLAN OF CARE
Problem: Patient Care Overview  Goal: Plan of Care Review  Outcome: Ongoing (interventions implemented as appropriate)  Patient currently resting quietly in bed. Patient awake, alert, oriented x4. Patient has decreased hearing bilaterally. VS currently stable. Patient NSR on monitor at this time. Fall prevention reviewed with patient. Patient educated and encouraged to use the call light for any needs. Patient call light within reach. Bed alarm set. Patient free of falls. Patient has no complaints of dizziness or vertigo at this time. Plan of care reviewed with patient. Patient has no further questions about plan of care at this time. Will continue to monitor.

## 2017-09-04 NOTE — PROGRESS NOTES
Patient arrived to unit from Obs via stretcher. Transferred into bed independently. Bedside report given by QUETA Armstrong. Tele monitored applied. Patient oriented to room and call bell. Encouraged to notify of all needs. Will continue to monitor.

## 2017-09-04 NOTE — CONSULTS
"  [facility] -   Adult Nutrition  Consult Note    SUMMARY     Recommendations    Recommendation/Intervention: 1.Continue current diet. 2. Patient was educated on Cardiac diet. 3. Pt was also educated on food and drug interaction. Pt/Family were given a handout from the Nutrition Care Manual on Cardiac Nutrition Therapy and Vitamin K and Coumadin interaction.  Pt was provided contact information for further questions. Pt verbalized understanding and the importance of diet compliance.   Goals: Intake of 85 % - 100 % of meals   Nutrition Goal Status: new  Communication of RD Recs:  (care plan and sticky note)      Reason for Assessment    Reason for Assessment: physician consult (Assess dietary needs)  Diagnosis:  (Dizziness, Ataxia )  Relevent Medical History: CAD, CHF, HTN, CKD 3 see H& P    General Information Comments: Spoke with the patient in [place]. Patient reports good intake at home. Noted Pt was on warfarin. Pt was educated on Vitamin K and Warfarin interactions. Reviewed dietary recommendations with Nurse this AM, ordered per RD recommendations.     Nutrition Discharge Planning: Home on Cardiac Diet     Nutrition Prescription Ordered    Current Diet Order: Cardiac Diet           Evaluation of Received Nutrients/Fluid Intake         % Intake of Estimated Energy Needs: Other: Patient was NPO just advanced to oral diet  % Meal Intake: NPO     Nutrition Risk Screen     Nutrition Risk Screen: no indicators present    Nutrition/Diet History       Typical Food/Fluid Intake: Good intake at home  Food Preferences: none reported including Restorationism or cultural       Labs/Tests/Procedures/Meds       Pertinent Labs Reviewed: reviewed  Pertinent Labs Comments: GFR 39  Pertinent Medications Reviewed: reviewed       Physical Findings    Overall Physical Appearance: nourished     Oral/Mouth Cavity: WDL  Skin:  (Raymond Score 19)    Anthropometrics    Temp: 97.7 °F (36.5 °C)     Height: 5' 4" (162.6 cm)  Weight " Method: Bed Scale  Weight: 65.7 kg (144 lb 12.8 oz)     Ideal Body Weight (IBW), Female: 120 lb     % Ideal Body Weight, Female (lb): 120.67 lb  BMI (Calculated): 24.9  BMI Grade: 18.5-24.9 - normal      Estimated/Assessed Needs    Weight Used For Calorie Calculations: 65.7 kg (144 lb 13.5 oz)   Height (cm): 162.6 cm  Energy Need Method:  (x1.2 - 1.3 = 1262 - 1368 kcal)  RMR (Atlantic-St. Jeor Equation): 1052  Weight Used For Protein Calculations: 65.7 kg (144 lb 13.5 oz)  0.8 gm Protein (gm): 52.67 and 1.2 gm Protein (gm): 79  Fluid Need Method: RDA Method (1ml/rula or Per Md)         Assessment and Plan    CAD (coronary artery disease) with remote CABG x 1V (LIMA-LAD)    Decreased nutrient needs (sodium)    Related to (etiology):   Current medical condition     Signs and Symptoms (as evidenced by):   CAD and hx of CHF, HTN     Interventions/Recommendations (treatment strategy):  1.Continue current diet. 2. Patient was educated on Cardiac diet. 3. Pt was also educated on food and drug interaction. Pt/Family were given a handout from the Nutrition Care Manual on Cardiac Nutrition Therapy and Vitamin K and Coumadin interaction.  Pt was provided contact information for further questions. Pt verbalized understanding and the importance of diet compliance.     Nutrition Diagnosis Status:   New              Monitor and Evaluation    Food and Nutrient Intake: energy intake  Food and Nutrient Adminstration: diet order  Knowledge/Beliefs/Attitudes: beliefs and attitudes  Anthropometric Measurements: weight  Biochemical Data, Medical Tests and Procedures: electrolyte and renal panel, glucose/endocrine profile  Nutrition-Focused Physical Findings: overall appearance    Nutrition Follow-Up    RD Follow-up?: Yes (2xweekly)

## 2017-09-04 NOTE — PROGRESS NOTES
Bed swallow evaluation done before giving medications, patient was able to tolerate medications with water. Ortho stats were done, the patient indicated that she felt dizzy when she radha from a sitting to standing position. BP remained stable, meclizine and antibiotic was given.

## 2017-09-04 NOTE — PLAN OF CARE
CM met with patient regarding home health recommendation.  The patient states he daughter usually assist with those type of decisions and would like to discuss when her daughter is available.  In addition, the patient states she has other doctors to see today.  CM acknowledged patient's wish to come back at a later time to discuss home health.

## 2017-09-04 NOTE — ED PROVIDER NOTES
SCRIBE #1 NOTE: I, Rhett Hopkins, am scribing for, and in the presence of, Rony Patel MD. I have scribed the entire note.      History      Chief Complaint   Patient presents with    Weakness     generalized for about 45 minutes with nausea and vomiting.       Review of patient's allergies indicates:   Allergen Reactions    Sulfa (sulfonamide antibiotics) Anaphylaxis     Other reaction(s): Angioedema    Morphine      Other reaction(s): Unknown    Statins-hmg-coa reductase inhibitors Other (See Comments)     Elevated LFTs    Dronedarone Diarrhea, Nausea Only and Rash     DIZZINESS          HPI   HPI    9/3/2017, 9:15 PM   History obtained from the daughter and patient      History of Present Illness: Josy Posey is a 89 y.o. female patient who presents to the Emergency Department for an evaluation of generalized weakness which onset suddenly today. Pt's daughter reports pt felt fine all day when suddenly she began to c/o of visual disturbance and began to vomit. She also reports pt was seen by her PCP x2 days ago and what told her cholesterol was high which prompted them to put her back on her cholesterol medication. Symptoms are constant and moderate in severity. Exacerbated by nothing and relieved by nothing. Associated sxs include visual disturbance, dizziness, nausea, and emesis. Patient denies any fever, chills, CP, SOB, abdominal pain, facial droop, confusion, speech changes, syncope, and all other sxs at this time. No further complaints or concerns at this time.     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 fuCT 1/3- stable,  CT's later - no residual    Lens replaced by other means 6/25/2014    Melena     Memory loss     PET scan consistent with Alzzheimers.   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    1 vessel LIMA to LAD    S/P MVR (mitral valve replacement) 9/27/2013    Skin ulcer     Squamous cell carcinoma     skin cancer X 2    Stroke     Syncope and collapse     Unspecified essential hypertension 9/16/2013    Unspecified transient cerebral ischemia     Urinary incontinence     wears briefs       Past Surgical History:  Past Surgical History:   Procedure Laterality Date    ADENOIDECTOMY      APPENDECTOMY      BREAST SURGERY  1950    right lumpectomy     CARDIAC VALVE SURGERY  10/04/2007    MVR    CATARACT EXTRACTION      CHOLECYSTECTOMY      CORONARY ARTERY BYPASS GRAFT      EYE SURGERY      cataracts bilateral    HYSTERECTOMY      for hydatiform mole    INSERT / REPLACE / REMOVE PACEMAKER  09/11/2001    TONSILLECTOMY           Family History:  Family History   Problem Relation Age of Onset    Tuberculosis Mother     Tuberculosis Father     Stroke Sister     Hypertension Sister     Stroke Brother     Hypertension Daughter     COPD Brother        Social History:  Social History     Social History Main Topics    Smoking status: Never Smoker    Smokeless tobacco: Never Used    Alcohol use " No    Drug use: No    Sexual activity: No       ROS   Review of Systems   Constitutional: Negative for chills and fever.   HENT: Negative for congestion and sore throat.    Respiratory: Negative for cough and shortness of breath.    Cardiovascular: Negative for chest pain.   Gastrointestinal: Positive for nausea and vomiting. Negative for abdominal pain and diarrhea.   Genitourinary: Negative for decreased urine volume, difficulty urinating and dysuria.   Musculoskeletal: Negative for back pain and neck pain.   Skin: Negative for rash.   Neurological: Positive for dizziness and weakness (Generalized). Negative for syncope, facial asymmetry, speech difficulty, light-headedness and headaches.   Hematological: Does not bruise/bleed easily.   Psychiatric/Behavioral: Negative for confusion.     Physical Exam      Initial Vitals [09/03/17 2111]   BP Pulse Resp Temp SpO2   (!) 195/108 74 18 98.3 °F (36.8 °C) 96 %      MAP       137          Physical Exam  Nursing Notes and Vital Signs Reviewed.  Constitutional: Patient is in no acute distress. Well-developed and well-nourished. Elderly.   Head: Atraumatic. Normocephalic.  Eyes: PERRL. EOM intact. Conjunctivae are not pale. No scleral icterus.  ENT: Mucous membranes are moist. Oropharynx is clear and symmetric.    Neck: Supple. Full ROM. No lymphadenopathy.  Cardiovascular: Regular rate. Regular rhythm. No murmurs, rubs, or gallops. Distal pulses are 2+ and symmetric.  Pulmonary/Chest: No respiratory distress. Clear to auscultation bilaterally. No wheezing, rales, or rhonchi.  Abdominal: Soft and non-distended.  There is no tenderness.  No rebound, guarding, or rigidity. Good bowel sounds.  Musculoskeletal: Moves all extremities. No obvious deformities. No edema. No calf tenderness.  Skin: Warm and dry.  Neurological:  Alert, awake, and appropriate.  Normal speech.  Positive Romberg sign. Ataxia.   Psychiatric: Normal affect. Good eye contact. Appropriate in  "content.    ED Course    Procedures  ED Vital Signs:  Vitals:    09/03/17 2111 09/03/17 2131 09/03/17 2146 09/03/17 2216   BP: (!) 195/108 (!) 172/87 (!) 158/85 (!) 165/60   Pulse: 74 75 75 69   Resp: 18 17 16 18   Temp: 98.3 °F (36.8 °C)      TempSrc: Oral      SpO2: 96% 98% 98% 97%   Weight: 64.9 kg (143 lb)      Height: 5' 4" (1.626 m)          Abnormal Lab Results:  Labs Reviewed   CBC W/ AUTO DIFFERENTIAL - Abnormal; Notable for the following:        Result Value    MCH 32.4 (*)     All other components within normal limits   COMPREHENSIVE METABOLIC PANEL - Abnormal; Notable for the following:     BUN, Bld 33 (*)     eGFR if  38 (*)     eGFR if non  33 (*)     All other components within normal limits   B-TYPE NATRIURETIC PEPTIDE - Abnormal; Notable for the following:      (*)     All other components within normal limits   CK   TROPONIN I   URINALYSIS        All Lab Results:  Results for orders placed or performed during the hospital encounter of 09/03/17   CBC auto differential   Result Value Ref Range    WBC 7.45 3.90 - 12.70 K/uL    RBC 4.13 4.00 - 5.40 M/uL    Hemoglobin 13.4 12.0 - 16.0 g/dL    Hematocrit 39.6 37.0 - 48.5 %    MCV 96 82 - 98 fL    MCH 32.4 (H) 27.0 - 31.0 pg    MCHC 33.8 32.0 - 36.0 g/dL    RDW 13.7 11.5 - 14.5 %    Platelets 175 150 - 350 K/uL    MPV 11.3 9.2 - 12.9 fL    Gran # 4.1 1.8 - 7.7 K/uL    Lymph # 2.5 1.0 - 4.8 K/uL    Mono # 0.7 0.3 - 1.0 K/uL    Eos # 0.2 0.0 - 0.5 K/uL    Baso # 0.02 0.00 - 0.20 K/uL    Gran% 54.4 38.0 - 73.0 %    Lymph% 33.4 18.0 - 48.0 %    Mono% 8.9 4.0 - 15.0 %    Eosinophil% 3.0 0.0 - 8.0 %    Basophil% 0.3 0.0 - 1.9 %    Differential Method Automated    Comprehensive metabolic panel   Result Value Ref Range    Sodium 138 136 - 145 mmol/L    Potassium 4.3 3.5 - 5.1 mmol/L    Chloride 105 95 - 110 mmol/L    CO2 25 23 - 29 mmol/L    Glucose 94 70 - 110 mg/dL    BUN, Bld 33 (H) 10 - 30 mg/dL    Creatinine 1.4 0.5 - " 1.4 mg/dL    Calcium 9.5 8.7 - 10.5 mg/dL    Total Protein 7.7 6.0 - 8.4 g/dL    Albumin 3.7 3.5 - 5.2 g/dL    Total Bilirubin 0.4 0.1 - 1.0 mg/dL    Alkaline Phosphatase 69 55 - 135 U/L    AST 21 10 - 40 U/L    ALT 13 10 - 44 U/L    Anion Gap 8 8 - 16 mmol/L    eGFR if African American 38 (A) >60 mL/min/1.73 m^2    eGFR if non African American 33 (A) >60 mL/min/1.73 m^2   Brain natriuretic peptide   Result Value Ref Range     (H) 0 - 99 pg/mL   CK   Result Value Ref Range    CPK 43 20 - 180 U/L   Troponin I   Result Value Ref Range    Troponin I 0.016 0.000 - 0.026 ng/mL         Imaging Results:  Imaging Results          X-Ray Chest AP Portable (In process)                CT Head Without Contrast (Final result)  Result time 09/03/17 21:40:49    Final result by Lamonte Benson MD (09/03/17 21:40:49)                 Impression:         No acute findings        All CT scans at this facility use dose modulation, iterative reconstruction, and/or weight based dosing when appropriate to reduce radiation dose to as low as reasonably achievable.       Electronically signed by: LAMONTE BENSON MD  Date:     09/03/17  Time:    21:40              Narrative:    CT HEAD WITHOUT CONTRAST    Date: 09/03/17 21:30:00    Clinical indication:  acute onset dizziness     Technique:  Standard noncontrast CT scan of the brain. Comparison 5/2/17     Findings:  There is generalized atrophy and moderate white matter low density microangiopathy. No evidence of intracranial hemorrhage or acute focal parenchymal abnormality. There is mild right ethmoid sinus mucosal thickening. Otherwise visualized paranasal sinuses and mastoid air cells are clear. The cranium is intact.                               The EKG was ordered, reviewed, and independently interpreted by the ED provider.  Interpretation time: 21:30  Rate: 75 BPM  Rhythm: Ventricular-paced rhythm  Interpretation: Biventricular pacemaker noted. No STEMI.             The  Emergency Provider reviewed the vital signs and test results, which are outlined above.    ED Discussion     10:05 PM: Dr. Patel discussed the pt's case with Dr. Bautista (Neurology) who states he does not recommend giving pt TPA, but states she should be admitted for observation.    10:05 PM: Discussed case with IGOR Armenta (Hospital Medicine). Geovany agrees with current care and management of pt and accepts admission.   Admitting Service: Hospital medicine   Admitting Physician: Dr. Reilly  Admit to: Observation    10:06 PM: Re-evaluated pt. I have discussed test results, shared treatment plan, and the need for admission with patient and family at bedside. Pt and family express understanding at this time and agree with all information. All questions answered. Pt and family have no further questions or concerns at this time. Pt is ready for admit.      ED Medication(s):  Medications   meclizine tablet 25 mg (not administered)   sodium chloride 0.9% flush 3 mL (not administered)   sodium chloride 0.9% bolus 500 mL (not administered)       Current Discharge Medication List                Medical Decision Making    Medical Decision Making:   Clinical Tests:   Lab Tests: Ordered and Reviewed  Radiological Study: Ordered and Reviewed  Medical Tests: Ordered and Reviewed           Scribe Attestation:   Scribe #1: I performed the above scribed service and the documentation accurately describes the services I performed. I attest to the accuracy of the note.    Attending:   Physician Attestation Statement for Scribe #1: I, Rony Patel MD, personally performed the services described in this documentation, as scribed by Rhett Hopkins, in my presence, and it is both accurate and complete.          Clinical Impression       ICD-10-CM ICD-9-CM   1. Dizziness R42 780.4   2. Ataxia R27.0 781.3       Disposition:   Disposition: Placed in Observation  Condition: Stable         Rony Patel MD  09/03/17 0275

## 2017-09-04 NOTE — ASSESSMENT & PLAN NOTE
- CT head shows no acute process.  - 2D echo 5/2017 showed LVEF of 60% without significant valvular abnormalities.  - TeleStroke in ED.  Neurology suspect vertigo.    - Will admit to Observation.  - Start meclizine.  - Orthostatic VS.  - Check electrolytes, TSH.  - Carotid U/S.  - Interrogate PPM to r/o arrhythmia.  - Monitor telemetry.  - If symptoms persist, may consider MRI +/- Neurology consult.

## 2017-09-04 NOTE — CONSULTS
"Consult Note  Neurology      Consult Requested By: Marika Delarosa NP  Reason for Consult: dizziness    SUBJECTIVE:     History of Present Illness:  Josy Posey is a 89 y.o. female who presented to [facility] ED yesterday with cc having a spell of blurry vision, extreme vertiginous dizziness, pulsatile tinnitus and hearing loss in right eye. The symptoms lasted approximately 2 hours. She still feels a little dizzy today, even lying still in bed. She has a history of occular migraine but the symptoms are completely different from what she experienced yesterday.     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 fuCT 1/3- stable,  CT's later - no residual    Lens replaced by other means 6/25/2014    Melena     Memory loss     PET scan consistent with Alzzheimers.   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    1 vessel LIMA to LAD    S/P MVR (mitral valve replacement) 9/27/2013    Skin ulcer     Squamous cell carcinoma     skin cancer X 2    Stroke     Syncope and collapse     Unspecified essential hypertension " 9/16/2013    Unspecified transient cerebral ischemia     Urinary incontinence     wears briefs        Past Surgical History:   Procedure Laterality Date    ADENOIDECTOMY      APPENDECTOMY      BREAST SURGERY  1950    right lumpectomy     CARDIAC VALVE SURGERY  10/04/2007    MVR    CATARACT EXTRACTION      CHOLECYSTECTOMY      CORONARY ARTERY BYPASS GRAFT      EYE SURGERY      cataracts bilateral    HYSTERECTOMY      for hydatiform mole    INSERT / REPLACE / REMOVE PACEMAKER  09/11/2001    TONSILLECTOMY          Social History     Social History    Marital status:      Spouse name: N/A    Number of children: N/A    Years of education: N/A     Social History Main Topics    Smoking status: Never Smoker    Smokeless tobacco: Never Used    Alcohol use No    Drug use: No    Sexual activity: No     Other Topics Concern    None     Social History Narrative    None        FAMILY HISTORY:  Family History   Problem Relation Age of Onset    Tuberculosis Mother     Tuberculosis Father     Stroke Sister     Hypertension Sister     Stroke Brother     Hypertension Daughter     COPD Brother         ALLERGIES:  Review of patient's allergies indicates:   Allergen Reactions    Sulfa (sulfonamide antibiotics) Anaphylaxis     Other reaction(s): Angioedema    Morphine      Other reaction(s): Unknown    Statins-hmg-coa reductase inhibitors Other (See Comments)     Elevated LFTs    Dronedarone Diarrhea, Nausea Only and Rash     DIZZINESS         MEDICATIONS:  No current facility-administered medications on file prior to encounter.      Current Outpatient Prescriptions on File Prior to Encounter   Medication Sig Dispense Refill    alendronate (FOSAMAX) 70 MG tablet take 1 tablet by mouth every week 4 tablet 11    ascorbic acid (VITAMIN C) 1000 MG tablet Take 1 tablet by mouth once daily once daily.      biotin 2,500 mcg Cap Take by mouth.      clotrimazole-betamethasone 1-0.05% (LOTRISONE) cream  Apply topically 2 (two) times daily. 45 g 1    econazole nitrate 1 % cream       escitalopram oxalate (LEXAPRO) 10 MG tablet take 1 tablet by mouth once daily 30 tablet 10    estradiol (ESTRACE) 0.01 % (0.1 mg/gram) vaginal cream Place 1 g vaginally 3 (three) times a week. Place small amount on the outside of the urethra nightly for 3 weeks , then twice weekly 42.5 g 12    fluticasone (FLONASE) 50 mcg/actuation nasal spray instill 2 sprays into each nostril once daily 16 g 11    folic acid (FOLVITE) 1 MG tablet Take 1 tablet by mouth once daily once daily.      iron-vitamin C 100-250 mg, ICAR-C, (ICAR-C) 100-250 mg Tab Take 1 tablet by mouth once daily. 30 tablet 3    levothyroxine (SYNTHROID) 50 MCG tablet take 1 tablet by mouth once daily 30 tablet 11    meclizine (ANTIVERT) 25 mg tablet Take 1 tablet (25 mg total) by mouth 3 (three) times daily as needed for Dizziness. 15 tablet 0    midodrine (PROAMATINE) 5 MG Tab Take 1 tablet (5 mg total) by mouth 2 (two) times daily with meals. 60 tablet 11    omega-3 fatty acids-vitamin E (FISH OIL) 1,000 mg Cap Take by mouth once daily.      pravastatin (PRAVACHOL) 20 MG tablet Take 1 tablet (20 mg total) by mouth once daily. 30 tablet 6    propafenone (RHTHYMOL) 150 MG Tab take 1/2 tablet by mouth three times a day 45 tablet 6    spironolactone (ALDACTONE) 25 MG tablet take 1 tablet by mouth once daily 30 tablet 11    tobramycin-dexamethasone 0.3-0.05 % DrpS Place 1 drop into both eyes 4 (four) times daily as needed. 1 Bottle 1    tramadol (ULTRAM) 50 mg tablet take 1 tablet by mouth every 8 hours if needed 14 tablet 0    triamcinolone acetonide 0.1% (KENALOG) 0.1 % cream apply THINLY to affected area ON LEGS, ARMS, AND TRUCK twice a day if needed for eczema  0    vitamin B12-folic acid 0.5-1 mg Tab Take 1 tablet by mouth once daily once daily.      warfarin (COUMADIN) 1 MG tablet Take 1/2 tablet Monday through Saturday and NONE on Sunday as directed by  the coumadin clinic. 15 tablet 3         OBJECTIVE:     VITAL SIGNS (Last 24H):  Temp:  [97.4 °F (36.3 °C)-98.3 °F (36.8 °C)]   Pulse:  [63-75]   Resp:  [16-18]   BP: (109-195)/()   SpO2:  [93 %-98 %]     Review of Systems:  Constitutional: Negative for chills and fever.   HENT: Negative for congestion and sore throat.    Respiratory: Negative for cough and shortness of breath.    Cardiovascular: Negative for chest pain.   Gastrointestinal: Positive for nausea and vomiting. Negative for abdominal pain and diarrhea.   Genitourinary: Negative for decreased urine volume, difficulty urinating and dysuria.   Musculoskeletal: Negative for back pain and neck pain.   Skin: Negative for rash.   Neurological: Positive for dizziness and weakness (Generalized). Negative for syncope, facial asymmetry, speech difficulty, light-headedness and headaches.   Hematological: Does not bruise/bleed easily.   Psychiatric/Behavioral: Negative for confusion.        Physical Exam:  Constitutional:elderly female  Head: Normocephalic and atraumatic.   Mouth: Mucous membranes moist.  Neck: Supple.  Eyes: No conjunctival injection.   Pulmonary/Chest: Effort normal. No respiratory distress.   Abdomen: Soft. Non-tender and non-distended.   Neurological: mental status - alert, fluent speech, following commands; CNs: subtle direction changing nystagmus, diplopia is elicited with bilateral upgaze otherwise CNs II-XII WNL; power - no pronator drift, 5/5 throughout; sensation - decreased to LT in LLE only; coordination - slight dysmetria on left FNF, HKS nl; DTRs - symmetrical, toes downgoing  Skin: Warm and dry. No lesions.  Extremities: No clubbing, cyanosis or edema.    Laboratory:  CBC:   Recent Labs  Lab 09/04/17  0611   WBC 7.25   RBC 3.88*   HGB 12.4   HCT 36.9*        CMP:   Recent Labs  Lab 09/03/17 2128 09/04/17  0611   GLU 94 94   CALCIUM 9.5 9.2   ALBUMIN 3.7  --    PROT 7.7  --     140   K 4.3 4.2   CO2 25 25     106   BUN 33* 28   CREATININE 1.4 1.2   ALKPHOS 69  --    ALT 13  --    AST 21  --    BILITOT 0.4  --        Diagnostic Results:  CT HEAD WITHOUT CONTRAST    Date: 09/03/17 21:30:00    Clinical indication:  acute onset dizziness     Technique:  Standard noncontrast CT scan of the brain. Comparison 5/2/17     Findings:  There is generalized atrophy and moderate white matter low density microangiopathy. No evidence of intracranial hemorrhage or acute focal parenchymal abnormality. There is mild right ethmoid sinus mucosal thickening. Otherwise visualized paranasal sinuses and mastoid air cells are clear. The cranium is intact.   Impression          No acute findings     Exam: US CAROTID BILATERAL    Clinical History:    Transient ischemic attack.  Vertigo.    Findings:     Sonographic evaluation of the carotid systems was performed.     Extensive calcified plaque at the bilateral carotid bulbs and proximal internal carotid arteries, right greater than left.    The peak systolic velocity in the right internal carotid artery was approximately 159 cm/sec.    The peak systolic velocity in the left internal carotid artery was approximately 124 cm/sec.    Antegrade flow noted in both vertebral arteries.   Impression      Moderate bilateral proximal internal carotid artery stenosis.    Right internal carotid artery stenosis of 50-59 %  Left internal carotid artery stenosis 20-50% - validated velocity measurements with angiographic measurements, velocity criteria are extrapolated from diameter data as defined by the Society of Radiologists in Ultrasound Consensus Conference Radiology 2003; 229;340-346.         ASSESSMENT/PLAN:     Probable posterior circulation stroke - patient cannot have an MRI to confirm diagnosis due to PPM.    - patient already on coumadin with INR 2.1 on presentation and 1.9 today; goal INR ideally closer to 3.0 in this patient  - patient had stopped her statin some months ago due to concern that it was  too high; agree with continuing it at 20 mg and having a follow up lipid panel to assess response; goal LDL <70  - daughter says that they are capable of bringing her home and managing her needs there  - call neurology with questions

## 2017-09-04 NOTE — ASSESSMENT & PLAN NOTE
Decreased nutrient needs (sodium)    Related to (etiology):   Current medical condition     Signs and Symptoms (as evidenced by):   CAD and hx of CHF, HTN     Interventions/Recommendations (treatment strategy):  1.Continue current diet. 2. Patient was educated on Cardiac diet. 3. Pt was also educated on food and drug interaction. Pt/Family were given a handout from the Nutrition Care Manual on Cardiac Nutrition Therapy and Vitamin K and Coumadin interaction.  Pt was provided contact information for further questions. Pt verbalized understanding and the importance of diet compliance.     Nutrition Diagnosis Status:   New

## 2017-09-04 NOTE — PLAN OF CARE
Problem: Patient Care Overview  Goal: Plan of Care Review  PT REQUIRES MIN A FOR GT X 30' WITH RW    Outcome: Ongoing (interventions implemented as appropriate)  Recommendations     Recommendation/Intervention: 1.Continue current diet. 2. Patient was educated on Cardiac diet. 3. Pt was also educated on food and drug interaction. Pt/Family were given a handout from the Nutrition Care Manual on Cardiac Nutrition Therapy and Vitamin K and Coumadin interaction.  Pt was provided contact information for further questions. Pt verbalized understanding and the importance of diet compliance.   Goals: Intake of 85 % - 100 % of meals   Nutrition Goal Status: new  Communication of RD Recs:  (care plan and sticky note)

## 2017-09-04 NOTE — PT/OT/SLP EVAL
"Occupational Therapy  Evaluation    Josy Posey   MRN: [id number]   Admitting Diagnosis: Vertigo    OT Date of Treatment: 09/04/17   OT Start Time: 0950  OT Stop Time: 1015  OT Total Time (min): 25 min    Billable Minutes:  Evaluation 10 MINUTES  Therapeutic Activity 15 MINUTES    Diagnosis: Vertigo   DEBILITY AND GENERALIZED WEAKNESS    Past Medical History:   Diagnosis Date    *Atrial fibrillation     Anemia     Angina pectoris     Arthritis     Atrial fibrillation 9/27/2013    Atrioventricular block, complete     CAD (coronary artery disease) 5/6/2015    Cardiac pacemaker 9/27/2013    CHF (congestive heart failure)     Coronary artery disease     Cystocele     prior pessary use    Depression     DVT (deep venous thrombosis) 3/1/10 approx    s/p rt embolectomy    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 fuCT 1/3- stable,  CT's later - no residual    Lens replaced by other means 6/25/2014    Melena     Memory loss     PET scan consistent with Alzzheimers.   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    1 vessel LIMA to LAD    S/P MVR (mitral valve replacement) 9/27/2013    Skin ulcer     Squamous cell carcinoma     skin cancer X 2    Stroke     Syncope and collapse     Unspecified essential hypertension 9/16/2013    Unspecified transient cerebral ischemia     Urinary incontinence     wears briefs      Past Surgical History:   Procedure Laterality Date    ADENOIDECTOMY      APPENDECTOMY      BREAST SURGERY  1950    right " lumpectomy     CARDIAC VALVE SURGERY  10/04/2007    MVR    CATARACT EXTRACTION      CHOLECYSTECTOMY      CORONARY ARTERY BYPASS GRAFT      EYE SURGERY      cataracts bilateral    HYSTERECTOMY      for hydatiform mole    INSERT / REPLACE / REMOVE PACEMAKER  09/11/2001    TONSILLECTOMY         Referring physician: DR. LOZANO  Date referred to OT: 9-3-17    General Precautions: Standard, fall  Orthopedic Precautions: N/A  Braces:            Patient History:  Living Environment  Lives With: child(gemma), adult  Living Arrangements: house  Home Layout: Able to live on 1st floor  Stair Railings at Home: none  Transportation Available: family or friend will provide  Living Environment Comment: pt lives with daugther and sitter for most of the day. pt reports alone at home approx. 3 hours  Equipment Currently Used at Home: rollator    Prior level of function:   Bed Mobility/Transfers: independent  Grooming: independent  Bathing: independent  Upper Body Dressing: independent  Lower Body Dressing: independent  Toileting: independent  Driving License: Yes  Occupation: Retired     Dominant hand: right    Subjective:  Communicated with NURSE KARAN AND EPIC CHART REVIEW prior to session.    Chief Complaint: DEBILITY AND GENERALIZED WEAKNESS  Patient/Family stated goals:     Pain/Comfort  Pain Rating 1: 0/10    Objective:  Patient found with: peripheral IV, telemetry    Cognitive Exam:  Oriented to: Person, Place, Time and Situation  Follows Commands/attention: Follows two-step commands  Communication: clear/fluent  Memory:  Poor immediate recall  Safety awareness/insight to disability: impaired  Coping skills/emotional control: Appropriate to situation    Visual/perceptual:  Intact    Physical Exam:  Postural examination/scapula alignment: Rounded shoulder and Head forward  Skin integrity: Visible skin intact and Bruising of R UE  Edema: Mild R UE    Sensation:   Intact    Upper Extremity Range of Motion:  Right Upper  Extremity: WFL  Left Upper Extremity: WFL    Upper Extremity Strength:  Right Upper Extremity: MMT: 3+/5 GROSSLY  Left Upper Extremity: MMT: 3+/5 GROSSLY   Strength: MMT: 3+/5 GROSSLY    Fine motor coordination:   Intact    Gross motor coordination: WFL    Functional Mobility:  Bed Mobility:  Rolling/Turning to Left: Contact guard assistance  Scooting/Bridging: Contact Guard Assistance  Supine to Sit: Contact Guard Assistance    Transfers:  Sit <> Stand Assistance: Minimum Assistance  Sit <> Stand Assistive Device: Rolling Walker  Bed <> Chair Technique: Stand Pivot  Bed <> Chair Transfer Assistance: Minimum Assistance  Bed <> Chair Assistive Device: Rolling Walker    Functional Ambulation: PT AMBULATED 20 FEET WITH MIN A RW AT SLOW PACE AND MAX VC'S FOR SAFETY AND CORRECT TECHNIQUE    Activities of Daily Living:     Feeding adaptive equipment: NA  UE Dressing Level of Assistance: Minimum assistance  UE adaptive equipment: NA  LE Dressing Level of Assistance: Minimum assistance  LE adaptive equipment: NA     Grooming Level of Assistance: Stand by assistance              Bathing adaptive equipment: NA    Balance:   Static Sit: GOOD: Takes MODERATE challenges from all directions  Dynamic Sit: GOOD-: Maintains balance through MODERATE excursions of active trunk movement,     Static Stand: FAIR+: Takes MINIMAL challenges from all directions  Dynamic stand: POOR+    Therapeutic Activities and Exercises:  PT SEEN IN ROOM. PT REQ MIN A WITH BED MOBILITY AND FUNCTIONAL MOBILITY WITH RW.    AM-PAC 6 CLICK ADL  How much help from another person does this patient currently need?  1 = Unable, Total/Dependent Assistance  2 = A lot, Maximum/Moderate Assistance  3 = A little, Minimum/Contact Guard/Supervision  4 = None, Modified Karval/Independent    Putting on and taking off regular lower body clothing? : 3  Bathing (including washing, rinsing, drying)?: 2  Toileting, which includes using toilet, bedpan, or urinal? :  "3  Putting on and taking off regular upper body clothing?: 3  Taking care of personal grooming such as brushing teeth?: 4  Eating meals?: 4  Total Score: 19    AM-PAC Raw Score CMS "G-Code Modifier Level of Impairment Assistance   6 % Total / Unable   7 - 9 CM 80 - 100% Maximal Assist   10-14 CL 60 - 80% Moderate Assist   15 - 19 CK 40 - 60% Moderate Assist   20 - 22 CJ 20 - 40% Minimal Assist   23 CI 1-20% SBA / CGA   24 CH 0% Independent/ Mod I       Patient left up in chair with all lines intact, call button in reach, bed alarm on and NURSE KARAN notified    Assessment:  Josy Posey is a 89 y.o. female with a medical diagnosis of Vertigo and presents with DEBILITY AND GENERALIZED WEAKNESS. PT WILL BENEFIT FROM SKILLED O.T..    Rehab identified problem list/impairments: Rehab identified problem list/impairments: weakness, impaired functional mobilty, impaired balance, decreased upper extremity function, decreased safety awareness, impaired endurance, impaired self care skills, gait instability, decreased lower extremity function, decreased coordination, decreased ROM, impaired coordination    Rehab potential is good.    Activity tolerance: Good    Discharge recommendations: Discharge Facility/Level Of Care Needs: home health OT     Barriers to discharge: Barriers to Discharge: Decreased caregiver support    Equipment recommendations: walker, rolling     GOALS:    Occupational Therapy Goals        Problem: Occupational Therapy Goal    Goal Priority Disciplines Outcome Interventions   Occupational Therapy Goal     OT, PT/OT     Description:  OT GOALS TO BE MET 9-11-17  1. PT WILL TOLERATE 1 SET X 10 REPS B UE ROM EXERCISE WITH MIN RESISTANCE  2. MOD (I) WITH LE DRESSING  3. MOD (I) WITH  UE DRESSING                      PLAN:  Patient to be seen 3 x/week to address the above listed problems via self-care/home management, therapeutic activities, therapeutic exercises  Plan of Care expires: " 09/11/17  Plan of Care reviewed with: patient, other (see comments) (personal sittier)         Yeny Oreilly, OT  09/04/2017

## 2017-09-04 NOTE — H&P
[facility] Hospital Medicine  History & Physical    Patient Name: Josy Posey  MRN: [id number]  Admission Date: 9/3/2017  Attending Physician: Tyler Reilly MD   Primary Care Provider: TIKA Rosales Jr, MD         Patient information was obtained from patient, relative(s), past medical records and ER records.     Subjective:     Principal Problem:Vertigo    Chief Complaint:   Chief Complaint   Patient presents with    Weakness     generalized for about 45 minutes with nausea and vomiting.        HPI: Ms. Posey is a 89yo female with a PMHx of HTN with orthostatic hypotension, HLD, CAD with remote CABG x 1V (LIMA-LAD), MR with remote mechanical MVR on Coumadin therapy, PAF, CHB s/p PPM, chronic diastolic HFpEF, h/o DVT, hypothyroidism, h/o CVA without residual, Alzheimer's dementia, and h/o vertigo, who presented to the ED with c/o vertigo that started suddenly this afternoon.  Patient reports visual disturbance, stating she could not look at anything without room spinning around her.  Associated nausea, vomiting, and generalized weakness.  Denies any chest pain, palpitations, SOB/HE, cough, orthopnea, PND, edema, diaphoresis, URI symptoms, HA, focal deficits, AMS, or syncope.  Initial work-up in ED with unrevealing EKG, stable VS and labs.  CT head showed no acute process.  TeleStroke called in ED.  Neurologist suspects vertigo, but recommends admission to r/o other etiologies, including TIA/CVA.  Hospital Medicine was consulted for admission.  Currently, patient appears comfortable, in NAD.    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 fuCT 1/3- stable,  CT's later - no residual    Lens replaced by other means 6/25/2014    Melena     Memory loss     PET scan consistent with Alzzheimers.   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    1 vessel LIMA to LAD    S/P MVR (mitral valve replacement) 9/27/2013    Skin ulcer     Squamous cell carcinoma     skin cancer X 2    Stroke     Syncope and collapse     Unspecified essential hypertension 9/16/2013    Unspecified transient cerebral ischemia     Urinary incontinence     wears briefs       Past Surgical History:   Procedure Laterality Date    ADENOIDECTOMY      APPENDECTOMY      BREAST SURGERY  1950    right lumpectomy     CARDIAC VALVE SURGERY  10/04/2007    MVR    CATARACT EXTRACTION      CHOLECYSTECTOMY      CORONARY ARTERY BYPASS GRAFT      EYE SURGERY      cataracts bilateral    HYSTERECTOMY      for hydatiform mole    INSERT / REPLACE / REMOVE PACEMAKER  09/11/2001    TONSILLECTOMY         Review of patient's allergies indicates:   Allergen Reactions    Sulfa (sulfonamide antibiotics) Anaphylaxis     Other reaction(s): Angioedema    Morphine      Other reaction(s): Unknown    Statins-hmg-coa reductase inhibitors Other (See Comments)     Elevated LFTs    Dronedarone Diarrhea, Nausea Only and Rash     DIZZINESS         No current facility-administered medications on file prior to encounter.      Current Outpatient Prescriptions on " File Prior to Encounter   Medication Sig    alendronate (FOSAMAX) 70 MG tablet take 1 tablet by mouth every week    ascorbic acid (VITAMIN C) 1000 MG tablet Take 1 tablet by mouth once daily once daily.    biotin 2,500 mcg Cap Take by mouth.    clotrimazole-betamethasone 1-0.05% (LOTRISONE) cream Apply topically 2 (two) times daily.    econazole nitrate 1 % cream     escitalopram oxalate (LEXAPRO) 10 MG tablet take 1 tablet by mouth once daily    estradiol (ESTRACE) 0.01 % (0.1 mg/gram) vaginal cream Place 1 g vaginally 3 (three) times a week. Place small amount on the outside of the urethra nightly for 3 weeks , then twice weekly    fluticasone (FLONASE) 50 mcg/actuation nasal spray instill 2 sprays into each nostril once daily    folic acid (FOLVITE) 1 MG tablet Take 1 tablet by mouth once daily once daily.    iron-vitamin C 100-250 mg, ICAR-C, (ICAR-C) 100-250 mg Tab Take 1 tablet by mouth once daily.    levothyroxine (SYNTHROID) 50 MCG tablet take 1 tablet by mouth once daily    meclizine (ANTIVERT) 25 mg tablet Take 1 tablet (25 mg total) by mouth 3 (three) times daily as needed for Dizziness.    midodrine (PROAMATINE) 5 MG Tab Take 1 tablet (5 mg total) by mouth 2 (two) times daily with meals.    omega-3 fatty acids-vitamin E (FISH OIL) 1,000 mg Cap Take by mouth once daily.    pravastatin (PRAVACHOL) 20 MG tablet Take 1 tablet (20 mg total) by mouth once daily.    propafenone (RHTHYMOL) 150 MG Tab take 1/2 tablet by mouth three times a day    spironolactone (ALDACTONE) 25 MG tablet take 1 tablet by mouth once daily    tobramycin-dexamethasone 0.3-0.05 % DrpS Place 1 drop into both eyes 4 (four) times daily as needed.    tramadol (ULTRAM) 50 mg tablet take 1 tablet by mouth every 8 hours if needed    triamcinolone acetonide 0.1% (KENALOG) 0.1 % cream apply THINLY to affected area ON LEGS, ARMS, AND TRUCK twice a day if needed for eczema    vitamin B12-folic acid 0.5-1 mg Tab Take 1 tablet  by mouth once daily once daily.    warfarin (COUMADIN) 1 MG tablet Take 1/2 tablet Monday through Saturday and NONE on Sunday as directed by the coumadin clinic.     Family History     Problem Relation (Age of Onset)    COPD Brother    Hypertension Sister, Daughter    Stroke Sister, Brother    Tuberculosis Mother, Father        Social History Main Topics    Smoking status: Never Smoker    Smokeless tobacco: Never Used    Alcohol use No    Drug use: No    Sexual activity: No     Review of Systems   Constitutional: Positive for activity change. Negative for chills, diaphoresis, fatigue, fever and unexpected weight change.   HENT: Negative for congestion, postnasal drip, rhinorrhea, sinus pressure, sore throat and trouble swallowing.    Eyes: Positive for visual disturbance.   Respiratory: Negative for apnea, cough, chest tightness, shortness of breath and wheezing.    Cardiovascular: Negative for chest pain, palpitations and leg swelling.   Gastrointestinal: Positive for nausea and vomiting. Negative for abdominal distention, abdominal pain, blood in stool, constipation and diarrhea.   Endocrine: Negative for polydipsia, polyphagia and polyuria.   Genitourinary: Negative for difficulty urinating, dysuria, frequency, hematuria and urgency.   Musculoskeletal: Positive for gait problem. Negative for arthralgias, back pain, joint swelling and myalgias.   Skin: Negative for pallor, rash and wound.   Neurological: Positive for dizziness and weakness. Negative for seizures, syncope, speech difficulty, light-headedness, numbness and headaches.   Psychiatric/Behavioral: Negative for confusion. The patient is not nervous/anxious.    All other systems reviewed and are negative.    Objective:     Vital Signs (Most Recent):  Temp: 97.4 °F (36.3 °C) (09/03/17 2316)  Pulse: 65 (09/03/17 2316)  Resp: 18 (09/03/17 2316)  BP: (!) 172/77 (09/03/17 2316)  SpO2: (!) 94 % (09/03/17 2316) Vital Signs (24h Range):  Temp:  [97.4 °F  (36.3 °C)-98.3 °F (36.8 °C)] 97.4 °F (36.3 °C)  Pulse:  [63-75] 65  Resp:  [16-18] 18  SpO2:  [94 %-98 %] 94 %  BP: (158-195)/() 172/77     Weight: 64.9 kg (143 lb)  Body mass index is 24.55 kg/m².    Physical Exam   Constitutional: She is oriented to person, place, and time. She appears well-developed and well-nourished. No distress.   HENT:   Head: Normocephalic and atraumatic.   Eyes: Conjunctivae and EOM are normal. Pupils are equal, round, and reactive to light.   Neck: Normal range of motion. Neck supple. No JVD present.   Cardiovascular: Normal rate, regular rhythm, normal heart sounds and intact distal pulses.  Exam reveals no gallop.    No murmur heard.  Pulmonary/Chest: Effort normal and breath sounds normal. No respiratory distress. She has no wheezes. She has no rales.   Abdominal: Soft. Bowel sounds are normal. She exhibits no distension. There is no tenderness.   Musculoskeletal: Normal range of motion. She exhibits no edema, tenderness or deformity.   Neurological: She is alert and oriented to person, place, and time. She has normal reflexes.   No focal deficits.   Skin: Skin is warm and dry. No rash noted. No erythema.   Psychiatric: She has a normal mood and affect. Judgment normal.   Nursing note and vitals reviewed.       Significant Labs:   Results for orders placed or performed during the hospital encounter of 09/03/17   CBC auto differential   Result Value Ref Range    WBC 7.45 3.90 - 12.70 K/uL    RBC 4.13 4.00 - 5.40 M/uL    Hemoglobin 13.4 12.0 - 16.0 g/dL    Hematocrit 39.6 37.0 - 48.5 %    MCV 96 82 - 98 fL    MCH 32.4 (H) 27.0 - 31.0 pg    MCHC 33.8 32.0 - 36.0 g/dL    RDW 13.7 11.5 - 14.5 %    Platelets 175 150 - 350 K/uL    MPV 11.3 9.2 - 12.9 fL    Gran # 4.1 1.8 - 7.7 K/uL    Lymph # 2.5 1.0 - 4.8 K/uL    Mono # 0.7 0.3 - 1.0 K/uL    Eos # 0.2 0.0 - 0.5 K/uL    Baso # 0.02 0.00 - 0.20 K/uL    Gran% 54.4 38.0 - 73.0 %    Lymph% 33.4 18.0 - 48.0 %    Mono% 8.9 4.0 - 15.0 %     Eosinophil% 3.0 0.0 - 8.0 %    Basophil% 0.3 0.0 - 1.9 %    Differential Method Automated    Comprehensive metabolic panel   Result Value Ref Range    Sodium 138 136 - 145 mmol/L    Potassium 4.3 3.5 - 5.1 mmol/L    Chloride 105 95 - 110 mmol/L    CO2 25 23 - 29 mmol/L    Glucose 94 70 - 110 mg/dL    BUN, Bld 33 (H) 10 - 30 mg/dL    Creatinine 1.4 0.5 - 1.4 mg/dL    Calcium 9.5 8.7 - 10.5 mg/dL    Total Protein 7.7 6.0 - 8.4 g/dL    Albumin 3.7 3.5 - 5.2 g/dL    Total Bilirubin 0.4 0.1 - 1.0 mg/dL    Alkaline Phosphatase 69 55 - 135 U/L    AST 21 10 - 40 U/L    ALT 13 10 - 44 U/L    Anion Gap 8 8 - 16 mmol/L    eGFR if African American 38 (A) >60 mL/min/1.73 m^2    eGFR if non African American 33 (A) >60 mL/min/1.73 m^2   Urinalysis   Result Value Ref Range    Specimen UA Urine, Clean Catch     Color, UA Yellow Yellow, Straw, Tanisha    Appearance, UA Clear Clear    pH, UA 6.0 5.0 - 8.0    Specific Gravity, UA 1.010 1.005 - 1.030    Protein, UA Negative Negative    Glucose, UA Negative Negative    Ketones, UA Negative Negative    Bilirubin (UA) Negative Negative    Occult Blood UA Negative Negative    Nitrite, UA Positive (A) Negative    Urobilinogen, UA Negative <2.0 EU/dL    Leukocytes, UA Trace (A) Negative   Brain natriuretic peptide   Result Value Ref Range     (H) 0 - 99 pg/mL   CK   Result Value Ref Range    CPK 43 20 - 180 U/L   Troponin I   Result Value Ref Range    Troponin I 0.016 0.000 - 0.026 ng/mL   TSH   Result Value Ref Range    TSH 2.352 0.400 - 4.000 uIU/mL   Magnesium   Result Value Ref Range    Magnesium 1.6 1.6 - 2.6 mg/dL   Protime-INR   Result Value Ref Range    Prothrombin Time 21.4 (H) 9.0 - 12.5 sec    INR 2.1 (H) 0.8 - 1.2   Urinalysis Microscopic   Result Value Ref Range    WBC, UA 4 0 - 5 /hpf    WBC Clumps, UA Occasional (A) None-Rare    Bacteria, UA Many (A) None-Occ /hpf    Microscopic Comment SEE COMMENT       All pertinent labs within the past 24 hours have been  reviewed.    Significant Imaging:   Imaging Results          X-Ray Chest AP Portable (In process)                CT Head Without Contrast (Final result)  Result time 09/03/17 21:40:49    Final result by Lamonte Benson MD (09/03/17 21:40:49)                 Impression:         No acute findings        All CT scans at this facility use dose modulation, iterative reconstruction, and/or weight based dosing when appropriate to reduce radiation dose to as low as reasonably achievable.       Electronically signed by: LAMONTE BENSON MD  Date:     09/03/17  Time:    21:40              Narrative:    CT HEAD WITHOUT CONTRAST    Date: 09/03/17 21:30:00    Clinical indication:  acute onset dizziness     Technique:  Standard noncontrast CT scan of the brain. Comparison 5/2/17     Findings:  There is generalized atrophy and moderate white matter low density microangiopathy. No evidence of intracranial hemorrhage or acute focal parenchymal abnormality. There is mild right ethmoid sinus mucosal thickening. Otherwise visualized paranasal sinuses and mastoid air cells are clear. The cranium is intact.                             I have reviewed all pertinent imaging results/findings within the past 24 hours.     EKG:  Ventricular pacemaker.  No acute changes from prior tracings.      Assessment/Plan:     * Vertigo    - CT head shows no acute process.  - 2D echo 5/2017 showed LVEF of 60% without significant valvular abnormalities.  - TeleStroke in ED.  Neurology suspect vertigo.    - Will admit to Observation.  - Start meclizine.  - Orthostatic VS.  - Check electrolytes, TSH, UA.  - Carotid U/S.  - Interrogate PPM to r/o arrhythmia.  - Monitor telemetry.  - If symptoms persist, may consider MRI +/- Neurology consult.        Hypertension with orthostatic hypotension    - Orthostatic VS Q shift.  - Compression stockings.  - PRN midodrine.        H/O CVA (cerebrovascular accident)    - Continue Coumadin therapy and statin.         Severe MR with remote mitral valve replacement on chronic anticoagulation    - Continue Coumadin.  - Followed by Dr. Alvarado outpatient.        Third degree AV block s/p PPM    - PPM appears to be functioning normally.  - Interrogate device.        Right-sided carotid artery disease    - Statin therapy.  - Carotid U/S.        Hypothyroidism    - Check TSH.  - Continue Synthroid.        Chronic diastolic HFpEF of 60% per echo 5/2017    - Clinically compensated currently.  - Continue cande.        CAD (coronary artery disease) with remote CABG x 1V (LIMA-LAD)    - No angina.  - Continue medical management.        CKD (chronic kidney disease) stage 3, GFR 30-59 ml/min    - Creatinine stable.  - Monitor kidney function.        Paroxysmal atrial fibrillation    - Continue propafenone.  - Monitor telemetry.  - Continue Coumadin.        Pure hypercholesterolemia    - Continue home statin.          VTE Risk Mitigation         Ordered     warfarin (COUMADIN) split tablet 0.5 mg  Every Saturday     Route:  Oral        09/03/17 2336     warfarin (COUMADIN) split tablet 0.5 mg  Every Mon, Tues, Wed, Thurs, Fri     Route:  Oral        09/03/17 2336             MARIANNE Cowan  Department of Hospital Medicine   [facility]

## 2017-09-04 NOTE — PROGRESS NOTES
Spoke with [employer] to have pacemaker interrogated. The representative is Nader and he will come interrogate the pacemaker today.

## 2017-09-04 NOTE — SUBJECTIVE & OBJECTIVE
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 fuCT 1/3- stable,  CT's later - no residual    Lens replaced by other means 6/25/2014    Melena     Memory loss     PET scan consistent with Alzzheimers.   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    1 vessel LIMA to LAD    S/P MVR (mitral valve replacement) 9/27/2013    Skin ulcer     Squamous cell carcinoma     skin cancer X 2    Stroke     Syncope and collapse     Unspecified essential hypertension 9/16/2013    Unspecified transient cerebral ischemia     Urinary incontinence     wears briefs       Past Surgical History:   Procedure Laterality Date    ADENOIDECTOMY      APPENDECTOMY      BREAST SURGERY  1950    right lumpectomy     CARDIAC VALVE SURGERY  10/04/2007    MVR    CATARACT EXTRACTION      CHOLECYSTECTOMY      CORONARY ARTERY BYPASS GRAFT      EYE SURGERY      cataracts bilateral    HYSTERECTOMY      for hydatiform mole    INSERT / REPLACE / REMOVE PACEMAKER  09/11/2001    TONSILLECTOMY         Review of patient's allergies indicates: "   Allergen Reactions    Sulfa (sulfonamide antibiotics) Anaphylaxis     Other reaction(s): Angioedema    Morphine      Other reaction(s): Unknown    Statins-hmg-coa reductase inhibitors Other (See Comments)     Elevated LFTs    Dronedarone Diarrhea, Nausea Only and Rash     DIZZINESS         No current facility-administered medications on file prior to encounter.      Current Outpatient Prescriptions on File Prior to Encounter   Medication Sig    alendronate (FOSAMAX) 70 MG tablet take 1 tablet by mouth every week    ascorbic acid (VITAMIN C) 1000 MG tablet Take 1 tablet by mouth once daily once daily.    biotin 2,500 mcg Cap Take by mouth.    clotrimazole-betamethasone 1-0.05% (LOTRISONE) cream Apply topically 2 (two) times daily.    econazole nitrate 1 % cream     escitalopram oxalate (LEXAPRO) 10 MG tablet take 1 tablet by mouth once daily    estradiol (ESTRACE) 0.01 % (0.1 mg/gram) vaginal cream Place 1 g vaginally 3 (three) times a week. Place small amount on the outside of the urethra nightly for 3 weeks , then twice weekly    fluticasone (FLONASE) 50 mcg/actuation nasal spray instill 2 sprays into each nostril once daily    folic acid (FOLVITE) 1 MG tablet Take 1 tablet by mouth once daily once daily.    iron-vitamin C 100-250 mg, ICAR-C, (ICAR-C) 100-250 mg Tab Take 1 tablet by mouth once daily.    levothyroxine (SYNTHROID) 50 MCG tablet take 1 tablet by mouth once daily    meclizine (ANTIVERT) 25 mg tablet Take 1 tablet (25 mg total) by mouth 3 (three) times daily as needed for Dizziness.    midodrine (PROAMATINE) 5 MG Tab Take 1 tablet (5 mg total) by mouth 2 (two) times daily with meals.    omega-3 fatty acids-vitamin E (FISH OIL) 1,000 mg Cap Take by mouth once daily.    pravastatin (PRAVACHOL) 20 MG tablet Take 1 tablet (20 mg total) by mouth once daily.    propafenone (RHTHYMOL) 150 MG Tab take 1/2 tablet by mouth three times a day    spironolactone (ALDACTONE) 25 MG tablet take 1  tablet by mouth once daily    tobramycin-dexamethasone 0.3-0.05 % DrpS Place 1 drop into both eyes 4 (four) times daily as needed.    tramadol (ULTRAM) 50 mg tablet take 1 tablet by mouth every 8 hours if needed    triamcinolone acetonide 0.1% (KENALOG) 0.1 % cream apply THINLY to affected area ON LEGS, ARMS, AND TRUCK twice a day if needed for eczema    vitamin B12-folic acid 0.5-1 mg Tab Take 1 tablet by mouth once daily once daily.    warfarin (COUMADIN) 1 MG tablet Take 1/2 tablet Monday through Saturday and NONE on Sunday as directed by the coumadin clinic.     Family History     Problem Relation (Age of Onset)    COPD Brother    Hypertension Sister, Daughter    Stroke Sister, Brother    Tuberculosis Mother, Father        Social History Main Topics    Smoking status: Never Smoker    Smokeless tobacco: Never Used    Alcohol use No    Drug use: No    Sexual activity: No     Review of Systems   Constitutional: Positive for activity change. Negative for chills, diaphoresis, fatigue, fever and unexpected weight change.   HENT: Negative for congestion, postnasal drip, rhinorrhea, sinus pressure, sore throat and trouble swallowing.    Eyes: Positive for visual disturbance.   Respiratory: Negative for apnea, cough, chest tightness, shortness of breath and wheezing.    Cardiovascular: Negative for chest pain, palpitations and leg swelling.   Gastrointestinal: Positive for nausea and vomiting. Negative for abdominal distention, abdominal pain, blood in stool, constipation and diarrhea.   Endocrine: Negative for polydipsia, polyphagia and polyuria.   Genitourinary: Negative for difficulty urinating, dysuria, frequency, hematuria and urgency.   Musculoskeletal: Positive for gait problem. Negative for arthralgias, back pain, joint swelling and myalgias.   Skin: Negative for pallor, rash and wound.   Neurological: Positive for dizziness and weakness. Negative for seizures, syncope, speech difficulty,  light-headedness, numbness and headaches.   Psychiatric/Behavioral: Negative for confusion. The patient is not nervous/anxious.    All other systems reviewed and are negative.    Objective:     Vital Signs (Most Recent):  Temp: 97.4 °F (36.3 °C) (09/03/17 2316)  Pulse: 65 (09/03/17 2316)  Resp: 18 (09/03/17 2316)  BP: (!) 172/77 (09/03/17 2316)  SpO2: (!) 94 % (09/03/17 2316) Vital Signs (24h Range):  Temp:  [97.4 °F (36.3 °C)-98.3 °F (36.8 °C)] 97.4 °F (36.3 °C)  Pulse:  [63-75] 65  Resp:  [16-18] 18  SpO2:  [94 %-98 %] 94 %  BP: (158-195)/() 172/77     Weight: 64.9 kg (143 lb)  Body mass index is 24.55 kg/m².    Physical Exam   Constitutional: She is oriented to person, place, and time. She appears well-developed and well-nourished. No distress.   HENT:   Head: Normocephalic and atraumatic.   Eyes: Conjunctivae and EOM are normal. Pupils are equal, round, and reactive to light.   Neck: Normal range of motion. Neck supple. No JVD present.   Cardiovascular: Normal rate, regular rhythm, normal heart sounds and intact distal pulses.  Exam reveals no gallop.    No murmur heard.  Pulmonary/Chest: Effort normal and breath sounds normal. No respiratory distress. She has no wheezes. She has no rales.   Abdominal: Soft. Bowel sounds are normal. She exhibits no distension. There is no tenderness.   Musculoskeletal: Normal range of motion. She exhibits no edema, tenderness or deformity.   Neurological: She is alert and oriented to person, place, and time. She has normal reflexes.   No focal deficits.   Skin: Skin is warm and dry. No rash noted. No erythema.   Psychiatric: She has a normal mood and affect. Judgment normal.   Nursing note and vitals reviewed.       Significant Labs:   Results for orders placed or performed during the hospital encounter of 09/03/17   CBC auto differential   Result Value Ref Range    WBC 7.45 3.90 - 12.70 K/uL    RBC 4.13 4.00 - 5.40 M/uL    Hemoglobin 13.4 12.0 - 16.0 g/dL    Hematocrit  39.6 37.0 - 48.5 %    MCV 96 82 - 98 fL    MCH 32.4 (H) 27.0 - 31.0 pg    MCHC 33.8 32.0 - 36.0 g/dL    RDW 13.7 11.5 - 14.5 %    Platelets 175 150 - 350 K/uL    MPV 11.3 9.2 - 12.9 fL    Gran # 4.1 1.8 - 7.7 K/uL    Lymph # 2.5 1.0 - 4.8 K/uL    Mono # 0.7 0.3 - 1.0 K/uL    Eos # 0.2 0.0 - 0.5 K/uL    Baso # 0.02 0.00 - 0.20 K/uL    Gran% 54.4 38.0 - 73.0 %    Lymph% 33.4 18.0 - 48.0 %    Mono% 8.9 4.0 - 15.0 %    Eosinophil% 3.0 0.0 - 8.0 %    Basophil% 0.3 0.0 - 1.9 %    Differential Method Automated    Comprehensive metabolic panel   Result Value Ref Range    Sodium 138 136 - 145 mmol/L    Potassium 4.3 3.5 - 5.1 mmol/L    Chloride 105 95 - 110 mmol/L    CO2 25 23 - 29 mmol/L    Glucose 94 70 - 110 mg/dL    BUN, Bld 33 (H) 10 - 30 mg/dL    Creatinine 1.4 0.5 - 1.4 mg/dL    Calcium 9.5 8.7 - 10.5 mg/dL    Total Protein 7.7 6.0 - 8.4 g/dL    Albumin 3.7 3.5 - 5.2 g/dL    Total Bilirubin 0.4 0.1 - 1.0 mg/dL    Alkaline Phosphatase 69 55 - 135 U/L    AST 21 10 - 40 U/L    ALT 13 10 - 44 U/L    Anion Gap 8 8 - 16 mmol/L    eGFR if African American 38 (A) >60 mL/min/1.73 m^2    eGFR if non African American 33 (A) >60 mL/min/1.73 m^2   Urinalysis   Result Value Ref Range    Specimen UA Urine, Clean Catch     Color, UA Yellow Yellow, Straw, Tanisha    Appearance, UA Clear Clear    pH, UA 6.0 5.0 - 8.0    Specific Gravity, UA 1.010 1.005 - 1.030    Protein, UA Negative Negative    Glucose, UA Negative Negative    Ketones, UA Negative Negative    Bilirubin (UA) Negative Negative    Occult Blood UA Negative Negative    Nitrite, UA Positive (A) Negative    Urobilinogen, UA Negative <2.0 EU/dL    Leukocytes, UA Trace (A) Negative   Brain natriuretic peptide   Result Value Ref Range     (H) 0 - 99 pg/mL   CK   Result Value Ref Range    CPK 43 20 - 180 U/L   Troponin I   Result Value Ref Range    Troponin I 0.016 0.000 - 0.026 ng/mL   TSH   Result Value Ref Range    TSH 2.352 0.400 - 4.000 uIU/mL   Magnesium   Result  Value Ref Range    Magnesium 1.6 1.6 - 2.6 mg/dL   Protime-INR   Result Value Ref Range    Prothrombin Time 21.4 (H) 9.0 - 12.5 sec    INR 2.1 (H) 0.8 - 1.2   Urinalysis Microscopic   Result Value Ref Range    WBC, UA 4 0 - 5 /hpf    WBC Clumps, UA Occasional (A) None-Rare    Bacteria, UA Many (A) None-Occ /hpf    Microscopic Comment SEE COMMENT       All pertinent labs within the past 24 hours have been reviewed.    Significant Imaging:   Imaging Results          X-Ray Chest AP Portable (In process)                CT Head Without Contrast (Final result)  Result time 09/03/17 21:40:49    Final result by Lamonte Benson MD (09/03/17 21:40:49)                 Impression:         No acute findings        All CT scans at this facility use dose modulation, iterative reconstruction, and/or weight based dosing when appropriate to reduce radiation dose to as low as reasonably achievable.       Electronically signed by: LAMONTE BENSON MD  Date:     09/03/17  Time:    21:40              Narrative:    CT HEAD WITHOUT CONTRAST    Date: 09/03/17 21:30:00    Clinical indication:  acute onset dizziness     Technique:  Standard noncontrast CT scan of the brain. Comparison 5/2/17     Findings:  There is generalized atrophy and moderate white matter low density microangiopathy. No evidence of intracranial hemorrhage or acute focal parenchymal abnormality. There is mild right ethmoid sinus mucosal thickening. Otherwise visualized paranasal sinuses and mastoid air cells are clear. The cranium is intact.                             I have reviewed all pertinent imaging results/findings within the past 24 hours.     EKG:  Ventricular pacemaker.  No acute changes from prior tracings.

## 2017-09-04 NOTE — PT/OT/SLP EVAL
"Speech Language Pathology  Evaluation    Josy Posey   MRN: [id number]   Admitting Diagnosis: Vertigo    Diet recommendations: Solid Diet Level: Regular  Liquid Diet Level: Thin HOB to 90 degrees and Remain upright 30 minutes post meal    SLP Treatment Date: 09/04/17  Speech Start Time: 1000     Speech Stop Time: 1015     Speech Total (min): 15 min       TREATMENT BILLABLE MINUTES:  Eval Swallow and Oral Function 15    Diagnosis: Vertigo    Past Medical History:   Diagnosis Date    *Atrial fibrillation     Anemia     Angina pectoris     Arthritis     Atrial fibrillation 9/27/2013    Atrioventricular block, complete     CAD (coronary artery disease) 5/6/2015    Cardiac pacemaker 9/27/2013    CHF (congestive heart failure)     Coronary artery disease     Cystocele     prior pessary use    Depression     DVT (deep venous thrombosis) 3/1/10 approx    s/p rt embolectomy    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 fuCT 1/3- stable,  CT's later - no residual    Lens replaced by other means 6/25/2014    Melena     Memory loss     PET scan consistent with Alzzheimers.   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    1 vessel LIMA to LAD    S/P MVR (mitral valve replacement) 9/27/2013    Skin ulcer     Squamous cell carcinoma     skin cancer X 2    Stroke     Syncope and collapse     Unspecified essential hypertension 9/16/2013    Unspecified transient cerebral ischemia     Urinary incontinence     wears briefs     Past Surgical History: "   Procedure Laterality Date    ADENOIDECTOMY      APPENDECTOMY      BREAST SURGERY  1950    right lumpectomy     CARDIAC VALVE SURGERY  10/04/2007    MVR    CATARACT EXTRACTION      CHOLECYSTECTOMY      CORONARY ARTERY BYPASS GRAFT      EYE SURGERY      cataracts bilateral    HYSTERECTOMY      for hydatiform mole    INSERT / REPLACE / REMOVE PACEMAKER  09/11/2001    TONSILLECTOMY         Has the patient been evaluated by SLP for swallowing? : Yes  Keep patient NPO?: No   General Precautions: Standard, fall          Social Hx: Patient lives with her daughter.    Prior diet: regular.    Occupational/hobbies/homemaking: none stated.    Subjective:  Pt was seen lying in bed with family present.    Patient goals: pt wants to eat.    Pain/Comfort  Pain Rating 1: 0/10    Objective:   Patient found with: telemetry, peripheral IV    Oral Musculature Evaluation  Oral Musculature: WFL  Dentition: present and adequate     Bedside Swallow Eval:  Consistencies Assessed: thin liquids and solids  Oral Phase: WFL  Pharyngeal Phase: no overt clinical  signs/symptoms of aspiration and no overt clinical signs/symptoms of pharyngeal dysphagia         Assessment:  Josy Posey is a 89 y.o. female with a medical diagnosis of Vertigo and presents with a safe, functional swallow.  No further ST warranted.          Plan:   Plan of Care reviewed with: patient, daughter  SLP Follow-up?: No              Virginia Michael CCC-SLP  09/04/2017

## 2017-09-04 NOTE — ASSESSMENT & PLAN NOTE
- CT head shows no acute process.  - 2D echo 5/2017 showed LVEF of 60% without significant valvular abnormalities.  - TeleStroke in ED.  Neurology suspect vertigo.    - Will admit to Observation.  - Start meclizine.  - Orthostatic VS.  - Check electrolytes, TSH, UA.  - Carotid U/S.  - Interrogate PPM to r/o arrhythmia.  - Monitor telemetry.  - If symptoms persist, may consider MRI +/- Neurology consult.

## 2017-09-04 NOTE — HPI
Ms. Posey is a 89yo female with a PMHx of HTN with orthostatic hypotension, HLD, CAD with remote CABG x 1V (LIMA-LAD), MR with remote mechanical MVR on Coumadin therapy, PAF, CHB s/p PPM, chronic diastolic HFpEF, h/o DVT, hypothyroidism, h/o CVA without residual, Alzheimer's dementia, and h/o vertigo, who presented to the ED with c/o vertigo that started suddenly this afternoon.  Patient reports visual disturbance, stating she could not look at anything without room spinning around her.  Associated nausea, vomiting, and generalized weakness.  Denies any chest pain, palpitations, SOB/HE, cough, orthopnea, PND, edema, diaphoresis, URI symptoms, HA, focal deficits, AMS, or syncope.  Initial work-up in ED with unrevealing EKG, stable VS and labs.  CT head showed no acute process.  TeleStroke called in ED.  Neurologist suspects vertigo, but recommends admission to r/o other etiologies, including TIA/CVA.

## 2017-09-04 NOTE — PROGRESS NOTES
Pharmacy Brief Progress Note:    Patient educated on warfarin indication, side effects, and drug interactions. Discussed importance of medication compliance and INR monitoring and reviewed signs of abnormal bleeding. Patient given warfarin educational handout. Patient expressed understanding and had no further questions.    Thank you for allowing us to participate in this patient's care.     Luma Galindo 9/4/2017 5:38 PM

## 2017-09-04 NOTE — PROGRESS NOTES
D/C instructions were given, all questions were answered, no signs of distress were noted, no c/o pain. Pt. Still c/o dizziness. IV access was removed with catheter intact. Heart monitor was removed. Appointments were scheduled. Patient brought down to family car. No signs of distress were noted.

## 2017-09-04 NOTE — ED NOTES
Approximately 45 minutes prior to arrival pt was watching TV and suddenly was unable to read the screen. Pt c/o dizziness, weakness, and vomited. Pt has weakness and dizziness with standing. Pt also reports BP increased after event.

## 2017-09-04 NOTE — CONSULTS
[facility]  Tele-Consultation Note    Consultation started: 9/3/2017 at 9:50 PM   Consulting Provider: Spoke Physician:: Dr. Rony Patel  The patient location is [facility]  Spoke hospital nurse at bedside with patient assisting consultant.     Subjective:   Subjective   History of Present Illness:  Josy Posey is a 89 y.o. female who presents with sudden onset dizziness with associated nausea and vomiting with midl confusions.  No celar triggers.  Has not had in the past.  Still has symtoms.  Has not improved.    Wake up Stroke?: no  Last known normal: Last known well (date and time):: 2025  Recent bleeding noted: no  Does the patient take any Blood Thinners? yes           H&P was obtained from patient and relative(s).   Past Medical History: hypertension, stroke and Heart Problems    Past Surgical History: no surgeries within the last 3 months    Family History: hypertension    Social History: no smoking, no drinking, no drugs    Medications: No current facility-administered medications for this encounter.     Current Outpatient Prescriptions:     alendronate (FOSAMAX) 70 MG tablet, take 1 tablet by mouth every week, Disp: 4 tablet, Rfl: 11    ascorbic acid (VITAMIN C) 1000 MG tablet, Take 1 tablet by mouth once daily once daily., Disp: , Rfl:     biotin 2,500 mcg Cap, Take by mouth., Disp: , Rfl:     clotrimazole-betamethasone 1-0.05% (LOTRISONE) cream, Apply topically 2 (two) times daily., Disp: 45 g, Rfl: 1    econazole nitrate 1 % cream, , Disp: , Rfl:     escitalopram oxalate (LEXAPRO) 10 MG tablet, take 1 tablet by mouth once daily, Disp: 30 tablet, Rfl: 10    estradiol (ESTRACE) 0.01 % (0.1 mg/gram) vaginal cream, Place 1 g vaginally 3 (three) times a week. Place small amount on the outside of the urethra nightly for 3 weeks , then twice weekly, Disp: 42.5 g, Rfl: 12    fluticasone (FLONASE) 50 mcg/actuation nasal  spray, instill 2 sprays into each nostril once daily, Disp: 16 g, Rfl: 11    folic acid (FOLVITE) 1 MG tablet, Take 1 tablet by mouth once daily once daily., Disp: , Rfl:     iron-vitamin C 100-250 mg, ICAR-C, (ICAR-C) 100-250 mg Tab, Take 1 tablet by mouth once daily., Disp: 30 tablet, Rfl: 3    levothyroxine (SYNTHROID) 50 MCG tablet, take 1 tablet by mouth once daily, Disp: 30 tablet, Rfl: 11    meclizine (ANTIVERT) 25 mg tablet, Take 1 tablet (25 mg total) by mouth 3 (three) times daily as needed for Dizziness., Disp: 15 tablet, Rfl: 0    midodrine (PROAMATINE) 5 MG Tab, Take 1 tablet (5 mg total) by mouth 2 (two) times daily with meals., Disp: 60 tablet, Rfl: 11    omega-3 fatty acids-vitamin E (FISH OIL) 1,000 mg Cap, Take by mouth once daily., Disp: , Rfl:     pravastatin (PRAVACHOL) 20 MG tablet, Take 1 tablet (20 mg total) by mouth once daily., Disp: 30 tablet, Rfl: 6    propafenone (RHTHYMOL) 150 MG Tab, take 1/2 tablet by mouth three times a day, Disp: 45 tablet, Rfl: 6    spironolactone (ALDACTONE) 25 MG tablet, take 1 tablet by mouth once daily, Disp: 30 tablet, Rfl: 11    tobramycin-dexamethasone 0.3-0.05 % DrpS, Place 1 drop into both eyes 4 (four) times daily as needed., Disp: 1 Bottle, Rfl: 1    tramadol (ULTRAM) 50 mg tablet, take 1 tablet by mouth every 8 hours if needed, Disp: 14 tablet, Rfl: 0    triamcinolone acetonide 0.1% (KENALOG) 0.1 % cream, apply THINLY to affected area ON LEGS, ARMS, AND TRUCK twice a day if needed for eczema, Disp: , Rfl: 0    vitamin B12-folic acid 0.5-1 mg Tab, Take 1 tablet by mouth once daily once daily., Disp: , Rfl:     warfarin (COUMADIN) 1 MG tablet, Take 1/2 tablet Monday through Saturday and NONE on Sunday as directed by the coumadin clinic., Disp: 15 tablet, Rfl: 3    Allergies: statins    Review Of Systems:     Review of Systems    Objective:     Vitals:   Vitals:    09/03/17 2146   BP: (!) 158/85   Pulse: 75   Resp: 16   Temp:        Objective    Physical Exam:  Physical Exam       Imaging Notes: No hemmorhage. No mass effect. No early infarct signs. Impression performed at: 9:52 pm    Stroke Scales    Assessment - Diagnosis - Goals:     Plan     Diagnosis/Impression: possible labrynthitis    Alteplase Recommendation: Altaplase not recommended due to mild sx, non focal. concern for labrynthitis    Recommendation: NPO until after pass dysphagia screen. symptoms treatment, MRI in am f sx persist    Disposition Recommendation:  admit to inpatient       Possible Interventional Revascularization Candidate? No; No large vessel occlusion    Recommended the emergency room physician to have a brief discussion with the patient and/or family if available regarding the risks and benefits of treatment, and to briefly document the occurrence of that discussion in his clinical encounter note.     The attending portion of this evaluation, treatment, and documentation was performed per Derick Bautista via audiovisual.      Billing code:  (non-intervention mild to moderate stroke, TIA, some mimics)    · This patient has a critical neurological condition/illness, with some potential for high morbidity and mortality.  · There is a moderate probability for acute neurological change leading to clinical and possibly life-threatening deterioration requiring highest level of physician preparedness for urgent intervention.  · Care was coordinated with other physicians involved in the patient's care.  · Radiologic studies and laboratory data were reviewed and interpreted, and plan of care was re-assessed based on the results.  · Diagnosis, treatment options and prognosis may have been discussed with the patient and/or family members or caregiver.        Consultation ended: 9/3/2017 at 10 pm    Derick Bautista MD  Vascular and Interventional Neurology Staff  Director of [facility]  [facility]  [phone]

## 2017-09-04 NOTE — PT/OT/SLP EVAL
"Physical Therapy  Evaluation    Josy Posey   MRN: [id number]   Admitting Diagnosis: Vertigo    PT Received On: 09/04/17  PT Start Time: 0927     PT Stop Time: 0957    PT Total Time (min): 30 min       Billable Minutes:  Evaluation 15 and Gait [id number]    Diagnosis: Vertigo  P.T. 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 fuCT 1/3- stable,  CT's later - no residual    Lens replaced by other means 6/25/2014    Melena     Memory loss     PET scan consistent with Alzzheimers.   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    1 vessel LIMA to LAD    S/P MVR (mitral valve replacement) 9/27/2013    Skin ulcer     Squamous cell carcinoma     skin cancer X 2    Stroke     Syncope and collapse     Unspecified essential hypertension 9/16/2013    Unspecified transient cerebral ischemia     Urinary incontinence     wears briefs      Past Surgical History:   Procedure Laterality Date    ADENOIDECTOMY      APPENDECTOMY      BREAST SURGERY  1950    right lumpectomy     CARDIAC VALVE SURGERY  " 10/04/2007    MVR    CATARACT EXTRACTION      CHOLECYSTECTOMY      CORONARY ARTERY BYPASS GRAFT      EYE SURGERY      cataracts bilateral    HYSTERECTOMY      for hydatiform mole    INSERT / REPLACE / REMOVE PACEMAKER  09/11/2001    TONSILLECTOMY         Referring physician: TOVA  Date referred to PT: 9/4/2017      General Precautions: Standard, fall  Orthopedic Precautions:     Braces:              Patient History:  Lives With: child(gemma), adult  Living Arrangements: house  Home Layout: Able to live on 1st floor  Stair Railings at Home: none  Transportation Available: family or friend will provide  Living Environment Comment: PT LIVES AT HOME WITH DAUGHTER AND HAS A SITTER DAILY EXCEPT FOR 3 HOURS A DAY. PT WAS AMBULATORY WITH ROLLATOR.   Equipment Currently Used at Home: rollator  DME owned (not currently used): single point cane, bedside commode and shower chair    Previous Level of Function:  Ambulation Skills: needs device  Transfer Skills: needs device  ADL Skills: needs device    Subjective:  Communicated with NURSE KARAN AND EPIC CHART REVIEW  prior to session.   PT AGREED TO EVAL AND TX   Chief Complaint: DIZZINESS AND SOB  Patient goals: DEC DIZZINESS    Pain/Comfort  Pain Rating 1: 0/10  Pain Rating Post-Intervention 1: 0/10      Objective:   Patient found with: peripheral IV, telemetry     Cognitive Exam:  Oriented to: Person, Place, Time and Situation    Follows Commands/attention: Follows multistep  commands  Communication: clear/fluent  Safety awareness/insight to disability: impaired    Physical Exam:  Postural examination/scapula alignment: Rounded shoulder and Head forward    Skin integrity: Visible skin intact  Edema: Mild B LE      Sensation:   Intact    Lower Extremity Range of Motion:  Right Lower Extremity: WFL  Left Lower Extremity: WFL    Lower Extremity Strength:  Right Lower Extremity: WFL  Left Lower Extremity: WFL    Functional Mobility:  Bed Mobility:  Rolling/Turning to  Left: Stand by assistance  Scooting/Bridging: Stand by Assistance  Supine to Sit: Stand by Assistance    Transfers:  Sit <> Stand Assistance: Minimum Assistance  Sit <> Stand Assistive Device: Rolling Walker  Bed <> Chair Technique: Stand Pivot  Bed <> Chair Assistance: Minimum Assistance  Bed <> Chair Assistive Device: Rolling Walker    Gait:   Gait Distance: PT GT TRAINED WITH RW X 30' WITH STEP TO GT AND C/O SOB AND DIZZINESS.  Assistance 1: Minimum assistance  Gait Assistive Device: Rolling walker  Gait Pattern: swing-to gait  Gait Deviation(s): decreased tejal, decreased weight-shifting ability    Therapeutic Activities and Exercises:   PT GT TRAINED AND RETURNED TO  T/F TO CHAIR AND EDUCATED ON SAFE RW USE. PT LEFT SEATED IN CHAIR WITH SITTER PRESENT AND GIVEN P.T. RECOMMENDATIONS. PT NURSE AWARE PT OOB    AM-PAC 6 CLICK MOBILITY  How much help from another person does this patient currently need?   1 = Unable, Total/Dependent Assistance  2 = A lot, Maximum/Moderate Assistance  3 = A little, Minimum/Contact Guard/Supervision  4 = None, Modified [place]/Independent    Turning over in bed (including adjusting bedclothes, sheets and blankets)?: 3  Sitting down on and standing up from a chair with arms (e.g., wheelchair, bedside commode, etc.): 3  Moving from lying on back to sitting on the side of the bed?: 3  Moving to and from a bed to a chair (including a wheelchair)?: 3  Need to walk in hospital room?: 3  Climbing 3-5 steps with a railing?: 1  Total Score: 16     AM-PAC Raw Score CMS G-Code Modifier Level of Impairment Assistance   6 % Total / Unable   7 - 9 CM 80 - 100% Maximal Assist   10 - 14 CL 60 - 80% Moderate Assist   15 - 19 CK 40 - 60% Moderate Assist   20 - 22 CJ 20 - 40% Minimal Assist   23 CI 1-20% SBA / CGA   24 CH 0% Independent/ Mod I     Patient left up in chair with call button in reach.    Assessment:   Josy Posey is a 89 y.o. female with a medical diagnosis of Vertigo  and presents with GT INSTABILITY AND WILL BENEFIT FROM P.T. TO ADDRESS IMPAIRMENTS LISTED    Rehab identified problem list/impairments: Rehab identified problem list/impairments: impaired endurance, impaired balance, impaired self care skills, impaired functional mobilty, gait instability, edema, decreased safety awareness    Rehab potential is good.    Activity tolerance: Fair    Discharge recommendations: Discharge Facility/Level Of Care Needs: home health PT (24 HOUR S AND A P.T. WHO SPECIALIZES IN VERTIGO AND BALANCE IMPAIRMENTS)     Barriers to discharge: Barriers to Discharge: Decreased caregiver support    Equipment recommendations: Equipment Needed After Discharge: walker, rolling     GOALS:    Physical Therapy Goals        Problem: Physical Therapy Goal    Goal Priority Disciplines Outcome Goal Variances Interventions   Physical Therapy Goal     PT/OT, PT      Description:  PT WILL BE SEEN FOR P.T. FOR A MIN OF 5 OUT OF 7 DAYS A WEEK  LT17  1. PT WILL COMPLETE BED MOBILITY IND.  2. PT WILL T/F TO CHAIR WITH RW AND S  3. PT WILL GT TRAIN X 150' WITH RW AND S.  4. PT WILL COMPLETE B LE TE X 20 REPS.                     PLAN:    Patient to be seen   to address the above listed problems via gait training, therapeutic activities, therapeutic exercises  Plan of Care expires: 17  Plan of Care reviewed with: patient    Functional Assessment Tool Used: [place] AM PAC  Score: CK  Functional Limitation: Mobility: Walking and moving around  Mobility: Walking and Moving Around Current Status (): CK  Mobility: Walking and Moving Around Goal Status (): MAILE Quispe, PT  2017

## 2017-09-05 NOTE — DISCHARGE SUMMARY
[facility] Hospital Medicine  Discharge Summary      Patient Name: Josy Posey  MRN: [id number]  Admission Date: 9/3/2017  Hospital Length of Stay: 0 days  Discharge Date and Time:  09/05/2017 5:29 PM  Attending Physician: Avery Hinojosa MD   Discharging Provider: Marika Delarosa NP  Primary Care Provider: TIKA Rosales Jr, MD      HPI:   Ms. Posey is a 89yo female with a PMHx of HTN with orthostatic hypotension, HLD, CAD with remote CABG x 1V (LIMA-LAD), MR with remote mechanical MVR on Coumadin therapy, PAF, CHB s/p PPM, chronic diastolic HFpEF, h/o DVT, hypothyroidism, h/o CVA without residual, Alzheimer's dementia, and h/o vertigo, who presented to the ED with c/o vertigo that started suddenly this afternoon.  Patient reports visual disturbance, stating she could not look at anything without room spinning around her.  Associated nausea, vomiting, and generalized weakness.  Denies any chest pain, palpitations, SOB/HE, cough, orthopnea, PND, edema, diaphoresis, URI symptoms, HA, focal deficits, AMS, or syncope.  Initial work-up in ED with unrevealing EKG, stable VS and labs.  CT head showed no acute process.  TeleStroke called in ED.  Neurologist suspects vertigo, but recommends admission to r/o other etiologies, including TIA/CVA.      * No surgery found *      Indwelling Lines/Drains at time of discharge:   Lines/Drains/Airways          No matching active lines, drains, or airways        Hospital Course:   Pt was admitted with symptoms of dizziness with position changes. The CT of Head was negative for acute process and as of 5/2017 the 2 D ECHO showed preserved EF with no significant valvular abnormalities, orthostatic VS were negative, Carotid US from May 2017 indicated moderate stenosis and PPM found no new significant findings. When walking with PT/OT pt stated she was dizziness with standing. Pt has been seen in the past for vertigo and she was seen by Cardiology the day prior  and chronic dizziness was mentioned. She received scheduled Meclizine. An MRI of brain was not performed as patient had a pacemaker and no focal deficits were noted on examine. Pt did mention an episode of ptosis twice and some blurring of vision therefore, Neurology was consulted. On exam he noted subtle direction changing nystagmus and diplopia was elicited with bilateral upgaze and slight dysmetria on left. It was felt that pt likely had a posterior circulation stroke. Pt was advised to resume her statin which she had stopped some months earlier. Also, Neurology felt that pt's INR should be closer to 3. A consult to Coumadin clinic was placed prior to discharge. She was seen and examined and determined to be stable and safe for discharge.      Consults:   Consults         Status Ordering Provider     Inpatient consult to Telemedicine-Stroke  Once     Provider:  (Not yet assigned)    Completed NERY SANCHEZ     IP consult to dietary  Once     Provider:  (Not yet assigned)    Completed NERY SANCHEZ          Significant Diagnostic Studies:   Imaging Results          X-Ray Chest AP Portable (Final result)  Result time 09/04/17 01:35:19    Final result by Lamonte Benson MD (09/04/17 01:35:19)                 Impression:     No definite acute findings      Electronically signed by: LAMONTE BENSON MD  Date:     09/04/17  Time:    01:35              Narrative:    History: Dizziness, shortness of breath    Normal heart size. Median sternotomy with valvular prosthesis. Dual chamber cardiac leads. No definite active infiltrates.                             CT Head Without Contrast (Final result)  Result time 09/03/17 21:40:49    Final result by Lamonte Benson MD (09/03/17 21:40:49)                 Impression:         No acute findings        All CT scans at this facility use dose modulation, iterative reconstruction, and/or weight based dosing when appropriate to reduce radiation dose to as low as  reasonably achievable.       Electronically signed by: ARASH MURRELL MD  Date:     09/03/17  Time:    21:40              Narrative:    CT HEAD WITHOUT CONTRAST    Date: 09/03/17 21:30:00    Clinical indication:  acute onset dizziness     Technique:  Standard noncontrast CT scan of the brain. Comparison 5/2/17     Findings:  There is generalized atrophy and moderate white matter low density microangiopathy. No evidence of intracranial hemorrhage or acute focal parenchymal abnormality. There is mild right ethmoid sinus mucosal thickening. Otherwise visualized paranasal sinuses and mastoid air cells are clear. The cranium is intact.                              Pending Diagnostic Studies:     None        Final Active Diagnoses:    Diagnosis Date Noted POA    PRINCIPAL PROBLEM:  Vertigo [R42] 05/02/2017 Yes    H/O CVA (cerebrovascular accident) [Z86.73] 05/06/2016 Not Applicable     Chronic    Hypothyroidism [E03.9] 07/02/2015 Yes     Chronic    Right-sided carotid artery disease [I77.9] 07/02/2015 Yes     Chronic    Third degree AV block s/p PPM [I44.2] 07/02/2015 Yes     Chronic    Severe MR with remote mitral valve replacement on chronic anticoagulation [Z95.2] 07/02/2015 Not Applicable     Chronic    Chronic diastolic HFpEF of 60% per echo 5/2017 [I51.9] 07/01/2015 Yes     Chronic    CAD (coronary artery disease) with remote CABG x 1V (LIMA-LAD) [I25.10] 05/06/2015 Yes     Chronic    CKD (chronic kidney disease) stage 3, GFR 30-59 ml/min [N18.3] 09/03/2014 Yes     Chronic    Paroxysmal atrial fibrillation [I48.0] 09/27/2013 Yes     Chronic    Pure hypercholesterolemia [E78.00] 09/16/2013 Yes     Chronic      Problems Resolved During this Admission:    Diagnosis Date Noted Date Resolved POA    Hypertension with orthostatic hypotension [G90.3] 05/03/2017 09/04/2017 Yes     Chronic      * Vertigo    - CT head shows no acute process.  - 2D echo 5/2017 showed LVEF of 60% without significant valvular  abnormalities.  - TeleStroke in ED.  Neurology suspect vertigo.    - Will admit to Observation.  - Start meclizine.  - Orthostatic VS.  - Check electrolytes, TSH, UA.  - Carotid U/S.  - Interrogate PPM to r/o arrhythmia.  - Monitor telemetry.  - If symptoms persist, may consider MRI +/- Neurology consult.            Discharged Condition: stable    Disposition: Home-Health Care [facility]    Follow Up:  Follow-up Information     E Geovany Rosales Jr, MD In 3 days.    Specialties:  Internal Medicine, Pediatrics  Why:  Hosp F/U - Chronic Dizzines  Contact information:  [address]  [place] [id number] [phone]             Pete Martinez MD In 1 week.    Specialty:  Otolaryngology  Why:  Hosp F/U - Evaluation for Vertigo  Contact information:  [address]  [place] [address]  [phone]             COUMADIN, ASSESSMENT.    Specialty:  Lab  Why:  Will need INR level on 9/7/2017               Patient Instructions:     Ambulatory consult to Anticoagulation Monitoring   Referral Priority: Routine Referral Type: Consultation   Referral Reason: Specialty Services Required    Requested Specialty: Cardiology    Number of Visits Requested: 1      Diet general       Medications:  Reconciled Home Medications:   Discharge Medication List as of 9/4/2017  5:53 PM      START taking these medications    Details   ciprofloxacin HCl (CIPRO) 500 MG tablet Take 1 tablet (500 mg total) by mouth 2 (two) times daily., Starting Mon 9/4/2017, Until Thu 9/7/2017, Normal         CONTINUE these medications which have CHANGED    Details   pravastatin (PRAVACHOL) 20 MG tablet Take 1 tablet (20 mg total) by mouth once daily., Starting Mon 9/4/2017, Until Tue 9/4/2018, Normal         CONTINUE these medications which have NOT CHANGED    Details   alendronate (FOSAMAX) 70 MG tablet take 1 tablet by mouth every week, Normal      ascorbic acid (VITAMIN C) 1000 MG tablet Take 1 tablet by mouth once daily once daily., Until Discontinued, Historical  Med      biotin 2,500 mcg Cap Take by mouth., Until Discontinued, Historical Med      clotrimazole-betamethasone 1-0.05% (LOTRISONE) cream Apply topically 2 (two) times daily., Starting 4/27/2017, Until Discontinued, Normal      econazole nitrate 1 % cream Starting 7/15/2016, Until Discontinued, Historical Med      escitalopram oxalate (LEXAPRO) 10 MG tablet take 1 tablet by mouth once daily, Normal      estradiol (ESTRACE) 0.01 % (0.1 mg/gram) vaginal cream Place 1 g vaginally 3 (three) times a week. Place small amount on the outside of the urethra nightly for 3 weeks , then twice weekly, Starting Wed 7/15/2015, Until Fri 9/1/2017, Normal      fluticasone (FLONASE) 50 mcg/actuation nasal spray instill 2 sprays into each nostril once daily, Normal      folic acid (FOLVITE) 1 MG tablet Take 1 tablet by mouth once daily once daily., Until Discontinued, Historical Med      iron-vitamin C 100-250 mg, ICAR-C, (ICAR-C) 100-250 mg Tab Take 1 tablet by mouth once daily., Starting 9/11/2014, Until Discontinued, Normal      levothyroxine (SYNTHROID) 50 MCG tablet take 1 tablet by mouth once daily, Normal      meclizine (ANTIVERT) 25 mg tablet Take 1 tablet (25 mg total) by mouth 3 (three) times daily as needed for Dizziness., Starting 5/4/2017, Until Discontinued, Normal      midodrine (PROAMATINE) 5 MG Tab Take 1 tablet (5 mg total) by mouth 2 (two) times daily with meals., Starting 5/19/2017, Until Sat 5/19/18, Normal      omega-3 fatty acids-vitamin E (FISH OIL) 1,000 mg Cap Take by mouth once daily., Until Discontinued, Historical Med      propafenone (RHTHYMOL) 150 MG Tab take 1/2 tablet by mouth three times a day, Normal      spironolactone (ALDACTONE) 25 MG tablet take 1 tablet by mouth once daily, Normal      tobramycin-dexamethasone 0.3-0.05 % DrpS Place 1 drop into both eyes 4 (four) times daily as needed., Starting Fri 5/26/2017, Normal      tramadol (ULTRAM) 50 mg tablet take 1 tablet by mouth every 8 hours if  needed, Print      triamcinolone acetonide 0.1% (KENALOG) 0.1 % cream apply THINLY to affected area ON LEGS, ARMS, AND TRUCK twice a day if needed for eczema, Historical Med      vitamin B12-folic acid 0.5-1 mg Tab Take 1 tablet by mouth once daily once daily., Until Discontinued, Historical Med      warfarin (COUMADIN) 1 MG tablet Take 1/2 tablet Monday through Saturday and NONE on Sunday as directed by the coumadin clinic., Normal           Time spent on the discharge of patient: > 30 minutes    HOS POC IP DISCHARGE SUMMARY    Marika Delarosa NP  Department of Hospital Medicine  [facility]

## 2017-09-05 NOTE — HOSPITAL COURSE
Pt was admitted with symptoms of dizziness with position changes. The CT of Head was negative for acute process and as of 5/2017 the 2 D ECHO showed preserved EF with no significant valvular abnormalities, orthostatic VS were negative, Carotid US from May 2017 indicated moderate stenosis and PPM found no new significant findings. When walking with PT/OT pt stated she was dizziness with standing. Pt has been seen in the past for vertigo and she was seen by Cardiology the day prior and chronic dizziness was mentioned. She received scheduled Meclizine. An MRI of brain was not performed as patient had a pacemaker and no focal deficits were noted on examine. Pt did mention an episode of ptosis twice and some blurring of vision therefore, Neurology was consulted. On exam he noted subtle direction changing nystagmus and diplopia was elicited with bilateral upgaze and slight dysmetria on left. It was felt that pt likely had a posterior circulation stroke. Pt was advised to resume her statin which she had stopped some months earlier. Also, Neurology felt that pt's INR should be closer to 3. A consult to Coumadin clinic was placed prior to discharge. She was seen and examined and determined to be stable and safe for discharge.

## 2017-09-05 NOTE — PLAN OF CARE
09/05/2017 12:05 PM CM telephoned and spoke with Abrahan  Willie, patient's daughter regarding preference for home health.  The daughter states they don't have a preference and 1st available will suffice.

## 2017-09-05 NOTE — PLAN OF CARE
[facility] arranged with [facility]

## 2017-09-06 ENCOUNTER — ANTI-COAG VISIT (OUTPATIENT)
Dept: CARDIOLOGY | Facility: CLINIC | Age: 82
End: 2017-09-06
Payer: MEDICARE

## 2017-09-06 ENCOUNTER — TELEPHONE (OUTPATIENT)
Dept: INTERNAL MEDICINE | Facility: CLINIC | Age: 82
End: 2017-09-06

## 2017-09-06 DIAGNOSIS — Z79.01 LONG TERM (CURRENT) USE OF ANTICOAGULANTS: Primary | ICD-10-CM

## 2017-09-06 LAB
BACTERIA UR CULT: NORMAL
INR PPP: 2 (ref 2.5–3.5)

## 2017-09-06 PROCEDURE — 99211 OFF/OP EST MAY X REQ PHY/QHP: CPT | Mod: 25,S$GLB,,

## 2017-09-06 PROCEDURE — 85610 PROTHROMBIN TIME: CPT | Mod: QW,S$GLB,,

## 2017-09-06 NOTE — TELEPHONE ENCOUNTER
Returned call to [facility] regarding HH orders for pt. GLADYS Ferris, advised that Dr. Rosales has agreed to follow pt w/HH orders. She verbalized understanding.//rlt

## 2017-09-06 NOTE — TELEPHONE ENCOUNTER
----- Message from FAM Rosales Jr., MD sent at 9/6/2017  6:38 AM CDT -----  [facility]  ----- Message -----  From: Todd Rivers LPN  Sent: 9/5/2017   5:32 PM  To: FAM Rosales Jr., MD        ----- Message -----  From: Nancie Hernandez  Sent: 9/5/2017   1:33 PM  To: Connie Armenta Jr Staff    Ms Sawyer states she received home health orders from the hospital and wants to know if Dr Rosales will be following her care and sign the home health orders, please call Ms Sawyer back at [phone]. Thank you

## 2017-09-06 NOTE — PROGRESS NOTES
INR is sub-therapeutic. Recent TIA at INR of 1.9, INR range increased to 2.5-3.5 by neurology upon discharge. Currently completing ciprofloxacin with 1 day of therapy left to complete. Will  increase total weekly dose until follow-up.

## 2017-09-07 ENCOUNTER — OFFICE VISIT (OUTPATIENT)
Dept: INTERNAL MEDICINE | Facility: CLINIC | Age: 82
End: 2017-09-07
Payer: MEDICARE

## 2017-09-07 ENCOUNTER — LAB VISIT (OUTPATIENT)
Dept: LAB | Facility: HOSPITAL | Age: 82
End: 2017-09-07
Attending: PEDIATRICS
Payer: MEDICARE

## 2017-09-07 VITALS
HEIGHT: 64 IN | OXYGEN SATURATION: 96 % | WEIGHT: 146.38 LBS | DIASTOLIC BLOOD PRESSURE: 55 MMHG | HEART RATE: 75 BPM | BODY MASS INDEX: 24.99 KG/M2 | TEMPERATURE: 96 F | SYSTOLIC BLOOD PRESSURE: 82 MMHG

## 2017-09-07 DIAGNOSIS — N39.0 URINARY TRACT INFECTION WITHOUT HEMATURIA, SITE UNSPECIFIED: ICD-10-CM

## 2017-09-07 DIAGNOSIS — Z86.73 H/O: CVA (CEREBROVASCULAR ACCIDENT): Chronic | ICD-10-CM

## 2017-09-07 DIAGNOSIS — Z79.01 CHRONIC ANTICOAGULATION: ICD-10-CM

## 2017-09-07 DIAGNOSIS — E78.00 PURE HYPERCHOLESTEROLEMIA: Chronic | ICD-10-CM

## 2017-09-07 DIAGNOSIS — R42 DIZZINESS: Primary | ICD-10-CM

## 2017-09-07 LAB
ALT SERPL W/O P-5'-P-CCNC: 14 U/L
AST SERPL-CCNC: 23 U/L

## 2017-09-07 PROCEDURE — 99499 UNLISTED E&M SERVICE: CPT | Mod: S$GLB,,, | Performed by: PEDIATRICS

## 2017-09-07 PROCEDURE — 99213 OFFICE O/P EST LOW 20 MIN: CPT | Mod: S$GLB,,, | Performed by: PEDIATRICS

## 2017-09-07 PROCEDURE — 99999 PR PBB SHADOW E&M-EST. PATIENT-LVL III: CPT | Mod: PBBFAC,,, | Performed by: PEDIATRICS

## 2017-09-07 PROCEDURE — 36415 COLL VENOUS BLD VENIPUNCTURE: CPT | Mod: PO

## 2017-09-07 PROCEDURE — 84450 TRANSFERASE (AST) (SGOT): CPT

## 2017-09-07 PROCEDURE — 84460 ALANINE AMINO (ALT) (SGPT): CPT

## 2017-09-07 RX ORDER — SPIRONOLACTONE 25 MG/1
12.5 TABLET ORAL DAILY
Qty: 30 TABLET | Refills: 11 | Status: SHIPPED | OUTPATIENT
Start: 2017-09-07 | End: 2018-07-23 | Stop reason: SDUPTHER

## 2017-09-07 NOTE — PROGRESS NOTES
Subjective:       Patient ID: Josy Posey is a 89 y.o. female.    Chief Complaint: Follow-up    F/U hospitalization at [facility]. Records reviewed, [place] to have posterior circulation CVA, orthostatic hypotension, and E coli uti- treated with cipro(3 days outpatient course). Pravastatin restarted despite hx of elevated LFTs, INR at 3 range now. She tilts is office and is on midodrine.      Review of Systems   Constitutional: Negative for fever and unexpected weight change.   HENT: Negative for congestion and rhinorrhea.    Eyes: Negative for discharge and redness.   Respiratory: Negative for cough and wheezing.    Cardiovascular: Negative for chest pain, palpitations and leg swelling.   Gastrointestinal: Negative for constipation, diarrhea and vomiting.   Endocrine: Negative for cold intolerance, heat intolerance, polydipsia, polyphagia and polyuria.   Genitourinary: Negative for decreased urine volume and difficulty urinating.   Musculoskeletal: Negative for arthralgias and joint swelling.   Skin: Negative for rash and wound.   Neurological: Negative for syncope and headaches.   Psychiatric/Behavioral: Negative for behavioral problems and sleep disturbance.       Objective:      Physical Exam   Constitutional: She is oriented to person, place, and time. She appears well-developed and well-nourished. No distress.   Neck: No JVD present.   Cardiovascular: Normal rate and regular rhythm.    Murmur (1/6 systolic at LSB.) heard.  Pulmonary/Chest: Effort normal and breath sounds normal. No respiratory distress. She has no wheezes.   Abdominal: Soft. She exhibits no distension and no mass. There is no tenderness. There is no guarding.   Musculoskeletal: She exhibits no edema.   Lymphadenopathy:     She has no cervical adenopathy.   Neurological: She is alert and oriented to person, place, and time. No cranial nerve deficit. Coordination normal.   Psychiatric: She has a normal mood and affect. Her behavior is normal.  Judgment and thought content normal.       Assessment:       1. Dizziness    2. Pure hypercholesterolemia    3. H/O CVA (cerebrovascular accident)    4. Chronic anticoagulation    5. Urinary tract infection without hematuria, site unspecified        Plan:       Dizziness    Pure hypercholesterolemia  -     ALT (SGPT); Future; Expected date: 09/07/2017  -     AST (SGOT); Future; Expected date: 09/07/2017    H/O CVA (cerebrovascular accident)    Chronic anticoagulation    Urinary tract infection without hematuria, site unspecified  -     Urinalysis; Future; Expected date: 09/07/2017  -     Urine culture; Future; Expected date: 09/07/2017    Other orders  -     spironolactone (ALDACTONE) 25 MG tablet; Take 0.5 tablets (12.5 mg total) by mouth once daily.  Dispense: 30 tablet; Refill: 11    Drop aldactone to 1/2 tab a day and watch weight(desired range 145-150#) and edema. Recheck urine. F/U as arranged.    Transitional Care Note    Family and/or Caretaker present at visit?  Yes.  Diagnostic tests reviewed/disposition: I have reviewed all completed as well as pending diagnostic tests at the time of discharge.  Disease/illness education: discussed  Home health/community services discussion/referrals: Patient has home health established at home to be started.   Establishment or re-establishment of referral orders for community resources: No other necessary community resources.   Discussion with other health care providers: No discussion with other health care providers necessary.

## 2017-09-08 ENCOUNTER — TELEPHONE (OUTPATIENT)
Dept: CARDIOLOGY | Facility: CLINIC | Age: 82
End: 2017-09-08

## 2017-09-08 NOTE — TELEPHONE ENCOUNTER
Received phone call from patient wanting to notify of medication changes from Dr. Rosales and requesting refill on prescription for dizziness medication (Meclizine)  Dr. Rosales decreased Midodrine to bid versus tid and Spironolactone to 12.5 mg daily versus 25 mg     Dr. Alvarado- please advise if okay to accept changes and refill Meclizine

## 2017-09-08 NOTE — TELEPHONE ENCOUNTER
Returned phone call to patient and daughter with instructions.  Prescription for Meclizine 25 mg 30 tablets with no refill sent to Pharmacy  Advised monitor blood pressure and if below 100/60 take her third dose of Midodrine as before.

## 2017-09-18 ENCOUNTER — ANTI-COAG VISIT (OUTPATIENT)
Dept: CARDIOLOGY | Facility: CLINIC | Age: 82
End: 2017-09-18
Payer: MEDICARE

## 2017-09-18 ENCOUNTER — OFFICE VISIT (OUTPATIENT)
Dept: OTOLARYNGOLOGY | Facility: CLINIC | Age: 82
End: 2017-09-18
Payer: MEDICARE

## 2017-09-18 VITALS
DIASTOLIC BLOOD PRESSURE: 71 MMHG | BODY MASS INDEX: 24.77 KG/M2 | HEIGHT: 64 IN | TEMPERATURE: 98 F | WEIGHT: 145.06 LBS | RESPIRATION RATE: 18 BRPM | SYSTOLIC BLOOD PRESSURE: 129 MMHG | HEART RATE: 74 BPM

## 2017-09-18 DIAGNOSIS — R42 VERTIGO: Primary | ICD-10-CM

## 2017-09-18 DIAGNOSIS — H81.11 BPPV (BENIGN PAROXYSMAL POSITIONAL VERTIGO), RIGHT: ICD-10-CM

## 2017-09-18 DIAGNOSIS — Z79.01 LONG TERM (CURRENT) USE OF ANTICOAGULANTS: Primary | ICD-10-CM

## 2017-09-18 LAB — INR PPP: 2.5 (ref 2.5–3.5)

## 2017-09-18 PROCEDURE — 99999 PR PBB SHADOW E&M-EST. PATIENT-LVL III: CPT | Mod: PBBFAC,,, | Performed by: OTOLARYNGOLOGY

## 2017-09-18 PROCEDURE — 1159F MED LIST DOCD IN RCRD: CPT | Mod: S$GLB,,, | Performed by: OTOLARYNGOLOGY

## 2017-09-18 PROCEDURE — 95992 CANALITH REPOSITIONING PROC: CPT | Mod: S$GLB,,, | Performed by: OTOLARYNGOLOGY

## 2017-09-18 PROCEDURE — 3008F BODY MASS INDEX DOCD: CPT | Mod: S$GLB,,, | Performed by: OTOLARYNGOLOGY

## 2017-09-18 PROCEDURE — 85610 PROTHROMBIN TIME: CPT | Mod: QW,S$GLB,,

## 2017-09-18 PROCEDURE — 99204 OFFICE O/P NEW MOD 45 MIN: CPT | Mod: 25,S$GLB,, | Performed by: OTOLARYNGOLOGY

## 2017-09-18 NOTE — PROGRESS NOTES
INR is therapeutic. Continue dose and diet until follow-up. Patient reports no bleeding or bruising, no new medications and no diet changes.  I reminded the patient to call with any problems, changes or questions before the next visit.

## 2017-09-18 NOTE — PROGRESS NOTES
Referring Provider:    No referring provider defined for this encounter.  Subjective:   Patient: Josy Posey [id number], :1924   Visit date:2017 1:02 PM    Chief Complaint:  Dizziness    HPI:  Josy is a 89 y.o. female who I was asked to see in consultation for evaluation of the following issue(s):    Dizziness/Vertigo:  Onset:  2 month(s)  Total number of episodes:  tntc  Duration of individual episodes: seconds  Severity: severe  Exacerbating factors: rolling in bed to the right and standing up  Prior Medications: nothing  Relieving factors:  remaining motionless  Associated signs and symptoms:  none  Quality:   Spinning- Yes   Light headedness- Yes   Sensation that room is moving but patient is motionless- No  Prior history of similar events: No      Review of Systems:  Negative unless checked off.  Gen:  []fever   []fatigue  HENT:  []nosebleeds  []dental problem   Eyes:  []photophobia  []visual disturbance  Resp:  []chest tightness []wheezing  Card:  []chest pain  []leg swelling  GI:  []abdominal pain []blood in stool  :  []dysuria  []hematuria  Musc:  []joint swelling  []gait problem  Skin:  []color change  []pallor  Neuro:  []seizures  []numbness  Hem:  []bruise/bleed easily  Psych:  []hallucinations  []behavioral problems  Allergy/Imm: is allergic to sulfa (sulfonamide antibiotics); morphine; statins-hmg-coa reductase inhibitors; and dronedarone.    Her meds, allergies, medical, surgical, social & family histories were reviewed & updated:  -     She has a current medication list which includes the following prescription(s): alendronate, ascorbic acid (vitamin c), econazole nitrate, escitalopram oxalate, fluticasone, folic acid, iron-vitamin c 100-250 mg (icar-c), levothyroxine, meclizine, midodrine, omega-3 fatty acids-vitamin e, pravastatin, propafenone, spironolactone, triamcinolone acetonide 0.1%, vitamin b12-folic acid, warfarin, biotin, clotrimazole-betamethasone 1-0.05%, estradiol,  tobramycin-dexamethasone, and tramadol.  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; GIB (gastrointestinal bleeding) (9/5/2014); Hyperlipidemia; Hypertension; Hyperthyroidism (7/1/2015); Hypothyroidism; Intracranial hemorrhage (1/2/11); Lens replaced by other means (6/25/2014); Melena; Memory loss; Mitral regurgitation (9/27/2013); Myocardial infarction; Ocular migraine (6/25/2014); Old myocardial infarct (7/1/2015); Orthostatic hypotension (9/27/2013); Osteoporosis (7/1/2015); Polyneuropathy; S/P CABG (coronary artery bypass graft) (9/27/2013); S/P MVR (mitral valve replacement) (9/27/2013); Skin ulcer; Squamous cell carcinoma; Stroke; Syncope and collapse; Unspecified essential hypertension (9/16/2013); Unspecified transient cerebral ischemia; and Urinary incontinence.   -     She  does not have any pertinent problems on file.   -     She  has a past surgical history that includes Appendectomy; Cholecystectomy; Cardiac valuve replacement (10/04/2007); Insert / replace / remove pacemaker (09/11/2001); Tonsillectomy; Eye surgery; Coronary artery bypass graft; Breast surgery (1950); Hysterectomy; Adenoidectomy; and Cataract extraction.  -     She  reports that she has never smoked. She has never used smokeless tobacco. She reports that she does not drink alcohol or use drugs.  -     Her family history includes COPD in her brother; Hypertension in her daughter and sister; Stroke in her brother and sister; Tuberculosis in her father and mother.  -     She is allergic to sulfa (sulfonamide antibiotics); morphine; statins-hmg-coa reductase inhibitors;  "and dronedarone.    Objective:     Physical Exam: (abnormal findings indicated in BOLD)    Physical Exam:  Vitals:  /71   Pulse 74   Temp 98.3 °F (36.8 °C) (Tympanic)   Resp 18   Ht 5' 4" (1.626 m)   Wt 65.8 kg (145 lb 1 oz)   BMI 24.90 kg/m²   General appearance:  Well developed, well nourished    Eyes:  Extraocular motions intact, PERRL    Communication:  no hoarseness, no dysphonia    Ears:  Otoscopy of external auditory canals and tympanic membranes was normal, clinical speech reception thresholds grossly intact, no mass/lesion of auricle.  Nose:  No masses/lesions of external nose, nasal mucosa, septum, and turbinates were within normal limits.  Mouth:  No mass/lesion of lips, teeth, gums, hard/soft palate, tongue, tonsils, or oropharynx.    Cardiovascular:  No pedal edema; Radial Pulses +2     Neck & Lymphatics:  No cervical lymphadenopathy, no neck mass/crepitus/ asymmetry, trachea is midline, no thyroid enlargement/tenderness/mass.    Psych: Oriented x3,  Alert with normal mood and affect.     Respiration/Chest:  Symmetric expansion during respiration, normal respiratory effort.    Skin:  Warm and intact. No ulcerations of face, scalp, neck.    CRANIAL NERVES:  III, IV, VI:  Extraocular muscles intact.  Pupils equally reactive to light and accommodation.  V: Facial sensory function to light touch intact and symmetrical.  No evidence of atrophy of the masseter and temporal muscles.  VII:  Facial strength intact and symmetrical.  IX:  Soft palate elevates in the midline.  XI:  Shoulders motility and strength intact and symmetrical. No atrophy of sternocleidomastoid and trapezius muscles.  XII:  Tongue motility and strength intact.  No spontaneous or gaze nystagmus.    [place]-Hallpike test:  Vertigo with head right, nystagmus  Head-shaking test:  No nystagmus.  Vestibulo-ocular reflex:  No evidence of catch-up saccades to bilateral head turns.  Oculo-counter torsion:  Intact to bilateral head " tilts.  Romberg test: bilateral sway   Fukuda stepping test:  Bilateral sway.    Gait: Moderate ataxia, cannot tandem   Finger-to-nose testing intact without dysmetria.  Deviation of index.  Eyes closed:  No deviation.  MOTOR STRENGTH:  Strength 5/5 throughout.  SENSORY:  Sensory function to light touch and proprioception intact throughout.      Assessment & Plan:   There are no diagnoses linked to this encounter.      DIZZINESS-   Dizziness is an extremely complex problem that may arise from many sources.  I requires the coordination between the visual system, the vestibular system as well as the proprioreceptive system.  Additionally, balance is compromised in the setting of musculoskeletal, cerebral, cardiac, and numerous physiologic disorders.  The complex interplay between these systems may also lead to dizziness if there is dysynchrony between the bilateral vestibular symptoms.    Central vestibular symptoms can generally be distinguished from peripheral vestibulopathies by the presence of other non vestibular neurologic symptoms (focal weakness, headache), light headedness (rather than true vertigo), near syncope, weak limbs, panic, fuzziness/cloudiness in mentation, and clumsiness.  Peripheral vestibulopathies are generally, at some point during their course, characterized by a true vertiginous sensation of movement, difficulty with sudden head movements, nausea, difficulty with sudden head movement, and possibly oscicllopsia.    BPPV is the most common cause of episodic vertigo.  Symptoms generally last for seconds then resolve when motionless, otitic symptoms are absent and may be provoked with sudden head motion.  This may occur spontaneously, but frequently follow head trauma or recent vestibular neuronitis.  Nystagmus is typically geotropic and directed toward the affected ear (hyperactive input to the inferior vestibular nerve via the Singular nerve). Canalith repositioning maneuvers are the method of  choice for treating this condition.  Mild imbalance following is common and may last 1-2 weeks.    Vestibular neuronitis and labyrinthitis can be distinguished by the presence of sensorineural hearing loss in labyrinthitis, but during their active phase, vertigo is constant.   MRI may be necessary during this phase to exclude CNS pathology.  Frequently this is preceded by a viral illness. Both result in permanent partial end organ weakness of the affected vestibular nerve (usually superior).  Otherwise, they are essentially the same.  The early (vertiginous, 1-3 days) phase is characterized by acute onset of vertigo that is essentially constant and present even in the absence of motion.  Nystagmus is directed away from the affected ear (hypoactive).  In the acute phase, steroids, limited use of vestibular suppressants and hydration are the most effective treatment. Patients then enter the second phase of uncompensated vestibulopathy where they have a general sense of imbalance.  The duration of this phase depends on several factors, but is generally delayed in the presence of vestibular suppressants, advanced age, central/systemic balance issues and sessile behavior.   Phase 3 is compensated vestibulopathy where symptoms generally only occur with sudden head movements and can be seen with catch up saccades on head thrust.   However, in the presence of bilateral VN, oscillopsia is the hallmark of the disease and compensation is frequently very delayed and poor.    Other causes of vertigo that are typically constant are vestibulotoxic medications and autoimmune inner ear disease and these are generally bilateral in nature.    Meniere's disease is typically (85%) unilateral and defined by 2 spontaneous vertigo attacks lasting 20 min or more, documented hearing loss (typically low tone, frequently fluctuating) and otic fullness or tinnitus in the affected ear. However, the presence of endolymphatic hydrops may result in  similar symptoms, not meeting these strict criteria.  This disease can be difficult to distinguish from vestibular migraines, which are not always associated with headaches.    Superior canal dehiscence presents with episodic vertigo lasting seconds to minutes, aural fullness, autophony, hyperacusis and tinnitus (kathleen pulsatile).  Aural pressure is the most common presenting symptom.  Symptoms can be provoked with Valsalva.  Bone conduction thresholds are supranormal.    Perilymph fistula presents with fluctuating hearing loss, episodic vertigo lasting seconds to minutes and frequently is associated with prior barotrauma or otologic surgery.  Conservative measures such as stool softeners and bedrest frequently lead to resolution.  In cases of persistent symptoms, unfortunately there is no noninvasive diagnostic test with high specificity, and surgical exploration is sometimes necessary when this is expected.    Vestibular schwannomas may have constant or episodic vertigo, frequently SNHL and sometimes may be accompanied by headache or facial numbness.    The diagnosis and options of management were discussed at length with the patient, including hearing, balance function and the risks associated with my recommendations. We spent a considerable amount of time discussing strategies to cope with dizziness. I emphasized the great importance of fall avoidance and activities that may be dangerous if Josy has an episode of dizziness.  I answered all questions in layman's terms. Based on the history, physical exam and imaging studies there is significant evidence of a vestibular dysfunction.  I recommend Epley today.  Stop meclizine.  Recheck in 7-10 days.        Thank you for allowing me to participate in the care of Josy.    Pete Martinez MD      Patient: Josy Posey [id number], :1924  Procedure date:2017  Patient's medications, allergies, past medical, surgical, social and family histories were reviewed and  updated as appropriate.  Chief Complaint:  Dizziness    HPI:  Josy is a 89 y.o. female with benign paroxysmal positional vertigo (BPPV) refractory to medical therapy. Hallpike-Ashland maneuver demonstrates nystagmus of delayed onset that fatigues, rotary, clockwise, associated with vertigo.    Procedure: Risks, benefits, and alternatives of the procedure were discussed with the patient, and the patient consented to the Epley maneuver or canalith repositioning procedure (CRP).      With the patient sitting upright on the examination table, the head was lowered to the supine position and the neck rotated 45 degrees to the right side; once nystagmus fatigued, the body and head were slowly rotated to the opposite side 90 degrees, and then after 30-60 seconds the rotation continued another 90 degrees (they rolled onto their shoulder and were looking downwards at a 45 degree angle). After the nystagmus resolved, the patient was gently allowed to sit up with neck flexion.    There were no complications and the patient tolerated it well.  Post-procedure instructions were given.    Pete Martinez performed the entire procedure.

## 2017-09-22 ENCOUNTER — TELEPHONE (OUTPATIENT)
Dept: CARDIOLOGY | Facility: CLINIC | Age: 82
End: 2017-09-22

## 2017-09-22 NOTE — TELEPHONE ENCOUNTER
Per Jennyfer  nurse, patient's BP today at noon was 80/52.  Patient is taking spironolactone 25 mg, and is experiencing dizziness and vertigo.  She has crystals in ear per ENT and is being refererred to audiologist.  Do you wish to continue patient on spironolactone?  She is also taking midodrine and mecliizine.  Please advise.

## 2017-09-22 NOTE — TELEPHONE ENCOUNTER
Notified Jennyfer with HH of Dr. Alvarado recommendations to keep patient on same regimen and to make sure she is drinking plenty of water and wearing stockings.  She verbalized understanding.

## 2017-09-22 NOTE — TELEPHONE ENCOUNTER
----- Message from Martha Conway sent at 9/22/2017 12:55 PM CDT -----  Contact: Jennyfer/ [facility]  Patient is having low blood pressure, Please call her at [phone].    Thanks  td

## 2017-09-25 ENCOUNTER — CLINICAL SUPPORT (OUTPATIENT)
Dept: AUDIOLOGY | Facility: CLINIC | Age: 82
End: 2017-09-25
Payer: MEDICARE

## 2017-09-25 DIAGNOSIS — H83.2X9 OTHER FORMS AND COMBINATIONS OF LABYRINTHINE DYSFUNCTION: Primary | ICD-10-CM

## 2017-09-25 PROCEDURE — 92541 SPONTANEOUS NYSTAGMUS TEST: CPT | Mod: S$GLB,ICN,, | Performed by: AUDIOLOGIST-HEARING AID FITTER

## 2017-09-25 PROCEDURE — 92542 POSITIONAL NYSTAGMUS TEST: CPT | Mod: 59,S$GLB,ICN, | Performed by: AUDIOLOGIST-HEARING AID FITTER

## 2017-09-25 NOTE — PROGRESS NOTES
Referring provider: Dr. Martinez    Josy Posey was seen 09/25/2017 for a vestibular screen.  Patient had Epley maneuver on 09/18/17 by Dr. Martinez for right BPPV.  Patient complains of dizziness described as world spinning when lying in bed, turning onto her right side or while walking.  It lasts for a few minutes in bed and longer when walking.  She is unable to ambulate independently.  She has physical therapy at home and notes the therapist has been doing position and head exercises with her.  According to patient, her therapist noted nystagmus to head right.  She has history of neuropathy and pain in her lefts.  She thinks the dizziness is somewhat improved since her last ENT visit.     VNG Screen:  Spontaneous test: 3 d/s RB nystagmus; suppressed with fixation  Gaze test: Absent for nystagmus  Static Positional test: Right beating nystagmus that suppressed with fixation   Head Center: 2 d/s RB   Head Right: Absent   Head Left: 3 d/s RB  [place]-Hallpike Right: Negative for BPPV  [place]-Hallpike Left: Negative for BPPV - 2 d/s RB positional nystagmus recorded    Summary: Non-localizing VNG screen: right-beating spontaneous and positional nsytagmus that is not consistent with BPPV.   A diagnostic VNG is recommended to evaluate for peripheral vestibular dysfunction.     Recommendations:  1. Audio/VNG - instructions provided and reviewed with patient and her daughter

## 2017-10-09 ENCOUNTER — ANTI-COAG VISIT (OUTPATIENT)
Dept: CARDIOLOGY | Facility: CLINIC | Age: 82
End: 2017-10-09
Payer: MEDICARE

## 2017-10-09 DIAGNOSIS — Z79.01 LONG-TERM (CURRENT) USE OF ANTICOAGULANTS: Primary | ICD-10-CM

## 2017-10-09 LAB — INR PPP: 2.8 (ref 2.5–3.5)

## 2017-10-09 PROCEDURE — 85610 PROTHROMBIN TIME: CPT | Mod: QW,S$GLB,,

## 2017-10-09 RX ORDER — WARFARIN 1 MG/1
TABLET ORAL
Qty: 15 TABLET | Refills: 3 | Status: SHIPPED | OUTPATIENT
Start: 2017-10-09 | End: 2017-11-20 | Stop reason: SDUPTHER

## 2017-10-09 NOTE — PROGRESS NOTES
INR remains therapeutic. Continue dose and diet until follow-up. Patient reports no bleeding or bruising, no new medications and no diet changes.  I reminded the patient to call with any problems, changes or questions before the next visit.

## 2017-10-11 ENCOUNTER — CLINICAL SUPPORT (OUTPATIENT)
Dept: AUDIOLOGY | Facility: CLINIC | Age: 82
End: 2017-10-11
Payer: MEDICARE

## 2017-10-11 DIAGNOSIS — H90.3 SENSORINEURAL HEARING LOSS, BILATERAL: Primary | ICD-10-CM

## 2017-10-11 DIAGNOSIS — H81.8X9 OTHER DISORDERS OF VESTIBULAR FUNCTION, UNSPECIFIED EAR: ICD-10-CM

## 2017-10-11 PROCEDURE — 92567 TYMPANOMETRY: CPT | Mod: S$GLB,,, | Performed by: AUDIOLOGIST-HEARING AID FITTER

## 2017-10-11 PROCEDURE — 92557 COMPREHENSIVE HEARING TEST: CPT | Mod: S$GLB,,, | Performed by: AUDIOLOGIST-HEARING AID FITTER

## 2017-10-11 PROCEDURE — 92537 CALORIC VSTBLR TEST W/REC: CPT | Mod: S$GLB,,, | Performed by: AUDIOLOGIST-HEARING AID FITTER

## 2017-10-11 PROCEDURE — 92540 BASIC VESTIBULAR EVALUATION: CPT | Mod: S$GLB,,, | Performed by: AUDIOLOGIST-HEARING AID FITTER

## 2017-10-11 NOTE — PROGRESS NOTES
Referring provider: Dr. Martinez    Josy Posey was seen 10/11/2017 for an audiological and vestibular evaluation.  Patient complains of general dizziness and sometimes spinning upon standing, upon arising from seated or supine or when walking.  The dizziness is worse when turning her head to the right.  No dizziness when laying or turning in bed.  It recurs throughout the day and began about one year ago.  She has history of fall.  She must ambulate with a walker or uses wheelchair.  She has home vestibular therapy services and notes an improvement in dizziness.  She had Epley maneuver by Dr. Martinez for right BPPV on 09/18/17.  Recheck vestibular screen was negative for BPPV but revealed positional nystagmus.  She reports bilateral hearing loss.     Audiology Report:  Results reveal bilateral symmetric moderate-to-severe sensorineural hearing loss 250-8000 Hz.  Speech Reception Thresholds were  45 dBHL for the right ear and 45 dBHL for the left ear.   Word recognition scores were good for the right ear and good for the left ear.   Tympanograms were Type A for the right ear and Type A for the left ear.      Videonystagmography Report (VNG):  Oculomotor function tests (sinusoidal tracking, saccade, optokinetic) were normal and symmetric.  Spontaneous test was absent for nystagmus.  Gaze test was absent for nystagmus.  Head-shake test revealed clinically insignificant right-beating after head-shake nystagmus.  Static Positional test was absent for nystagmus.  [place]-Hallpike Right was questionably positive for BPPV - slight up-beating nystagmus for less than 5 seconds, no dizziness to head-hanging, dizziness only upon sitting.  [place]-Hallpike Left was negative for BPPV, no dizziness to head-hanging, slight dizziness upon sitting.  Bi-thermal caloric test was Normal.  Fixation suppression following caloric irrigations was normal.  The following values were obtained:  Unilateral weakness (UW): 11% right ear  Directional  preponderance (DP): 5% right beating  RC: 10 d/s  LC: 15 d/s  RW: 17 d/s  LW: 19 d/s    Summary: Normal VNG.     Hallpike Right test results today are not quite consistent with BPPV; however, a right Epley maneuver was performed since patient has a history of Right BPPV and slight up-beating positional nystagmus was recorded.  No nystagmus or dizziness was elicited during any position of the maneuver.      Recommendations:  1. Head restrictions and sleep semi-recumbent for one night following Epley - patient has neck problems and has difficulty sleeping with head elevated.  2. Continue vestibular rehabilitation with her home PT.  VNG results were provided to patient to share with her therapist.   3. Hearing aids, binaural, when ready.    Patient was counseled on the above findings.  Tracings are to be scanned.

## 2017-11-06 ENCOUNTER — IMMUNIZATION (OUTPATIENT)
Dept: INTERNAL MEDICINE | Facility: CLINIC | Age: 82
End: 2017-11-06
Payer: MEDICARE

## 2017-11-06 ENCOUNTER — ANTI-COAG VISIT (OUTPATIENT)
Dept: CARDIOLOGY | Facility: CLINIC | Age: 82
End: 2017-11-06
Payer: MEDICARE

## 2017-11-06 DIAGNOSIS — Z79.01 LONG-TERM (CURRENT) USE OF ANTICOAGULANTS: Primary | ICD-10-CM

## 2017-11-06 LAB — INR PPP: 2.6 (ref 2.5–3.5)

## 2017-11-06 PROCEDURE — 90662 IIV NO PRSV INCREASED AG IM: CPT | Mod: S$GLB,,, | Performed by: FAMILY MEDICINE

## 2017-11-06 PROCEDURE — 85610 PROTHROMBIN TIME: CPT | Mod: QW,S$GLB,,

## 2017-11-06 PROCEDURE — G0008 ADMIN INFLUENZA VIRUS VAC: HCPCS | Mod: S$GLB,,, | Performed by: FAMILY MEDICINE

## 2017-11-20 DIAGNOSIS — Z79.01 LONG-TERM (CURRENT) USE OF ANTICOAGULANTS: ICD-10-CM

## 2017-11-20 RX ORDER — LEVOTHYROXINE SODIUM 50 UG/1
50 TABLET ORAL DAILY
Qty: 90 TABLET | Refills: 3 | Status: SHIPPED | OUTPATIENT
Start: 2017-11-20 | End: 2018-07-23 | Stop reason: SDUPTHER

## 2017-11-20 RX ORDER — WARFARIN 1 MG/1
TABLET ORAL
Qty: 45 TABLET | Refills: 3 | Status: SHIPPED | OUTPATIENT
Start: 2017-11-20 | End: 2018-01-31 | Stop reason: SDUPTHER

## 2017-11-20 RX ORDER — ESCITALOPRAM OXALATE 10 MG/1
10 TABLET ORAL DAILY
Qty: 90 TABLET | Refills: 3 | Status: SHIPPED | OUTPATIENT
Start: 2017-11-20 | End: 2018-10-31 | Stop reason: SDUPTHER

## 2017-11-20 NOTE — TELEPHONE ENCOUNTER
----- Message from Kallie Villeda sent at 11/20/2017  1:08 PM CST -----  Contact: Cari ( [facility])   Cari ( [facility]) is requesting a 90 supply on lexapro and synthroid.        Please call Cari ( [facility]) back at [phone]

## 2017-12-06 ENCOUNTER — ANTI-COAG VISIT (OUTPATIENT)
Dept: CARDIOLOGY | Facility: CLINIC | Age: 82
End: 2017-12-06
Payer: MEDICARE

## 2017-12-06 ENCOUNTER — OFFICE VISIT (OUTPATIENT)
Dept: CARDIOLOGY | Facility: CLINIC | Age: 82
End: 2017-12-06
Payer: MEDICARE

## 2017-12-06 VITALS
DIASTOLIC BLOOD PRESSURE: 64 MMHG | SYSTOLIC BLOOD PRESSURE: 114 MMHG | HEART RATE: 76 BPM | WEIGHT: 152.13 LBS | BODY MASS INDEX: 26.95 KG/M2 | HEIGHT: 63 IN

## 2017-12-06 DIAGNOSIS — Z79.01 LONG-TERM (CURRENT) USE OF ANTICOAGULANTS: Primary | ICD-10-CM

## 2017-12-06 DIAGNOSIS — I44.2 THIRD DEGREE AV BLOCK: Chronic | ICD-10-CM

## 2017-12-06 DIAGNOSIS — I25.10 CORONARY ARTERY DISEASE INVOLVING NATIVE CORONARY ARTERY OF NATIVE HEART WITHOUT ANGINA PECTORIS: Primary | Chronic | ICD-10-CM

## 2017-12-06 DIAGNOSIS — I77.9 RIGHT-SIDED CAROTID ARTERY DISEASE: Chronic | ICD-10-CM

## 2017-12-06 DIAGNOSIS — Z86.718 HISTORY OF DVT OF LOWER EXTREMITY: ICD-10-CM

## 2017-12-06 DIAGNOSIS — I48.0 PAROXYSMAL ATRIAL FIBRILLATION: Chronic | ICD-10-CM

## 2017-12-06 DIAGNOSIS — Z86.73 H/O: CVA (CEREBROVASCULAR ACCIDENT): Chronic | ICD-10-CM

## 2017-12-06 DIAGNOSIS — I51.89 LEFT VENTRICULAR DIASTOLIC DYSFUNCTION WITH PRESERVED SYSTOLIC FUNCTION: Chronic | ICD-10-CM

## 2017-12-06 DIAGNOSIS — N18.30 CKD (CHRONIC KIDNEY DISEASE) STAGE 3, GFR 30-59 ML/MIN: Chronic | ICD-10-CM

## 2017-12-06 DIAGNOSIS — Z95.2 S/P MITRAL VALVE REPLACEMENT: Chronic | ICD-10-CM

## 2017-12-06 DIAGNOSIS — I95.1 ORTHOSTATIC HYPOTENSION: ICD-10-CM

## 2017-12-06 DIAGNOSIS — E78.00 PURE HYPERCHOLESTEROLEMIA: Chronic | ICD-10-CM

## 2017-12-06 LAB — INR PPP: 2.7 (ref 2.5–3.5)

## 2017-12-06 PROCEDURE — 99499 UNLISTED E&M SERVICE: CPT | Mod: S$GLB,,, | Performed by: INTERNAL MEDICINE

## 2017-12-06 PROCEDURE — 99213 OFFICE O/P EST LOW 20 MIN: CPT | Mod: S$GLB,,, | Performed by: INTERNAL MEDICINE

## 2017-12-06 PROCEDURE — 85610 PROTHROMBIN TIME: CPT | Mod: QW,S$GLB,,

## 2017-12-06 PROCEDURE — 99999 PR PBB SHADOW E&M-EST. PATIENT-LVL III: CPT | Mod: PBBFAC,,, | Performed by: INTERNAL MEDICINE

## 2017-12-06 NOTE — PROGRESS NOTES
"Subjective:   Patient ID:  Josy Posey is a 89 y.o. female who presents for follow up of Coronary Artery Disease      HPI  A  89 yo female with h/o cad s/p lima jessica lad mitral regurgitation s/p pacer for complete block is here for f/u  She has been doing well she is walking on the treadmill for 8 minutes no chest pain. Had 2 episodes of chest pian  Has taken asa x2. Has misdternal chest pain felt like pressure took asa   The pain lasted 10 minutes. Asa and rest relieves it .  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 fuCT 1/3- stable,  CT's later - no residual    Lens replaced by other means 6/25/2014    Melena     Memory loss     PET scan consistent with Alzzheimers.   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    1 vessel LIMA to LAD    S/P MVR (mitral valve replacement) 9/27/2013    Skin ulcer     Squamous cell carcinoma     skin cancer X 2    Stroke     Syncope and collapse     Unspecified essential hypertension 9/16/2013    Unspecified transient cerebral ischemia     Urinary incontinence     " wears briefs       Past Surgical History:   Procedure Laterality Date    ADENOIDECTOMY      APPENDECTOMY      BREAST SURGERY  1950    right lumpectomy     CARDIAC VALVE SURGERY  10/04/2007    MVR    CATARACT EXTRACTION      CHOLECYSTECTOMY      CORONARY ARTERY BYPASS GRAFT      EYE SURGERY      cataracts bilateral    HYSTERECTOMY      for hydatiform mole    INSERT / REPLACE / REMOVE PACEMAKER  09/11/2001    TONSILLECTOMY         Social History   Substance Use Topics    Smoking status: Never Smoker    Smokeless tobacco: Never Used    Alcohol use No       Family History   Problem Relation Age of Onset    Tuberculosis Mother     Tuberculosis Father     Stroke Sister     Hypertension Sister     Stroke Brother     Hypertension Daughter     COPD Brother        Current Outpatient Prescriptions   Medication Sig    alendronate (FOSAMAX) 70 MG tablet take 1 tablet by mouth every week    ascorbic acid (VITAMIN C) 1000 MG tablet Take 1 tablet by mouth once daily once daily.    biotin 2,500 mcg Cap Take by mouth.    clotrimazole-betamethasone 1-0.05% (LOTRISONE) cream Apply topically 2 (two) times daily.    econazole nitrate 1 % cream     escitalopram oxalate (LEXAPRO) 10 MG tablet Take 1 tablet (10 mg total) by mouth once daily.    fluticasone (FLONASE) 50 mcg/actuation nasal spray instill 2 sprays into each nostril once daily    folic acid (FOLVITE) 1 MG tablet Take 1 tablet by mouth once daily once daily.    iron-vitamin C 100-250 mg, ICAR-C, (ICAR-C) 100-250 mg Tab Take 1 tablet by mouth once daily.    levothyroxine (SYNTHROID) 50 MCG tablet Take 1 tablet (50 mcg total) by mouth once daily.    meclizine (ANTIVERT) 25 mg tablet Take 1 tablet (25 mg total) by mouth 3 (three) times daily as needed for Dizziness.    midodrine (PROAMATINE) 5 MG Tab Take 1 tablet (5 mg total) by mouth 2 (two) times daily with meals.    omega-3 fatty acids-vitamin E (FISH OIL) 1,000 mg Cap Take by mouth once daily.     pravastatin (PRAVACHOL) 20 MG tablet Take 1 tablet (20 mg total) by mouth once daily.    propafenone (RHTHYMOL) 150 MG Tab take 1/2 tablet by mouth three times a day    spironolactone (ALDACTONE) 25 MG tablet Take 0.5 tablets (12.5 mg total) by mouth once daily.    tobramycin-dexamethasone 0.3-0.05 % DrpS Place 1 drop into both eyes 4 (four) times daily as needed.    tramadol (ULTRAM) 50 mg tablet take 1 tablet by mouth every 8 hours if needed    triamcinolone acetonide 0.1% (KENALOG) 0.1 % cream apply THINLY to affected area ON LEGS, ARMS, AND TRUCK twice a day if needed for eczema    vitamin B12-folic acid 0.5-1 mg Tab Take 1 tablet by mouth once daily once daily.    warfarin (COUMADIN) 1 MG tablet Take 1/2 tablet every evening as directed by the coumadin clinic.    estradiol (ESTRACE) 0.01 % (0.1 mg/gram) vaginal cream Place 1 g vaginally 3 (three) times a week. Place small amount on the outside of the urethra nightly for 3 weeks , then twice weekly     No current facility-administered medications for this visit.      Current Outpatient Prescriptions on File Prior to Visit   Medication Sig    alendronate (FOSAMAX) 70 MG tablet take 1 tablet by mouth every week    ascorbic acid (VITAMIN C) 1000 MG tablet Take 1 tablet by mouth once daily once daily.    biotin 2,500 mcg Cap Take by mouth.    clotrimazole-betamethasone 1-0.05% (LOTRISONE) cream Apply topically 2 (two) times daily.    econazole nitrate 1 % cream     escitalopram oxalate (LEXAPRO) 10 MG tablet Take 1 tablet (10 mg total) by mouth once daily.    fluticasone (FLONASE) 50 mcg/actuation nasal spray instill 2 sprays into each nostril once daily    folic acid (FOLVITE) 1 MG tablet Take 1 tablet by mouth once daily once daily.    iron-vitamin C 100-250 mg, ICAR-C, (ICAR-C) 100-250 mg Tab Take 1 tablet by mouth once daily.    levothyroxine (SYNTHROID) 50 MCG tablet Take 1 tablet (50 mcg total) by mouth once daily.    meclizine  (ANTIVERT) 25 mg tablet Take 1 tablet (25 mg total) by mouth 3 (three) times daily as needed for Dizziness.    midodrine (PROAMATINE) 5 MG Tab Take 1 tablet (5 mg total) by mouth 2 (two) times daily with meals.    omega-3 fatty acids-vitamin E (FISH OIL) 1,000 mg Cap Take by mouth once daily.    pravastatin (PRAVACHOL) 20 MG tablet Take 1 tablet (20 mg total) by mouth once daily.    propafenone (RHTHYMOL) 150 MG Tab take 1/2 tablet by mouth three times a day    spironolactone (ALDACTONE) 25 MG tablet Take 0.5 tablets (12.5 mg total) by mouth once daily.    tobramycin-dexamethasone 0.3-0.05 % DrpS Place 1 drop into both eyes 4 (four) times daily as needed.    tramadol (ULTRAM) 50 mg tablet take 1 tablet by mouth every 8 hours if needed    triamcinolone acetonide 0.1% (KENALOG) 0.1 % cream apply THINLY to affected area ON LEGS, ARMS, AND TRUCK twice a day if needed for eczema    vitamin B12-folic acid 0.5-1 mg Tab Take 1 tablet by mouth once daily once daily.    warfarin (COUMADIN) 1 MG tablet Take 1/2 tablet every evening as directed by the coumadin clinic.    estradiol (ESTRACE) 0.01 % (0.1 mg/gram) vaginal cream Place 1 g vaginally 3 (three) times a week. Place small amount on the outside of the urethra nightly for 3 weeks , then twice weekly     No current facility-administered medications on file prior to visit.        Review of Systems   Constitution: Negative for diaphoresis, weakness, malaise/fatigue and weight gain.   HENT: Negative for hoarse voice.    Eyes: Negative for double vision and visual disturbance.   Cardiovascular: Positive for chest pain. Negative for claudication, cyanosis, dyspnea on exertion, irregular heartbeat, leg swelling, near-syncope, orthopnea, palpitations, paroxysmal nocturnal dyspnea and syncope.   Respiratory: Negative for cough, hemoptysis, shortness of breath and snoring.    Hematologic/Lymphatic: Negative for bleeding problem. Does not bruise/bleed easily.   Skin:  Negative for color change and poor wound healing.   Musculoskeletal: Negative for muscle cramps, muscle weakness and myalgias.   Gastrointestinal: Negative for bloating, abdominal pain, change in bowel habit, diarrhea, heartburn, hematemesis, hematochezia, melena and nausea.   Neurological: Negative for excessive daytime sleepiness, dizziness, headaches, light-headedness, loss of balance and numbness.   Psychiatric/Behavioral: Negative for memory loss. The patient does not have insomnia.    Allergic/Immunologic: Negative for hives.       Objective:   Physical Exam   Constitutional: She is oriented to person, place, and time. She appears well-developed and well-nourished. She does not appear ill. No distress.   HENT:   Head: Normocephalic and atraumatic.   Eyes: EOM are normal. Pupils are equal, round, and reactive to light. No scleral icterus.   Neck: Normal range of motion. Neck supple. Normal carotid pulses, no hepatojugular reflux and no JVD present. Carotid bruit is not present. No tracheal deviation present. No thyromegaly present.   Cardiovascular: Normal rate, regular rhythm, normal heart sounds and normal pulses.  Exam reveals no gallop and no friction rub.    No murmur heard.  Pulmonary/Chest: Effort normal and breath sounds normal. No respiratory distress. She has no wheezes. She has no rhonchi. She has no rales. She exhibits no tenderness.   Scar cabg well healed   Pacer site well healed.   Abdominal: Soft. Normal appearance, normal aorta and bowel sounds are normal. She exhibits no abdominal bruit, no ascites and no pulsatile midline mass. There is no hepatomegaly. There is no tenderness.   Musculoskeletal: She exhibits no edema.        Right shoulder: She exhibits no deformity.   Neurological: She is alert and oriented to person, place, and time. She has normal strength. No cranial nerve deficit. Coordination normal.   Skin: Skin is warm and dry. No rash noted. No cyanosis or erythema. Nails show no  "clubbing.   Psychiatric: She has a normal mood and affect. Her speech is normal and behavior is normal.     Vitals:    12/06/17 1346   BP: 114/64   Pulse: 76   Weight: 69 kg (152 lb 1.9 oz)   Height: 5' 3" (1.6 m)     Lab Results   Component Value Date    CHOL 283 (H) 09/03/2017    CHOL 287 (H) 08/23/2017    CHOL 165 05/06/2016     Lab Results   Component Value Date    HDL 41 09/03/2017    HDL 42 08/23/2017    HDL 46 05/06/2016     Lab Results   Component Value Date    LDLCALC 211.0 (H) 09/03/2017    LDLCALC 210.8 (H) 08/23/2017    LDLCALC 94.0 05/06/2016     Lab Results   Component Value Date    TRIG 155 (H) 09/03/2017    TRIG 171 (H) 08/23/2017    TRIG 125 05/06/2016     Lab Results   Component Value Date    CHOLHDL 14.5 (L) 09/03/2017    CHOLHDL 14.6 (L) 08/23/2017    CHOLHDL 27.9 05/06/2016       Chemistry        Component Value Date/Time     09/04/2017 0611    K 4.2 09/04/2017 0611     09/04/2017 0611    CO2 25 09/04/2017 0611    BUN 28 09/04/2017 0611    CREATININE 1.2 09/04/2017 0611    GLU 94 09/04/2017 0611        Component Value Date/Time    CALCIUM 9.2 09/04/2017 0611    ALKPHOS 69 09/03/2017 2128    AST 23 09/07/2017 1453    ALT 14 09/07/2017 1453    BILITOT 0.4 09/03/2017 2128    ESTGFRAFRICA 45 (A) 09/04/2017 0611    EGFRNONAA 39 (A) 09/04/2017 0611          Lab Results   Component Value Date    TSH 2.352 09/03/2017     Lab Results   Component Value Date    INR 2.7 12/06/2017    INR 2.6 11/06/2017    INR 2.8 10/09/2017     Lab Results   Component Value Date    WBC 7.25 09/04/2017    HGB 12.4 09/04/2017    HCT 36.9 (L) 09/04/2017    MCV 95 09/04/2017     09/04/2017     BMP  Sodium   Date Value Ref Range Status   09/04/2017 140 136 - 145 mmol/L Final     Potassium   Date Value Ref Range Status   09/04/2017 4.2 3.5 - 5.1 mmol/L Final     Chloride   Date Value Ref Range Status   09/04/2017 106 95 - 110 mmol/L Final     CO2   Date Value Ref Range Status   09/04/2017 25 23 - 29 mmol/L " Final     BUN, Bld   Date Value Ref Range Status   09/04/2017 28 10 - 30 mg/dL Final     Creatinine   Date Value Ref Range Status   09/04/2017 1.2 0.5 - 1.4 mg/dL Final     Calcium   Date Value Ref Range Status   09/04/2017 9.2 8.7 - 10.5 mg/dL Final     Anion Gap   Date Value Ref Range Status   09/04/2017 9 8 - 16 mmol/L Final     eGFR if    Date Value Ref Range Status   09/04/2017 45 (A) >60 mL/min/1.73 m^2 Final     eGFR if non    Date Value Ref Range Status   09/04/2017 39 (A) >60 mL/min/1.73 m^2 Final     Comment:     Calculation used to obtain the estimated glomerular filtration  rate (eGFR) is the CKD-EPI equation. Since race is unknown   in our information system, the eGFR values for   -American and Non--American patients are given   for each creatinine result.       CrCl cannot be calculated (Patient's most recent lab result is older than the maximum 7 days allowed.).    Assessment:     1. Coronary artery disease involving native coronary artery of native heart without angina pectoris    2. Pure hypercholesterolemia    3. Paroxysmal atrial fibrillation    4. CKD (chronic kidney disease) stage 3, GFR 30-59 ml/min    5. Chronic diastolic HFpEF of 60% per echo 5/2017    6. Right-sided carotid artery disease    7. Third degree AV block s/p PPM    8. Severe MR with remote mitral valve replacement on chronic anticoagulation    9. H/O CVA (cerebrovascular accident)    10. Orthostatic hypotension    11. History of DVT of lower extremity right      Has chest pain needs to reevaluate with nuclear stress test.  Needs pacer eval .  Plan:     Continue current therapy  Cardiac low salt diet.  Risk factor modification and excercise program.  F/u in 6 months with lipid cmp

## 2017-12-12 ENCOUNTER — HOSPITAL ENCOUNTER (OUTPATIENT)
Dept: RADIOLOGY | Facility: HOSPITAL | Age: 82
Discharge: HOME | End: 2017-12-12
Attending: INTERNAL MEDICINE
Payer: MEDICARE

## 2017-12-12 ENCOUNTER — CLINICAL SUPPORT (OUTPATIENT)
Dept: CARDIOLOGY | Facility: CLINIC | Age: 82
End: 2017-12-12
Attending: INTERNAL MEDICINE
Payer: MEDICARE

## 2017-12-12 DIAGNOSIS — I25.10 CORONARY ARTERY DISEASE INVOLVING NATIVE CORONARY ARTERY OF NATIVE HEART WITHOUT ANGINA PECTORIS: Chronic | ICD-10-CM

## 2017-12-12 LAB — DIASTOLIC DYSFUNCTION: NO

## 2017-12-12 PROCEDURE — 93015 CV STRESS TEST SUPVJ I&R: CPT | Mod: S$GLB,,, | Performed by: INTERNAL MEDICINE

## 2017-12-12 PROCEDURE — 78452 HT MUSCLE IMAGE SPECT MULT: CPT | Mod: TC,PO

## 2017-12-12 PROCEDURE — 78452 HT MUSCLE IMAGE SPECT MULT: CPT | Mod: 26,,, | Performed by: INTERNAL MEDICINE

## 2017-12-13 ENCOUNTER — TELEPHONE (OUTPATIENT)
Dept: CARDIOLOGY | Facility: CLINIC | Age: 82
End: 2017-12-13

## 2017-12-21 ENCOUNTER — OFFICE VISIT (OUTPATIENT)
Dept: INTERNAL MEDICINE | Facility: CLINIC | Age: 82
End: 2017-12-21
Payer: MEDICARE

## 2017-12-21 VITALS
BODY MASS INDEX: 25.29 KG/M2 | WEIGHT: 142.75 LBS | SYSTOLIC BLOOD PRESSURE: 119 MMHG | OXYGEN SATURATION: 96 % | HEIGHT: 63 IN | HEART RATE: 75 BPM | DIASTOLIC BLOOD PRESSURE: 70 MMHG

## 2017-12-21 DIAGNOSIS — Z86.73 H/O: CVA (CEREBROVASCULAR ACCIDENT): Chronic | ICD-10-CM

## 2017-12-21 DIAGNOSIS — I77.9 BILATERAL CAROTID ARTERY DISEASE: ICD-10-CM

## 2017-12-21 DIAGNOSIS — I25.10 CORONARY ARTERY DISEASE INVOLVING NATIVE CORONARY ARTERY OF NATIVE HEART WITHOUT ANGINA PECTORIS: Chronic | ICD-10-CM

## 2017-12-21 DIAGNOSIS — I48.0 PAROXYSMAL ATRIAL FIBRILLATION: Chronic | ICD-10-CM

## 2017-12-21 DIAGNOSIS — I27.9 PULMONARY HEART DISEASE: ICD-10-CM

## 2017-12-21 DIAGNOSIS — E78.00 PURE HYPERCHOLESTEROLEMIA: Chronic | ICD-10-CM

## 2017-12-21 DIAGNOSIS — G62.9 POLYNEUROPATHY: ICD-10-CM

## 2017-12-21 DIAGNOSIS — Z00.00 ENCOUNTER FOR PREVENTIVE HEALTH EXAMINATION: Primary | ICD-10-CM

## 2017-12-21 DIAGNOSIS — E03.4 HYPOTHYROIDISM DUE TO ACQUIRED ATROPHY OF THYROID: Chronic | ICD-10-CM

## 2017-12-21 DIAGNOSIS — M81.0 OSTEOPOROSIS, UNSPECIFIED OSTEOPOROSIS TYPE, UNSPECIFIED PATHOLOGICAL FRACTURE PRESENCE: ICD-10-CM

## 2017-12-21 DIAGNOSIS — N18.30 CKD (CHRONIC KIDNEY DISEASE) STAGE 3, GFR 30-59 ML/MIN: Chronic | ICD-10-CM

## 2017-12-21 DIAGNOSIS — Z79.01 CHRONIC ANTICOAGULATION: ICD-10-CM

## 2017-12-21 DIAGNOSIS — I70.0 ATHEROSCLEROSIS OF AORTA: ICD-10-CM

## 2017-12-21 DIAGNOSIS — I51.89 LEFT VENTRICULAR DIASTOLIC DYSFUNCTION WITH PRESERVED SYSTOLIC FUNCTION: Chronic | ICD-10-CM

## 2017-12-21 DIAGNOSIS — I44.2 THIRD DEGREE AV BLOCK: Chronic | ICD-10-CM

## 2017-12-21 DIAGNOSIS — E61.1 IRON DEFICIENCY: ICD-10-CM

## 2017-12-21 DIAGNOSIS — F32.0 DEPRESSION, MAJOR, SINGLE EPISODE, MILD: ICD-10-CM

## 2017-12-21 DIAGNOSIS — Z95.2 S/P MITRAL VALVE REPLACEMENT: Chronic | ICD-10-CM

## 2017-12-21 DIAGNOSIS — G57.11 MERALGIA PARESTHETICA OF RIGHT SIDE: ICD-10-CM

## 2017-12-21 DIAGNOSIS — Z86.718 HISTORY OF DVT OF LOWER EXTREMITY: ICD-10-CM

## 2017-12-21 PROBLEM — R42 DIZZINESS: Status: RESOLVED | Noted: 2017-09-03 | Resolved: 2017-12-21

## 2017-12-21 PROBLEM — R42 VERTIGO: Status: RESOLVED | Noted: 2017-05-02 | Resolved: 2017-12-21

## 2017-12-21 PROCEDURE — 99499 UNLISTED E&M SERVICE: CPT | Mod: S$GLB,,, | Performed by: NURSE PRACTITIONER

## 2017-12-21 PROCEDURE — G0439 PPPS, SUBSEQ VISIT: HCPCS | Mod: S$GLB,,, | Performed by: NURSE PRACTITIONER

## 2017-12-21 PROCEDURE — 99999 PR PBB SHADOW E&M-EST. PATIENT-LVL V: CPT | Mod: PBBFAC,,, | Performed by: NURSE PRACTITIONER

## 2017-12-21 NOTE — PATIENT INSTRUCTIONS
Counseling and Referral of Other Preventative  (Italic type indicates deductible and co-insurance are waived)    Patient Name: Josy Posey  Today's Date: 12/21/2017      SERVICE LIMITATIONS RECOMMENDATION    Vaccines    · Pneumococcal (once after 65)    · Influenza (annually)    · Hepatitis B (if medium/high risk)    · Prevnar 13      Hepatitis B medium/high risk factors:       - End-stage renal disease       - Hemophiliacs who received Factor VII or         IX concentrates       - Clients of institutions for the mentally             retarded       - Persons who live in the same house as          a HepB carrier       - Homosexual men       - Illicit injectable drug abusers     Pneumococcal: DUE     Influenza: Done, repeat in one year     Hepatitis B: N/A     Prevnar 13: Done, no repeat necessary    Mammogram (biennial age 50-74)  Annually (age 40 or over)  N/A    Pap (up to age 70 and after 70 if unknown history or abnormal study last 10 years)    N/A     The USPSTF recommends against screening for cervical cancer in women who have had a hysterectomy with removal of the cervix and who do not have a history of a high-grade precancerous lesion (cervical intraepithelial neoplasia [CEE] grade 2 or 3) or cervical cancer.     Colorectal cancer screening (to age 75)    · Fecal occult blood test (annual)  · Flexible sigmoidoscopy (5y)  · Screening colonoscopy (10y)  · Barium enema   N/A    Diabetes self-management training (no USPSTF recommendations)  Requires referral by treating physician for patient with diabetes or renal disease. 10 hours of initial DSMT sessions of no less than 30 minutes each in a continuous 12-month period. 2 hours of follow-up DSMT in subsequent years.  N/A    Bone mass measurements (age 65 & older, biennial)  Requires diagnosis related to osteoporosis or estrogen deficiency. Biennial benefit unless patient has history of long-term glucocorticoid  Recommended to patient, declined    Glaucoma  screening (no USPSTF recommendation)  Diabetes mellitus, family history   , age 50 or over    American, age 65 or over  Done this year, repeat every year    Medical nutrition therapy for diabetes or renal disease (no recommended schedule)  Requires referral by treating physician for patient with diabetes or renal disease or kidney transplant within the past 3 years.  Can be provided in same year as diabetes self-management training (DSMT), and CMS recommends medical nutrition therapy take place after DSMT. Up to 3 hours for initial year and 2 hours in subsequent years.  N/A    Cardiovascular screening blood tests (every 5 years)  · Fasting lipid panel  Order as a panel if possible  Done this year, repeat every year    Diabetes screening tests (at least every 3 years, Medicare covers annually or at 6-month intervals for prediabetic patients)  · Fasting blood sugar (FBS) or glucose tolerance test (GTT)  Patient must be diagnosed with one of the following:       - Hypertension       - Dyslipidemia       - Obesity (BMI 30kg/m2)       - Previous elevated impaired FBS or GTT       ... or any two of the following:       - Overweight (BMI 25 but <30)       - Family history of diabetes       - Age 65 or older       - History of gestational diabetes or birth of baby weighing more than 9 pounds  Done this year, repeat every year    HIV screening (annually for increased risk patients)  · HIV-1 and HIV-2 by EIA, or SHRAVAN, rapid antibody test or oral mucosa transudate  Patients must be at increased risk for HIV infection per USPSTF guidelines or pregnant. Tests covered annually for patient at increased risk or as requested by the patient. Pregnant patients may receive up to 3 tests during pregnancy.  Risks discussed, screening is not recommended    Smoking cessation counseling (up to 8 sessions per year)  Patients must be asymptomatic of tobacco-related conditions to receive as a preventative service.   Non-smoker    Subsequent annual wellness visit  At least 12 months since last AWV  Return in one year     The following information is provided to all patients.  This information is to help you find resources for any of the problems found today that may be affecting your health:                Living healthy guide: www.[facility].[email or web address].[facility]      Understanding Diabetes: www.diabetes.org      Eating healthy: www.cdc.gov/healthyweight      CDC home safety checklist: www.cdc.gov/steadi/patient.html      Agency on Aging: www.goea.[place].[facility]      Alcoholics anonymous (AA): www.aa.org      Physical Activity: www.ricardo.nih.gov/ru9knne      Tobacco use: www.quitwithusla.org

## 2017-12-21 NOTE — Clinical Note
Your patient was seen today for a HRA visit. Abnormalities have been identified during this visit that may require additional testing and follow up. I have included a copy of my visit note, please review the note and feel free to contact me with any questions.  Thank you for allowing me to participate in the care of your patients.  Priscilla Fowler NP

## 2017-12-21 NOTE — PROGRESS NOTES
"Josy Posey presented for a  Medicare AWV and comprehensive Health Risk Assessment today. The following components were reviewed and updated:    · Medical history  · Family History  · Social history  · Allergies and Current Medications  · Health Risk Assessment  · Health Maintenance  · Care Team     ** See Completed Assessments for Annual Wellness Visit within the encounter summary.**       The following assessments were completed:  · Living Situation  · CAGE  · Depression Screening  · Timed Get Up and Go  · Whisper Test  · Cognitive Function Screening  · Nutrition Screening  · ADL Screening  · PAQ Screening    Vitals:    12/21/17 1347   BP: 119/70   BP Location: Left arm   Patient Position: Sitting   BP Method: Medium (Manual)   Pulse: 75   SpO2: 96%   Weight: 64.8 kg (142 lb 12 oz)   Height: 5' 3" (1.6 m)     Body mass index is 25.29 kg/m².  Physical Exam   Constitutional: She appears well-developed.   HENT:   Head: Normocephalic and atraumatic.   Eyes: Pupils are equal, round, and reactive to light.   Neck: Carotid bruit is not present.   Cardiovascular: Normal rate, regular rhythm, intact distal pulses and normal pulses.  Exam reveals no gallop.    Murmur heard.   Systolic murmur is present with a grade of 1/6   Pulmonary/Chest: Effort normal and breath sounds normal.   Abdominal: Soft. Normal appearance and bowel sounds are normal. She exhibits no distension. There is no tenderness.   Musculoskeletal: Normal range of motion. She exhibits no edema or tenderness.   Trace edema BLE   Neurological: She is alert. She exhibits normal muscle tone. Gait abnormal.   In wheel chair   Skin: Skin is warm, dry and intact.   Psychiatric: She has a normal mood and affect. Her speech is normal and behavior is normal. Judgment and thought content normal. Cognition and memory are normal.   Nursing note and vitals reviewed.        Diagnoses and health risks identified today and associated recommendations/orders:    1. " Encounter for preventive health examination    2. Paroxysmal atrial fibrillation  Stable and controlled heart rate and rhythm  on Rythmol and Coumadin daily  Denies palpations Continue current treatment plan as previously prescribed with your cardiologist-.     4. Third degree AV block s/p PPM  Stable and controlled with pacemaker placed 9/11/ 2001 . Last checked 9/ 4/ 2017  Continue current treatment plan as previously prescribed with your cardiologist    5 Bilateral carotid artery disease   2013 ultrasound showed There is 40 - 49% right Internal Carotid stenosis.here is 20 - 39% left Internal Carotid stenosis.   Chronic and ongoing. Continue current treatment plan as previously prescribed with your PCP and cardiologist    6. Depression, major, single episode, mild  Stable and controlled on Lexapro daily. PHQ-2 score is 0 today  Continue current treatment plan as previously prescribed with your PCP.     7. Chronic diastolic HFpEF of 60% per echo 5/2017   echo 5/3/ 2017 Moderate left atrial enlargement.     2 - Concentric hypertrophy.     3 - No wall motion abnormalities.     4 - Normal left ventricular systolic function (EF 60-65%).     5 - Normal right ventricular systolic function .     6 - Pulmonary hypertension. The estimated PA systolic pressure is greater than 47 mmHg.     7 - Mitral valve prosthesis.   Chronic and stable on med's. Denies SOB. Continue current treatment plan as previously prescribed with your cardiologist    8. Chronic anticoagulation  Stable and controlled on Coumadin daily  Component      Latest Ref Rng & Units 12/6/2017   Coumadin Monitoring INR      2.5 - 3.5 2.7     Denies bleeding. Continue current treatment plan as previously prescribed with your cardiologist    9. CKD (chronic kidney disease) stage 3, GFR 30-59 ml/min  Component      Latest Ref Rng & Units 9/4/2017 9/3/2017 8/23/2017   eGFR if non African American      >60 mL/min/1.73 m:2 39 (A) 33 (A) 30.0 (A)   Chronic and  stable. Continue current treatment plan as previously prescribed with your PCP.    10. Coronary artery disease involving native coronary artery of native heart without angina pectoris  Chronic and stable.  Neg stress test   On 12/12/ 2017 showed No significant arrhythmias were present. There were no symptoms of chest discomfort or significant dyspnea throughout the protocol . S/P MI  And  CABG X1 vessel  9/27/ 2013   Continue current treatment plan as previously prescribed with your  cardiologist    11. Hypothyroidism due to acquired atrophy of thyroid  Stable and controlled on Synthroid daily. Continue current treatment plan as previously prescribed with your PCP    12. Iron deficiency  Component      Latest Ref Rng & Units 9/4/2017   RBC      4.00 - 5.40 M/uL 3.88 (L)   Hemoglobin      12.0 - 16.0 g/dL 12.4   Hematocrit      37.0 - 48.5 % 36.9 (L)   MCV      82 - 98 fL 95   Stable and controlled on ICAR C daily . Continue current treatment plan as previously prescribed with your PCP    13. Meralgia paresthetica of right side  Stable and controlled  Continue current treatment plan as previously prescribed with your PCP    14. Polyneuropathy  Stable and controlled. Continue current treatment plan as previously prescribed with your PCP    15. Pure hypercholesterolemic  Component      Latest Ref Rng & Units 9/3/2017   Triglycerides      30 - 150 mg/dL 155 (H)   HDL      40 - 75 mg/dL 41   LDL Cholesterol      63.0 - 159.0 mg/dL 211.0 (H)   HDL/Chol Ratio      20.0 - 50.0 % 14.5 (L)   Total Cholesterol/HDL Ratio      2.0 - 5.0 6.9 (H)   Non-HDL Cholesterol      mg/dL 242   This problem is currently not controlled. Please follow up with your  cardiologist  as planned to discuss adjustments to your treatment plan.    16. Severe MR with remote mitral valve replacement on chronic anticoagulation  Stable and controlled.  S/P replacement 10/04/2007); with a stable ECHO. Continue current treatment plan as previously  prescribed with your cardiologist    17. H/O CVA (cerebrovascular accident)  Stable and controlled. with Continue current treatment plan as previously prescribed with your PCP    18. History of DVT of lower extremity right   Approximate 3/ 2010 ;Stable and controlled on Coumadin.Continue current treatment plan as previously prescribed with your PCP    19. Pulmonary heart disease  Echo 5/ 2017 Pulmonary hypertension. The estimated PA systolic pressure is greater than 47 mmHg  This is a new problem that has been identified during today's visit. Please follow up with your cardiologist  .   20.Osteoporosis, unspecified osteoporosis type, unspecified pathological fracture presence  DEXA  3/27/ 2013 decline 12/ 2017  Chronic and stable on Fosamax. No recent fractures or injuries noted. Continue current treatment plan as previously prescribed with your PCP.     Provided Josy with a 5-10 year written screening schedule and personal prevention plan. Recommendations were developed using the USPSTF age appropriate recommendations. Education, counseling, and referrals were provided as needed. After Visit Summary printed and given to patient which includes a list of additional screenings\tests needed.    1 year follow up    Priscilla Fowler NP

## 2018-01-04 PROBLEM — I27.9 PULMONARY HEART DISEASE: Status: ACTIVE | Noted: 2018-01-04

## 2018-01-10 ENCOUNTER — ANTI-COAG VISIT (OUTPATIENT)
Dept: CARDIOLOGY | Facility: CLINIC | Age: 83
End: 2018-01-10
Payer: MEDICARE

## 2018-01-10 DIAGNOSIS — Z79.01 LONG TERM (CURRENT) USE OF ANTICOAGULANTS: Primary | ICD-10-CM

## 2018-01-10 LAB — INR PPP: 2.3 (ref 2.5–3.5)

## 2018-01-10 PROCEDURE — 99211 OFF/OP EST MAY X REQ PHY/QHP: CPT | Mod: 25,S$GLB,,

## 2018-01-10 PROCEDURE — 85610 PROTHROMBIN TIME: CPT | Mod: QW,S$GLB,,

## 2018-01-10 NOTE — PROGRESS NOTES
INR is sub-therapeutic. Patient reports diet changes. Would like to begin drinking boost once weekly. Will increase total weekly dose. Repeat INR in 2 weeks. Patient and caregiver verbalized understanding.

## 2018-01-31 ENCOUNTER — ANTI-COAG VISIT (OUTPATIENT)
Dept: CARDIOLOGY | Facility: CLINIC | Age: 83
End: 2018-01-31
Payer: MEDICARE

## 2018-01-31 ENCOUNTER — OFFICE VISIT (OUTPATIENT)
Dept: CARDIOLOGY | Facility: CLINIC | Age: 83
End: 2018-01-31
Payer: MEDICARE

## 2018-01-31 ENCOUNTER — CLINICAL SUPPORT (OUTPATIENT)
Dept: CARDIOLOGY | Facility: CLINIC | Age: 83
End: 2018-01-31
Attending: INTERNAL MEDICINE
Payer: MEDICARE

## 2018-01-31 DIAGNOSIS — N18.30 CKD (CHRONIC KIDNEY DISEASE) STAGE 3, GFR 30-59 ML/MIN: Chronic | ICD-10-CM

## 2018-01-31 DIAGNOSIS — Z95.0 CARDIAC PACEMAKER IN SITU: ICD-10-CM

## 2018-01-31 DIAGNOSIS — I48.91 ATRIAL FIBRILLATION, UNSPECIFIED TYPE: ICD-10-CM

## 2018-01-31 DIAGNOSIS — I44.2 CHB (COMPLETE HEART BLOCK): ICD-10-CM

## 2018-01-31 DIAGNOSIS — Z79.01 CHRONIC ANTICOAGULATION: ICD-10-CM

## 2018-01-31 DIAGNOSIS — I95.1 ORTHOSTATIC HYPOTENSION: Primary | ICD-10-CM

## 2018-01-31 DIAGNOSIS — Z79.01 LONG TERM (CURRENT) USE OF ANTICOAGULANTS: Primary | ICD-10-CM

## 2018-01-31 DIAGNOSIS — I51.89 LEFT VENTRICULAR DIASTOLIC DYSFUNCTION WITH PRESERVED SYSTOLIC FUNCTION: Chronic | ICD-10-CM

## 2018-01-31 DIAGNOSIS — I44.2 THIRD DEGREE AV BLOCK: Chronic | ICD-10-CM

## 2018-01-31 DIAGNOSIS — I95.1 ORTHOSTATIC HYPOTENSION: ICD-10-CM

## 2018-01-31 DIAGNOSIS — Z95.2 S/P MITRAL VALVE REPLACEMENT: Primary | Chronic | ICD-10-CM

## 2018-01-31 DIAGNOSIS — I77.9 BILATERAL CAROTID ARTERY DISEASE: ICD-10-CM

## 2018-01-31 LAB — INR PPP: 2.9 (ref 2.5–3.5)

## 2018-01-31 PROCEDURE — 1159F MED LIST DOCD IN RCRD: CPT | Mod: S$GLB,,, | Performed by: INTERNAL MEDICINE

## 2018-01-31 PROCEDURE — 99205 OFFICE O/P NEW HI 60 MIN: CPT | Mod: S$GLB,,, | Performed by: INTERNAL MEDICINE

## 2018-01-31 PROCEDURE — 99999 PR PBB SHADOW E&M-EST. PATIENT-LVL II: CPT | Mod: PBBFAC,,, | Performed by: INTERNAL MEDICINE

## 2018-01-31 PROCEDURE — 99211 OFF/OP EST MAY X REQ PHY/QHP: CPT | Mod: 25,S$GLB,,

## 2018-01-31 PROCEDURE — 3008F BODY MASS INDEX DOCD: CPT | Mod: S$GLB,,, | Performed by: INTERNAL MEDICINE

## 2018-01-31 PROCEDURE — 85610 PROTHROMBIN TIME: CPT | Mod: QW,S$GLB,,

## 2018-01-31 RX ORDER — WARFARIN 1 MG/1
TABLET ORAL
Qty: 50 TABLET | Refills: 3 | Status: SHIPPED | OUTPATIENT
Start: 2018-01-31 | End: 2018-07-06 | Stop reason: SDUPTHER

## 2018-01-31 NOTE — PROGRESS NOTES
INR is now therapeutic. Continue dose and diet until follow-up. This is a quick follow-up after a sub-therapeutic INR on 1/10. Patient reports no bleeding or bruising, no new medications and no diet changes.  I reminded the patient to call with any problems, changes or questions before the next visit.

## 2018-02-01 VITALS
SYSTOLIC BLOOD PRESSURE: 140 MMHG | HEART RATE: 70 BPM | WEIGHT: 143 LBS | DIASTOLIC BLOOD PRESSURE: 72 MMHG | BODY MASS INDEX: 25.33 KG/M2

## 2018-02-01 RX ORDER — MIDODRINE HYDROCHLORIDE 5 MG/1
5 TABLET ORAL 4 TIMES DAILY
Qty: 120 TABLET | Refills: 6 | Status: SHIPPED | OUTPATIENT
Start: 2018-02-01 | End: 2018-06-18 | Stop reason: SDUPTHER

## 2018-02-01 NOTE — PROGRESS NOTES
Patient scheduled for Pacemaker generator change on 3/9/18 and will return to clinic on 18 for final battery check.  Subjective:   Patient ID:  Josy Posey is a 89 y.o. female     Chief complaint:Pacemaker Check (aproaching JORGE)      HPI  Background:  89 woman  I know her well from many years ago (prior to ) when I used to see her at [facility]  I have not seen her in many years although I have been checking on her when se comes for her PPM evals and/or when she comes and sees Dr Alvarado in clinic.   She has a Hx of PAF, SSS, HiGr AVB and is sp PPM -- currently with a bio CLS biV PPM unit that has been nearing JORGE. She is PPM dependent to a large degree but she has an escape rhythm.  She had a Hx of severe CMP that was eventually repaired (EF had normalized by )   She eventually required MVR for severe MR. She has a  Mechanical valve and she has been on coumadin w/o sig issues.  She has a long Hx of autonomic dysfunction with sig OH and past episodes of syncope . She has had an ICH after one such fall 40 years ago. She has been on chronic midodrine Rx with dosing requirements changing over the years. She has done surprisingly well with her AD and has had no recent falls.   She has no sig CV c/o  Her last echo from 3+ years ago showed the followin - Normal left ventricular systolic function (EF 60-65%). Mild left ventricular hypertrophy    2 - Left ventricular diastolic dysfunction. Grade I    3 - Normal right ventricular systolic function .     4 - Mitral valve prosthesis. Seated well with physiological mitral regurgitation.    5 - Severe left atrial enlargement. SPARKS 57  Her PPM eval has been showing a battery nearing JORGE for some time now. Today's predicted time to RRT is 1 month but this has been Hxally inaccurate for her.  PPM is in DDI due to frequent ATs and lack of appropriate AMS as well as sig SSS/SB  She is anxiously awaiting replacement of her PPM  She is still mobile and fully oriented.  There is no sign of dementia.   She is here with her DTR.  BP at home fluctuates but she has not passed out nor fallen        Current Outpatient Prescriptions   Medication Sig    alendronate (FOSAMAX) 70 MG tablet take 1 tablet by mouth every week    ascorbic acid (VITAMIN C) 1000 MG tablet Take 1 tablet by mouth once daily once daily.    biotin 2,500 mcg Cap Take by mouth.    clotrimazole-betamethasone 1-0.05% (LOTRISONE) cream Apply topically 2 (two) times daily.    econazole nitrate 1 % cream     escitalopram oxalate (LEXAPRO) 10 MG tablet Take 1 tablet (10 mg total) by mouth once daily.    estradiol (ESTRACE) 0.01 % (0.1 mg/gram) vaginal cream Place 1 g vaginally 3 (three) times a week. Place small amount on the outside of the urethra nightly for 3 weeks , then twice weekly    fluticasone (FLONASE) 50 mcg/actuation nasal spray instill 2 sprays into each nostril once daily    folic acid (FOLVITE) 1 MG tablet Take 1 tablet by mouth once daily once daily.    iron-vitamin C 100-250 mg, ICAR-C, (ICAR-C) 100-250 mg Tab Take 1 tablet by mouth once daily.    levothyroxine (SYNTHROID) 50 MCG tablet Take 1 tablet (50 mcg total) by mouth once daily.    meclizine (ANTIVERT) 25 mg tablet Take 1 tablet (25 mg total) by mouth 3 (three) times daily as needed for Dizziness.    midodrine (PROAMATINE) 5 MG Tab Take 1 tablet (5 mg total) by mouth 4 (four) times daily. During waking hours    omega-3 fatty acids-vitamin E (FISH OIL) 1,000 mg Cap Take by mouth once daily.    pravastatin (PRAVACHOL) 20 MG tablet Take 1 tablet (20 mg total) by mouth once daily.    propafenone (RHTHYMOL) 150 MG Tab take 1/2 tablet by mouth three times a day    spironolactone (ALDACTONE) 25 MG tablet Take 0.5 tablets (12.5 mg total) by mouth once daily.    tobramycin-dexamethasone 0.3-0.05 % DrpS Place 1 drop into both eyes 4 (four) times daily as needed.    tramadol (ULTRAM) 50 mg tablet take 1 tablet by mouth every 8 hours if needed     triamcinolone acetonide 0.1% (KENALOG) 0.1 % cream apply THINLY to affected area ON LEGS, ARMS, AND TRUCK twice a day if needed for eczema    vitamin B12-folic acid 0.5-1 mg Tab Take 1 tablet by mouth once daily once daily.    warfarin (COUMADIN) 1 MG tablet Take 1 tablet on Wednesdays and 1/2 tablet all other days as directed by the coumadin clinic.     No current facility-administered medications for this visit.      Review of Systems   Constitution: Negative for decreased appetite, weakness, weight gain and weight loss.   HENT: Negative for nosebleeds.    Eyes: Negative for blurred vision and visual disturbance.   Cardiovascular: Positive for near-syncope. Negative for chest pain, claudication, cyanosis, dyspnea on exertion, irregular heartbeat, leg swelling, orthopnea, palpitations, paroxysmal nocturnal dyspnea and syncope.   Respiratory: Positive for shortness of breath. Negative for cough and wheezing.    Endocrine: Negative for heat intolerance.   Hematologic/Lymphatic: Bruises/bleeds easily.   Skin: Negative for rash.   Musculoskeletal: Positive for arthritis. Negative for muscle weakness and myalgias.   Gastrointestinal: Negative for abdominal pain, anorexia, melena, nausea and vomiting.   Genitourinary: Negative for menorrhagia.   Neurological: Positive for light-headedness and vertigo. Negative for excessive daytime sleepiness, dizziness, headaches, loss of balance and seizures.   Psychiatric/Behavioral: Positive for depression. Negative for altered mental status. The patient is not nervous/anxious.        Objective:   Physical Exam   Constitutional: She is oriented to person, place, and time. She appears well-developed and well-nourished.   HENT:   Head: Normocephalic and atraumatic.   Right Ear: External ear normal.   Left Ear: External ear normal.   Eyes: Conjunctivae are normal. Pupils are equal, round, and reactive to light. Left eye exhibits no discharge. No scleral icterus.   Neck: Normal  range of motion. Neck supple. No thyromegaly present.   Cardiovascular: Normal rate, regular rhythm and intact distal pulses.  Exam reveals no gallop, no S3, no S4, no friction rub, no midsystolic click and no opening snap.    Murmur heard.   Systolic murmur is present with a grade of 2/6   Pulses:       Carotid pulses are 2+ on the right side, and 2+ on the left side.       Radial pulses are 2+ on the right side, and 2+ on the left side.        Dorsalis pedis pulses are 2+ on the right side, and 2+ on the left side.        Posterior tibial pulses are 2+ on the right side, and 2+ on the left side.   Pulmonary/Chest: Effort normal and breath sounds normal.   Device pocket is in excellent repair   Abdominal: Soft. She exhibits no distension. There is no hepatomegaly. There is no tenderness. There is no guarding.   Musculoskeletal:        Right ankle: She exhibits no swelling.        Left ankle: She exhibits no swelling.        Right lower leg: She exhibits no swelling.        Left lower leg: She exhibits no swelling.   Neurological: She is alert and oriented to person, place, and time. She has normal strength. No cranial nerve deficit. Gait normal.   Skin: Skin is warm, dry and intact. No rash noted. No cyanosis. Nails show no clubbing.   Psychiatric: She has a normal mood and affect. Her speech is normal and behavior is normal. Thought content normal. Cognition and memory are normal.   Nursing note and vitals reviewed.    BP (!) 140/72   Pulse 70   Wt 64.9 kg (143 lb)   BMI 25.33 kg/m²      Assessment:      1. Severe MR with remote mitral valve replacement on chronic anticoagulation    2. Third degree AV block s/p PPM    3. Chronic diastolic HFpEF of 60% per echo 5/2017    4. Bilateral carotid artery disease    5. Chronic anticoagulation    6. CKD (chronic kidney disease) stage 3, GFR 30-59 ml/min    7. Orthostatic hypotension        Plan:    Will follow PPM battery status closely and electively replace PPM in due  time   No orders of the defined types were placed in this encounter.    Follow-up post op .  There are no discontinued medications.  New Prescriptions    No medications on file     Modified Medications    Modified Medication Previous Medication    MIDODRINE (PROAMATINE) 5 MG TAB midodrine (PROAMATINE) 5 MG Tab       Take 1 tablet (5 mg total) by mouth 4 (four) times daily. During waking hours    Take 1 tablet (5 mg total) by mouth 2 (two) times daily with meals.    WARFARIN (COUMADIN) 1 MG TABLET warfarin (COUMADIN) 1 MG tablet       Take 1 tablet on Wednesdays and 1/2 tablet all other days as directed by the coumadin clinic.    Take 1/2 tablet every evening as directed by the coumadin clinic.

## 2018-02-14 DIAGNOSIS — I48.0 PAF (PAROXYSMAL ATRIAL FIBRILLATION): ICD-10-CM

## 2018-02-14 RX ORDER — PROPAFENONE HYDROCHLORIDE 150 MG/1
TABLET, COATED ORAL
Qty: 45 TABLET | Refills: 6 | Status: SHIPPED | OUTPATIENT
Start: 2018-02-14 | End: 2018-06-15 | Stop reason: SDUPTHER

## 2018-02-28 ENCOUNTER — LAB VISIT (OUTPATIENT)
Dept: LAB | Facility: HOSPITAL | Age: 83
End: 2018-02-28
Attending: INTERNAL MEDICINE
Payer: MEDICARE

## 2018-02-28 ENCOUNTER — CLINICAL SUPPORT (OUTPATIENT)
Dept: CARDIOLOGY | Facility: CLINIC | Age: 83
End: 2018-02-28
Payer: MEDICARE

## 2018-02-28 ENCOUNTER — ANTI-COAG VISIT (OUTPATIENT)
Dept: CARDIOLOGY | Facility: CLINIC | Age: 83
End: 2018-02-28
Payer: MEDICARE

## 2018-02-28 DIAGNOSIS — I50.30 DIASTOLIC CONGESTIVE HEART FAILURE, UNSPECIFIED CONGESTIVE HEART FAILURE CHRONICITY: ICD-10-CM

## 2018-02-28 DIAGNOSIS — I49.5 SICK SINUS SYNDROME: ICD-10-CM

## 2018-02-28 DIAGNOSIS — Z45.010 PACEMAKER GENERATOR END OF LIFE: Primary | ICD-10-CM

## 2018-02-28 DIAGNOSIS — Z79.01 LONG TERM (CURRENT) USE OF ANTICOAGULANTS: Primary | ICD-10-CM

## 2018-02-28 DIAGNOSIS — Z45.010 PACEMAKER GENERATOR END OF LIFE: ICD-10-CM

## 2018-02-28 LAB
ANION GAP SERPL CALC-SCNC: 9 MMOL/L
APTT BLDCRRT: 39.4 SEC
BASOPHILS # BLD AUTO: 0.03 K/UL
BASOPHILS NFR BLD: 0.5 %
BUN SERPL-MCNC: 31 MG/DL
CALCIUM SERPL-MCNC: 9 MG/DL
CHLORIDE SERPL-SCNC: 104 MMOL/L
CO2 SERPL-SCNC: 26 MMOL/L
CREAT SERPL-MCNC: 1.4 MG/DL
DIFFERENTIAL METHOD: ABNORMAL
EOSINOPHIL # BLD AUTO: 0.2 K/UL
EOSINOPHIL NFR BLD: 3.2 %
ERYTHROCYTE [DISTWIDTH] IN BLOOD BY AUTOMATED COUNT: 13 %
EST. GFR  (AFRICAN AMERICAN): 37.4 ML/MIN/1.73 M^2
EST. GFR  (NON AFRICAN AMERICAN): 32.4 ML/MIN/1.73 M^2
GLUCOSE SERPL-MCNC: 83 MG/DL
HCT VFR BLD AUTO: 36.4 %
HGB BLD-MCNC: 11.9 G/DL
IMM GRANULOCYTES # BLD AUTO: 0.02 K/UL
IMM GRANULOCYTES NFR BLD AUTO: 0.3 %
INR PPP: 2.7
INR PPP: 2.8 (ref 2.5–3.5)
LYMPHOCYTES # BLD AUTO: 2.1 K/UL
LYMPHOCYTES NFR BLD: 32.7 %
MCH RBC QN AUTO: 31.9 PG
MCHC RBC AUTO-ENTMCNC: 32.7 G/DL
MCV RBC AUTO: 98 FL
MONOCYTES # BLD AUTO: 0.6 K/UL
MONOCYTES NFR BLD: 9.6 %
NEUTROPHILS # BLD AUTO: 3.4 K/UL
NEUTROPHILS NFR BLD: 53.7 %
NRBC BLD-RTO: 0 /100 WBC
PLATELET # BLD AUTO: 182 K/UL
PMV BLD AUTO: 12.3 FL
POTASSIUM SERPL-SCNC: 4.7 MMOL/L
PROTHROMBIN TIME: 28.2 SEC
RBC # BLD AUTO: 3.73 M/UL
SODIUM SERPL-SCNC: 139 MMOL/L
WBC # BLD AUTO: 6.26 K/UL

## 2018-02-28 PROCEDURE — 80048 BASIC METABOLIC PNL TOTAL CA: CPT

## 2018-02-28 PROCEDURE — 85610 PROTHROMBIN TIME: CPT | Mod: PO

## 2018-02-28 PROCEDURE — 85730 THROMBOPLASTIN TIME PARTIAL: CPT | Mod: PO

## 2018-02-28 PROCEDURE — 36415 COLL VENOUS BLD VENIPUNCTURE: CPT | Mod: PO

## 2018-02-28 PROCEDURE — 85025 COMPLETE CBC W/AUTO DIFF WBC: CPT

## 2018-02-28 PROCEDURE — 85610 PROTHROMBIN TIME: CPT | Mod: QW,S$GLB,,

## 2018-02-28 PROCEDURE — 99211 OFF/OP EST MAY X REQ PHY/QHP: CPT | Mod: 25,S$GLB,,

## 2018-02-28 NOTE — PROGRESS NOTES
INR remains therapeutic. Upcoming pacemaker procedure. Will hold warfarin prior to and resume at boosted dose post-op unless otherwise notified by provider. Repeat INR  1 week post-op. Patient verbalized understanding.

## 2018-03-01 NOTE — PROGRESS NOTES
Pt has Scanaduronik Bi-V pacemaker implanted 10/27/2011 - battery check only today - device has reached JORGE.  Scheduled generator change with Dr. Webb on Friday, March 9th at [facility].

## 2018-03-07 ENCOUNTER — TELEPHONE (OUTPATIENT)
Dept: CARDIOLOGY | Facility: CLINIC | Age: 83
End: 2018-03-07

## 2018-03-07 NOTE — TELEPHONE ENCOUNTER
----- Message from Bright Arrington sent at 3/7/2018 10:50 AM CST -----  Contact: pt daughter christopher Hardin is calling to speak to nurse in regards to pt surgery.[phone]

## 2018-03-07 NOTE — TELEPHONE ENCOUNTER
Returned phone call to patient's daughter and scheduled procedure is outpatient... only Pacemaker generator change

## 2018-03-08 DIAGNOSIS — I48.20 ATRIAL FIBRILLATION, CHRONIC: ICD-10-CM

## 2018-03-08 DIAGNOSIS — Z45.018 PACEMAKER END OF LIFE: Primary | ICD-10-CM

## 2018-03-08 DIAGNOSIS — I44.2 HEART BLOCK AV THIRD DEGREE: ICD-10-CM

## 2018-03-09 ENCOUNTER — SURGERY (OUTPATIENT)
Age: 83
End: 2018-03-09

## 2018-03-09 ENCOUNTER — HOSPITAL ENCOUNTER (OUTPATIENT)
Facility: HOSPITAL | Age: 83
Discharge: HOME | End: 2018-03-11
Attending: EMERGENCY MEDICINE | Admitting: INTERNAL MEDICINE
Payer: MEDICARE

## 2018-03-09 ENCOUNTER — ANESTHESIA EVENT (OUTPATIENT)
Dept: CARDIOLOGY | Facility: HOSPITAL | Age: 83
End: 2018-03-09
Payer: MEDICARE

## 2018-03-09 ENCOUNTER — ANESTHESIA (OUTPATIENT)
Dept: CARDIOLOGY | Facility: HOSPITAL | Age: 83
End: 2018-03-09
Payer: MEDICARE

## 2018-03-09 DIAGNOSIS — G45.9 TRANSIENT CEREBRAL ISCHEMIA, UNSPECIFIED TYPE: Primary | ICD-10-CM

## 2018-03-09 DIAGNOSIS — R55 SYNCOPE: ICD-10-CM

## 2018-03-09 DIAGNOSIS — R55 NEAR SYNCOPE: ICD-10-CM

## 2018-03-09 PROBLEM — Z45.018 PACEMAKER END OF LIFE: Status: ACTIVE | Noted: 2018-03-09

## 2018-03-09 LAB
ALBUMIN SERPL BCP-MCNC: 3.8 G/DL
ALP SERPL-CCNC: 56 U/L
ALT SERPL W/O P-5'-P-CCNC: 13 U/L
ANION GAP SERPL CALC-SCNC: 10 MMOL/L
AST SERPL-CCNC: 22 U/L
BILIRUB SERPL-MCNC: 0.5 MG/DL
BUN SERPL-MCNC: 37 MG/DL
CALCIUM SERPL-MCNC: 9.2 MG/DL
CHLORIDE SERPL-SCNC: 105 MMOL/L
CO2 SERPL-SCNC: 25 MMOL/L
CREAT SERPL-MCNC: 1.8 MG/DL
EST. GFR  (AFRICAN AMERICAN): 28 ML/MIN/1.73 M^2
EST. GFR  (NON AFRICAN AMERICAN): 24 ML/MIN/1.73 M^2
GLUCOSE SERPL-MCNC: 127 MG/DL
INR PPP: 3
POTASSIUM SERPL-SCNC: 5.1 MMOL/L
PROT SERPL-MCNC: 7 G/DL
PROTHROMBIN TIME: 30.9 SEC
SODIUM SERPL-SCNC: 140 MMOL/L

## 2018-03-09 PROCEDURE — 93010 ELECTROCARDIOGRAM REPORT: CPT | Mod: ,,, | Performed by: INTERNAL MEDICINE

## 2018-03-09 PROCEDURE — 63600175 PHARM REV CODE 636 W HCPCS: Performed by: NURSE ANESTHETIST, CERTIFIED REGISTERED

## 2018-03-09 PROCEDURE — 25000003 PHARM REV CODE 250: Performed by: NURSE ANESTHETIST, CERTIFIED REGISTERED

## 2018-03-09 PROCEDURE — 83735 ASSAY OF MAGNESIUM: CPT

## 2018-03-09 PROCEDURE — 93005 ELECTROCARDIOGRAM TRACING: CPT | Mod: 59

## 2018-03-09 PROCEDURE — 84484 ASSAY OF TROPONIN QUANT: CPT

## 2018-03-09 PROCEDURE — 83880 ASSAY OF NATRIURETIC PEPTIDE: CPT

## 2018-03-09 PROCEDURE — 99285 EMERGENCY DEPT VISIT HI MDM: CPT | Mod: 25

## 2018-03-09 PROCEDURE — 85610 PROTHROMBIN TIME: CPT | Mod: 91

## 2018-03-09 PROCEDURE — 84100 ASSAY OF PHOSPHORUS: CPT

## 2018-03-09 PROCEDURE — 80053 COMPREHEN METABOLIC PANEL: CPT

## 2018-03-09 PROCEDURE — 85025 COMPLETE CBC W/AUTO DIFF WBC: CPT

## 2018-03-09 RX ORDER — LIDOCAINE HYDROCHLORIDE 10 MG/ML
INJECTION INFILTRATION; PERINEURAL
Status: DISCONTINUED | OUTPATIENT
Start: 2018-03-09 | End: 2018-03-09

## 2018-03-09 RX ORDER — PROPOFOL 10 MG/ML
VIAL (ML) INTRAVENOUS
Status: DISCONTINUED | OUTPATIENT
Start: 2018-03-09 | End: 2018-03-09

## 2018-03-09 RX ORDER — CEFADROXIL 500 MG/1
500 CAPSULE ORAL EVERY 12 HOURS
Qty: 6 CAPSULE | Refills: 0 | Status: ON HOLD | OUTPATIENT
Start: 2018-03-09 | End: 2018-03-11 | Stop reason: HOSPADM

## 2018-03-09 RX ORDER — ONDANSETRON 2 MG/ML
INJECTION INTRAMUSCULAR; INTRAVENOUS
Status: DISCONTINUED | OUTPATIENT
Start: 2018-03-09 | End: 2018-03-09

## 2018-03-09 RX ORDER — SODIUM CHLORIDE, SODIUM LACTATE, POTASSIUM CHLORIDE, CALCIUM CHLORIDE 600; 310; 30; 20 MG/100ML; MG/100ML; MG/100ML; MG/100ML
INJECTION, SOLUTION INTRAVENOUS CONTINUOUS PRN
Status: DISCONTINUED | OUTPATIENT
Start: 2018-03-09 | End: 2018-03-09

## 2018-03-09 RX ORDER — MIDAZOLAM HYDROCHLORIDE 1 MG/ML
INJECTION, SOLUTION INTRAMUSCULAR; INTRAVENOUS
Status: DISCONTINUED | OUTPATIENT
Start: 2018-03-09 | End: 2018-03-09

## 2018-03-09 RX ADMIN — PROPOFOL 30 MG: 10 INJECTION, EMULSION INTRAVENOUS at 10:03

## 2018-03-09 RX ADMIN — MIDAZOLAM HYDROCHLORIDE 2 MG: 1 INJECTION, SOLUTION INTRAMUSCULAR; INTRAVENOUS at 09:03

## 2018-03-09 RX ADMIN — CEFAZOLIN SODIUM 2 G: 2 SOLUTION INTRAVENOUS at 09:03

## 2018-03-09 RX ADMIN — PROPOFOL 20 MG: 10 INJECTION, EMULSION INTRAVENOUS at 10:03

## 2018-03-09 RX ADMIN — SODIUM CHLORIDE, SODIUM LACTATE, POTASSIUM CHLORIDE, AND CALCIUM CHLORIDE: 600; 310; 30; 20 INJECTION, SOLUTION INTRAVENOUS at 09:03

## 2018-03-09 RX ADMIN — LIDOCAINE HYDROCHLORIDE 80 MG: 10 INJECTION, SOLUTION INFILTRATION; PERINEURAL at 10:03

## 2018-03-09 RX ADMIN — ONDANSETRON 4 MG: 2 INJECTION INTRAMUSCULAR; INTRAVENOUS at 09:03

## 2018-03-09 NOTE — TRANSFER OF CARE
"Anesthesia Transfer of Care Note    Patient: Josy Posey    Procedure(s) Performed: Procedure(s) (LRB):  REPLACEMENT-GENERATOR-PACEMAKER (Left)    Patient location: Other: cvru    Anesthesia Type: MAC    Transport from OR: Transported from OR on room air with adequate spontaneous ventilation    Post pain: adequate analgesia    Post assessment: no apparent anesthetic complications    Post vital signs: stable    Level of consciousness: awake    Nausea/Vomiting: no nausea/vomiting    Complications: none    Transfer of care protocol was followed      Last vitals:   Visit Vitals  BP (!) 163/71 (BP Location: Left arm, Patient Position: Lying)   Pulse 75   Temp 36.2 °C (97.1 °F) (Oral)   Resp 16   Ht 5' 3" (1.6 m)   Wt 64.9 kg (143 lb)   SpO2 99%   Breastfeeding? No   BMI 25.33 kg/m²     "

## 2018-03-09 NOTE — ANESTHESIA RELEASE NOTE
"Anesthesia Release from PACU Note    Patient: Josy Posey    Procedure(s) Performed: Procedure(s) (LRB):  REPLACEMENT-GENERATOR-PACEMAKER (Left)    Anesthesia type: MAC    Post pain: Adequate analgesia    Post assessment: no apparent anesthetic complications    Last Vitals:   Visit Vitals  BP (!) 163/71 (BP Location: Left arm, Patient Position: Lying)   Pulse 75   Temp 36.2 °C (97.1 °F) (Oral)   Resp 16   Ht 5' 3" (1.6 m)   Wt 64.9 kg (143 lb)   SpO2 99%   Breastfeeding? No   BMI 25.33 kg/m²       Post vital signs: stable    Level of consciousness: awake    Nausea/Vomiting: no nausea/no vomiting    Complications: none    Airway Patency: patent    Respiratory: unassisted    Cardiovascular: stable and blood pressure at baseline    Hydration: euvolemic  "

## 2018-03-09 NOTE — ANESTHESIA PREPROCEDURE EVALUATION
03/09/2018  Josy Posey is a 89 y.o., female.    Anesthesia Evaluation    I have reviewed the Patient Summary Reports.    I have reviewed the Nursing Notes.   I have reviewed the Medications.     Review of Systems  Anesthesia Hx:  No problems with previous Anesthesia  History of prior surgery of interest to airway management or planning: Denies Family Hx of Anesthesia complications.   Denies Personal Hx of Anesthesia complications.   Social:  Non-Smoker    Hematology/Oncology:         -- Anemia: --  Cancer in past history:    Cardiovascular:   Pacemaker Hypertension Valvular problems/Murmurs, MR Past MI CAD  CABG/stent Dysrhythmias atrial fibrillation Angina CHF hyperlipidemia ECG has been reviewed. Hx DVT    Hx MVR    Echo 05/17  CONCLUSIONS     1 - Moderate left atrial enlargement.     2 - Concentric hypertrophy.     3 - No wall motion abnormalities.     4 - Normal left ventricular systolic function (EF 60-65%).     5 - Normal right ventricular systolic function .     6 - Pulmonary hypertension. The estimated PA systolic pressure is greater than 47 mmHg.     7 - Mitral valve prosthesis.    Pulmonary:   Pulmonary HTN   Renal/:   Chronic Renal Disease, CRI Urinary incontinence   Hepatic/GI:   Internal hemorhoids   Neurological:   CVA Neuromuscular Disease, Headaches Hx Intracranial Hemorhage  Peripheral Neuropathy    Endocrine:   Hypothyroidism    Psych:   Psychiatric History depression Memory Loss         Physical Exam  General:  Well nourished    Airway/Jaw/Neck:  Airway Findings: Mouth Opening: Normal Tongue: Normal  General Airway Assessment: Adult  Mallampati: II  TM Distance: Normal, at least 6 cm          Chest/Lungs:  Chest/Lungs Findings: Normal Respiratory Rate     Heart/Vascular:  Heart Findings: Ventricular pacing           Anesthesia Plan  Type of Anesthesia, risks & benefits  discussed:  Anesthesia Type:  MAC  Patient's Preference:   Intra-op Monitoring Plan: standard ASA monitors  Intra-op Monitoring Plan Comments:   Post Op Pain Control Plan:   Post Op Pain Control Plan Comments:   Induction:   IV  Beta Blocker:  Patient is not currently on a Beta-Blocker (No further documentation required).       Informed Consent: Patient understands risks and agrees with Anesthesia plan.  Questions answered. Anesthesia consent signed with patient.  ASA Score: 4     Day of Surgery Review of History & Physical:    H&P update referred to the surgeon.         Ready For Surgery From Anesthesia Perspective.

## 2018-03-09 NOTE — ANESTHESIA POSTPROCEDURE EVALUATION
"Anesthesia Post Evaluation    Patient: Josy Posey    Procedure(s) Performed: Procedure(s) (LRB):  REPLACEMENT-GENERATOR-PACEMAKER (Left)    Final Anesthesia Type: MAC  Patient location during evaluation: PACU  Patient participation: Yes- Able to Participate  Level of consciousness: awake and alert  Post-procedure vital signs: reviewed and stable  Pain management: adequate  Airway patency: patent  PONV status at discharge: No PONV  Anesthetic complications: no      Cardiovascular status: blood pressure returned to baseline  Respiratory status: unassisted  Hydration status: euvolemic  Follow-up not needed.        Visit Vitals  BP (!) 163/71 (BP Location: Left arm, Patient Position: Lying)   Pulse 75   Temp 36.2 °C (97.1 °F) (Oral)   Resp 16   Ht 5' 3" (1.6 m)   Wt 64.9 kg (143 lb)   SpO2 99%   Breastfeeding? No   BMI 25.33 kg/m²       Pain/Jeremy Score: No Data Recorded      "

## 2018-03-10 PROBLEM — N17.9 AKI (ACUTE KIDNEY INJURY): Status: ACTIVE | Noted: 2018-03-10

## 2018-03-10 PROBLEM — R55 SYNCOPE: Status: ACTIVE | Noted: 2018-03-10

## 2018-03-10 PROBLEM — E83.42 HYPOMAGNESEMIA: Status: ACTIVE | Noted: 2018-03-10

## 2018-03-10 LAB
ANION GAP SERPL CALC-SCNC: 9 MMOL/L
BACTERIA #/AREA URNS HPF: ABNORMAL /HPF
BASOPHILS # BLD AUTO: 0.01 K/UL
BASOPHILS # BLD AUTO: 0.02 K/UL
BASOPHILS NFR BLD: 0.1 %
BASOPHILS NFR BLD: 0.2 %
BILIRUB UR QL STRIP: NEGATIVE
BNP SERPL-MCNC: 206 PG/ML
BUN SERPL-MCNC: 39 MG/DL
CALCIUM SERPL-MCNC: 8.9 MG/DL
CHLORIDE SERPL-SCNC: 108 MMOL/L
CHOLEST SERPL-MCNC: 126 MG/DL
CHOLEST/HDLC SERPL: 3.6 {RATIO}
CLARITY UR: ABNORMAL
CO2 SERPL-SCNC: 24 MMOL/L
COLOR UR: YELLOW
CREAT SERPL-MCNC: 1.7 MG/DL
DIFFERENTIAL METHOD: ABNORMAL
DIFFERENTIAL METHOD: ABNORMAL
EOSINOPHIL # BLD AUTO: 0.1 K/UL
EOSINOPHIL # BLD AUTO: 0.2 K/UL
EOSINOPHIL NFR BLD: 1.6 %
EOSINOPHIL NFR BLD: 1.6 %
ERYTHROCYTE [DISTWIDTH] IN BLOOD BY AUTOMATED COUNT: 13.6 %
ERYTHROCYTE [DISTWIDTH] IN BLOOD BY AUTOMATED COUNT: 13.8 %
EST. GFR  (AFRICAN AMERICAN): 30 ML/MIN/1.73 M^2
EST. GFR  (NON AFRICAN AMERICAN): 26 ML/MIN/1.73 M^2
ESTIMATED AVG GLUCOSE: 111 MG/DL
ESTIMATED PA SYSTOLIC PRESSURE: 39.48
GLUCOSE SERPL-MCNC: 107 MG/DL
GLUCOSE UR QL STRIP: NEGATIVE
HBA1C MFR BLD HPLC: 5.5 %
HCT VFR BLD AUTO: 33.1 %
HCT VFR BLD AUTO: 36.9 %
HDLC SERPL-MCNC: 35 MG/DL
HDLC SERPL: 27.8 %
HGB BLD-MCNC: 11 G/DL
HGB BLD-MCNC: 12.4 G/DL
HGB UR QL STRIP: ABNORMAL
INR PPP: 3.4
KETONES UR QL STRIP: NEGATIVE
LDLC SERPL CALC-MCNC: 67.2 MG/DL
LEUKOCYTE ESTERASE UR QL STRIP: ABNORMAL
LYMPHOCYTES # BLD AUTO: 1.7 K/UL
LYMPHOCYTES # BLD AUTO: 2.2 K/UL
LYMPHOCYTES NFR BLD: 18.8 %
LYMPHOCYTES NFR BLD: 29.6 %
MAGNESIUM SERPL-MCNC: 1.3 MG/DL
MCH RBC QN AUTO: 32 PG
MCH RBC QN AUTO: 32.4 PG
MCHC RBC AUTO-ENTMCNC: 33.2 G/DL
MCHC RBC AUTO-ENTMCNC: 33.6 G/DL
MCV RBC AUTO: 96 FL
MCV RBC AUTO: 96 FL
MICROSCOPIC COMMENT: ABNORMAL
MITRAL VALVE REGURGITATION: NORMAL
MONOCYTES # BLD AUTO: 0.6 K/UL
MONOCYTES # BLD AUTO: 0.7 K/UL
MONOCYTES NFR BLD: 6.9 %
MONOCYTES NFR BLD: 9.7 %
NEUTROPHILS # BLD AUTO: 4.5 K/UL
NEUTROPHILS # BLD AUTO: 6.7 K/UL
NEUTROPHILS NFR BLD: 59 %
NEUTROPHILS NFR BLD: 72.7 %
NITRITE UR QL STRIP: NEGATIVE
NON-SQ EPI CELLS #/AREA URNS HPF: 1 /HPF
NONHDLC SERPL-MCNC: 91 MG/DL
PH UR STRIP: 6 [PH] (ref 5–8)
PHOSPHATE SERPL-MCNC: 4 MG/DL
PLATELET # BLD AUTO: 149 K/UL
PLATELET # BLD AUTO: 175 K/UL
PMV BLD AUTO: 11.6 FL
PMV BLD AUTO: 11.8 FL
POTASSIUM SERPL-SCNC: 4.7 MMOL/L
PROT UR QL STRIP: NEGATIVE
PROTHROMBIN TIME: 35.3 SEC
RBC # BLD AUTO: 3.44 M/UL
RBC # BLD AUTO: 3.83 M/UL
RBC #/AREA URNS HPF: 10 /HPF (ref 0–4)
RETIRED EF AND QEF - SEE NOTES: 55 (ref 55–65)
SODIUM SERPL-SCNC: 141 MMOL/L
SP GR UR STRIP: 1.02 (ref 1–1.03)
SQUAMOUS #/AREA URNS HPF: 0 /HPF
TRIGL SERPL-MCNC: 119 MG/DL
TROPONIN I SERPL DL<=0.01 NG/ML-MCNC: 0.01 NG/ML
TROPONIN I SERPL DL<=0.01 NG/ML-MCNC: 0.02 NG/ML
TROPONIN I SERPL DL<=0.01 NG/ML-MCNC: 0.02 NG/ML
TROPONIN I SERPL DL<=0.01 NG/ML-MCNC: 0.03 NG/ML
TSH SERPL DL<=0.005 MIU/L-ACNC: 2.47 UIU/ML
URN SPEC COLLECT METH UR: ABNORMAL
UROBILINOGEN UR STRIP-ACNC: NEGATIVE EU/DL
WBC # BLD AUTO: 7.56 K/UL
WBC # BLD AUTO: 9.21 K/UL
WBC #/AREA URNS HPF: >100 /HPF (ref 0–5)

## 2018-03-10 PROCEDURE — G0378 HOSPITAL OBSERVATION PER HR: HCPCS

## 2018-03-10 PROCEDURE — 84484 ASSAY OF TROPONIN QUANT: CPT | Mod: 91

## 2018-03-10 PROCEDURE — G8988 SELF CARE GOAL STATUS: HCPCS | Mod: CJ

## 2018-03-10 PROCEDURE — 81000 URINALYSIS NONAUTO W/SCOPE: CPT

## 2018-03-10 PROCEDURE — 87086 URINE CULTURE/COLONY COUNT: CPT

## 2018-03-10 PROCEDURE — 97802 MEDICAL NUTRITION INDIV IN: CPT | Mod: 59

## 2018-03-10 PROCEDURE — 93005 ELECTROCARDIOGRAM TRACING: CPT

## 2018-03-10 PROCEDURE — 25000003 PHARM REV CODE 250: Performed by: EMERGENCY MEDICINE

## 2018-03-10 PROCEDURE — 85025 COMPLETE CBC W/AUTO DIFF WBC: CPT

## 2018-03-10 PROCEDURE — 63600175 PHARM REV CODE 636 W HCPCS: Performed by: NURSE PRACTITIONER

## 2018-03-10 PROCEDURE — 93306 TTE W/DOPPLER COMPLETE: CPT | Mod: 26,,, | Performed by: INTERNAL MEDICINE

## 2018-03-10 PROCEDURE — 83036 HEMOGLOBIN GLYCOSYLATED A1C: CPT

## 2018-03-10 PROCEDURE — 80048 BASIC METABOLIC PNL TOTAL CA: CPT

## 2018-03-10 PROCEDURE — 80061 LIPID PANEL: CPT

## 2018-03-10 PROCEDURE — C8929 TTE W OR WO FOL WCON,DOPPLER: HCPCS

## 2018-03-10 PROCEDURE — 97166 OT EVAL MOD COMPLEX 45 MIN: CPT

## 2018-03-10 PROCEDURE — 85610 PROTHROMBIN TIME: CPT

## 2018-03-10 PROCEDURE — G8978 MOBILITY CURRENT STATUS: HCPCS | Mod: CL

## 2018-03-10 PROCEDURE — 84484 ASSAY OF TROPONIN QUANT: CPT

## 2018-03-10 PROCEDURE — 25000003 PHARM REV CODE 250: Performed by: INTERNAL MEDICINE

## 2018-03-10 PROCEDURE — G8987 SELF CARE CURRENT STATUS: HCPCS | Mod: CL

## 2018-03-10 PROCEDURE — 97162 PT EVAL MOD COMPLEX 30 MIN: CPT

## 2018-03-10 PROCEDURE — 93010 ELECTROCARDIOGRAM REPORT: CPT | Mod: ,,, | Performed by: INTERNAL MEDICINE

## 2018-03-10 PROCEDURE — 25000003 PHARM REV CODE 250: Performed by: NURSE PRACTITIONER

## 2018-03-10 PROCEDURE — G8979 MOBILITY GOAL STATUS: HCPCS | Mod: CK

## 2018-03-10 PROCEDURE — 36415 COLL VENOUS BLD VENIPUNCTURE: CPT

## 2018-03-10 PROCEDURE — 84443 ASSAY THYROID STIM HORMONE: CPT

## 2018-03-10 RX ORDER — FOLIC ACID 1 MG/1
1 TABLET ORAL DAILY
Status: DISCONTINUED | OUTPATIENT
Start: 2018-03-10 | End: 2018-03-11 | Stop reason: HOSPADM

## 2018-03-10 RX ORDER — IRON,CARBONYL/ASCORBIC ACID 100-250 MG
1 TABLET ORAL DAILY
Status: DISCONTINUED | OUTPATIENT
Start: 2018-03-10 | End: 2018-03-10 | Stop reason: RX

## 2018-03-10 RX ORDER — SODIUM CHLORIDE 9 MG/ML
INJECTION, SOLUTION INTRAVENOUS CONTINUOUS
Status: ACTIVE | OUTPATIENT
Start: 2018-03-10 | End: 2018-03-11

## 2018-03-10 RX ORDER — CEFADROXIL 500 MG/1
500 CAPSULE ORAL EVERY 12 HOURS
Status: DISCONTINUED | OUTPATIENT
Start: 2018-03-10 | End: 2018-03-10

## 2018-03-10 RX ORDER — ESCITALOPRAM OXALATE 10 MG/1
10 TABLET ORAL DAILY
Status: DISCONTINUED | OUTPATIENT
Start: 2018-03-10 | End: 2018-03-11 | Stop reason: HOSPADM

## 2018-03-10 RX ORDER — MAGNESIUM SULFATE HEPTAHYDRATE 40 MG/ML
2 INJECTION, SOLUTION INTRAVENOUS ONCE
Status: COMPLETED | OUTPATIENT
Start: 2018-03-10 | End: 2018-03-10

## 2018-03-10 RX ORDER — LEVOTHYROXINE SODIUM 50 UG/1
50 TABLET ORAL DAILY
Status: DISCONTINUED | OUTPATIENT
Start: 2018-03-10 | End: 2018-03-11 | Stop reason: HOSPADM

## 2018-03-10 RX ORDER — ONDANSETRON 2 MG/ML
4 INJECTION INTRAMUSCULAR; INTRAVENOUS EVERY 8 HOURS PRN
Status: DISCONTINUED | OUTPATIENT
Start: 2018-03-10 | End: 2018-03-11 | Stop reason: HOSPADM

## 2018-03-10 RX ORDER — ACETAMINOPHEN 325 MG/1
325 TABLET ORAL EVERY 6 HOURS PRN
Status: DISCONTINUED | OUTPATIENT
Start: 2018-03-10 | End: 2018-03-11 | Stop reason: HOSPADM

## 2018-03-10 RX ORDER — FERROUS SULFATE 325(65) MG
325 TABLET, DELAYED RELEASE (ENTERIC COATED) ORAL DAILY
Status: DISCONTINUED | OUTPATIENT
Start: 2018-03-10 | End: 2018-03-11 | Stop reason: HOSPADM

## 2018-03-10 RX ORDER — FOLIC ACID 1 MG/1
1000 TABLET ORAL DAILY
Status: DISCONTINUED | OUTPATIENT
Start: 2018-03-10 | End: 2018-03-10

## 2018-03-10 RX ORDER — PRAVASTATIN SODIUM 20 MG/1
20 TABLET ORAL DAILY
Status: DISCONTINUED | OUTPATIENT
Start: 2018-03-10 | End: 2018-03-11 | Stop reason: HOSPADM

## 2018-03-10 RX ORDER — CYANOCOBALAMIN (VITAMIN B-12) 250 MCG
500 TABLET ORAL DAILY
Status: DISCONTINUED | OUTPATIENT
Start: 2018-03-10 | End: 2018-03-11 | Stop reason: HOSPADM

## 2018-03-10 RX ORDER — SODIUM CHLORIDE 0.9 % (FLUSH) 0.9 %
3 SYRINGE (ML) INJECTION EVERY 8 HOURS
Status: DISCONTINUED | OUTPATIENT
Start: 2018-03-10 | End: 2018-03-11 | Stop reason: HOSPADM

## 2018-03-10 RX ORDER — MIDODRINE HYDROCHLORIDE 2.5 MG/1
5 TABLET ORAL 4 TIMES DAILY
Status: DISCONTINUED | OUTPATIENT
Start: 2018-03-10 | End: 2018-03-11 | Stop reason: HOSPADM

## 2018-03-10 RX ORDER — ASCORBIC ACID 500 MG
500 TABLET ORAL DAILY
Status: DISCONTINUED | OUTPATIENT
Start: 2018-03-10 | End: 2018-03-11 | Stop reason: HOSPADM

## 2018-03-10 RX ORDER — WARFARIN 1 MG/1
1 TABLET ORAL
Status: DISCONTINUED | OUTPATIENT
Start: 2018-03-14 | End: 2018-03-10 | Stop reason: CLARIF

## 2018-03-10 RX ADMIN — LEVOTHYROXINE SODIUM 50 MCG: 50 TABLET ORAL at 06:03

## 2018-03-10 RX ADMIN — MAGNESIUM SULFATE IN WATER 2 G: 40 INJECTION, SOLUTION INTRAVENOUS at 04:03

## 2018-03-10 RX ADMIN — ACETAMINOPHEN 325 MG: 325 TABLET ORAL at 04:03

## 2018-03-10 RX ADMIN — ACETAMINOPHEN 325 MG: 325 TABLET ORAL at 03:03

## 2018-03-10 RX ADMIN — FOLIC ACID 1 MG: 1 TABLET ORAL at 08:03

## 2018-03-10 RX ADMIN — PROPAFENONE HYDROCHLORIDE 75 MG: 150 TABLET, FILM COATED ORAL at 10:03

## 2018-03-10 RX ADMIN — CEFADROXIL 500 MG: 500 CAPSULE ORAL at 06:03

## 2018-03-10 RX ADMIN — PROPAFENONE HYDROCHLORIDE 75 MG: 150 TABLET, FILM COATED ORAL at 03:03

## 2018-03-10 RX ADMIN — CEFTRIAXONE 1 G: 1 INJECTION, SOLUTION INTRAVENOUS at 09:03

## 2018-03-10 RX ADMIN — MIDODRINE HYDROCHLORIDE 5 MG: 2.5 TABLET ORAL at 05:03

## 2018-03-10 RX ADMIN — MIDODRINE HYDROCHLORIDE 5 MG: 2.5 TABLET ORAL at 09:03

## 2018-03-10 RX ADMIN — CYANOCOBALAMIN TAB 250 MCG 500 MCG: 250 TAB at 08:03

## 2018-03-10 RX ADMIN — FERROUS SULFATE TAB EC 325 MG (65 MG FE EQUIVALENT) 325 MG: 325 (65 FE) TABLET DELAYED RESPONSE at 09:03

## 2018-03-10 RX ADMIN — PRAVASTATIN SODIUM 20 MG: 20 TABLET ORAL at 09:03

## 2018-03-10 RX ADMIN — SODIUM CHLORIDE: 0.9 INJECTION, SOLUTION INTRAVENOUS at 04:03

## 2018-03-10 RX ADMIN — ESCITALOPRAM OXALATE 10 MG: 10 TABLET, FILM COATED ORAL at 09:03

## 2018-03-10 RX ADMIN — MIDODRINE HYDROCHLORIDE 5 MG: 2.5 TABLET ORAL at 10:03

## 2018-03-10 RX ADMIN — PROPAFENONE HYDROCHLORIDE 75 MG: 150 TABLET, FILM COATED ORAL at 09:03

## 2018-03-10 RX ADMIN — ACETAMINOPHEN 325 MG: 325 TABLET ORAL at 10:03

## 2018-03-10 RX ADMIN — OXYCODONE HYDROCHLORIDE AND ACETAMINOPHEN 500 MG: 500 TABLET ORAL at 09:03

## 2018-03-10 RX ADMIN — MIDODRINE HYDROCHLORIDE 5 MG: 2.5 TABLET ORAL at 12:03

## 2018-03-10 NOTE — PLAN OF CARE
Consult received for discharge planning.  Patient lives at home with her daughter, Abrahan.  She has a caregiver with her during the day, 7 days per week.  She states the only time she may be alone is from 4-5 pm.  She has the following equipment at home:  Rollator, BSC, wheelchair, and shower chair (does not use).  She does not currently have home health but has had it in the past.  CM discussed possible SNF placement for therapy.  Patient declined any placement at this time.  CM also discussed home health and she stated that she has someone in her home to help her so she does not feel she needs home health.  CM provided contact information for any needs/questions.     03/10/18 0836   Discharge Assessment   Assessment Type Discharge Planning Assessment   Confirmed/corrected address and phone number on facesheet? Yes   Assessment information obtained from? Patient;Medical Record   Expected Length of Stay (days) (1-2)   Communicated expected length of stay with patient/caregiver yes   Prior to hospitilization cognitive status: Alert/Oriented   Prior to hospitalization functional status: Assistive Equipment;Needs Assistance   Current cognitive status: Alert/Oriented   Current Functional Status: Assistive Equipment;Needs Assistance   Facility Arrived From: Home    Lives With child(gemma), adult   Able to Return to Prior Arrangements yes   Is patient able to care for self after discharge? Unable to determine at this time (comments)   Who are your caregiver(s) and their phone number(s)? Abrahan Gray, daughter [phone]   Patient's perception of discharge disposition home or selfcare   Readmission Within The Last 30 Days no previous admission in last 30 days   Patient currently being followed by outpatient case management? No   Patient currently receives any other outside agency services? No   Equipment Currently Used at Home rollator;shower chair;wheelchair;bedside commode   Do you have any problems affording any of  your prescribed medications? No   Is the patient taking medications as prescribed? yes   Does the patient have transportation home? Yes   Transportation Available family or friend will provide   Dialysis Name and Scheduled days NA   Does the patient receive services at the [facility]? Yes   Discharge Plan A Home with family   Discharge Plan B Home Health;Home with family   Patient/Family In Agreement With Plan yes

## 2018-03-10 NOTE — PLAN OF CARE
Problem: Patient Care Overview  Goal: Plan of Care Review  Patient currently requires min assist for bed mobility and transfers with RW.    Outcome: Ongoing (interventions implemented as appropriate)  Plan of care reviewed and discussed with patient.  No acute distress noted.  Safety and fall precautions reviewed and discussed with patient.  Patient free from injury.  Pain managed adequately.  Oral hydration and bed mobility promoted.  IV hydration maintained.  Call bell and personal items in reach.  Bed in low position and locked.  Side rails up x3.  Encouraged to call if any assistance needed.  Will continue to monitor.      12 hour chart check complete.

## 2018-03-10 NOTE — PLAN OF CARE
Problem: Fall Risk (Adult)  Goal: Identify Related Risk Factors and Signs and Symptoms  Related risk factors and signs and symptoms are identified upon initiation of Human Response Clinical Practice Guideline (CPG)   Outcome: Ongoing (interventions implemented as appropriate)  Bed alarm in use

## 2018-03-10 NOTE — PLAN OF CARE
Problem: Patient Care Overview  Goal: Plan of Care Review  Outcome: Ongoing (interventions implemented as appropriate)  POC reviewed with patient, verbalized understanding. Pt remains free from falls. Fall precautions in place. Bed alarm in use  Paced on the  cardiac monitor. VSS. No other complaints at this time. Call bell w/in reach. Reminded to call for assistance. Bedside commode at the bedside. Urine sample collected and sent down to the lab. Pt has an ice pack on her left shoulder and took tylenol for pain

## 2018-03-10 NOTE — HOSPITAL COURSE
89 year old female admitted for syncope. Patient has long hx autonomic dysfunction with orthostatic hypotension. Yesterday patient had battery change to pacemaker. Initial Troponin 0.032, . Troponin #2 0.019. Head CT NAF, Chest Xray: NAF. Currently patient AAOx3, nad, no sob, no cp. Orthostatic. Usman Carotid US showed bilateral carotid atherosclerotic plaque with elevated peak systolic velocity the right internal carotid artery suggesting 50-69% stenosis. 2D echo showed EF 55-60% and Pulmonary HTN. + UTI, IV Rocephin started. Patient being treated with IV hydration and midodrine. Creatinine back to baseline. Symptoms improved. Patient denies any dysuria, frequency, or urgency. Case discussed with Dr. Nieto. Home meds reconciled. New prescription given for Ciprofloxacin. Patient to follow-up with PCP in 3-5 days for hospital follow-up. Patient also to follow-up with Dr. Alvarado (Cardiology) in 2 weeks. Patient seen and examined on the date of discharge and found suitable for discharge.

## 2018-03-10 NOTE — ASSESSMENT & PLAN NOTE
Continue home meds  Gentle IV hydration   Repeat echo  Consider cardiology consult  Medication optimization pending course  Monitor

## 2018-03-10 NOTE — CONSULTS
Food & Nutrition  Education    Diet Education:Coumadin nutrition  Time Spent:15 min  Learners:pt      Nutrition Education provided with handouts: yes    Comments: Reviewed chart.  Coumadin monitoring lab 3.4 on 3/10/18.      Spoke with nursing as RD is covering remotely. Emailed coumadin nutrition education handout and pt's nurse agreed to give it to the patient.    Spoke with pt on the phone. Notes that coumadin has been stopped for now 2/2 surgery.   Reports she has been on coumadin around 20 yrs. Pt reported she likes green vegetables and uses Boost regularly.  Reviewed need for consistent vitamin K intake.  Pt appeared receptive.        All questions and concerns answered.       Follow up: RD on site to follow up for any additional questions.

## 2018-03-10 NOTE — PLAN OF CARE
Problem: Occupational Therapy Goal  Goal: Occupational Therapy Goal  Outcome: Ongoing (interventions implemented as appropriate)  Goals to be met by: 3/18/18     Patient will increase functional independence with ADLs by performing:    UE Dressing with Supervision.  LE Dressing with Minimal Assistance.  Sitting at edge of bed x5 minutes with Contact Guard Assistance.  Supine to sit with Contact Guard Assistance.  Increased functional strength to 5/5 for B UE.

## 2018-03-10 NOTE — ASSESSMENT & PLAN NOTE
Has a long Hx of autonomic dysfunction on chronic midodrine Rx   Continue midodrine, patient reports held yesterday dosing due to procedure. Monitor may need adjustment.   Monitor Orthostatic bp  Gentle IV hydration  Electrolytes, trend toponin, lipids, a1c  Check urine  Ct head: NAF  Check echo, bilateral cartoid ultrasound  Consider cardiology consult pending course

## 2018-03-10 NOTE — H&P
[facility] Hospital Medicine  History & Physical    Patient Name: Josy Posey  MRN: [id number]  Admission Date: 3/9/2018  Attending Physician: Gloria Parkinson MD  Primary Care Provider: TIKA Rosales Jr, MD         Patient information was obtained from patient, past medical records and ER records.     Subjective:     Principal Problem:Syncope    Chief Complaint:   Chief Complaint   Patient presents with    Pacemaker Problem     reports recent placement. having weakness and near syncope today.         HPI:   Josy Posey is a 89 y.o. 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 Onset suddenly tonight. Pt reports that she became weak after dinner and patient suddenly passed out while sitting in her walker while having a conversation per daughter. This has occurred twice within the past several months. No Mitigating factors. Exacerbating factors reported; position changes. Associated symptoms weakness. No further complaints or concerns at this time.  Patient saw cardiology for Battery change to pacemaker yesterday (see chart review) and was started on Duricef 500mg q12 x6 doses which patient reports have not started yet. Additionally to note: Per cardiology HPI note: She has a long Hx of autonomic dysfunction with sig OH and past episodes of syncope . She has had an ICH after one such fall 40 years ago. She has been on chronic midodrine Rx with dosing requirements changing over the years. She has done surprisingly well with her AD and has had no recent falls. The patient was evaluated in the Er. CBC stable, CMP with RADHA (BUN 37, Cr. 1.8, K 5.1). Initial Troponin 0.032, . Troponin #2 0.019. Head CT NAF, Chest Xray: NAF. Currently patient AAOx3, nad, no sob, no cp. Orthostatic. Patient placed in obs for further treatment of RADHA and syncope.       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 fuCT 1/3- stable,  CT's later - no residual    Lens replaced by other means 6/25/2014    Melena     Memory loss     PET scan consistent with Alzzheimers.   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    1 vessel LIMA to LAD    S/P MVR (mitral valve replacement) 9/27/2013    Skin ulcer     Squamous cell carcinoma     skin cancer X 2    Stroke     Syncope and collapse     Unspecified essential hypertension 9/16/2013    Unspecified transient cerebral ischemia     Urinary incontinence     wears briefs       Past Surgical History:   Procedure Laterality Date    ADENOIDECTOMY      APPENDECTOMY      BREAST SURGERY  1950    right lumpectomy     CARDIAC VALVE SURGERY  10/04/2007    MVR    CATARACT EXTRACTION      CHOLECYSTECTOMY      CORONARY ARTERY BYPASS GRAFT      EYE SURGERY      cataracts bilateral    HYSTERECTOMY      for hydatiform mole    INSERT / REPLACE / REMOVE PACEMAKER  09/11/2001    TONSILLECTOMY         Review of " patient's allergies indicates:   Allergen Reactions    Sulfa (sulfonamide antibiotics) Anaphylaxis     Other reaction(s): Angioedema    Morphine      Other reaction(s): Unknown    Statins-hmg-coa reductase inhibitors Other (See Comments)     Elevated LFTs    Dronedarone Diarrhea, Nausea Only and Rash     DIZZINESS         Current Facility-Administered Medications on File Prior to Encounter   Medication    [COMPLETED] cefazolin (ANCEF) 2 gram in dextrose 5% 50 mL IVPB (premix)    [DISCONTINUED] lactated ringers infusion    [DISCONTINUED] lidocaine HCL 10 mg/ml (1%) injection    [DISCONTINUED] midazolam injection    [DISCONTINUED] mineral oil 20 mL    [DISCONTINUED] nozaseptin (NOZIN) nasal     [DISCONTINUED] ondansetron injection    [DISCONTINUED] propofol (DIPRIVAN) 10 mg/mL infusion    [DISCONTINUED] sodium chloride 0.9% flush 3 mL     Current Outpatient Prescriptions on File Prior to Encounter   Medication Sig    ascorbic acid (VITAMIN C) 1000 MG tablet Take 1 tablet by mouth once daily once daily.    biotin 2,500 mcg Cap Take by mouth.    cefadroxil (DURICEF) 500 MG Cap Take 1 capsule (500 mg total) by mouth every 12 (twelve) hours.    clotrimazole-betamethasone 1-0.05% (LOTRISONE) cream Apply topically 2 (two) times daily.    econazole nitrate 1 % cream     escitalopram oxalate (LEXAPRO) 10 MG tablet Take 1 tablet (10 mg total) by mouth once daily.    fluticasone (FLONASE) 50 mcg/actuation nasal spray instill 2 sprays into each nostril once daily    folic acid (FOLVITE) 1 MG tablet Take 1 tablet by mouth once daily once daily.    iron-vitamin C 100-250 mg, ICAR-C, (ICAR-C) 100-250 mg Tab Take 1 tablet by mouth once daily.    levothyroxine (SYNTHROID) 50 MCG tablet Take 1 tablet (50 mcg total) by mouth once daily.    meclizine (ANTIVERT) 25 mg tablet Take 1 tablet (25 mg total) by mouth 3 (three) times daily as needed for Dizziness.    midodrine (PROAMATINE) 5 MG Tab Take 1  tablet (5 mg total) by mouth 4 (four) times daily. During waking hours    omega-3 fatty acids-vitamin E (FISH OIL) 1,000 mg Cap Take by mouth once daily.    pravastatin (PRAVACHOL) 20 MG tablet Take 1 tablet (20 mg total) by mouth once daily.    propafenone (RHTHYMOL) 150 MG Tab take 1/2 tablet by mouth three times a day    spironolactone (ALDACTONE) 25 MG tablet Take 0.5 tablets (12.5 mg total) by mouth once daily.    tramadol (ULTRAM) 50 mg tablet take 1 tablet by mouth every 8 hours if needed    triamcinolone acetonide 0.1% (KENALOG) 0.1 % cream apply THINLY to affected area ON LEGS, ARMS, AND TRUCK twice a day if needed for eczema    vitamin B12-folic acid 0.5-1 mg Tab Take 1 tablet by mouth once daily once daily.    warfarin (COUMADIN) 1 MG tablet Take 1 tablet on Wednesdays and 1/2 tablet all other days as directed by the coumadin clinic.    estradiol (ESTRACE) 0.01 % (0.1 mg/gram) vaginal cream Place 1 g vaginally 3 (three) times a week. Place small amount on the outside of the urethra nightly for 3 weeks , then twice weekly     Family History     Problem Relation (Age of Onset)    COPD Brother    Hypertension Sister, Daughter    Stroke Sister, Brother    Tuberculosis Mother, Father        Social History Main Topics    Smoking status: Never Smoker    Smokeless tobacco: Never Used    Alcohol use No    Drug use: No    Sexual activity: No     Review of Systems   Constitutional: Negative for chills, diaphoresis, fatigue and fever.   HENT: Negative for congestion, sore throat and voice change.    Eyes: Negative for photophobia and visual disturbance.   Respiratory: Negative for cough, shortness of breath, wheezing and stridor.    Cardiovascular: Negative for chest pain and leg swelling.   Gastrointestinal: Negative for abdominal distention, abdominal pain, constipation, diarrhea, nausea and vomiting.   Endocrine: Negative for polydipsia, polyphagia and polyuria.   Genitourinary: Negative for  difficulty urinating, dysuria, flank pain, pelvic pain, urgency and vaginal discharge.   Musculoskeletal: Negative for back pain, joint swelling, neck pain and neck stiffness.   Skin: Negative for color change and rash.   Allergic/Immunologic: Negative for immunocompromised state.   Neurological: Positive for syncope and weakness. Negative for dizziness, numbness and headaches.   Hematological: Does not bruise/bleed easily.   Psychiatric/Behavioral: Negative for agitation, behavioral problems and confusion.     Objective:     Vital Signs (Most Recent):  Temp: 98.5 °F (36.9 °C) (03/09/18 2223)  Pulse: 64 (03/09/18 2302)  Resp: (!) 24 (03/09/18 2302)  BP: 123/64 (03/09/18 2302)  SpO2: (!) 93 % (03/09/18 2302) Vital Signs (24h Range):  Temp:  [97 °F (36.1 °C)-98.5 °F (36.9 °C)] 98.5 °F (36.9 °C)  Pulse:  [] 64  Resp:  [12-24] 24  SpO2:  [93 %-99 %] 93 %  BP: (109-163)/(57-84) 123/64     Weight: 64.9 kg (143 lb)  Body mass index is 25.33 kg/m².    Physical Exam   Constitutional: She is oriented to person, place, and time. She appears well-developed and well-nourished. No distress. Pleasant elderly female   HENT:   Head: Normocephalic and atraumatic.   Nose: Nose normal.   Eyes: Conjunctivae and EOM are normal. Pupils are equal, round, and reactive to light. No scleral icterus.   Neck: Normal range of motion. Neck supple. No tracheal deviation present.   Cardiovascular: Normal rate, regular rhythm, normal heart sounds and intact distal pulses.    No murmur heard.  Implanted pacemaker  Orthostatic      Pulmonary/Chest: Effort normal and breath sounds normal. No stridor. No respiratory distress. She has no wheezes. She has no rales.   Abdominal: Soft. Bowel sounds are normal. She exhibits no distension. There is no tenderness. There is no guarding.   Musculoskeletal: Normal range of motion. She exhibits no edema or deformity.   Neurological: She is alert and oriented to person, place, and time. No cranial nerve  deficit.   Skin: Skin is warm and dry. Capillary refill takes less than 2 seconds. No rash noted. She is not diaphoretic. Decreased skin turgor   Psychiatric: She has a normal mood and affect. Her behavior is normal. Judgment and thought content normal.   Nursing note and vitals reviewed.        CRANIAL NERVES     CN III, IV, VI   Pupils are equal, round, and reactive to light.  Extraocular motions are normal.        Significant Labs: All pertinent labs within the past 24 hours have been reviewed.  Results for orders placed or performed during the hospital encounter of 03/09/18   CBC auto differential   Result Value Ref Range    WBC 9.21 3.90 - 12.70 K/uL    RBC 3.83 (L) 4.00 - 5.40 M/uL    Hemoglobin 12.4 12.0 - 16.0 g/dL    Hematocrit 36.9 (L) 37.0 - 48.5 %    MCV 96 82 - 98 fL    MCH 32.4 (H) 27.0 - 31.0 pg    MCHC 33.6 32.0 - 36.0 g/dL    RDW 13.6 11.5 - 14.5 %    Platelets 175 150 - 350 K/uL    MPV 11.8 9.2 - 12.9 fL    Gran # (ANC) 6.7 1.8 - 7.7 K/uL    Lymph # 1.7 1.0 - 4.8 K/uL    Mono # 0.6 0.3 - 1.0 K/uL    Eos # 0.2 0.0 - 0.5 K/uL    Baso # 0.02 0.00 - 0.20 K/uL    Gran% 72.7 38.0 - 73.0 %    Lymph% 18.8 18.0 - 48.0 %    Mono% 6.9 4.0 - 15.0 %    Eosinophil% 1.6 0.0 - 8.0 %    Basophil% 0.2 0.0 - 1.9 %    Differential Method Automated    Comprehensive metabolic panel   Result Value Ref Range    Sodium 140 136 - 145 mmol/L    Potassium 5.1 3.5 - 5.1 mmol/L    Chloride 105 95 - 110 mmol/L    CO2 25 23 - 29 mmol/L    Glucose 127 (H) 70 - 110 mg/dL    BUN, Bld 37 (H) 10 - 30 mg/dL    Creatinine 1.8 (H) 0.5 - 1.4 mg/dL    Calcium 9.2 8.7 - 10.5 mg/dL    Total Protein 7.0 6.0 - 8.4 g/dL    Albumin 3.8 3.5 - 5.2 g/dL    Total Bilirubin 0.5 0.1 - 1.0 mg/dL    Alkaline Phosphatase 56 55 - 135 U/L    AST 22 10 - 40 U/L    ALT 13 10 - 44 U/L    Anion Gap 10 8 - 16 mmol/L    eGFR if African American 28 (A) >60 mL/min/1.73 m^2    eGFR if non African American 24 (A) >60 mL/min/1.73 m^2   Troponin I #1   Result Value  Ref Range    Troponin I 0.032 (H) 0.000 - 0.026 ng/mL   B-Type natriuretic peptide (BNP)   Result Value Ref Range     (H) 0 - 99 pg/mL   Protime-INR   Result Value Ref Range    Prothrombin Time 30.9 (H) 9.0 - 12.5 sec    INR 3.0 (H) 0.8 - 1.2       Significant Imaging: I have reviewed all pertinent imaging results/findings within the past 24 hours.   Imaging Results          CT Head Without Contrast    Per virtual imaging, there is no acute intracranial process            X-Ray Chest AP Portable    Independent preliminary soft read: NAF             I have personally reviewed the patients labs, imaging, ekg and discussed the patient case in detail with the Er provider    Assessment/Plan:     * Syncope    Has a long Hx of autonomic dysfunction on chronic midodrine Rx   Continue midodrine, patient reports held yesterday dosing due to procedure. Monitor may need adjustment.   Monitor Orthostatic bp  Gentle IV hydration  Electrolytes, trend toponin, lipids, a1c  Check urine  Ct head: NAF  Check echo, bilateral cartoid ultrasound  Consider cardiology consult pending course            RADHA (acute kidney injury)    Likely related to hydration status  Gentle IV hydration  Hold aldactone  Monitor for improvement          CAD (coronary artery disease) with remote CABG x 1V (LIMA-LAD)    Continue statin, anticoag. Pharmacy to monitor  Medication optimization pending course  Consider cardiology consult.           Hypomagnesemia    Replenish          Chronic anticoagulation    INR 3.0  Pharmacy to monitor         Chronic diastolic HFpEF of 60% per echo 5/2017    Continue home meds  Gentle IV hydration   Repeat echo  Consider cardiology consult  Medication optimization pending course  Monitor           VTE Risk Mitigation         Ordered     Medium Risk of VTE  Once      03/10/18 0246     Place sequential compression device  Until discontinued      03/10/18 0246     Place ANTELMO hose  Until discontinued      03/10/18 0246      Reason for No Pharmacological VTE Prophylaxis  Once      03/10/18 0246             Geovany Swanson NP  Department of Hospital Medicine   [facility]

## 2018-03-10 NOTE — SUBJECTIVE & OBJECTIVE
Interval History: No syncopal episodes since admission. Creatinine improving. Will continue with current medical management. Plan to discharge in morning.     Review of Systems   Constitutional: Negative for chills, diaphoresis, fatigue and fever.   HENT: Negative for congestion, sore throat and voice change.    Eyes: Negative for photophobia and visual disturbance.   Respiratory: Negative for cough, shortness of breath, wheezing and stridor.    Cardiovascular: Negative for chest pain and leg swelling.   Gastrointestinal: Negative for abdominal distention, abdominal pain, constipation, diarrhea, nausea and vomiting.   Endocrine: Negative for polydipsia, polyphagia and polyuria.   Genitourinary: Negative for difficulty urinating, dysuria, flank pain, pelvic pain, urgency and vaginal discharge.   Musculoskeletal: Negative for back pain, joint swelling, neck pain and neck stiffness.   Skin: Negative for color change and rash.   Allergic/Immunologic: Negative for immunocompromised state.   Neurological: Positive for syncope and weakness. Negative for dizziness, numbness and headaches.   Hematological: Does not bruise/bleed easily.   Psychiatric/Behavioral: Negative for agitation, behavioral problems and confusion.     Objective:     Vital Signs (Most Recent):  Temp: 97.3 °F (36.3 °C) (03/10/18 1228)  Pulse: 75 (03/10/18 1337)  Resp: 18 (03/10/18 1228)  BP: (!) 97/50 (03/10/18 1337)  SpO2: 96 % (03/10/18 1228) Vital Signs (24h Range):  Temp:  [96.4 °F (35.8 °C)-98.5 °F (36.9 °C)] 97.3 °F (36.3 °C)  Pulse:  [64-88] 75  Resp:  [14-38] 18  SpO2:  [93 %-96 %] 96 %  BP: ()/(50-69) 97/50     Weight: 65.3 kg (143 lb 15.4 oz)  Body mass index is 25.5 kg/m².    Intake/Output Summary (Last 24 hours) at 03/10/18 1421  Last data filed at 03/10/18 5153   Gross per 24 hour   Intake                0 ml   Output              150 ml   Net             -150 ml      Physical Exam   Constitutional: She is oriented to person, place, and  time. She appears well-developed and well-nourished. No distress.   Elderly    HENT:   Head: Normocephalic and atraumatic.   Nose: Nose normal.   Eyes: Conjunctivae and EOM are normal. Pupils are equal, round, and reactive to light. No scleral icterus.   Neck: Normal range of motion. Neck supple. No tracheal deviation present.   Cardiovascular: Normal rate, regular rhythm and intact distal pulses.    Murmur heard.  S/p Left CW pacemaker with dressing C/D/I.      Pulmonary/Chest: Effort normal and breath sounds normal. No stridor. No respiratory distress. She has no wheezes. She has no rales.   Abdominal: Soft. Bowel sounds are normal. She exhibits no distension. There is no tenderness. There is no guarding.   Musculoskeletal: Normal range of motion. She exhibits no edema or deformity.   Neurological: She is alert and oriented to person, place, and time. No cranial nerve deficit.   Skin: Skin is warm and dry. Capillary refill takes less than 2 seconds. No rash noted. She is not diaphoretic.   Psychiatric: She has a normal mood and affect. Her behavior is normal. Judgment and thought content normal.   Nursing note and vitals reviewed.      Significant Labs:   BMP:   Recent Labs  Lab 03/09/18  2214 03/10/18  0558   * 107    141   K 5.1 4.7    108   CO2 25 24   BUN 37* 39*   CREATININE 1.8* 1.7*   CALCIUM 9.2 8.9   MG 1.3*  --      CBC:   Recent Labs  Lab 03/09/18  2214 03/10/18  0558   WBC 9.21 7.56   HGB 12.4 11.0*   HCT 36.9* 33.1*    149*     CMP:   Recent Labs  Lab 03/09/18  2214 03/10/18  0558    141   K 5.1 4.7    108   CO2 25 24   * 107   BUN 37* 39*   CREATININE 1.8* 1.7*   CALCIUM 9.2 8.9   PROT 7.0  --    ALBUMIN 3.8  --    BILITOT 0.5  --    ALKPHOS 56  --    AST 22  --    ALT 13  --    ANIONGAP 10 9   EGFRNONAA 24* 26*     Troponin:   Recent Labs  Lab 03/10/18  0245 03/10/18  0558 03/10/18  1102   TROPONINI 0.019 0.019 0.010     All pertinent labs within the past  24 hours have been reviewed.    Significant Imaging:   Imaging Results          US Carotid Bilateral (Final result)  Result time 03/10/18 12:25:38    Final result by Roxane Shankar MD (03/10/18 12:25:38)                 Impression:     Bilateral carotid atherosclerotic plaque as described above with elevated peak systolic velocity the right internal carotid artery suggesting 50-69% stenosis..      Electronically signed by: ROXANE SHANKAR MD  Date:     03/10/18  Time:    12:25              Narrative:    Carotid ultrasound    History:     Syncope    Findings:     Sonographic evaluation of the carotid systems was performed.     Moderate atherosclerotic plaque involving the carotid bulb and proximal internal carotid arteries bilaterally.    The peak systolic velocity in the right internal carotid artery was approximately 152 cm/sec.    The peak systolic velocity in the left internal carotid artery was [id number] cm/sec.    Antegrade flow noted in both vertebral arteries.    Stenosis percentage validated velocity measurements with angiographic measurements, velocity criteria are extrapolated from diameter data as defined by the Society of Radiologists in Ultrasound Consensus Conference Radiology 2003; 229;340-346.                             CT Head Without Contrast (Final result)  Result time 03/10/18 07:55:30    Final result by Geovany Barcenas MD (03/10/18 07:55:30)                 Impression:         Atrophy.  Small vessel disease.  Intracranial atherosclerotic calcifications.  No acute intracranial abnormality.  CT scan of the head similar to 09/03/2017.    All CT scans at this facility use dose modulation, iterative reconstruction, and/or weight based dosing when appropriate to reduce radiation dose to as low as reasonably achievable.       Electronically signed by: GEOVANY BARCENAS MD  Date:     03/10/18  Time:    07:55              Narrative:    Exam: CT scan of the head without contrast    Clinical History:  R. 55  syncope and collapse..      Findings:    Not diffuse cerebral and cerebellar atrophy. Low-density changes in the periventricular distribution bilaterally consistent with small vessel disease. No acute infarct can be seen.  Old lacunar infarct in the anterior limb of the internal capsule on the left. Intracranial atherosclerotic calcifications. No osseous abnormality.                             X-Ray Chest AP Portable (Final result)  Result time 03/10/18 07:26:06    Final result by Geovany Molina MD (03/10/18 07:26:06)                 Impression:     No acute cardiopulmonary disease.  Chest x-ray similar to 09/03/2017.      Electronically signed by: GEOVANY MOLINA MD  Date:     03/10/18  Time:    07:26              Narrative:    Exam: Portable chest radiograph    Clinical History:   fatigue .    Findings:     Coarsened bronchovascular markings/COPD.  Multiple deep pacemaker.  Probable mitral annulus valvular replacement.  Remote median sternotomy.  No acute pulmonary infiltrate can be identified. Heart size is within normal limits.

## 2018-03-10 NOTE — SUBJECTIVE & OBJECTIVE
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 fuCT 1/3- stable,  CT's later - no residual    Lens replaced by other means 6/25/2014    Melena     Memory loss     PET scan consistent with Alzzheimers.   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    1 vessel LIMA to LAD    S/P MVR (mitral valve replacement) 9/27/2013    Skin ulcer     Squamous cell carcinoma     skin cancer X 2    Stroke     Syncope and collapse     Unspecified essential hypertension 9/16/2013    Unspecified transient cerebral ischemia     Urinary incontinence     wears briefs       Past Surgical History:   Procedure Laterality Date    ADENOIDECTOMY      APPENDECTOMY      BREAST SURGERY  1950    right lumpectomy     CARDIAC VALVE SURGERY  10/04/2007    MVR    CATARACT EXTRACTION      CHOLECYSTECTOMY      CORONARY ARTERY BYPASS GRAFT      EYE SURGERY      cataracts bilateral    HYSTERECTOMY      for hydatiform mole    INSERT / REPLACE / " REMOVE PACEMAKER  09/11/2001    TONSILLECTOMY         Review of patient's allergies indicates:   Allergen Reactions    Sulfa (sulfonamide antibiotics) Anaphylaxis     Other reaction(s): Angioedema    Morphine      Other reaction(s): Unknown    Statins-hmg-coa reductase inhibitors Other (See Comments)     Elevated LFTs    Dronedarone Diarrhea, Nausea Only and Rash     DIZZINESS         Current Facility-Administered Medications on File Prior to Encounter   Medication    [COMPLETED] cefazolin (ANCEF) 2 gram in dextrose 5% 50 mL IVPB (premix)    [DISCONTINUED] lactated ringers infusion    [DISCONTINUED] lidocaine HCL 10 mg/ml (1%) injection    [DISCONTINUED] midazolam injection    [DISCONTINUED] mineral oil 20 mL    [DISCONTINUED] nozaseptin (NOZIN) nasal     [DISCONTINUED] ondansetron injection    [DISCONTINUED] propofol (DIPRIVAN) 10 mg/mL infusion    [DISCONTINUED] sodium chloride 0.9% flush 3 mL     Current Outpatient Prescriptions on File Prior to Encounter   Medication Sig    ascorbic acid (VITAMIN C) 1000 MG tablet Take 1 tablet by mouth once daily once daily.    biotin 2,500 mcg Cap Take by mouth.    cefadroxil (DURICEF) 500 MG Cap Take 1 capsule (500 mg total) by mouth every 12 (twelve) hours.    clotrimazole-betamethasone 1-0.05% (LOTRISONE) cream Apply topically 2 (two) times daily.    econazole nitrate 1 % cream     escitalopram oxalate (LEXAPRO) 10 MG tablet Take 1 tablet (10 mg total) by mouth once daily.    fluticasone (FLONASE) 50 mcg/actuation nasal spray instill 2 sprays into each nostril once daily    folic acid (FOLVITE) 1 MG tablet Take 1 tablet by mouth once daily once daily.    iron-vitamin C 100-250 mg, ICAR-C, (ICAR-C) 100-250 mg Tab Take 1 tablet by mouth once daily.    levothyroxine (SYNTHROID) 50 MCG tablet Take 1 tablet (50 mcg total) by mouth once daily.    meclizine (ANTIVERT) 25 mg tablet Take 1 tablet (25 mg total) by mouth 3 (three) times daily as  needed for Dizziness.    midodrine (PROAMATINE) 5 MG Tab Take 1 tablet (5 mg total) by mouth 4 (four) times daily. During waking hours    omega-3 fatty acids-vitamin E (FISH OIL) 1,000 mg Cap Take by mouth once daily.    pravastatin (PRAVACHOL) 20 MG tablet Take 1 tablet (20 mg total) by mouth once daily.    propafenone (RHTHYMOL) 150 MG Tab take 1/2 tablet by mouth three times a day    spironolactone (ALDACTONE) 25 MG tablet Take 0.5 tablets (12.5 mg total) by mouth once daily.    tramadol (ULTRAM) 50 mg tablet take 1 tablet by mouth every 8 hours if needed    triamcinolone acetonide 0.1% (KENALOG) 0.1 % cream apply THINLY to affected area ON LEGS, ARMS, AND TRUCK twice a day if needed for eczema    vitamin B12-folic acid 0.5-1 mg Tab Take 1 tablet by mouth once daily once daily.    warfarin (COUMADIN) 1 MG tablet Take 1 tablet on Wednesdays and 1/2 tablet all other days as directed by the coumadin clinic.    estradiol (ESTRACE) 0.01 % (0.1 mg/gram) vaginal cream Place 1 g vaginally 3 (three) times a week. Place small amount on the outside of the urethra nightly for 3 weeks , then twice weekly     Family History     Problem Relation (Age of Onset)    COPD Brother    Hypertension Sister, Daughter    Stroke Sister, Brother    Tuberculosis Mother, Father        Social History Main Topics    Smoking status: Never Smoker    Smokeless tobacco: Never Used    Alcohol use No    Drug use: No    Sexual activity: No     Review of Systems   Constitutional: Negative for chills, diaphoresis, fatigue and fever.   HENT: Negative for congestion, sore throat and voice change.    Eyes: Negative for photophobia and visual disturbance.   Respiratory: Negative for cough, shortness of breath, wheezing and stridor.    Cardiovascular: Negative for chest pain and leg swelling.   Gastrointestinal: Negative for abdominal distention, abdominal pain, constipation, diarrhea, nausea and vomiting.   Endocrine: Negative for  polydipsia, polyphagia and polyuria.   Genitourinary: Negative for difficulty urinating, dysuria, flank pain, pelvic pain, urgency and vaginal discharge.   Musculoskeletal: Negative for back pain, joint swelling, neck pain and neck stiffness.   Skin: Negative for color change and rash.   Allergic/Immunologic: Negative for immunocompromised state.   Neurological: Positive for syncope and weakness. Negative for dizziness, numbness and headaches.   Hematological: Does not bruise/bleed easily.   Psychiatric/Behavioral: Negative for agitation, behavioral problems and confusion.     Objective:     Vital Signs (Most Recent):  Temp: 98.5 °F (36.9 °C) (03/09/18 2223)  Pulse: 64 (03/09/18 2302)  Resp: (!) 24 (03/09/18 2302)  BP: 123/64 (03/09/18 2302)  SpO2: (!) 93 % (03/09/18 2302) Vital Signs (24h Range):  Temp:  [97 °F (36.1 °C)-98.5 °F (36.9 °C)] 98.5 °F (36.9 °C)  Pulse:  [] 64  Resp:  [12-24] 24  SpO2:  [93 %-99 %] 93 %  BP: (109-163)/(57-84) 123/64     Weight: 64.9 kg (143 lb)  Body mass index is 25.33 kg/m².    Physical Exam   Constitutional: She is oriented to person, place, and time. She appears well-developed and well-nourished. No distress.   HENT:   Head: Normocephalic and atraumatic.   Nose: Nose normal.   Eyes: Conjunctivae and EOM are normal. Pupils are equal, round, and reactive to light. No scleral icterus.   Neck: Normal range of motion. Neck supple. No tracheal deviation present.   Cardiovascular: Normal rate, regular rhythm, normal heart sounds and intact distal pulses.    No murmur heard.  Implanted pacemaker     Pulmonary/Chest: Effort normal and breath sounds normal. No stridor. No respiratory distress. She has no wheezes. She has no rales.   Abdominal: Soft. Bowel sounds are normal. She exhibits no distension. There is no tenderness. There is no guarding.   Musculoskeletal: Normal range of motion. She exhibits no edema or deformity.   Neurological: She is alert and oriented to person, place, and  time. No cranial nerve deficit.   Skin: Skin is warm and dry. Capillary refill takes less than 2 seconds. No rash noted. She is not diaphoretic.   Psychiatric: She has a normal mood and affect. Her behavior is normal. Judgment and thought content normal.   Nursing note and vitals reviewed.        CRANIAL NERVES     CN III, IV, VI   Pupils are equal, round, and reactive to light.  Extraocular motions are normal.        Significant Labs: All pertinent labs within the past 24 hours have been reviewed.  Results for orders placed or performed during the hospital encounter of 03/09/18   CBC auto differential   Result Value Ref Range    WBC 9.21 3.90 - 12.70 K/uL    RBC 3.83 (L) 4.00 - 5.40 M/uL    Hemoglobin 12.4 12.0 - 16.0 g/dL    Hematocrit 36.9 (L) 37.0 - 48.5 %    MCV 96 82 - 98 fL    MCH 32.4 (H) 27.0 - 31.0 pg    MCHC 33.6 32.0 - 36.0 g/dL    RDW 13.6 11.5 - 14.5 %    Platelets 175 150 - 350 K/uL    MPV 11.8 9.2 - 12.9 fL    Gran # (ANC) 6.7 1.8 - 7.7 K/uL    Lymph # 1.7 1.0 - 4.8 K/uL    Mono # 0.6 0.3 - 1.0 K/uL    Eos # 0.2 0.0 - 0.5 K/uL    Baso # 0.02 0.00 - 0.20 K/uL    Gran% 72.7 38.0 - 73.0 %    Lymph% 18.8 18.0 - 48.0 %    Mono% 6.9 4.0 - 15.0 %    Eosinophil% 1.6 0.0 - 8.0 %    Basophil% 0.2 0.0 - 1.9 %    Differential Method Automated    Comprehensive metabolic panel   Result Value Ref Range    Sodium 140 136 - 145 mmol/L    Potassium 5.1 3.5 - 5.1 mmol/L    Chloride 105 95 - 110 mmol/L    CO2 25 23 - 29 mmol/L    Glucose 127 (H) 70 - 110 mg/dL    BUN, Bld 37 (H) 10 - 30 mg/dL    Creatinine 1.8 (H) 0.5 - 1.4 mg/dL    Calcium 9.2 8.7 - 10.5 mg/dL    Total Protein 7.0 6.0 - 8.4 g/dL    Albumin 3.8 3.5 - 5.2 g/dL    Total Bilirubin 0.5 0.1 - 1.0 mg/dL    Alkaline Phosphatase 56 55 - 135 U/L    AST 22 10 - 40 U/L    ALT 13 10 - 44 U/L    Anion Gap 10 8 - 16 mmol/L    eGFR if African American 28 (A) >60 mL/min/1.73 m^2    eGFR if non African American 24 (A) >60 mL/min/1.73 m^2   Troponin I #1   Result Value  Ref Range    Troponin I 0.032 (H) 0.000 - 0.026 ng/mL   B-Type natriuretic peptide (BNP)   Result Value Ref Range     (H) 0 - 99 pg/mL   Protime-INR   Result Value Ref Range    Prothrombin Time 30.9 (H) 9.0 - 12.5 sec    INR 3.0 (H) 0.8 - 1.2       Significant Imaging: I have reviewed all pertinent imaging results/findings within the past 24 hours.   Imaging Results          CT Head Without Contrast (In process)                X-Ray Chest AP Portable (In process)

## 2018-03-10 NOTE — ASSESSMENT & PLAN NOTE
Continue statin, anticoag. Pharmacy to monitor  Medication optimization pending course  Consider cardiology consult.

## 2018-03-10 NOTE — PT/OT/SLP EVAL
"Physical Therapy Evaluation    Patient Name:  Josy Posey   MRN:  [id number]    Recommendations:     Discharge Recommendations:  nursing facility, skilled (vs home health with 24 hour care/supervision)   Discharge Equipment Recommendations: none   Barriers to discharge: None    Assessment:     Josy Posey is a 89 y.o. female admitted with a medical diagnosis of Syncope.  She presents with the following impairments/functional limitations:  weakness, impaired endurance, impaired self care skills, impaired functional mobilty, gait instability, impaired balance, impaired cognition, decreased coordination, decreased safety awareness, other (comment) (orthostatic) .    Rehab Prognosis:  FAIR; patient would benefit from acute skilled PT services to address these deficits and reach maximum level of function.      Recent Surgery: * No surgery found *      Plan:     During this hospitalization, patient to be seen 5 x/week to address the above listed problems via gait training, therapeutic activities, therapeutic exercises  · Plan of Care Expires:  03/17/18   Plan of Care Reviewed with: patient    Subjective     Communicated with EPIC and nurse (Jacinta) prior to session.  Patient found supine in bed upon PT entry to room, agreeable to evaluation.      Chief Complaint: "Dizziness" with sitting and standing  Patient comments/goals: To go home  Pain/Comfort:  · Pain Rating 1: 0/10    Patients cultural, spiritual, Shinto conflicts given the current situation: none    Living Environment:  Patient lives with her daughter in a two-story home but is able to stay on first floor only.  Patient has a sitter who is there with her during the day while her daughter is at work.  Prior to admission, patients level of function was assistance with ADLs (by sitter) and using Rollator walker for ambulation.  Patient has the following equipment: wheelchair, rollator, bedside commode.  DME owned (not currently used): shower chair.  Upon " "discharge, patient will have assistance from her sitter and family.    Objective:     Patient found with: peripheral IV, telemetry     General Precautions: Standard, fall   Orthopedic Precautions:N/A   Braces: N/A     Exams:  · Cognitive Exam:  Patient is oriented to Person and Situation and follows 100% of one-step commands   · Gross Motor Coordination:  Decreased  · Postural Exam:  Patient presented with the following abnormalities:    · -       Rounded shoulders  · -       Forward head  · RLE ROM: WFL  · RLE Strength: Functionally 4/5  · LLE ROM: WFL  · LLE Strength: Functionally 4/5    Functional Mobility:  · Bed Mobility:     · Rolling Left:  minimum assistance  · Scooting: minimum assistance  · Supine to Sit: minimum assistance  · Sit to Supine: minimum assistance  · Transfers:     · Sit to Stand:  minimum assistance with rolling walker  · Gait: Unable to progress to ambulation today; patient c/o "dizziness" in static standing that would not subside  · Balance: Good static sitting balance; Poor standing balance (with RW)    AM-PAC 6 CLICK MOBILITY  Total Score:12       Patient left supine with all lines intact, call button in reach and bed alarm on.    GOALS:    Physical Therapy Goals        Problem: Physical Therapy Goal    Goal Priority Disciplines Outcome Goal Variances Interventions   Physical Therapy Goal     PT/OT, PT      Description:  LTGs to be met within 7 days (3/17/18):  1.  Patient will perform bed mobility with SPV only  2.  Patient will perform functional transfers with RW with SBA  3.  Patient will ambulate 100' with RW with CGA and no gross LOB  4.  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 fuCT 1/3- stable,  CT's later - no residual    Lens replaced by other means 6/25/2014    Melena     Memory loss     PET scan consistent with Alzzheimers.   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    1 vessel LIMA to LAD    S/P MVR (mitral valve replacement) 9/27/2013    Skin ulcer     Squamous cell carcinoma     skin cancer X 2    Stroke     Syncope and collapse     Unspecified essential hypertension 9/16/2013    Unspecified transient cerebral ischemia     Urinary incontinence     wears briefs       Past Surgical History:   Procedure Laterality Date    ADENOIDECTOMY      APPENDECTOMY      BREAST SURGERY  1950    right lumpectomy     CARDIAC VALVE SURGERY  10/04/2007    MVR    CATARACT EXTRACTION      CHOLECYSTECTOMY      CORONARY ARTERY BYPASS GRAFT      EYE SURGERY      cataracts bilateral    HYSTERECTOMY      for hydatiform mole    INSERT / REPLACE / REMOVE PACEMAKER  09/11/2001    TONSILLECTOMY         Clinical Decision Making:     History  Co-morbidities and personal factors that may impact the plan of care Examination  Body Structures and Functions, activity limitations and participation " restrictions that may impact the plan of care Clinical Presentation   Decision Making/ Complexity Score   Co-morbidities:   [] Time since onset of injury / illness / exacerbation  [] Status of current condition  []Patient's cognitive status and safety concerns    [] Multiple Medical Problems (see med hx)  Personal Factors:   [] Patient's age  [] Prior Level of function   [] Patient's home situation (environment and family support)  [] Patient's level of motivation  [] Expected progression of patient      HISTORY:(criteria)    [] 07727 - no personal factors/history    [] 80533 - has 1-2 personal factor/comorbidity     [] 32374 - has >3 personal factor/comorbidity     Body Regions:  [] Objective examination findings  [] Head     []  Neck  [] Trunk   [] Upper Extremity  [] Lower Extremity    Body Systems:  [] For communication ability, affect, cognition, language, and learning style: the assessment of the ability to make needs known, consciousness, orientation (person, place, and time), expected emotional /behavioral responses, and learning preferences (eg, learning barriers, education  needs)  [] For the neuromuscular system: a general assessment of gross coordinated movement (eg, balance, gait, locomotion, transfers, and transitions) and motor function  (motor control and motor learning)  [] For the musculoskeletal system: the assessment of gross symmetry, gross range of motion, gross strength, height, and weight  [] For the integumentary system: the assessment of pliability(texture), presence of scar formation, skin color, and skin integrity  [] For cardiovascular/pulmonary system: the assessment of heart rate, respiratory rate, blood pressure, and edema     Activity limitations:    [] Patient's cognitive status and saf ety concerns          [] Status of current condition      [] Weight bearing restriction  [] Cardiopulmunary Restriction    Participation Restrictions:   [] Goals and goal agreement with the patient      [] Rehab potential (prognosis) and probable outcome      Examination of Body System: (criteria)    [] 19055 - addressing 1-2 elements    [] 11069 - addressing a total of 3 or more elements     [] 62380 -  Addressing a total of 4 or more elements         Clinical Presentation: (criteria)  Choose one     On examination of body system using standardized tests and measures patient presents with (CHOOSE ONE) elements from any of the following: body structures and functions, activity limitations, and/or participation restrictions.  Leading to a clinical presentation that is considered (CHOOSE ONE)                              Clinical Decision Making  (Eval Complexity):  Choose One     Time Tracking:     PT Received On: 03/10/18  PT Start Time: 0817     PT Stop Time: 0831  PT Total Time (min): 14 min     Billable Minutes: Evaluation 14 mins      Yolanda Azul, PT, DPT  03/10/2018

## 2018-03-10 NOTE — ED PROVIDER NOTES
SCRIBE #1 NOTE: I, Shannon Rivers, am scribing for, and in the presence of, Reece Holt Jr., MD. I have scribed the entire note.      History      Chief Complaint   Patient presents with    Pacemaker Problem     reports recent placement. having weakness and near syncope today.        Review of patient's allergies indicates:   Allergen Reactions    Sulfa (sulfonamide antibiotics) Anaphylaxis     Other reaction(s): Angioedema    Morphine      Other reaction(s): Unknown    Statins-hmg-coa reductase inhibitors Other (See Comments)     Elevated LFTs    Dronedarone Diarrhea, Nausea Only and Rash     DIZZINESS          HPI   HPI    3/9/2018, 10:26 PM   History obtained from the patient      History of Present Illness: Josy Posey is a 89 y.o. female patient with HTN, A-fib, CAD, cardiac pacemaker (had battery changed this morning), who presents to the Emergency Department for syncope which onset suddenly tonight around 9 PM. Pt reports that she became weak after eating dinner tonight. Daughter states that she was having a conversation with pt when patient suddenly passed out while sitting in her walker. She states that patient became unresponsive and stiff. Patient has had 2 prior syncopal episodes within the past several months. Symptoms are episodic and moderate in severity. No mitigating or exacerbating factors reported. Patient states that she currently feels weak. Patient denies fever, chills, CP, SOB, abd pain, N/V, dizziness, HA, extremity weakness/numbness, paresthesias, slurred speech, facial droop, seizures, falls, head trauma, and all other sxs at this time. No further complaints or concerns at this time.       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 fuCT 1/3- stable,  CT's later - no residual    Lens replaced by other means 6/25/2014    Melena     Memory loss     PET scan consistent with Alzzheimers.   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    1 vessel LIMA to LAD    S/P MVR (mitral valve replacement) 9/27/2013    Skin ulcer     Squamous cell carcinoma     skin cancer X 2    Stroke     Syncope and collapse     Unspecified essential hypertension 9/16/2013    Unspecified transient cerebral ischemia     Urinary incontinence     wears briefs       Past Surgical History:  Past Surgical History:   Procedure Laterality Date    ADENOIDECTOMY      APPENDECTOMY      BREAST SURGERY  1950    right lumpectomy     CARDIAC VALVE SURGERY  10/04/2007    MVR    CATARACT EXTRACTION      CHOLECYSTECTOMY      CORONARY ARTERY BYPASS GRAFT      EYE SURGERY      cataracts bilateral    HYSTERECTOMY      for hydatiform mole    INSERT / REPLACE / REMOVE PACEMAKER  09/11/2001    TONSILLECTOMY           Family History:  Family History   Problem Relation Age of Onset    Tuberculosis Mother     Tuberculosis Father     Stroke Sister  "    Hypertension Sister     Stroke Brother     Hypertension Daughter     COPD Brother        Social History:  Social History     Social History Main Topics    Smoking status: Never Smoker    Smokeless tobacco: Never Used    Alcohol use No    Drug use: No    Sexual activity: No       ROS   Review of Systems   Constitutional: Negative for chills, diaphoresis and fever.        (+) generalized weakness   HENT: Negative for sore throat.    Respiratory: Negative for shortness of breath.    Cardiovascular: Negative for chest pain.   Gastrointestinal: Negative for nausea.   Genitourinary: Negative for dysuria.   Musculoskeletal: Negative for back pain.   Skin: Negative for rash.   Neurological: Positive for syncope. Negative for dizziness, seizures, speech difficulty, weakness, numbness and headaches.   Hematological: Does not bruise/bleed easily.   All other systems reviewed and are negative.      Physical Exam      Initial Vitals [03/09/18 2223]   BP Pulse Resp Temp SpO2   (!) 142/69 65 14 98.5 °F (36.9 °C) 96 %      MAP       93.33          Physical Exam  Nursing Notes and Vital Signs Reviewed.  Constitutional: Patient is in no acute distress. Awake and alert. Well-developed and well-nourished.  Head: Atraumatic. Normocephalic.  Eyes: PERRL. EOM intact. Conjunctivae are not pale. No scleral icterus.  ENT: Mucous membranes are moist. Oropharynx is clear and symmetric.    Neck: Supple. Full ROM. No lymphadenopathy.  Cardiovascular: Regular rate. Regular rhythm. No murmurs, rubs, or gallops. Distal pulses are 2+ and symmetric.  Pulmonary/Chest: No respiratory distress. Clear to auscultation bilaterally. No wheezing, rales, or rhonchi.  Abdominal: Soft and non-distended.  There is no tenderness.  No rebound, guarding, or rigidity.  Good bowel sounds.    Musculoskeletal: Moves all extremities. No obvious deformities. No edema. No calf tenderness.  Skin: Warm and dry.  Neurological:  Alert, awake, and appropriate.  Normal speech. Cranial nerves II-XII intact. Normal strength in BUE and BLE. No facial droop. No acute focal neurological deficits are appreciated.  Psychiatric: Normal affect. Good eye contact. Appropriate in content.    ED Course    Procedures  ED Vital Signs:  Vitals:    03/10/18 0253 03/10/18 0254 03/10/18 0257 03/10/18 0452   BP: 125/62 127/68 (!) 90/54    Pulse: 64 64 67    Resp: (!) 26 (!) 27 (!) 38    Temp:       TempSrc:       SpO2: 95% 95% 95% 95%   Weight:       Height:        03/10/18 0813 03/10/18 1228 03/10/18 1333 03/10/18 1336   BP: (!) 126/58 134/61 114/66 (!) 103/55   Pulse: 75 88 75 75   Resp: 20 18     Temp: 96.4 °F (35.8 °C) 97.3 °F (36.3 °C)     TempSrc: Oral Oral     SpO2: 95% 96%     Weight:       Height:        03/10/18 1337 03/10/18 1705 03/10/18 1911 03/10/18 2033   BP: (!) 97/50 132/60  (!) 141/63   Pulse: 75 76  75   Resp:  18  18   Temp:       TempSrc:    Oral   SpO2:  96% 97% 97%   Weight:       Height:        03/10/18 2311 03/11/18 0023 03/11/18 0311   BP:  (!) 154/69    Pulse:  65    Resp:  19    Temp:  99 °F (37.2 °C)    TempSrc:  Oral    SpO2: 97% 95% 95%   Weight:  69.9 kg (154 lb 1.6 oz)    Height:          Abnormal Lab Results:  Labs Reviewed   CBC W/ AUTO DIFFERENTIAL - Abnormal; Notable for the following:        Result Value    RBC 3.83 (*)     Hematocrit 36.9 (*)     MCH 32.4 (*)     All other components within normal limits   COMPREHENSIVE METABOLIC PANEL - Abnormal; Notable for the following:     Glucose 127 (*)     BUN, Bld 37 (*)     Creatinine 1.8 (*)     eGFR if  28 (*)     eGFR if non  24 (*)     All other components within normal limits   TROPONIN I - Abnormal; Notable for the following:     Troponin I 0.032 (*)     All other components within normal limits   B-TYPE NATRIURETIC PEPTIDE - Abnormal; Notable for the following:      (*)     All other components within normal limits   PROTIME-INR - Abnormal; Notable for the following:      Prothrombin Time 30.9 (*)     INR 3.0 (*)     All other components within normal limits   MAGNESIUM - Abnormal; Notable for the following:     Magnesium 1.3 (*)     All other components within normal limits   TROPONIN I   MAGNESIUM   PHOSPHORUS   PHOSPHORUS        All Lab Results:  Results for orders placed or performed during the hospital encounter of 03/09/18   CBC auto differential   Result Value Ref Range    WBC 9.21 3.90 - 12.70 K/uL    RBC 3.83 (L) 4.00 - 5.40 M/uL    Hemoglobin 12.4 12.0 - 16.0 g/dL    Hematocrit 36.9 (L) 37.0 - 48.5 %    MCV 96 82 - 98 fL    MCH 32.4 (H) 27.0 - 31.0 pg    MCHC 33.6 32.0 - 36.0 g/dL    RDW 13.6 11.5 - 14.5 %    Platelets 175 150 - 350 K/uL    MPV 11.8 9.2 - 12.9 fL    Gran # (ANC) 6.7 1.8 - 7.7 K/uL    Lymph # 1.7 1.0 - 4.8 K/uL    Mono # 0.6 0.3 - 1.0 K/uL    Eos # 0.2 0.0 - 0.5 K/uL    Baso # 0.02 0.00 - 0.20 K/uL    Gran% 72.7 38.0 - 73.0 %    Lymph% 18.8 18.0 - 48.0 %    Mono% 6.9 4.0 - 15.0 %    Eosinophil% 1.6 0.0 - 8.0 %    Basophil% 0.2 0.0 - 1.9 %    Differential Method Automated    Comprehensive metabolic panel   Result Value Ref Range    Sodium 140 136 - 145 mmol/L    Potassium 5.1 3.5 - 5.1 mmol/L    Chloride 105 95 - 110 mmol/L    CO2 25 23 - 29 mmol/L    Glucose 127 (H) 70 - 110 mg/dL    BUN, Bld 37 (H) 10 - 30 mg/dL    Creatinine 1.8 (H) 0.5 - 1.4 mg/dL    Calcium 9.2 8.7 - 10.5 mg/dL    Total Protein 7.0 6.0 - 8.4 g/dL    Albumin 3.8 3.5 - 5.2 g/dL    Total Bilirubin 0.5 0.1 - 1.0 mg/dL    Alkaline Phosphatase 56 55 - 135 U/L    AST 22 10 - 40 U/L    ALT 13 10 - 44 U/L    Anion Gap 10 8 - 16 mmol/L    eGFR if African American 28 (A) >60 mL/min/1.73 m^2    eGFR if non African American 24 (A) >60 mL/min/1.73 m^2   Troponin I #1   Result Value Ref Range    Troponin I 0.032 (H) 0.000 - 0.026 ng/mL   B-Type natriuretic peptide (BNP)   Result Value Ref Range     (H) 0 - 99 pg/mL   Protime-INR   Result Value Ref Range    Prothrombin Time 30.9 (H) 9.0 -  12.5 sec    INR 3.0 (H) 0.8 - 1.2   Troponin I #2   Result Value Ref Range    Troponin I 0.019 0.000 - 0.026 ng/mL   Urinalysis   Result Value Ref Range    Specimen UA Urine, Clean Catch     Color, UA Yellow Yellow, Straw, Tanisha    Appearance, UA Cloudy (A) Clear    pH, UA 6.0 5.0 - 8.0    Specific Gravity, UA 1.020 1.005 - 1.030    Protein, UA Negative Negative    Glucose, UA Negative Negative    Ketones, UA Negative Negative    Bilirubin (UA) Negative Negative    Occult Blood UA 1+ (A) Negative    Nitrite, UA Negative Negative    Urobilinogen, UA Negative <2.0 EU/dL    Leukocytes, UA 3+ (A) Negative   Magnesium   Result Value Ref Range    Magnesium 1.3 (L) 1.6 - 2.6 mg/dL   Phosphorus   Result Value Ref Range    Phosphorus 4.0 2.7 - 4.5 mg/dL   TSH   Result Value Ref Range    TSH 2.467 0.400 - 4.000 uIU/mL   Troponin I   Result Value Ref Range    Troponin I 0.019 0.000 - 0.026 ng/mL   Protime-INR - if on Coumadin   Result Value Ref Range    Prothrombin Time 35.3 (H) 9.0 - 12.5 sec    INR 3.4 (H) 0.8 - 1.2   CBC auto differential   Result Value Ref Range    WBC 7.56 3.90 - 12.70 K/uL    RBC 3.44 (L) 4.00 - 5.40 M/uL    Hemoglobin 11.0 (L) 12.0 - 16.0 g/dL    Hematocrit 33.1 (L) 37.0 - 48.5 %    MCV 96 82 - 98 fL    MCH 32.0 (H) 27.0 - 31.0 pg    MCHC 33.2 32.0 - 36.0 g/dL    RDW 13.8 11.5 - 14.5 %    Platelets 149 (L) 150 - 350 K/uL    MPV 11.6 9.2 - 12.9 fL    Gran # (ANC) 4.5 1.8 - 7.7 K/uL    Lymph # 2.2 1.0 - 4.8 K/uL    Mono # 0.7 0.3 - 1.0 K/uL    Eos # 0.1 0.0 - 0.5 K/uL    Baso # 0.01 0.00 - 0.20 K/uL    Gran% 59.0 38.0 - 73.0 %    Lymph% 29.6 18.0 - 48.0 %    Mono% 9.7 4.0 - 15.0 %    Eosinophil% 1.6 0.0 - 8.0 %    Basophil% 0.1 0.0 - 1.9 %    Differential Method Automated    Basic metabolic panel    Result Value Ref Range    Sodium 141 136 - 145 mmol/L    Potassium 4.7 3.5 - 5.1 mmol/L    Chloride 108 95 - 110 mmol/L    CO2 24 23 - 29 mmol/L    Glucose 107 70 - 110 mg/dL    BUN, Bld 39 (H) 10 - 30 mg/dL     Creatinine 1.7 (H) 0.5 - 1.4 mg/dL    Calcium 8.9 8.7 - 10.5 mg/dL    Anion Gap 9 8 - 16 mmol/L    eGFR if African American 30 (A) >60 mL/min/1.73 m^2    eGFR if non African American 26 (A) >60 mL/min/1.73 m^2   Hemoglobin A1c   Result Value Ref Range    Hemoglobin A1C 5.5 4.0 - 5.6 %    Estimated Avg Glucose 111 68 - 131 mg/dL   Lipid panel   Result Value Ref Range    Cholesterol 126 120 - 199 mg/dL    Triglycerides 119 30 - 150 mg/dL    HDL 35 (L) 40 - 75 mg/dL    LDL Cholesterol 67.2 63.0 - 159.0 mg/dL    HDL/Chol Ratio 27.8 20.0 - 50.0 %    Total Cholesterol/HDL Ratio 3.6 2.0 - 5.0    Non-HDL Cholesterol 91 mg/dL   Urinalysis Microscopic   Result Value Ref Range    RBC, UA 10 (H) 0 - 4 /hpf    WBC, UA >100 (H) 0 - 5 /hpf    Bacteria, UA Few (A) None-Occ /hpf    Squam Epithel, UA 0 /hpf    Non-Squam Epith 1 (A) <1/hpf /hpf    Microscopic Comment SEE COMMENT    Troponin I   Result Value Ref Range    Troponin I 0.010 0.000 - 0.026 ng/mL   2D echo with color flow doppler   Result Value Ref Range    EF 55 55 - 65    Mitral Valve Regurgitation TRIVIAL     Est. PA Systolic Pressure 39.48      Imaging Results:  Study: CT Head without IV contrast  Findings: Per virtual imaging, there is no acute intracranial process    Ordered, reviewed, and independently interpreted by the ED provider.  Study: CXR  Findings: NAF     The EKG was ordered, reviewed, and independently interpreted by the ED provider.  Interpretation time: 22:29  Rate: 65 BPM  Rhythm: ventricular-paced rhythm  Interpretation: No acute ST changes. No STEMI.    The Emergency Provider reviewed the vital signs and test results, which are outlined above.    ED Discussion   2:14 AM: Pacemaker representative interrogated patient's pacemaker and states that the pacemaker is working fine.     2:30 AM: Discussed case with Dr. Castro ([facility]). Dr. Castro agrees with current care and management of pt and accepts admission.   Admitting Service: [facility]  medicine   Admitting Physician: Dr. Castro  Admit to: Obs-tele    2:42 AM: Re-evaluated pt. I have discussed test results, shared treatment plan, and the need for admission with patient and family at bedside. Pt and family express understanding at this time and agree with all information. All questions answered. Pt and family have no further questions or concerns at this time. Pt is ready for admit.    ED Medication(s):  Medications   Propafenone 75 mg Half Tablet (75 mg Oral Given 3/10/18 2252)   midodrine tablet 5 mg (5 mg Oral Given 3/10/18 2251)   escitalopram oxalate tablet 10 mg (10 mg Oral Given 3/10/18 0936)   levothyroxine tablet 50 mcg (50 mcg Oral Given 3/10/18 0614)   pravastatin tablet 20 mg (20 mg Oral Given 3/10/18 0936)   sodium chloride 0.9% flush 3 mL (3 mLs Intravenous Not Given 3/10/18 2200)   0.9%  NaCl infusion ( Intravenous New Bag 3/10/18 0420)   cyanocobalamin tablet 500 mcg (500 mcg Oral Given 3/10/18 0812)   folic acid tablet 1 mg (1 mg Oral Given 3/10/18 0812)   ferrous sulfate EC tablet 325 mg (325 mg Oral Given 3/10/18 0936)   ascorbic acid (vitamin C) tablet 500 mg (500 mg Oral Given 3/10/18 0936)   acetaminophen tablet 325 mg (325 mg Oral Given 3/10/18 2252)   cefTRIAXone (ROCEPHIN) 1 g in dextrose 5 % 50 mL IVPB (1 g Intravenous New Bag 3/10/18 0936)   warfarin (COUMADIN) split tablet 0.5 mg (not administered)   ondansetron injection 4 mg (0 mg Intravenous Return to Cabinet 3/10/18 2230)   magnesium sulfate 2g in water 50mL IVPB (premix) (2 g Intravenous New Bag 3/10/18 5292)          Medical Decision Making    Medical Decision Making:   Clinical Tests:   Lab Tests: Reviewed and Ordered  Radiological Study: Reviewed and Ordered  Medical Tests: Reviewed and Ordered           Scribe Attestation:   Scribe #1: I performed the above scribed service and the documentation accurately describes the services I performed. I attest to the accuracy of the note.    Attending:   Physician  Attestation Statement for Scribe #1: I, Reece Holt Jr., MD, personally performed the services described in this documentation, as scribed by Shannon Rivers, in my presence, and it is both accurate and complete.          Clinical Impression       ICD-10-CM ICD-9-CM   1. Near syncope R55 780.2   2. Syncope R55 780.2       Disposition:   Disposition: Placed in Observation  Condition: Fair         Reece Holt Jr., MD  03/11/18 0587

## 2018-03-10 NOTE — PT/OT/SLP EVAL
"Occupational Therapy   Evaluation    Name: Josy Posey  MRN: [id number]  Admitting Diagnosis:  Syncope      Recommendations:     Discharge Recommendations: nursing facility, skilled  Discharge Equipment Recommendations:  none  Barriers to discharge:       History:     Occupational Profile:  Living Environment: lives with daughter  Previous level of function: Mod I  Roles and Routines:   Equipment Owned:  wheelchair, rollator, bedside commode  Assistance upon Discharge: Will return home with daughter    Past Medical History:   Diagnosis Date    *Atrial fibrillation     Anemia     Angina pectoris     Arthritis     Atrial fibrillation 9/27/2013    Atrioventricular block, complete     CAD (coronary artery disease) 5/6/2015    Cardiac pacemaker 9/27/2013    CHF (congestive heart failure)     Coronary artery disease     Cystocele     prior pessary use    Depression     Disorder of kidney and ureter     DVT (deep venous thrombosis) 3/1/10 approx    s/p rt embolectomy    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 fuCT 1/3- stable,  CT's later - no residual    Lens replaced by other means 6/25/2014    Melena     Memory loss     PET scan consistent with Alzzheimers.   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    1 vessel LIMA to LAD    S/P MVR (mitral valve replacement) 9/27/2013    Skin ulcer     Squamous cell carcinoma     skin cancer X 2    Stroke     Syncope and collapse     " Unspecified essential hypertension 9/16/2013    Unspecified transient cerebral ischemia     Urinary incontinence     wears briefs       Past Surgical History:   Procedure Laterality Date    ADENOIDECTOMY      APPENDECTOMY      BREAST SURGERY  1950    right lumpectomy     CARDIAC VALVE SURGERY  10/04/2007    MVR    CATARACT EXTRACTION      CHOLECYSTECTOMY      CORONARY ARTERY BYPASS GRAFT      EYE SURGERY      cataracts bilateral    HYSTERECTOMY      for hydatiform mole    INSERT / REPLACE / REMOVE PACEMAKER  09/11/2001    TONSILLECTOMY         Subjective     Chief Complaint: dizziness, weakness  Patient/Family stated goals: return home to [facility]  Communicated with: Nurse Workman prior to session.  Pain/Comfort:  · Pain Rating 1: 0/10    Patients cultural, spiritual, Anglican conflicts given the current situation: none    Objective:     Patient found with: telemetry, peripheral IV    General Precautions: Standard, fall   Orthopedic Precautions:N/A   Braces: N/A     Occupational Performance:    Bed Mobility:    · Patient completed Scooting/Bridging with moderate assistance  · Patient completed Supine to Sit with minimum assistance  · Patient completed Sit to Supine with minimum assistance    Functional Mobility/Transfers:  · Patient completed Sit <> Stand Transfer with contact guard assistance  with  rolling walker   · Functional Mobility: Pt did not ambulate today secondary to increased dizziness upon standing.    Activities of Daily Living:  · ADL performance NA today     Cognitive/Visual Perceptual:  Cognitive/Psychosocial Skills:  -       Oriented to: Person, Place and Situation   -       Follows Commands/attention:Follows two-step commands  -       Communication: clear/fluent  -       Memory: Poor immediate recall  -       Safety awareness/insight to disability: impaired   Visual/Perceptual:      -Intact    Physical Exam:  Balance:    -       Impaired with complaints of dizziness  Upper Extremity Range  "of Motion:  -       Right Upper Extremity: WFL  -       Left Upper Extremity: WFL  Upper Extremity Strength:    -       Right Upper Extremity: WFL  -       Left Upper Extremity: WFL    Patient left supine with all lines intact, call button in reach and nurse present    [facility] 6 Click:  [facility] Total Score: 16    Treatment & Education:  Pt with good attempt to participate in evaluation today. Dizziness a factor in assessment.   Education:    Assessment:     Josy Posey is a 89 y.o. female with a medical diagnosis of Syncope.  She presents with weakness, impaired functional status, and impaired mobility.  Performance deficits affecting function are weakness, impaired endurance, gait instability, impaired cognition, impaired self care skills, impaired balance, decreased safety awareness, impaired functional mobilty.      Rehab Prognosis:  Good; patient would benefit from acute skilled OT services to address these deficits and reach maximum level of function.         Clinical Decision Makin.  OT Mod:  "Pt evaluation falls under moderate complexity for evaluation coding due to identification of 3-5 performance deficits noted as stated above. Eval required Min/Mod assistance to complete on this date and detailed assessment(s) were utilized. Moreover, an expanded review of history and occupational profile obtained with additional review of cognitive, physical and psychosocial hx."     Plan:     Patient to be seen 3 x/week to address the above listed problems via self-care/home management, therapeutic activities, therapeutic exercises  · Plan of Care Expires:    · Plan of Care Reviewed with: patient    This Plan of care has been discussed with the patient who was involved in its development and understands and is in agreement with the identified goals and treatment plan    GOALS:    Occupational Therapy Goals        Problem: Occupational Therapy Goal    Goal Priority Disciplines Outcome Interventions "   Occupational Therapy Goal     OT, PT/OT Ongoing (interventions implemented as appropriate)                    Time Tracking:     OT Date of Treatment: 03/10/18  OT Start Time: 0815  OT Stop Time: 0832  OT Total Time (min): 17 min    Billable Minutes:Evaluation 15    Ilene Lentz OT  3/10/2018

## 2018-03-10 NOTE — ASSESSMENT & PLAN NOTE
Likely related to hydration status  Gentle IV hydration  Hold aldactone  Monitor for improvement

## 2018-03-10 NOTE — HPI
Josy Posey is a 89 y.o. 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 Onset suddenly tonight. Pt reports that she became weak after dinner and patient suddenly passed out while sitting in her walker while having a conversation per daughter. This has occurred twice within the past several months. No Mitigating factors. Exacerbating factors reported; position changes. Associated symptoms weakness. No further complaints or concerns at this time.  Patient saw cardiology for Battery change to pacemaker yesterday (see chart review) and was started on Duricef 500mg q12 x6 doses which patient reports have not started yet. Additionally to note: Per cardiology HPI note: She has a long Hx of autonomic dysfunction with sig OH and past episodes of syncope . She has had an ICH after one such fall 40 years ago. She has been on chronic midodrine Rx with dosing requirements changing over the years. She has done surprisingly well with her AD and has had no recent falls. The patient was evaluated in the Er. CBC stable, CMP with RADHA (BUN 37, Cr. 1.8, K 5.1). Initial Troponin 0.032, . Troponin #2 0.019. Head CT NAF, Chest Xray: NAF. Currently patient AAOx3, nad, no sob, no cp. Orthostatic. Patient placed in obs for further treatment of RADHA and syncope.

## 2018-03-10 NOTE — PROGRESS NOTES
[facility] Hospital Medicine  Progress Note    Patient Name: Josy Posey  MRN: [id number]  Patient Class: OP- Observation   Admission Date: 3/9/2018  Length of Stay: 0 days  Attending Physician: Daphne Nieto MD  Primary Care Provider: TIKA Rosales Jr, MD        Subjective:     Principal Problem:Syncope    HPI:  Josy Posey is a 89 y.o. 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 Onset suddenly tonight. Pt reports that she became weak after dinner and patient suddenly passed out while sitting in her walker while having a conversation per daughter. This has occurred twice within the past several months. No Mitigating factors. Exacerbating factors reported; position changes. Associated symptoms weakness. No further complaints or concerns at this time.  Patient saw cardiology for Battery change to pacemaker yesterday (see chart review) and was started on Duricef 500mg q12 x6 doses which patient reports have not started yet. Additionally to note: Per cardiology HPI note: She has a long Hx of autonomic dysfunction with sig OH and past episodes of syncope . She has had an ICH after one such fall 40 years ago. She has been on chronic midodrine Rx with dosing requirements changing over the years. She has done surprisingly well with her AD and has had no recent falls. The patient was evaluated in the Er. CBC stable, CMP with RADHA (BUN 37, Cr. 1.8, K 5.1). Initial Troponin 0.032, . Troponin #2 0.019. Head CT NAF, Chest Xray: NAF. Currently patient AAOx3, nad, no sob, no cp. Orthostatic. Patient placed in obs for further treatment of RADHA and syncope.       Hospital Course:  89 year old female admitted for syncope. Patient has long hx autonomic dysfunction with orthostatic hypotension. Yesterday patient had battery change to pacemaker. Initial Troponin 0.032, . Troponin #2 0.019. Head CT NAF, Chest Xray:  NAF. Currently patient AAOx3, nad, no sob, no cp. Orthostatic. Usman Carotid US showed bilateral carotid atherosclerotic plaque with elevated peak systolic velocity the right internal carotid artery suggesting 50-69% stenosis. 2D echo showed EF 55-60% and Pulmonary HTN. Patient being treated with IV hydration and midodrine.      Interval History: No syncopal episodes since admission. Creatinine improving. Will continue with current medical management. Plan to discharge in morning.     Review of Systems   Constitutional: Negative for chills, diaphoresis, fatigue and fever.   HENT: Negative for congestion, sore throat and voice change.    Eyes: Negative for photophobia and visual disturbance.   Respiratory: Negative for cough, shortness of breath, wheezing and stridor.    Cardiovascular: Negative for chest pain and leg swelling.   Gastrointestinal: Negative for abdominal distention, abdominal pain, constipation, diarrhea, nausea and vomiting.   Endocrine: Negative for polydipsia, polyphagia and polyuria.   Genitourinary: Negative for difficulty urinating, dysuria, flank pain, pelvic pain, urgency and vaginal discharge.   Musculoskeletal: Negative for back pain, joint swelling, neck pain and neck stiffness.   Skin: Negative for color change and rash.   Allergic/Immunologic: Negative for immunocompromised state.   Neurological: Positive for syncope and weakness. Negative for dizziness, numbness and headaches.   Hematological: Does not bruise/bleed easily.   Psychiatric/Behavioral: Negative for agitation, behavioral problems and confusion.     Objective:     Vital Signs (Most Recent):  Temp: 97.3 °F (36.3 °C) (03/10/18 1228)  Pulse: 75 (03/10/18 1337)  Resp: 18 (03/10/18 1228)  BP: (!) 97/50 (03/10/18 1337)  SpO2: 96 % (03/10/18 1228) Vital Signs (24h Range):  Temp:  [96.4 °F (35.8 °C)-98.5 °F (36.9 °C)] 97.3 °F (36.3 °C)  Pulse:  [64-88] 75  Resp:  [14-38] 18  SpO2:  [93 %-96 %] 96 %  BP: ()/(50-69) 97/50      Weight: 65.3 kg (143 lb 15.4 oz)  Body mass index is 25.5 kg/m².    Intake/Output Summary (Last 24 hours) at 03/10/18 1421  Last data filed at 03/10/18 0419   Gross per 24 hour   Intake                0 ml   Output              150 ml   Net             -150 ml      Physical Exam   Constitutional: She is oriented to person, place, and time. She appears well-developed and well-nourished. No distress.   Elderly    HENT:   Head: Normocephalic and atraumatic.   Nose: Nose normal.   Eyes: Conjunctivae and EOM are normal. Pupils are equal, round, and reactive to light. No scleral icterus.   Neck: Normal range of motion. Neck supple. No tracheal deviation present.   Cardiovascular: Normal rate, regular rhythm and intact distal pulses.    Murmur heard.  S/p Left CW pacemaker with dressing C/D/I.      Pulmonary/Chest: Effort normal and breath sounds normal. No stridor. No respiratory distress. She has no wheezes. She has no rales.   Abdominal: Soft. Bowel sounds are normal. She exhibits no distension. There is no tenderness. There is no guarding.   Musculoskeletal: Normal range of motion. She exhibits no edema or deformity.   Neurological: She is alert and oriented to person, place, and time. No cranial nerve deficit.   Skin: Skin is warm and dry. Capillary refill takes less than 2 seconds. No rash noted. She is not diaphoretic.   Psychiatric: She has a normal mood and affect. Her behavior is normal. Judgment and thought content normal.   Nursing note and vitals reviewed.      Significant Labs:   BMP:   Recent Labs  Lab 03/09/18  2214 03/10/18  0558   * 107    141   K 5.1 4.7    108   CO2 25 24   BUN 37* 39*   CREATININE 1.8* 1.7*   CALCIUM 9.2 8.9   MG 1.3*  --      CBC:   Recent Labs  Lab 03/09/18  2214 03/10/18  0558   WBC 9.21 7.56   HGB 12.4 11.0*   HCT 36.9* 33.1*    149*     CMP:   Recent Labs  Lab 03/09/18  2214 03/10/18  0558    141   K 5.1 4.7    108   CO2 25 24   *  107   BUN 37* 39*   CREATININE 1.8* 1.7*   CALCIUM 9.2 8.9   PROT 7.0  --    ALBUMIN 3.8  --    BILITOT 0.5  --    ALKPHOS 56  --    AST 22  --    ALT 13  --    ANIONGAP 10 9   EGFRNONAA 24* 26*     Troponin:   Recent Labs  Lab 03/10/18  0245 03/10/18  0558 03/10/18  1102   TROPONINI 0.019 0.019 0.010     All pertinent labs within the past 24 hours have been reviewed.    Significant Imaging:   Imaging Results          US Carotid Bilateral (Final result)  Result time 03/10/18 12:25:38    Final result by Roxane Shankar MD (03/10/18 12:25:38)                 Impression:     Bilateral carotid atherosclerotic plaque as described above with elevated peak systolic velocity the right internal carotid artery suggesting 50-69% stenosis..      Electronically signed by: ROXANE SHANKAR MD  Date:     03/10/18  Time:    12:25              Narrative:    Carotid ultrasound    History:     Syncope    Findings:     Sonographic evaluation of the carotid systems was performed.     Moderate atherosclerotic plaque involving the carotid bulb and proximal internal carotid arteries bilaterally.    The peak systolic velocity in the right internal carotid artery was approximately 152 cm/sec.    The peak systolic velocity in the left internal carotid artery was [id number] cm/sec.    Antegrade flow noted in both vertebral arteries.    Stenosis percentage validated velocity measurements with angiographic measurements, velocity criteria are extrapolated from diameter data as defined by the Society of Radiologists in Ultrasound Consensus Conference Radiology 2003; 229;340-346.                             CT Head Without Contrast (Final result)  Result time 03/10/18 07:55:30    Final result by Geovany Molina MD (03/10/18 07:55:30)                 Impression:         Atrophy.  Small vessel disease.  Intracranial atherosclerotic calcifications.  No acute intracranial abnormality.  CT scan of the head similar to 09/03/2017.    All CT scans at this  facility use dose modulation, iterative reconstruction, and/or weight based dosing when appropriate to reduce radiation dose to as low as reasonably achievable.       Electronically signed by: GEOVANY MOLINA MD  Date:     03/10/18  Time:    07:55              Narrative:    Exam: CT scan of the head without contrast    Clinical History:  R. 55 syncope and collapse..      Findings:    Not diffuse cerebral and cerebellar atrophy. Low-density changes in the periventricular distribution bilaterally consistent with small vessel disease. No acute infarct can be seen.  Old lacunar infarct in the anterior limb of the internal capsule on the left. Intracranial atherosclerotic calcifications. No osseous abnormality.                             X-Ray Chest AP Portable (Final result)  Result time 03/10/18 07:26:06    Final result by Geovany Molina MD (03/10/18 07:26:06)                 Impression:     No acute cardiopulmonary disease.  Chest x-ray similar to 09/03/2017.      Electronically signed by: GEOVANY MOLINA MD  Date:     03/10/18  Time:    07:26              Narrative:    Exam: Portable chest radiograph    Clinical History:   fatigue .    Findings:     Coarsened bronchovascular markings/COPD.  Multiple deep pacemaker.  Probable mitral annulus valvular replacement.  Remote median sternotomy.  No acute pulmonary infiltrate can be identified. Heart size is within normal limits.                            Assessment/Plan:      * Syncope    Hx of autonomic dysfunction on chronic midodrine Rx   Continue midodrine, patient reports held yesterday dosing due to procedure. Monitor may need adjustment.   Monitor Orthostatic bp  Gentle IV hydration  Electrolytes, trend toponin, lipids, a1c  + UA , Urine cx pending   IV Rocephin    Ct head: NAF  Echo pending   Bilateral cartoid ultrasound shows atherosclerotic plaque with elevated peak systolic velocity the right internal carotid artery suggesting 50-69% stenosis.          Hypomagnesemia     Replete   Monitor           Chronic anticoagulation    INR 3.4  Pharmacy to dose         Chronic diastolic HFpEF of 60% per echo 5/2017    Continue home meds  Gentle IV hydration   Echo pending  Monitor for volume overload         CAD (coronary artery disease) with remote CABG x 1V (LIMA-LAD)    Continue statin, anticoag. Pharmacy to monitor  Cardiology following outpatient             VTE Risk Mitigation         Ordered     warfarin (COUMADIN) split tablet 0.5 mg  Daily     Route:  Oral        03/10/18 1411     Medium Risk of VTE  Once      03/10/18 0246     Place sequential compression device  Until discontinued      03/10/18 0246     Place ANTELMO hose  Until discontinued      03/10/18 0246     Reason for No Pharmacological VTE Prophylaxis  Once      03/10/18 0246              Lorena Lamar NP  Department of Hospital Medicine   [facility]

## 2018-03-10 NOTE — PLAN OF CARE
Problem: Syncope (Adult)  Goal: Identify Related Risk Factors and Signs and Symptoms  Related risk factors and signs and symptoms are identified upon initiation of Human Response Clinical Practice Guideline (CPG)   Outcome: Ongoing (interventions implemented as appropriate)  Bed alarm in use and monitoring BP

## 2018-03-10 NOTE — ASSESSMENT & PLAN NOTE
Hx of autonomic dysfunction on chronic midodrine Rx   Continue midodrine, patient reports held yesterday dosing due to procedure. Monitor may need adjustment.   Monitor Orthostatic bp  Gentle IV hydration  Electrolytes, trend toponin, lipids, a1c  + UA , Urine cx pending   IV Rocephin    Ct head: NAF  Echo pending   Bilateral cartoid ultrasound shows atherosclerotic plaque with elevated peak systolic velocity the right internal carotid artery suggesting 50-69% stenosis.

## 2018-03-10 NOTE — CONSULTS
Coumadin Consult Note    Pharmacy consulted to dose Coumadin by NP Ourso  Consult day #1  Indication: atrial fibrillation & hx of DVT  Goal INR: 2.0-3.0  However NOTE that per Coumadin Clinic notes, the neurologist recommends a goal INR of 2.5-3.5 due to recent stroke on 9/4 (was this while INR was therapeutic btw 2-3?)    Today's INR: 3.4 (slightly supra-therapeutic) --> will HOLD today's dose  Pt to be discharged home today  Instructed pt to resume home dose tomorrow (per pt this is 0.5 mg daily) -- she no longer takes booster dose of 1 mg on Wednesdays  Pt & daughter have been counseled & given warfarin educational handout    Thank you for allowing us to participate in this patient's care.   Katherine McArdle, Pharm.D. 3/10/2018 2:32 PM

## 2018-03-11 VITALS
SYSTOLIC BLOOD PRESSURE: 148 MMHG | HEIGHT: 63 IN | OXYGEN SATURATION: 94 % | RESPIRATION RATE: 18 BRPM | DIASTOLIC BLOOD PRESSURE: 69 MMHG | TEMPERATURE: 98 F | WEIGHT: 155.44 LBS | HEART RATE: 75 BPM | BODY MASS INDEX: 27.54 KG/M2

## 2018-03-11 LAB
ANION GAP SERPL CALC-SCNC: 9 MMOL/L
BACTERIA UR CULT: NO GROWTH
BASOPHILS # BLD AUTO: 0.02 K/UL
BASOPHILS NFR BLD: 0.2 %
BUN SERPL-MCNC: 27 MG/DL
CALCIUM SERPL-MCNC: 8.5 MG/DL
CHLORIDE SERPL-SCNC: 108 MMOL/L
CO2 SERPL-SCNC: 22 MMOL/L
CREAT SERPL-MCNC: 1.4 MG/DL
DIFFERENTIAL METHOD: ABNORMAL
EOSINOPHIL # BLD AUTO: 0.2 K/UL
EOSINOPHIL NFR BLD: 1.6 %
ERYTHROCYTE [DISTWIDTH] IN BLOOD BY AUTOMATED COUNT: 13.6 %
EST. GFR  (AFRICAN AMERICAN): 37 ML/MIN/1.73 M^2
EST. GFR  (NON AFRICAN AMERICAN): 32 ML/MIN/1.73 M^2
GLUCOSE SERPL-MCNC: 96 MG/DL
HCT VFR BLD AUTO: 27.3 %
HGB BLD-MCNC: 9.2 G/DL
INR PPP: 2.6
LYMPHOCYTES # BLD AUTO: 2.2 K/UL
LYMPHOCYTES NFR BLD: 24.2 %
MCH RBC QN AUTO: 32.5 PG
MCHC RBC AUTO-ENTMCNC: 33.7 G/DL
MCV RBC AUTO: 97 FL
MONOCYTES # BLD AUTO: 0.9 K/UL
MONOCYTES NFR BLD: 9.5 %
NEUTROPHILS # BLD AUTO: 5.9 K/UL
NEUTROPHILS NFR BLD: 64.5 %
PLATELET # BLD AUTO: 137 K/UL
PMV BLD AUTO: 11.4 FL
POTASSIUM SERPL-SCNC: 4.8 MMOL/L
PROTHROMBIN TIME: 27 SEC
RBC # BLD AUTO: 2.83 M/UL
SODIUM SERPL-SCNC: 139 MMOL/L
WBC # BLD AUTO: 9.14 K/UL

## 2018-03-11 PROCEDURE — A4216 STERILE WATER/SALINE, 10 ML: HCPCS | Performed by: NURSE PRACTITIONER

## 2018-03-11 PROCEDURE — G0378 HOSPITAL OBSERVATION PER HR: HCPCS

## 2018-03-11 PROCEDURE — 93005 ELECTROCARDIOGRAM TRACING: CPT

## 2018-03-11 PROCEDURE — 25000003 PHARM REV CODE 250: Performed by: FAMILY MEDICINE

## 2018-03-11 PROCEDURE — 36415 COLL VENOUS BLD VENIPUNCTURE: CPT

## 2018-03-11 PROCEDURE — 25000003 PHARM REV CODE 250: Performed by: EMERGENCY MEDICINE

## 2018-03-11 PROCEDURE — 97530 THERAPEUTIC ACTIVITIES: CPT

## 2018-03-11 PROCEDURE — 85610 PROTHROMBIN TIME: CPT

## 2018-03-11 PROCEDURE — 85025 COMPLETE CBC W/AUTO DIFF WBC: CPT

## 2018-03-11 PROCEDURE — 25000003 PHARM REV CODE 250: Performed by: INTERNAL MEDICINE

## 2018-03-11 PROCEDURE — 80048 BASIC METABOLIC PNL TOTAL CA: CPT

## 2018-03-11 PROCEDURE — 25000003 PHARM REV CODE 250: Performed by: NURSE PRACTITIONER

## 2018-03-11 PROCEDURE — 93010 ELECTROCARDIOGRAM REPORT: CPT | Mod: ,,, | Performed by: INTERNAL MEDICINE

## 2018-03-11 PROCEDURE — 63600175 PHARM REV CODE 636 W HCPCS: Performed by: NURSE PRACTITIONER

## 2018-03-11 RX ORDER — DIPHENHYDRAMINE HCL 25 MG
25 CAPSULE ORAL EVERY 6 HOURS PRN
Status: DISCONTINUED | OUTPATIENT
Start: 2018-03-11 | End: 2018-03-11 | Stop reason: HOSPADM

## 2018-03-11 RX ORDER — CIPROFLOXACIN 500 MG/1
500 TABLET ORAL 2 TIMES DAILY
Qty: 10 TABLET | Refills: 0 | Status: SHIPPED | OUTPATIENT
Start: 2018-03-11 | End: 2018-03-16

## 2018-03-11 RX ADMIN — CEFTRIAXONE 1 G: 1 INJECTION, SOLUTION INTRAVENOUS at 10:03

## 2018-03-11 RX ADMIN — MIDODRINE HYDROCHLORIDE 5 MG: 2.5 TABLET ORAL at 08:03

## 2018-03-11 RX ADMIN — CYANOCOBALAMIN TAB 250 MCG 500 MCG: 250 TAB at 08:03

## 2018-03-11 RX ADMIN — DIPHENHYDRAMINE HYDROCHLORIDE 25 MG: 25 CAPSULE ORAL at 07:03

## 2018-03-11 RX ADMIN — LEVOTHYROXINE SODIUM 50 MCG: 50 TABLET ORAL at 06:03

## 2018-03-11 RX ADMIN — PROPAFENONE HYDROCHLORIDE 75 MG: 150 TABLET, FILM COATED ORAL at 04:03

## 2018-03-11 RX ADMIN — MIDODRINE HYDROCHLORIDE 5 MG: 2.5 TABLET ORAL at 01:03

## 2018-03-11 RX ADMIN — PRAVASTATIN SODIUM 20 MG: 20 TABLET ORAL at 08:03

## 2018-03-11 RX ADMIN — FOLIC ACID 1 MG: 1 TABLET ORAL at 08:03

## 2018-03-11 RX ADMIN — ESCITALOPRAM OXALATE 10 MG: 10 TABLET, FILM COATED ORAL at 08:03

## 2018-03-11 RX ADMIN — OXYCODONE HYDROCHLORIDE AND ACETAMINOPHEN 500 MG: 500 TABLET ORAL at 08:03

## 2018-03-11 RX ADMIN — PROPAFENONE HYDROCHLORIDE 75 MG: 150 TABLET, FILM COATED ORAL at 08:03

## 2018-03-11 RX ADMIN — WARFARIN SODIUM 0.5 MG: 1 TABLET ORAL at 04:03

## 2018-03-11 RX ADMIN — ACETAMINOPHEN 325 MG: 325 TABLET ORAL at 04:03

## 2018-03-11 RX ADMIN — MIDODRINE HYDROCHLORIDE 5 MG: 2.5 TABLET ORAL at 04:03

## 2018-03-11 RX ADMIN — FERROUS SULFATE TAB EC 325 MG (65 MG FE EQUIVALENT) 325 MG: 325 (65 FE) TABLET DELAYED RESPONSE at 08:03

## 2018-03-11 RX ADMIN — SODIUM CHLORIDE 3 ML: 9 INJECTION, SOLUTION INTRAMUSCULAR; INTRAVENOUS; SUBCUTANEOUS at 02:03

## 2018-03-11 NOTE — DISCHARGE SUMMARY
[facility] Hospital Medicine  Discharge Summary      Patient Name: Josy Posey  MRN: [id number]  Admission Date: 3/9/2018  Hospital Length of Stay: 0 days  Discharge Date and Time:  03/11/2018 2:09 PM  Attending Physician: Daphne Nieto MD   Discharging Provider: Lorena Lamar NP  Primary Care Provider: TIKA Rosales Jr, MD      HPI:   Josy Posye is a 89 y.o. 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 Onset suddenly tonight. Pt reports that she became weak after dinner and patient suddenly passed out while sitting in her walker while having a conversation per daughter. This has occurred twice within the past several months. No Mitigating factors. Exacerbating factors reported; position changes. Associated symptoms weakness. No further complaints or concerns at this time.  Patient saw cardiology for Battery change to pacemaker yesterday (see chart review) and was started on Duricef 500mg q12 x6 doses which patient reports have not started yet. Additionally to note: Per cardiology HPI note: She has a long Hx of autonomic dysfunction with sig OH and past episodes of syncope . She has had an ICH after one such fall 40 years ago. She has been on chronic midodrine Rx with dosing requirements changing over the years. She has done surprisingly well with her AD and has had no recent falls. The patient was evaluated in the Er. CBC stable, CMP with RADHA (BUN 37, Cr. 1.8, K 5.1). Initial Troponin 0.032, . Troponin #2 0.019. Head CT NAF, Chest Xray: NAF. Currently patient AAOx3, nad, no sob, no cp. Orthostatic. Patient placed in obs for further treatment of RADHA and syncope.       * No surgery found *      Hospital Course:   89 year old female admitted for syncope. Patient has long hx autonomic dysfunction with orthostatic hypotension. Yesterday patient had battery change to pacemaker. Initial Troponin 0.032,  . Troponin #2 0.019. Head CT NAF, Chest Xray: NAF. Currently patient AAOx3, nad, no sob, no cp. Orthostatic. Usman Carotid US showed bilateral carotid atherosclerotic plaque with elevated peak systolic velocity the right internal carotid artery suggesting 50-69% stenosis. 2D echo showed EF 55-60% and Pulmonary HTN. + UTI, IV Rocephin started. Patient being treated with IV hydration and midodrine. Creatinine back to baseline. Symptoms improved. Patient denies any dysuria, frequency, or urgency. Case discussed with Dr. Nieto. Home meds reconciled. New prescription given for Ciprofloxacin. Patient to follow-up with PCP in 3-5 days for hospital follow-up. Patient also to follow-up with Dr. Alvarado (Cardiology) in 2 weeks. Patient seen and examined on the date of discharge and found suitable for discharge.      Consults:   Consults         Status Ordering Provider     Inpatient consult to Social Work  Once     Provider:  (Not yet assigned)    Completed CAMDEN BALDWIN     Nutrition Services Referral  Once     Provider:  (Not yet assigned)    Completed CAMDEN BALDWIN     Pharmacy to dose Warfarin consult  Once     Provider:  (Not yet assigned)    Acknowledged CAMDEN BALDWIN          No new Assessment & Plan notes have been filed under this hospital service since the last note was generated.  Service: Hospital Medicine    Final Active Diagnoses:    Diagnosis Date Noted POA    PRINCIPAL PROBLEM:  Syncope [R55] 03/10/2018 Yes    Hypomagnesemia [E83.42] 03/10/2018 Yes    Chronic anticoagulation [Z79.01] 07/02/2015 Not Applicable    Chronic diastolic HFpEF of 60% per echo 5/2017 [I51.9] 07/01/2015 Yes     Chronic    CAD (coronary artery disease) with remote CABG x 1V (LIMA-LAD) [I25.10] 05/06/2015 Yes     Chronic      Problems Resolved During this Admission:    Diagnosis Date Noted Date Resolved POA       Discharged Condition: stable    Disposition: Home or Self Care    Follow Up:  Follow-up Information     TIKA Armenta  Connie Ashton MD In 3 days.    Specialties:  Internal Medicine, Pediatrics  Why:  hospital follow-up in 3-5 days  Contact information:  [address]  [place] [address] [phone]             Sachin Alvarado MD In 2 weeks.    Specialty:  Cardiology  Contact information:  [address]  [place] [address] [phone]                 Patient Instructions:     Activity as tolerated     Notify your health care provider if you experience any of the following:  temperature >100.4     Notify your health care provider if you experience any of the following:  persistent nausea and vomiting or diarrhea     Notify your health care provider if you experience any of the following:  severe uncontrolled pain     Notify your health care provider if you experience any of the following:  redness, tenderness, or signs of infection (pain, swelling, redness, odor or green/yellow discharge around incision site)     Notify your health care provider if you experience any of the following:  persistent dizziness, light-headedness, or visual disturbances     Notify your health care provider if you experience any of the following:  increased confusion or weakness         Significant Diagnostic Studies: Labs:   BMP:   Recent Labs  Lab 03/09/18  2214 03/10/18  0558 03/11/18  0753   * 107 96    141 139   K 5.1 4.7 4.8    108 108   CO2 25 24 22*   BUN 37* 39* 27   CREATININE 1.8* 1.7* 1.4   CALCIUM 9.2 8.9 8.5*   MG 1.3*  --   --    , CMP   Recent Labs  Lab 03/09/18  2214 03/10/18  0558 03/11/18  0753    141 139   K 5.1 4.7 4.8    108 108   CO2 25 24 22*   * 107 96   BUN 37* 39* 27   CREATININE 1.8* 1.7* 1.4   CALCIUM 9.2 8.9 8.5*   PROT 7.0  --   --    ALBUMIN 3.8  --   --    BILITOT 0.5  --   --    ALKPHOS 56  --   --    AST 22  --   --    ALT 13  --   --    ANIONGAP 10 9 9   ESTGFRAFRICA 28* 30* 37*   EGFRNONAA 24* 26* 32*   , CBC   Recent Labs  Lab 03/09/18  2214 03/10/18  0558  03/11/18  0755   WBC 9.21 7.56 9.14   HGB 12.4 11.0* 9.2*   HCT 36.9* 33.1* 27.3*    149* 137*    and All labs within the past 24 hours have been reviewed    Pending Diagnostic Studies:     None         Medications:  Reconciled Home Medications:   Current Discharge Medication List      START taking these medications    Details   ciprofloxacin HCl (CIPRO) 500 MG tablet Take 1 tablet (500 mg total) by mouth 2 (two) times daily.  Qty: 10 tablet, Refills: 0         CONTINUE these medications which have NOT CHANGED    Details   ascorbic acid (VITAMIN C) 1000 MG tablet Take 1 tablet by mouth once daily once daily.      biotin 2,500 mcg Cap Take by mouth.      clotrimazole-betamethasone 1-0.05% (LOTRISONE) cream Apply topically 2 (two) times daily.  Qty: 45 g, Refills: 1    Associated Diagnoses: Tinea corporis      econazole nitrate 1 % cream       escitalopram oxalate (LEXAPRO) 10 MG tablet Take 1 tablet (10 mg total) by mouth once daily.  Qty: 90 tablet, Refills: 3      fluticasone (FLONASE) 50 mcg/actuation nasal spray instill 2 sprays into each nostril once daily  Qty: 16 g, Refills: 11      folic acid (FOLVITE) 1 MG tablet Take 1 tablet by mouth once daily once daily.      iron-vitamin C 100-250 mg, ICAR-C, (ICAR-C) 100-250 mg Tab Take 1 tablet by mouth once daily.  Qty: 30 tablet, Refills: 3    Associated Diagnoses: Iron deficiency anemia      levothyroxine (SYNTHROID) 50 MCG tablet Take 1 tablet (50 mcg total) by mouth once daily.  Qty: 90 tablet, Refills: 3      meclizine (ANTIVERT) 25 mg tablet Take 1 tablet (25 mg total) by mouth 3 (three) times daily as needed for Dizziness.  Qty: 15 tablet, Refills: 0      midodrine (PROAMATINE) 5 MG Tab Take 1 tablet (5 mg total) by mouth 4 (four) times daily. During waking hours  Qty: 120 tablet, Refills: 6    Associated Diagnoses: Orthostatic hypotension      omega-3 fatty acids-vitamin E (FISH OIL) 1,000 mg Cap Take by mouth once daily.      pravastatin (PRAVACHOL)  20 MG tablet Take 1 tablet (20 mg total) by mouth once daily.  Qty: 30 tablet, Refills: 6    Associated Diagnoses: Coronary artery disease involving native coronary artery of native heart without angina pectoris      propafenone (RHTHYMOL) 150 MG Tab take 1/2 tablet by mouth three times a day  Qty: 45 tablet, Refills: 6    Associated Diagnoses: PAF (paroxysmal atrial fibrillation)      spironolactone (ALDACTONE) 25 MG tablet Take 0.5 tablets (12.5 mg total) by mouth once daily.  Qty: 30 tablet, Refills: 11      tramadol (ULTRAM) 50 mg tablet take 1 tablet by mouth every 8 hours if needed  Qty: 14 tablet, Refills: 0      triamcinolone acetonide 0.1% (KENALOG) 0.1 % cream apply THINLY to affected area ON LEGS, ARMS, AND TRUCK twice a day if needed for eczema  Refills: 0      vitamin B12-folic acid 0.5-1 mg Tab Take 1 tablet by mouth once daily once daily.      warfarin (COUMADIN) 1 MG tablet Take 1 tablet on Wednesdays and 1/2 tablet all other days as directed by the coumadin clinic.  Qty: 50 tablet, Refills: 3    Associated Diagnoses: Long term (current) use of anticoagulants         STOP taking these medications       cefadroxil (DURICEF) 500 MG Cap Comments:   Reason for Stopping:         estradiol (ESTRACE) 0.01 % (0.1 mg/gram) vaginal cream Comments:   Reason for Stopping:               Indwelling Lines/Drains at time of discharge:   Lines/Drains/Airways          No matching active lines, drains, or airways          Time spent on the discharge of patient: 45 minutes  Patient was seen and examined on the date of discharge and determined to be suitable for discharge.         Lorena Lamar NP  Department of Hospital Medicine  [facility]

## 2018-03-11 NOTE — PLAN OF CARE
Problem: Patient Care Overview  Goal: Plan of Care Review  Patient currently requires min assist for bed mobility and transfers with RW.    Outcome: Ongoing (interventions implemented as appropriate)  POC reviewed with patient, verbalized understanding. Pt remains free from falls. Fall precautions in place. V paced on cardiac monitor. VSS. No other complaints at this time. Call bell w/in reach. Reminded to call for assistance. Pt did get weak and dizzy when getting up to the BSC . Pt complained of nausea and new orders received for zofran prn. Early am I had Merline, Np come to pt bedside to access the pt pacemaker site due to swelling and pt complaining of itching. NP felt like the swelling looked normal so suggested to start applying ice packs to the site. New orders for benadryl received to help with the itching. Also will report to day nurse so she can continue to monitor the swelling.

## 2018-03-11 NOTE — PLAN OF CARE
Problem: Physical Therapy Goal  Goal: Physical Therapy Goal  LTGs to be met within 7 days (3/17/18):  1.  Patient will perform bed mobility with SPV only  2.  Patient will perform functional transfers with RW with SBA  3.  Patient will ambulate 100' with RW with CGA and no gross LOB  4.  Patient will perform BLE therapeutic exercises 10 x 2 in all planes   Outcome: Ongoing (interventions implemented as appropriate)  PATIENT PARTICIPATED WITH GOOD EFFORT

## 2018-03-11 NOTE — PROGRESS NOTES
Patient's daughter states that she does not think that patient is ready to go home and would like to speak to someone. Ana Lilia Lamar NP notified and spoke with patient and daughter.

## 2018-03-11 NOTE — PT/OT/SLP PROGRESS
Physical Therapy  Treatment    Josy Posey   MRN: [id number]   Admitting Diagnosis: Syncope       PT Start Time: 1135     PT Stop Time: 1200    PT Total Time (min): 25 min       Billable Minutes:  Therapeutic Activity 25    Treatment Type: Treatment  PT/PTA: PTA     PTA Visit Number: 1       General Precautions: Standard, fall  Orthopedic Precautions: N/A   Braces:      Do you have any cultural, spiritual, Congregational conflicts, given your current situation?: none    Subjective:  Communicated with NSG prior to session.      Pain/Comfort  Pain Rating 1: 0/10  Pain Rating Post-Intervention 1: 0/10    Objective:        Functional Mobility:  Bed Mobility:        Transfers:       Gait:        Stairs:      Balance:   Static Sit: FAIR: Maintains without assist, but unable to take any challenges   Dynamic Sit: FAIR+: Maintains balance through MINIMAL excursions of active trunk motion  Static Stand: FAIR: Maintains without assist but unable to take challenges  Dynamic stand: FAIR: Needs CONTACT GUARD during gait     Therapeutic Activities and Exercises:  SUPINE TO SIT SBA SIT TO SUPINE MOD ASSIST. AMBULATED SIDE STEPS TO L SBA FOR REPOSITIONING IN BED. APPLIED ICE TO L SHOULDER FOR COMFORT.      AM-PAC 6 CLICK MOBILITY  How much help from another person does this patient currently need?   1 = Unable, Total/Dependent Assistance  2 = A lot, Maximum/Moderate Assistance  3 = A little, Minimum/Contact Guard/Supervision  4 = None, Modified [place]/Independent         AM-PAC Raw Score CMS G-Code Modifier Level of Impairment Assistance   6 % Total / Unable   7 - 9 CM 80 - 100% Maximal Assist   10 - 14 CL 60 - 80% Moderate Assist   15 - 19 CK 40 - 60% Moderate Assist   20 - 22 CJ 20 - 40% Minimal Assist   23 CI 1-20% SBA / CGA   24 CH 0% Independent/ Mod I     Patient left supine with all lines intact, call button in reach, bed alarm on and FAMILY present.    Assessment:  Josy Posey is a 89 y.o. female with a  medical diagnosis of Syncope and presents with .    Rehab identified problem list/impairments: Rehab identified problem list/impairments: weakness, impaired self care skills, impaired functional mobilty, impaired endurance, decreased lower extremity function, decreased upper extremity function, decreased safety awareness, impaired balance    Rehab potential is fair.    Activity tolerance: Fair    Discharge recommendations: Discharge Facility/Level Of Care Needs: rehabilitation facility     Barriers to discharge:      Equipment recommendations: Equipment Needed After Discharge: walker, rolling     GOALS:    Physical Therapy Goals        Problem: Physical Therapy Goal    Goal Priority Disciplines Outcome Goal Variances Interventions   Physical Therapy Goal     PT/OT, PT Ongoing (interventions implemented as appropriate)     Description:  LTGs to be met within 7 days (3/17/18):  1.  Patient will perform bed mobility with SPV only  2.  Patient will perform functional transfers with RW with SBA  3.  Patient will ambulate 100' with RW with CGA and no gross LOB  4.  Patient will perform BLE therapeutic exercises 10 x 2 in all planes                     PLAN:    Patient to be seen 5 x/week  to address the above listed problems via gait training, therapeutic activities, therapeutic exercises  Plan of Care expires: 03/17/18  Plan of Care reviewed with: patient, family         Ronnie Bowen, PTA  03/11/2018

## 2018-03-11 NOTE — PROGRESS NOTES
Discharge instructions given to pt and daughter, understanding verbalized. Pt AAO x 4, VSS on RA. No signs of distress noted. Cardiac monitor removed. IV removed, pressure dressing applied. Pt transported off floor by PCT and RN. Pt remains free of falls/injuries during transfer to car.

## 2018-03-14 ENCOUNTER — CLINICAL SUPPORT (OUTPATIENT)
Dept: CARDIOLOGY | Facility: CLINIC | Age: 83
End: 2018-03-14
Payer: MEDICARE

## 2018-03-14 ENCOUNTER — ANTI-COAG VISIT (OUTPATIENT)
Dept: CARDIOLOGY | Facility: CLINIC | Age: 83
End: 2018-03-14
Payer: MEDICARE

## 2018-03-14 DIAGNOSIS — I48.91 ATRIAL FIBRILLATION, UNSPECIFIED TYPE: ICD-10-CM

## 2018-03-14 DIAGNOSIS — Z95.0 CARDIAC PACEMAKER IN SITU: ICD-10-CM

## 2018-03-14 DIAGNOSIS — Z79.01 LONG TERM (CURRENT) USE OF ANTICOAGULANTS: Primary | ICD-10-CM

## 2018-03-14 DIAGNOSIS — I44.2 CHB (COMPLETE HEART BLOCK): ICD-10-CM

## 2018-03-14 LAB — INR PPP: 2 (ref 2.5–3.5)

## 2018-03-14 PROCEDURE — 85610 PROTHROMBIN TIME: CPT | Mod: QW,S$GLB,,

## 2018-03-14 PROCEDURE — 93281 PM DEVICE PROGR EVAL MULTI: CPT | Mod: 26,,, | Performed by: INTERNAL MEDICINE

## 2018-03-14 PROCEDURE — 99211 OFF/OP EST MAY X REQ PHY/QHP: CPT | Mod: 25,S$GLB,,

## 2018-03-14 NOTE — PROGRESS NOTES
INR is sub-therapeutic. Pacemaker procedure 3/9, discharged on Duricef. Admitted 3/10-3/11 following a syncope episode discharged on Cipro for 5 days regimen. Large bruising on both arms no other sign/symptoms of bleeding. Will begin 0.5mg daily. Repeat INR on Monday. Patient and daughter verbalized understanding.

## 2018-03-16 ENCOUNTER — PATIENT MESSAGE (OUTPATIENT)
Dept: CARDIOLOGY | Facility: CLINIC | Age: 83
End: 2018-03-16

## 2018-03-16 DIAGNOSIS — B99.9 INFECTION: Primary | ICD-10-CM

## 2018-03-19 ENCOUNTER — ANTI-COAG VISIT (OUTPATIENT)
Dept: CARDIOLOGY | Facility: CLINIC | Age: 83
End: 2018-03-19
Payer: MEDICARE

## 2018-03-19 ENCOUNTER — DOCUMENTATION ONLY (OUTPATIENT)
Dept: CARDIOLOGY | Facility: CLINIC | Age: 83
End: 2018-03-19

## 2018-03-19 ENCOUNTER — TELEPHONE (OUTPATIENT)
Dept: CARDIOLOGY | Facility: CLINIC | Age: 83
End: 2018-03-19

## 2018-03-19 DIAGNOSIS — Z79.01 LONG TERM (CURRENT) USE OF ANTICOAGULANTS: Primary | ICD-10-CM

## 2018-03-19 LAB — INR PPP: 2 (ref 2.5–3.5)

## 2018-03-19 PROCEDURE — 85610 PROTHROMBIN TIME: CPT | Mod: QW,S$GLB,,

## 2018-03-19 RX ORDER — DOXYCYCLINE HYCLATE 100 MG
100 TABLET ORAL EVERY 12 HOURS
Qty: 10 TABLET | Refills: 1 | Status: CANCELLED | OUTPATIENT
Start: 2018-03-19

## 2018-03-19 NOTE — PROGRESS NOTES
Patient's INR remains low today.  Will increase dosage to 1 mg on Monday and Wednesday, then 0.5 mg on remaining 5 days.  Recheck in 1 week.

## 2018-03-19 NOTE — TELEPHONE ENCOUNTER
Received photo from patient continues to hurt,  itch, lots of bruising all over left side, back and arm.  She also is still weak and having presyncopal episides.  Will come to Cardiology today after Coumadin Clinic appointment

## 2018-03-19 NOTE — PROGRESS NOTES
Patient in clinic for Coumadin check and wound check.  See photo after cleaning with Hibiclens and recovered with Tegaderm dressing copy of photo sent to Dr. Webb.

## 2018-03-20 RX ORDER — DOXYCYCLINE 100 MG/1
100 TABLET ORAL 2 TIMES DAILY
Qty: 60 TABLET | Refills: 0 | Status: ON HOLD | OUTPATIENT
Start: 2018-03-20 | End: 2018-04-03

## 2018-03-26 ENCOUNTER — TELEPHONE (OUTPATIENT)
Dept: CARDIOLOGY | Facility: CLINIC | Age: 83
End: 2018-03-26

## 2018-03-26 NOTE — TELEPHONE ENCOUNTER
Received phone call from patient and photo of Pacemaker wound, she stated she's still in pain and very weak

## 2018-03-28 ENCOUNTER — ANTI-COAG VISIT (OUTPATIENT)
Dept: CARDIOLOGY | Facility: CLINIC | Age: 83
End: 2018-03-28
Payer: MEDICARE

## 2018-03-28 DIAGNOSIS — Z79.01 LONG TERM (CURRENT) USE OF ANTICOAGULANTS: Primary | ICD-10-CM

## 2018-03-28 LAB — INR PPP: 3.2 (ref 2.5–3.5)

## 2018-03-28 PROCEDURE — 85610 PROTHROMBIN TIME: CPT | Mod: QW,S$GLB,,

## 2018-03-28 NOTE — PROGRESS NOTES
Patient's INR is therapeutic today.  No changes in dosage.  Recheck in 2 weeks.  Please call should you have any questions or concerns at [phone] or [phone].

## 2018-04-02 ENCOUNTER — TELEPHONE (OUTPATIENT)
Dept: CARDIOLOGY | Facility: CLINIC | Age: 83
End: 2018-04-02

## 2018-04-02 ENCOUNTER — ANESTHESIA EVENT (OUTPATIENT)
Dept: MEDSURG UNIT | Facility: HOSPITAL | Age: 83
End: 2018-04-02
Payer: MEDICARE

## 2018-04-02 NOTE — TELEPHONE ENCOUNTER
Received photo from patient with message that it feels warm and she's sill weak but better than last week. Advised continue ice packs to wound.  Also advised to add dark green vegetables to diet since her last INR 3/28/18 was 3.2, currently continuing Doxycycline 100 mg bid.  Copy of photo was sent to Dr. Webb and patient advised would have an appointment for Tuesday in [place] but will not receive time for appointment until Monday.  Instructed to hold Coumadin and advised again continue extra salad or spinach.  Arrival time 8am and nothing to eat or drink after midnight except for morning medications with a small amount of water.

## 2018-04-03 ENCOUNTER — HOSPITAL ENCOUNTER (OUTPATIENT)
Facility: HOSPITAL | Age: 83
Discharge: HOME | End: 2018-04-03
Attending: INTERNAL MEDICINE | Admitting: INTERNAL MEDICINE
Payer: MEDICARE

## 2018-04-03 ENCOUNTER — ANESTHESIA (OUTPATIENT)
Dept: MEDSURG UNIT | Facility: HOSPITAL | Age: 83
End: 2018-04-03
Payer: MEDICARE

## 2018-04-03 ENCOUNTER — SURGERY (OUTPATIENT)
Age: 83
End: 2018-04-03

## 2018-04-03 ENCOUNTER — OFFICE VISIT (OUTPATIENT)
Dept: ELECTROPHYSIOLOGY | Facility: CLINIC | Age: 83
End: 2018-04-03
Payer: MEDICARE

## 2018-04-03 VITALS
RESPIRATION RATE: 16 BRPM | OXYGEN SATURATION: 96 % | WEIGHT: 140 LBS | DIASTOLIC BLOOD PRESSURE: 66 MMHG | TEMPERATURE: 98 F | HEIGHT: 63 IN | SYSTOLIC BLOOD PRESSURE: 136 MMHG | BODY MASS INDEX: 24.8 KG/M2 | HEART RATE: 76 BPM

## 2018-04-03 VITALS
TEMPERATURE: 98 F | SYSTOLIC BLOOD PRESSURE: 88 MMHG | HEIGHT: 63 IN | DIASTOLIC BLOOD PRESSURE: 52 MMHG | WEIGHT: 140 LBS | BODY MASS INDEX: 24.8 KG/M2

## 2018-04-03 DIAGNOSIS — I95.1 ORTHOSTATIC HYPOTENSION: ICD-10-CM

## 2018-04-03 DIAGNOSIS — I44.2 THIRD DEGREE AV BLOCK: Chronic | ICD-10-CM

## 2018-04-03 DIAGNOSIS — I48.0 PAROXYSMAL ATRIAL FIBRILLATION: Chronic | ICD-10-CM

## 2018-04-03 DIAGNOSIS — Z86.73 H/O: CVA (CEREBROVASCULAR ACCIDENT): Chronic | ICD-10-CM

## 2018-04-03 DIAGNOSIS — T82.837D HEMATOMA OF PACEMAKER POCKET, SUBSEQUENT ENCOUNTER: ICD-10-CM

## 2018-04-03 DIAGNOSIS — T82.837S: ICD-10-CM

## 2018-04-03 DIAGNOSIS — Z86.718 HISTORY OF DVT OF LOWER EXTREMITY: ICD-10-CM

## 2018-04-03 DIAGNOSIS — I77.9 BILATERAL CAROTID ARTERY DISEASE: ICD-10-CM

## 2018-04-03 DIAGNOSIS — T82.837A: Primary | ICD-10-CM

## 2018-04-03 DIAGNOSIS — Z79.01 CHRONIC ANTICOAGULATION: ICD-10-CM

## 2018-04-03 DIAGNOSIS — I25.10 CORONARY ARTERY DISEASE INVOLVING NATIVE CORONARY ARTERY OF NATIVE HEART WITHOUT ANGINA PECTORIS: Chronic | ICD-10-CM

## 2018-04-03 DIAGNOSIS — T82.837D HEMATOMA OF PACEMAKER POCKET, SUBSEQUENT ENCOUNTER: Primary | ICD-10-CM

## 2018-04-03 DIAGNOSIS — R55 SYNCOPE, UNSPECIFIED SYNCOPE TYPE: ICD-10-CM

## 2018-04-03 DIAGNOSIS — Z95.2 S/P MITRAL VALVE REPLACEMENT: Chronic | ICD-10-CM

## 2018-04-03 DIAGNOSIS — I49.9 ARRHYTHMIA: ICD-10-CM

## 2018-04-03 LAB
ABO + RH BLD: NORMAL
ANION GAP SERPL CALC-SCNC: 11 MMOL/L
APTT BLDCRRT: 40.8 SEC
BASOPHILS # BLD AUTO: 0.04 K/UL
BASOPHILS NFR BLD: 0.6 %
BLD GP AB SCN CELLS X3 SERPL QL: NORMAL
BLD PROD TYP BPU: NORMAL
BLOOD UNIT EXPIRATION DATE: NORMAL
BLOOD UNIT TYPE CODE: 7300
BLOOD UNIT TYPE: NORMAL
BUN SERPL-MCNC: 39 MG/DL
CALCIUM SERPL-MCNC: 9.3 MG/DL
CHLORIDE SERPL-SCNC: 106 MMOL/L
CO2 SERPL-SCNC: 23 MMOL/L
CODING SYSTEM: NORMAL
CREAT SERPL-MCNC: 1.6 MG/DL
DIFFERENTIAL METHOD: ABNORMAL
DISPENSE STATUS: NORMAL
EOSINOPHIL # BLD AUTO: 0.2 K/UL
EOSINOPHIL NFR BLD: 3.1 %
ERYTHROCYTE [DISTWIDTH] IN BLOOD BY AUTOMATED COUNT: 16.9 %
EST. GFR  (AFRICAN AMERICAN): 31.8 ML/MIN/1.73 M^2
EST. GFR  (NON AFRICAN AMERICAN): 27.6 ML/MIN/1.73 M^2
GLUCOSE SERPL-MCNC: 91 MG/DL
HCT VFR BLD AUTO: 36.7 %
HGB BLD-MCNC: 11.8 G/DL
IMM GRANULOCYTES # BLD AUTO: 0.03 K/UL
IMM GRANULOCYTES NFR BLD AUTO: 0.5 %
INR PPP: 2.3
INR PPP: 4
LYMPHOCYTES # BLD AUTO: 2.1 K/UL
LYMPHOCYTES NFR BLD: 33.1 %
MCH RBC QN AUTO: 33 PG
MCHC RBC AUTO-ENTMCNC: 32.2 G/DL
MCV RBC AUTO: 103 FL
MONOCYTES # BLD AUTO: 0.5 K/UL
MONOCYTES NFR BLD: 7.9 %
NEUTROPHILS # BLD AUTO: 3.5 K/UL
NEUTROPHILS NFR BLD: 54.8 %
NRBC BLD-RTO: 0 /100 WBC
NUM UNITS TRANS FFP: NORMAL
PLATELET # BLD AUTO: 300 K/UL
PMV BLD AUTO: 12.1 FL
POTASSIUM SERPL-SCNC: 5.1 MMOL/L
PROTHROMBIN TIME: 22.3 SEC
PROTHROMBIN TIME: 38.5 SEC
RBC # BLD AUTO: 3.58 M/UL
SODIUM SERPL-SCNC: 140 MMOL/L
WBC # BLD AUTO: 6.44 K/UL

## 2018-04-03 PROCEDURE — 93010 ELECTROCARDIOGRAM REPORT: CPT | Mod: 77,,, | Performed by: INTERNAL MEDICINE

## 2018-04-03 PROCEDURE — P9017 PLASMA 1 DONOR FRZ W/IN 8 HR: HCPCS

## 2018-04-03 PROCEDURE — 25000003 PHARM REV CODE 250: Performed by: NURSE ANESTHETIST, CERTIFIED REGISTERED

## 2018-04-03 PROCEDURE — 93010 ELECTROCARDIOGRAM REPORT: CPT | Mod: ,,, | Performed by: INTERNAL MEDICINE

## 2018-04-03 PROCEDURE — 37000008 HC ANESTHESIA 1ST 15 MINUTES: Performed by: INTERNAL MEDICINE

## 2018-04-03 PROCEDURE — 63600175 PHARM REV CODE 636 W HCPCS: Performed by: INTERNAL MEDICINE

## 2018-04-03 PROCEDURE — 87075 CULTR BACTERIA EXCEPT BLOOD: CPT

## 2018-04-03 PROCEDURE — 85730 THROMBOPLASTIN TIME PARTIAL: CPT

## 2018-04-03 PROCEDURE — 93005 ELECTROCARDIOGRAM TRACING: CPT | Mod: 59

## 2018-04-03 PROCEDURE — 85025 COMPLETE CBC W/AUTO DIFF WBC: CPT

## 2018-04-03 PROCEDURE — 99499 UNLISTED E&M SERVICE: CPT | Mod: S$PBB,,, | Performed by: INTERNAL MEDICINE

## 2018-04-03 PROCEDURE — 10140 I&D HMTMA SEROMA/FLUID COLLJ: CPT | Mod: 78,,, | Performed by: INTERNAL MEDICINE

## 2018-04-03 PROCEDURE — 99999 PR PBB SHADOW E&M-EST. PATIENT-LVL III: CPT | Mod: PBBFAC,,, | Performed by: INTERNAL MEDICINE

## 2018-04-03 PROCEDURE — 25000003 PHARM REV CODE 250: Performed by: NURSE PRACTITIONER

## 2018-04-03 PROCEDURE — 63600175 PHARM REV CODE 636 W HCPCS

## 2018-04-03 PROCEDURE — 63600175 PHARM REV CODE 636 W HCPCS: Performed by: NURSE ANESTHETIST, CERTIFIED REGISTERED

## 2018-04-03 PROCEDURE — D9220A PRA ANESTHESIA: Mod: CRNA,,, | Performed by: NURSE ANESTHETIST, CERTIFIED REGISTERED

## 2018-04-03 PROCEDURE — 99235 HOSP IP/OBS SAME DATE MOD 70: CPT | Mod: 24,,, | Performed by: INTERNAL MEDICINE

## 2018-04-03 PROCEDURE — 36430 TRANSFUSION BLD/BLD COMPNT: CPT | Mod: 59

## 2018-04-03 PROCEDURE — 80048 BASIC METABOLIC PNL TOTAL CA: CPT

## 2018-04-03 PROCEDURE — 94761 N-INVAS EAR/PLS OXIMETRY MLT: CPT

## 2018-04-03 PROCEDURE — 37000009 HC ANESTHESIA EA ADD 15 MINS: Performed by: INTERNAL MEDICINE

## 2018-04-03 PROCEDURE — 63600175 PHARM REV CODE 636 W HCPCS: Performed by: NURSE PRACTITIONER

## 2018-04-03 PROCEDURE — D9220A PRA ANESTHESIA: Mod: ANES,,, | Performed by: ANESTHESIOLOGY

## 2018-04-03 PROCEDURE — 25000003 PHARM REV CODE 250

## 2018-04-03 PROCEDURE — 27201642 EP LAB PROCEDURE

## 2018-04-03 PROCEDURE — 86850 RBC ANTIBODY SCREEN: CPT

## 2018-04-03 PROCEDURE — 87070 CULTURE OTHR SPECIMN AEROBIC: CPT

## 2018-04-03 PROCEDURE — 85610 PROTHROMBIN TIME: CPT

## 2018-04-03 PROCEDURE — 36415 COLL VENOUS BLD VENIPUNCTURE: CPT

## 2018-04-03 RX ORDER — DOXYCYCLINE HYCLATE 100 MG
100 TABLET ORAL EVERY 12 HOURS
Status: DISCONTINUED | OUTPATIENT
Start: 2018-04-04 | End: 2018-04-03 | Stop reason: HOSPADM

## 2018-04-03 RX ORDER — VANCOMYCIN/0.9 % SOD CHLORIDE 1 G/100 ML
1000 PLASTIC BAG, INJECTION (ML) INTRAVENOUS ONCE
Status: DISCONTINUED | OUTPATIENT
Start: 2018-04-04 | End: 2018-04-03 | Stop reason: HOSPADM

## 2018-04-03 RX ORDER — FOLIC ACID 1 MG/1
1000 TABLET ORAL DAILY
Status: DISCONTINUED | OUTPATIENT
Start: 2018-04-04 | End: 2018-04-03 | Stop reason: HOSPADM

## 2018-04-03 RX ORDER — TRAMADOL HYDROCHLORIDE 50 MG/1
50 TABLET ORAL EVERY 8 HOURS PRN
Status: DISCONTINUED | OUTPATIENT
Start: 2018-04-03 | End: 2018-04-03 | Stop reason: HOSPADM

## 2018-04-03 RX ORDER — SODIUM CHLORIDE 9 MG/ML
INJECTION, SOLUTION INTRAVENOUS CONTINUOUS PRN
Status: DISCONTINUED | OUTPATIENT
Start: 2018-04-03 | End: 2018-04-03

## 2018-04-03 RX ORDER — VANCOMYCIN/0.9 % SOD CHLORIDE 1 G/100 ML
1000 PLASTIC BAG, INJECTION (ML) INTRAVENOUS ONCE
Status: DISCONTINUED | OUTPATIENT
Start: 2018-04-04 | End: 2018-04-03

## 2018-04-03 RX ORDER — ONDANSETRON 2 MG/ML
4 INJECTION INTRAMUSCULAR; INTRAVENOUS DAILY PRN
Status: DISCONTINUED | OUTPATIENT
Start: 2018-04-03 | End: 2018-04-03

## 2018-04-03 RX ORDER — LEVOTHYROXINE SODIUM 50 UG/1
50 TABLET ORAL
Status: DISCONTINUED | OUTPATIENT
Start: 2018-04-04 | End: 2018-04-03 | Stop reason: HOSPADM

## 2018-04-03 RX ORDER — MIDODRINE HYDROCHLORIDE 5 MG/1
5 TABLET ORAL 4 TIMES DAILY
Status: DISCONTINUED | OUTPATIENT
Start: 2018-04-03 | End: 2018-04-03 | Stop reason: HOSPADM

## 2018-04-03 RX ORDER — CEFAZOLIN SODIUM 1 G/3ML
2 INJECTION, POWDER, FOR SOLUTION INTRAMUSCULAR; INTRAVENOUS
Status: COMPLETED | OUTPATIENT
Start: 2018-04-03 | End: 2018-04-03

## 2018-04-03 RX ORDER — VANCOMYCIN/0.9 % SOD CHLORIDE 1 G/100 ML
1000 PLASTIC BAG, INJECTION (ML) INTRAVENOUS
Status: DISCONTINUED | OUTPATIENT
Start: 2018-04-03 | End: 2018-04-03

## 2018-04-03 RX ORDER — PROPOFOL 10 MG/ML
VIAL (ML) INTRAVENOUS CONTINUOUS PRN
Status: DISCONTINUED | OUTPATIENT
Start: 2018-04-03 | End: 2018-04-03

## 2018-04-03 RX ORDER — FENTANYL CITRATE 50 UG/ML
INJECTION, SOLUTION INTRAMUSCULAR; INTRAVENOUS
Status: DISCONTINUED | OUTPATIENT
Start: 2018-04-03 | End: 2018-04-03

## 2018-04-03 RX ORDER — SODIUM CHLORIDE 0.9 % (FLUSH) 0.9 %
3 SYRINGE (ML) INJECTION
Status: DISCONTINUED | OUTPATIENT
Start: 2018-04-03 | End: 2018-04-03 | Stop reason: HOSPADM

## 2018-04-03 RX ORDER — LIDOCAINE HCL/PF 100 MG/5ML
SYRINGE (ML) INTRAVENOUS
Status: DISCONTINUED | OUTPATIENT
Start: 2018-04-03 | End: 2018-04-03

## 2018-04-03 RX ORDER — DOXYCYCLINE 100 MG/1
100 TABLET ORAL 2 TIMES DAILY
Qty: 60 TABLET | Refills: 0 | Status: SHIPPED | OUTPATIENT
Start: 2018-04-03 | End: 2018-05-03

## 2018-04-03 RX ORDER — PHENYLEPHRINE HYDROCHLORIDE 10 MG/ML
INJECTION INTRAVENOUS
Status: DISCONTINUED | OUTPATIENT
Start: 2018-04-03 | End: 2018-04-03

## 2018-04-03 RX ORDER — FENTANYL CITRATE 50 UG/ML
25 INJECTION, SOLUTION INTRAMUSCULAR; INTRAVENOUS EVERY 5 MIN PRN
Status: DISCONTINUED | OUTPATIENT
Start: 2018-04-03 | End: 2018-04-03

## 2018-04-03 RX ORDER — SODIUM CHLORIDE 0.9 % (FLUSH) 0.9 %
3 SYRINGE (ML) INJECTION EVERY 8 HOURS
Status: DISCONTINUED | OUTPATIENT
Start: 2018-04-03 | End: 2018-04-03

## 2018-04-03 RX ORDER — DOXYCYCLINE HYCLATE 100 MG
100 TABLET ORAL EVERY 12 HOURS
Status: DISCONTINUED | OUTPATIENT
Start: 2018-04-04 | End: 2018-04-03

## 2018-04-03 RX ORDER — ESCITALOPRAM OXALATE 10 MG/1
10 TABLET ORAL NIGHTLY
Status: DISCONTINUED | OUTPATIENT
Start: 2018-04-03 | End: 2018-04-03 | Stop reason: HOSPADM

## 2018-04-03 RX ORDER — PRAVASTATIN SODIUM 20 MG/1
20 TABLET ORAL NIGHTLY
Status: DISCONTINUED | OUTPATIENT
Start: 2018-04-03 | End: 2018-04-03 | Stop reason: HOSPADM

## 2018-04-03 RX ADMIN — PHENYLEPHRINE HYDROCHLORIDE 100 MCG: 10 INJECTION INTRAVENOUS at 04:04

## 2018-04-03 RX ADMIN — PHENYLEPHRINE HYDROCHLORIDE 100 MCG: 10 INJECTION INTRAVENOUS at 03:04

## 2018-04-03 RX ADMIN — PROPOFOL 100 MCG/KG/MIN: 10 INJECTION, EMULSION INTRAVENOUS at 03:04

## 2018-04-03 RX ADMIN — FENTANYL CITRATE 25 MCG: 50 INJECTION, SOLUTION INTRAMUSCULAR; INTRAVENOUS at 04:04

## 2018-04-03 RX ADMIN — FENTANYL CITRATE 25 MCG: 50 INJECTION, SOLUTION INTRAMUSCULAR; INTRAVENOUS at 03:04

## 2018-04-03 RX ADMIN — LIDOCAINE HYDROCHLORIDE 40 MG: 20 INJECTION, SOLUTION INTRAVENOUS at 03:04

## 2018-04-03 RX ADMIN — SODIUM CHLORIDE: 0.9 INJECTION, SOLUTION INTRAVENOUS at 04:04

## 2018-04-03 RX ADMIN — VANCOMYCIN HYDROCHLORIDE 1 G: 10 INJECTION, POWDER, LYOPHILIZED, FOR SOLUTION INTRAVENOUS at 02:04

## 2018-04-03 RX ADMIN — SODIUM CHLORIDE: 0.9 INJECTION, SOLUTION INTRAVENOUS at 03:04

## 2018-04-03 RX ADMIN — CEFAZOLIN 2 G: 330 INJECTION, POWDER, FOR SOLUTION INTRAMUSCULAR; INTRAVENOUS at 03:04

## 2018-04-03 NOTE — NURSING TRANSFER
Nursing Transfer Note      4/3/2018     Transfer To: 316 From PACU    Transfer via stretcher    Transfer with cardiac monitoring    Transported by RN    Medicines sent: none    Chart send with patient: Yes    Notified: daughter    Patient reassessed at: 4/3/18 @ 1835     Upon arrival to floor

## 2018-04-03 NOTE — ANESTHESIA PREPROCEDURE EVALUATION
04/03/2018  Josy Posey is a 89 y.o., female.    Anesthesia Evaluation    I have reviewed the Patient Summary Reports.    I have reviewed the Nursing Notes.      Review of Systems  Anesthesia Hx:  No problems with previous Anesthesia    Cardiovascular:   Hypertension Past MI CAD  Dysrhythmias  Angina CHF    Renal/:   Chronic Renal Disease    Neurological:   CVA Neuromuscular Disease, Headaches    Endocrine:   Hypothyroidism Hyperthyroidism    Psych:   Psychiatric History          Physical Exam  General:  Well nourished    Airway/Jaw/Neck:  Airway Findings: Mouth Opening: Normal Tongue: Normal  General Airway Assessment: Adult  Mallampati: III  TM Distance: Normal, at least 6 cm  Jaw/Neck Findings:  Neck ROM: Normal ROM     Eyes/Ears/Nose:  Eyes/Ears/Nose Findings:    Dental:  Dental Findings: In tact   Chest/Lungs:  Chest/Lungs Findings: Normal Respiratory Rate     Heart/Vascular:  Heart Findings: Rate: Normal  Rhythm: Regular Rhythm        Mental Status:  Mental Status Findings:  Cooperative, Alert and Oriented         Anesthesia Plan  Type of Anesthesia, risks & benefits discussed:  Anesthesia Type:  MAC  Patient's Preference: MAC  Intra-op Monitoring Plan: standard ASA monitors  Intra-op Monitoring Plan Comments:   Post Op Pain Control Plan:   Post Op Pain Control Plan Comments:   Induction:   IV  Beta Blocker:  Patient is not currently on a Beta-Blocker (No further documentation required).       Informed Consent: Patient understands risks and agrees with Anesthesia plan.  Questions answered. Anesthesia consent signed with patient.  ASA Score: 4     Day of Surgery Review of History & Physical:    H&P update referred to the surgeon.         Ready For Surgery From Anesthesia Perspective.

## 2018-04-03 NOTE — TRANSFER OF CARE
"Anesthesia Transfer of Care Note    Patient: Josy Posey    Procedure(s) Performed: Procedure(s) (LRB):  REVISION-POCKET-PACEMAKER (N/A)    Patient location: PACU    Anesthesia Type: MAC    Transport from OR: Transported from OR on 2-3 L/min O2 by NC with adequate spontaneous ventilation    Post pain: adequate analgesia    Post assessment: no apparent anesthetic complications    Post vital signs: stable    Level of consciousness: awake, alert and oriented    Nausea/Vomiting: no nausea/vomiting    Complications: none    Transfer of care protocol was followed      Last vitals:   Visit Vitals  BP (!) 112/56   Pulse 75   Temp 36.2 °C (97.2 °F) (Temporal)   Resp 18   Ht 5' 3" (1.6 m)   Wt 63.5 kg (140 lb)   SpO2 96%   Breastfeeding? No   BMI 24.80 kg/m²     "

## 2018-04-03 NOTE — PROGRESS NOTES
Subjective:   Patient ID:  Josy Posey is a 89 y.o. female     Chief complaint:Pacemaker Problem      HPI  Background :  89 woman  I know her well from many years ago (prior to ) when I used to see her at [facility]  I have not seen her in many years although I have been checking on her when se comes for her PPM evals and/or when she comes and sees Dr Alvarado in clinic.   She has a Hx of PAF, SSS, HiGr AVB and is sp PPM -- currently with a bio CLS biV PPM unit that has been nearing JORGE. She is PPM dependent to a large degree but she has an escape rhythm.  She had a Hx of severe CMP that was eventually repaired (EF had normalized by )   She eventually required MVR for severe MR. She has a  Mechanical valve and she has been on coumadin w/o sig issues.  She has a long Hx of autonomic dysfunction with sig OH and past episodes of syncope . She has had an ICH after one such fall 40 years ago. She has been on chronic midodrine Rx with dosing requirements changing over the years. She has done surprisingly well with her AD and has had no recent falls.   She has no sig CV c/o  Her last echo from 3+ years ago showed the followin - Normal left ventricular systolic function (EF 60-65%). Mild left ventricular hypertrophy    2 - Left ventricular diastolic dysfunction. Grade I    3 - Normal right ventricular systolic function .     4 - Mitral valve prosthesis. Seated well with physiological mitral regurgitation.    5 - Severe left atrial enlargement. SPARKS 57  Her PPM eval had been showing a battery nearing JORGE for some time now. Today's predicted time to RRT is 1 month but this has been Hxally inaccurate for her.  PPM is in DDI due to frequent ATs and lack of appropriate AMS as well as sig SSS/SB  She is anxiously awaiting replacement of her PPM  She is still mobile and fully oriented. There is no sign of dementia.   She is here with her DTR.  BP at home fluctuates but she has not passed out nor fallen      She had a  PPM replacement on 3/9. Unfortunately, she has had a hematoma in the pocket. No drainage, no chills, no fever, no dehiscence.  The hematoma has been resorbing too slowly to her taste and she wants it removed. She continues to be on Doxy,. She has been having more issues with syncope.  The midodrine dose seems to be too small as she has Sx even an hour after she takes the doses.           Current Outpatient Prescriptions   Medication Sig    ascorbic acid (VITAMIN C) 1000 MG tablet Take 1 tablet by mouth once daily once daily.    biotin 2,500 mcg Cap Take by mouth.    clotrimazole-betamethasone 1-0.05% (LOTRISONE) cream Apply topically 2 (two) times daily.    doxycycline monohydrate 100 mg Tab Take 1 tablet (100 mg total) by mouth 2 (two) times daily.    econazole nitrate 1 % cream     escitalopram oxalate (LEXAPRO) 10 MG tablet Take 1 tablet (10 mg total) by mouth once daily.    fluticasone (FLONASE) 50 mcg/actuation nasal spray instill 2 sprays into each nostril once daily    folic acid (FOLVITE) 1 MG tablet Take 1 tablet by mouth once daily once daily.    iron-vitamin C 100-250 mg, ICAR-C, (ICAR-C) 100-250 mg Tab Take 1 tablet by mouth once daily.    levothyroxine (SYNTHROID) 50 MCG tablet Take 1 tablet (50 mcg total) by mouth once daily.    meclizine (ANTIVERT) 25 mg tablet Take 1 tablet (25 mg total) by mouth 3 (three) times daily as needed for Dizziness.    midodrine (PROAMATINE) 5 MG Tab Take 1 tablet (5 mg total) by mouth 4 (four) times daily. During waking hours    omega-3 fatty acids-vitamin E (FISH OIL) 1,000 mg Cap Take by mouth once daily.    pravastatin (PRAVACHOL) 20 MG tablet Take 1 tablet (20 mg total) by mouth once daily.    propafenone (RHTHYMOL) 150 MG Tab take 1/2 tablet by mouth three times a day    spironolactone (ALDACTONE) 25 MG tablet Take 0.5 tablets (12.5 mg total) by mouth once daily.    tramadol (ULTRAM) 50 mg tablet take 1 tablet by mouth every 8 hours if needed     triamcinolone acetonide 0.1% (KENALOG) 0.1 % cream apply THINLY to affected area ON LEGS, ARMS, AND TRUCK twice a day if needed for eczema    vitamin B12-folic acid 0.5-1 mg Tab Take 1 tablet by mouth once daily once daily.    warfarin (COUMADIN) 1 MG tablet Take 1 tablet on Wednesdays and 1/2 tablet all other days as directed by the coumadin clinic. (Patient taking differently: Take 1 tablet on Mondays and Wednesdays and 1/2 tablet all other days as directed by the coumadin clinic.)     No current facility-administered medications for this visit.      Review of Systems   Constitution: Negative for decreased appetite, weakness, weight gain and weight loss.   HENT: Negative for nosebleeds.    Eyes: Negative for blurred vision and visual disturbance.   Cardiovascular: Negative for chest pain, claudication, cyanosis, dyspnea on exertion, irregular heartbeat, leg swelling, near-syncope, orthopnea, palpitations, paroxysmal nocturnal dyspnea and syncope.   Respiratory: Negative for cough, shortness of breath and wheezing.    Endocrine: Negative for heat intolerance.   Skin: Negative for rash.   Musculoskeletal: Negative for muscle weakness and myalgias.   Gastrointestinal: Positive for nausea. Negative for abdominal pain, anorexia, melena and vomiting.   Genitourinary: Negative for menorrhagia.   Neurological: Positive for dizziness and headaches. Negative for excessive daytime sleepiness, loss of balance, seizures and vertigo.   Psychiatric/Behavioral: Negative for altered mental status and depression. The patient is not nervous/anxious.        Objective:   Physical Exam   Constitutional: She is oriented to person, place, and time. She appears well-developed and well-nourished.   HENT:   Head: Normocephalic and atraumatic.   Right Ear: External ear normal.   Left Ear: External ear normal.   Eyes: Conjunctivae are normal. Pupils are equal, round, and reactive to light. Left eye exhibits no discharge. No scleral icterus.  "  Neck: Normal range of motion. Neck supple. No thyromegaly present.   Cardiovascular: Normal rate, regular rhythm and intact distal pulses.  Exam reveals no gallop, no S3, no S4, no friction rub, no midsystolic click and no opening snap.    No murmur heard.  Pulses:       Carotid pulses are 2+ on the right side, and 2+ on the left side.       Radial pulses are 2+ on the right side, and 2+ on the left side.        Dorsalis pedis pulses are 2+ on the right side, and 2+ on the left side.        Posterior tibial pulses are 2+ on the right side, and 2+ on the left side.   Crisp mechanical valve sounds   Pulmonary/Chest: Effort normal and breath sounds normal.   Device pocket is enlarged with a subacute hematoma that has not been coming down fast enough. The wound itself is well healed without dehiscence.   Abdominal: Soft. She exhibits no distension. There is no hepatomegaly. There is no tenderness. There is no guarding.   Musculoskeletal:        Right ankle: She exhibits no swelling.        Left ankle: She exhibits no swelling.        Right lower leg: She exhibits no swelling.        Left lower leg: She exhibits no swelling.   Neurological: She is alert and oriented to person, place, and time. She has normal strength. No cranial nerve deficit. Gait normal.   Skin: Skin is warm, dry and intact. No rash noted. No cyanosis. Nails show no clubbing.   Psychiatric: She has a normal mood and affect. Her speech is normal and behavior is normal. Thought content normal. Cognition and memory are normal.   Nursing note and vitals reviewed.    BP (!) 88/52   Temp 97.7 °F (36.5 °C)   Ht 5' 3" (1.6 m)   Wt 63.5 kg (140 lb)   BMI 24.80 kg/m²      Assessment:      1. Hematoma of pacemaker pocket, subsequent encounter    2. Syncope, unspecified syncope type    3. Third degree AV block s/p PPM    4. Severe MR with remote mitral valve replacement on chronic anticoagulation    5. Paroxysmal atrial fibrillation    6. Orthostatic " hypotension    7. History of DVT of lower extremity right    8. H/O CVA (cerebrovascular accident)    9. Bilateral carotid artery disease    10. Coronary artery disease involving native coronary artery of native heart without angina pectoris    11. Chronic anticoagulation        Plan:    Will proceed with hematoma evacuation -- major risk is infection  I have discussed the procedure in detail with the patient and her DTR. I described its benefits and risks. I reviewed alternative therapies and discussed their potential value. The patient was given ample opportunity to express concerns and ask questions and I provided appropriate responses and  answers to such.The patient understands and agrees to proceed.  Consent form was signed today by patient and myself and appropriately witnessed.     No orders of the defined types were placed in this encounter.    Follow-up post op .  There are no discontinued medications.  New Prescriptions    No medications on file     Modified Medications    No medications on file

## 2018-04-03 NOTE — SIGNIFICANT EVENT
INR was 2.3 > proceeded for hematoma evacuation  Will continue to monitor   Continue antibiotics > will give another dose of vancomycin 12 hours after first dose   , will continue on doxycycline 100 mg BID on discharge > will have close follow up with MD Kristine Mei in 2-3 weeks. Pt will return to clinic for wound check in one week.   Resume coumadin at home dose  > Close follow up with coumadin clinic upon discharge.     Discussed treatment plans/discharge plans   with daughter Mrs Gauthier,  by me  and Dr Joe Mei, and jessicairis agrees with above plans of care.     - Patient evaluated with MD Joe Rivera.     WHIT Padilla-BC  Cardiology Electrophysiology  NP   [facility]    Attending: MD Joe Rivera

## 2018-04-03 NOTE — INTERVAL H&P NOTE
The patient has been examined and the H&P has been reviewed:    I concur with the findings and changes have been noted since the H&P was written: Patient, was seen this AM along with her daughter in EP clinic as above. Pt elected to proceed w/ planned hematoma evacuation. Pt denies any shortness of breath, overt noted bleeding, malaise, fevers or chills. Pt drainage from device site, she does reports site  discomfort.     Pt presented  In SSCU for planned procedure> she states her last dose of coumadin 0.5 mg was on Sunday. INR came back at 4.0 ( pt's current INR goal is 2.5 - 3.5), per discussion with MD Miguel Diaz will give 1 unit of FFP and then recheck INR. Current findings explained to patient and her daughter, and they agree with current plans of care.       WHIT Padilla-BC  Cardiology Electrophysiology  NP   [facility]    Attending: MD Joe Rivera               Active Hospital Problems    Diagnosis  POA    Pacemaker pocket hematoma [T82.629T]  Yes      Resolved Hospital Problems    Diagnosis Date Resolved POA   No resolved problems to display.

## 2018-04-03 NOTE — ANESTHESIA POSTPROCEDURE EVALUATION
"Anesthesia Post Evaluation    Patient: Josy Posey    Procedure(s) Performed: Procedure(s) (LRB):  REVISION-POCKET-PACEMAKER (N/A)    Final Anesthesia Type: MAC  Patient location during evaluation: PACU  Patient participation: Yes- Able to Participate  Level of consciousness: awake and alert and oriented  Post-procedure vital signs: reviewed and stable  Pain management: adequate  Airway patency: patent  PONV status at discharge: No PONV  Anesthetic complications: no      Cardiovascular status: blood pressure returned to baseline  Respiratory status: unassisted, room air and spontaneous ventilation  Hydration status: euvolemic  Follow-up not needed.        Visit Vitals  BP (!) 112/56   Pulse 75   Temp 36.2 °C (97.2 °F) (Temporal)   Resp 18   Ht 5' 3" (1.6 m)   Wt 63.5 kg (140 lb)   SpO2 97%   Breastfeeding? No   BMI 24.80 kg/m²       Pain/Jeremy Score: Pain Assessment Performed: Yes (4/3/2018  5:00 PM)  Presence of Pain: denies (4/3/2018  5:00 PM)  Jeremy Score: 10 (4/3/2018  5:00 PM)      "

## 2018-04-04 NOTE — PROGRESS NOTES
Patient received to room via stretcher. Pt is aaox4. Vss. Family at bedside. Left chest wall with incision . dermabond intact. No bleeding noted. Ecchymosis present, but  No hematoma. Pt states she would like to go home. Notified dr. Cardenas. md to put orders in.

## 2018-04-04 NOTE — PROGRESS NOTES
"The pt was recently admitted on 4/3.  Per discharge note on 4/3: "The hematoma has been resorbing too slowly > pt saw MD Mary Mei in EP this AM, and pt and daughter prefers to have hematoma evacuated.  Pt presented  In SSCU for planned procedure> she states her last dose of coumadin 0.5 mg was on Sunday. INR came back at 4.0, per discussion with MD Rivera Joe will give 1 unit of FFP and then recheck INR. Pt denies any fevers/ chills."  Per her med card, she was given Doxy 100mg BID x 30 days.  LMFCB with pt - 2:15pm  "

## 2018-04-04 NOTE — PROGRESS NOTES
AVS printed and reviewed with patient. Left chest wall remains intact. Patient able to take oral fluids without any problems. Voided. ambulated to and from bathroom without complaint. Iv heplock removed. Personal wheelchair at [facility] for discharge with family.

## 2018-04-04 NOTE — DISCHARGE SUMMARY
[facility]  Cardiac Electrophysiology  Discharge Summary      Patient Name: Josy Posey  MRN: [id number]  Admission Date: 4/3/2018  Hospital Length of Stay: 0 days  Discharge Date and Time:  04/03/2018   Attending Physician: Joe Rivera MD    Discharging Provider: Angie Baeza NP  Primary Care Physician: TIKA Rosales Jr, MD    HPI: 89 year old female with PMH with PMH, SSS, hi degree AV block s/p PPM implant, Bioprosthetic MVR for severe MR, AF past DVT,  Mild CVA on coumadin, orthostatic hypotension on midodine. Pt PPM was at [facility] hence had a generator replacement on 3/9/18, however had a hematoma in the pocket. She had a PPM replacement on 3/9. Unfortunately, she has had a hematoma in the pocket. No drainage, no chills, no fever, no dehiscence. Pt remains on doxycycline.  The hematoma has been resorbing too slowly > pt saw MD Kristine Mei in EP this AM, and pt and daughter prefers to have hematoma evacuated.      Pt presented  In SSCU for planned procedure> she states her last dose of coumadin 0.5 mg was on Sunday. INR came back at 4.0, per discussion with MD Miguel Diaz will give 1 unit of FFP and then recheck INR. Pt denies any fevers/ chills.     Procedure(s) (LRB):  REVISION-POCKET-PACEMAKER (N/A)     Hospital Course:INR was 2.3 > proceeded for hematoma evacuation. Tolerated procedure   Continued monitoring post procedure. On discharge > will continue on doxycycline 100 mg BID > will have close follow up with MD Kristine Mei in 2-3 weeks.   Pt will return to clinic for wound check in one week ( Instructed  that the 1 week follow up wound check can be done in [facility] clinic > EP clinic staff will arrange to schedule appointment)  . Follow up with cardiologist MD Alvarado,   Resume coumadin at current home dose  > Close follow up with coumadin clinic upon discharge for further INR checks / and coumadin dosing adjustments.   Discussed treatment plans/discharge plans   with daughter Mrs  Abrahan,  by me  and Dr Joe Mei and jarred agrees with above plans of care.        Final Active Diagnoses:    Diagnosis Date Noted POA    PRINCIPAL PROBLEM:  Pacemaker pocket hematoma [T82.837A] 04/03/2018 Yes      Problems Resolved During this Admission:    Diagnosis Date Noted Date Resolved POA         Discharged Condition: good    Disposition: Home or Self Care    Follow Up:  Follow-up Information     Please follow up.    Contact information:   Close follow up with [place] coumadin clinic            [facility] ARRHYTHMIA In 1 week.    Specialty:  Arrhythmia  Why:  For wound re-check in 1 week will schedule with be done in [place]   Contact information:  [address]  [facility] [address]  [phone]           Joe Rivera MD In 2 weeks.    Specialties:  Electrophysiology, Cardiology  Contact information:  [address]  [facility] [address]  [phone]                 Patient Instructions:     Diet Cardiac     Ice to affected area     Notify your health care provider if you experience any of the following:  temperature >100.4     Notify your health care provider if you experience any of the following:  persistent nausea and vomiting or diarrhea     Notify your health care provider if you experience any of the following:  severe uncontrolled pain     Notify your health care provider if you experience any of the following:  redness, tenderness, or signs of infection (pain, swelling, redness, odor or green/yellow discharge around incision site)     Notify your health care provider if you experience any of the following:  difficulty breathing or increased cough     Notify your health care provider if you experience any of the following:  severe persistent headache     Notify your health care provider if you experience any of the following:  worsening rash     Notify your health care provider if you experience any of the following:  persistent dizziness, light-headedness,  or visual disturbances     Notify your health care provider if you experience any of the following:  increased confusion or weakness     Notify your health care provider if you experience any of the following:   Scheduling Instructions: For any concerning medical symptoms       Medications:  Reconciled Home Medications:      Medication List      CHANGE how you take these medications    warfarin 1 MG tablet  Commonly known as:  COUMADIN  Take 1 tablet on Wednesdays and 1/2 tablet all other days as directed by the coumadin clinic.  What changed:  additional instructions        CONTINUE taking these medications    biotin 2,500 mcg Cap     clotrimazole-betamethasone 1-0.05% cream  Commonly known as:  LOTRISONE  Apply topically 2 (two) times daily.     doxycycline monohydrate 100 mg Tab  Take 1 tablet (100 mg total) by mouth 2 (two) times daily.     econazole nitrate 1 % cream     escitalopram oxalate 10 MG tablet  Commonly known as:  LEXAPRO  Take 1 tablet (10 mg total) by mouth once daily.     FISH OIL 1,000 mg Cap  Generic drug:  omega-3 fatty acids-vitamin E     fluticasone 50 mcg/actuation nasal spray  Commonly known as:  FLONASE  instill 2 sprays into each nostril once daily     folic acid 1 MG tablet  Commonly known as:  FOLVITE     iron-vitamin C 100-250 mg (ICAR-C) 100-250 mg Tab  Commonly known as:  ICAR-C  Take 1 tablet by mouth once daily.     levothyroxine 50 MCG tablet  Commonly known as:  SYNTHROID  Take 1 tablet (50 mcg total) by mouth once daily.     meclizine 25 mg tablet  Commonly known as:  ANTIVERT  Take 1 tablet (25 mg total) by mouth 3 (three) times daily as needed for Dizziness.     midodrine 5 MG Tab  Commonly known as:  PROAMATINE  Take 1 tablet (5 mg total) by mouth 4 (four) times daily. During waking hours     pravastatin 20 MG tablet  Commonly known as:  PRAVACHOL  Take 1 tablet (20 mg total) by mouth once daily.     propafenone 150 MG Tab  Commonly known as:  RHTHYMOL  take 1/2 tablet by  mouth three times a day     spironolactone 25 MG tablet  Commonly known as:  ALDACTONE  Take 0.5 tablets (12.5 mg total) by mouth once daily.     traMADol 50 mg tablet  Commonly known as:  ULTRAM  take 1 tablet by mouth every 8 hours if needed     triamcinolone acetonide 0.1% 0.1 % cream  Commonly known as:  KENALOG     vitamin B12-folic acid 0.5-1 mg Tab     VITAMIN C 1000 MG tablet  Generic drug:  ascorbic acid (vitamin C)           Where to Get Your Medications      These medications were sent to [address] [address]    Phone:  [phone]   · doxycycline monohydrate 100 mg Tab             Angie Baeza NP  Cardiac Electrophysiology  [facility]    STAFF MD Joe Zuniga

## 2018-04-05 ENCOUNTER — TELEPHONE (OUTPATIENT)
Dept: CARDIOLOGY | Facility: CLINIC | Age: 83
End: 2018-04-05

## 2018-04-05 NOTE — TELEPHONE ENCOUNTER
----- Message from Angie Baeza NP sent at 4/3/2018  5:02 PM CDT -----  Keagan Arias    Pt had a hematoma evacuation. Please schedule a wound check clinic appt  in  in 1 week.   Please schedule  a clinic follow up  with MD Diaz in about 3 weeks > pt will continue on her doxycycline on discharge     Best   Angie

## 2018-04-05 NOTE — PROGRESS NOTES
I was able to reach the pt today.  She will take a decreased dose of her warfarin due to the DDI with her doxy - see calendar.  She will get her INR checked next week, as scheduled.

## 2018-04-06 ENCOUNTER — TELEPHONE (OUTPATIENT)
Dept: CARDIOLOGY | Facility: CLINIC | Age: 83
End: 2018-04-06

## 2018-04-06 LAB — BACTERIA SPEC AEROBE CULT: NO GROWTH

## 2018-04-06 NOTE — TELEPHONE ENCOUNTER
----- Message from Joe Rivera MD sent at 4/6/2018  2:56 PM CDT -----  All results are OK. Please see comments below- if any- and call patient.  In general you do not need to route back to me.

## 2018-04-10 LAB — BACTERIA SPEC ANAEROBE CULT: NORMAL

## 2018-04-11 ENCOUNTER — ANTI-COAG VISIT (OUTPATIENT)
Dept: CARDIOLOGY | Facility: CLINIC | Age: 83
End: 2018-04-11
Payer: MEDICARE

## 2018-04-11 ENCOUNTER — OFFICE VISIT (OUTPATIENT)
Dept: CARDIOLOGY | Facility: CLINIC | Age: 83
End: 2018-04-11
Payer: MEDICARE

## 2018-04-11 VITALS
DIASTOLIC BLOOD PRESSURE: 76 MMHG | SYSTOLIC BLOOD PRESSURE: 140 MMHG | HEART RATE: 72 BPM | HEIGHT: 63 IN | WEIGHT: 140 LBS | BODY MASS INDEX: 24.8 KG/M2

## 2018-04-11 DIAGNOSIS — Z45.018 PACEMAKER END OF LIFE: ICD-10-CM

## 2018-04-11 DIAGNOSIS — Z95.0 CARDIAC PACEMAKER: Primary | ICD-10-CM

## 2018-04-11 DIAGNOSIS — E78.00 PURE HYPERCHOLESTEROLEMIA: Chronic | ICD-10-CM

## 2018-04-11 DIAGNOSIS — N18.30 CKD (CHRONIC KIDNEY DISEASE) STAGE 3, GFR 30-59 ML/MIN: Chronic | ICD-10-CM

## 2018-04-11 DIAGNOSIS — I51.89 LEFT VENTRICULAR DIASTOLIC DYSFUNCTION WITH PRESERVED SYSTOLIC FUNCTION: Chronic | ICD-10-CM

## 2018-04-11 DIAGNOSIS — I77.9 BILATERAL CAROTID ARTERY DISEASE: ICD-10-CM

## 2018-04-11 DIAGNOSIS — Z86.718 HISTORY OF DVT OF LOWER EXTREMITY: ICD-10-CM

## 2018-04-11 DIAGNOSIS — Z79.01 LONG TERM (CURRENT) USE OF ANTICOAGULANTS: Primary | ICD-10-CM

## 2018-04-11 DIAGNOSIS — Z95.2 S/P MITRAL VALVE REPLACEMENT: Chronic | ICD-10-CM

## 2018-04-11 DIAGNOSIS — I48.0 PAROXYSMAL ATRIAL FIBRILLATION: Chronic | ICD-10-CM

## 2018-04-11 DIAGNOSIS — R55 SYNCOPE, UNSPECIFIED SYNCOPE TYPE: ICD-10-CM

## 2018-04-11 DIAGNOSIS — G62.9 POLYNEUROPATHY: ICD-10-CM

## 2018-04-11 LAB — INR PPP: 3.4 (ref 2.5–3.5)

## 2018-04-11 PROCEDURE — 99499 UNLISTED E&M SERVICE: CPT | Mod: S$GLB,,, | Performed by: INTERNAL MEDICINE

## 2018-04-11 PROCEDURE — 85610 PROTHROMBIN TIME: CPT | Mod: QW,S$GLB,, | Performed by: INTERNAL MEDICINE

## 2018-04-11 PROCEDURE — 99999 PR PBB SHADOW E&M-EST. PATIENT-LVL III: CPT | Mod: PBBFAC,,, | Performed by: INTERNAL MEDICINE

## 2018-04-11 PROCEDURE — 99213 OFFICE O/P EST LOW 20 MIN: CPT | Mod: S$GLB,,, | Performed by: INTERNAL MEDICINE

## 2018-04-11 NOTE — PROGRESS NOTES
"Subjective:   Patient ID:  Josy Posey is a 89 y.o. female who presents for follow up of Coronary Artery Disease; Carotid Artery Disease; Atrial Fibrillation; and Hyperlipidemia      HPI  A 89 yo female with h/o mvr and afib pacer autonomic insufficiency is here for f/u had pacer with subsequent bleed in pocket requiruing drainage. She is feeling better no fever chills she is tired. appears a little deconditioned. Needs pt eval if she does not want to do her home pt exercises. She is not wearing her stocking she is being forced to eat.  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 fuCT 1/3- stable,  CT's later - no residual    Lens replaced by other means 6/25/2014    Melena     Memory loss     PET scan consistent with Alzzheimers.   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    1 vessel LIMA to LAD    S/P MVR (mitral valve replacement) 9/27/2013    Skin ulcer     Squamous cell carcinoma     skin cancer X 2    " Stroke     Syncope and collapse     Unspecified essential hypertension 9/16/2013    Unspecified transient cerebral ischemia     Urinary incontinence     wears briefs       Past Surgical History:   Procedure Laterality Date    ADENOIDECTOMY      APPENDECTOMY      BREAST SURGERY  1950    right lumpectomy     CARDIAC VALVE SURGERY  10/04/2007    MVR    CATARACT EXTRACTION      CHOLECYSTECTOMY      CORONARY ARTERY BYPASS GRAFT      EYE SURGERY      cataracts bilateral    HYSTERECTOMY      for hydatiform mole    INSERT / REPLACE / REMOVE PACEMAKER  09/11/2001    TONSILLECTOMY         Social History   Substance Use Topics    Smoking status: Never Smoker    Smokeless tobacco: Never Used    Alcohol use No       Family History   Problem Relation Age of Onset    Tuberculosis Mother     Tuberculosis Father     Stroke Sister     Hypertension Sister     Stroke Brother     Hypertension Daughter     COPD Brother        Current Outpatient Prescriptions   Medication Sig    ascorbic acid (VITAMIN C) 1000 MG tablet Take 1 tablet by mouth once daily once daily.    biotin 2,500 mcg Cap Take by mouth.    clotrimazole-betamethasone 1-0.05% (LOTRISONE) cream Apply topically 2 (two) times daily.    doxycycline monohydrate 100 mg Tab Take 1 tablet (100 mg total) by mouth 2 (two) times daily.    econazole nitrate 1 % cream     escitalopram oxalate (LEXAPRO) 10 MG tablet Take 1 tablet (10 mg total) by mouth once daily.    fluticasone (FLONASE) 50 mcg/actuation nasal spray instill 2 sprays into each nostril once daily    folic acid (FOLVITE) 1 MG tablet Take 1 tablet by mouth once daily once daily.    iron-vitamin C 100-250 mg, ICAR-C, (ICAR-C) 100-250 mg Tab Take 1 tablet by mouth once daily.    levothyroxine (SYNTHROID) 50 MCG tablet Take 1 tablet (50 mcg total) by mouth once daily.    meclizine (ANTIVERT) 25 mg tablet Take 1 tablet (25 mg total) by mouth 3 (three) times daily as needed for Dizziness.     midodrine (PROAMATINE) 5 MG Tab Take 1 tablet (5 mg total) by mouth 4 (four) times daily. During waking hours    omega-3 fatty acids-vitamin E (FISH OIL) 1,000 mg Cap Take by mouth once daily.    pravastatin (PRAVACHOL) 20 MG tablet Take 1 tablet (20 mg total) by mouth once daily.    propafenone (RHTHYMOL) 150 MG Tab take 1/2 tablet by mouth three times a day    spironolactone (ALDACTONE) 25 MG tablet Take 0.5 tablets (12.5 mg total) by mouth once daily.    tramadol (ULTRAM) 50 mg tablet take 1 tablet by mouth every 8 hours if needed    triamcinolone acetonide 0.1% (KENALOG) 0.1 % cream apply THINLY to affected area ON LEGS, ARMS, AND TRUCK twice a day if needed for eczema    vitamin B12-folic acid 0.5-1 mg Tab Take 1 tablet by mouth once daily once daily.    warfarin (COUMADIN) 1 MG tablet Take 1 tablet on Wednesdays and 1/2 tablet all other days as directed by the coumadin clinic. (Patient taking differently: Take 1 tablet on Mondays and Wednesdays and 1/2 tablet all other days as directed by the coumadin clinic.)     No current facility-administered medications for this visit.      Current Outpatient Prescriptions on File Prior to Visit   Medication Sig    ascorbic acid (VITAMIN C) 1000 MG tablet Take 1 tablet by mouth once daily once daily.    biotin 2,500 mcg Cap Take by mouth.    clotrimazole-betamethasone 1-0.05% (LOTRISONE) cream Apply topically 2 (two) times daily.    doxycycline monohydrate 100 mg Tab Take 1 tablet (100 mg total) by mouth 2 (two) times daily.    econazole nitrate 1 % cream     escitalopram oxalate (LEXAPRO) 10 MG tablet Take 1 tablet (10 mg total) by mouth once daily.    fluticasone (FLONASE) 50 mcg/actuation nasal spray instill 2 sprays into each nostril once daily    folic acid (FOLVITE) 1 MG tablet Take 1 tablet by mouth once daily once daily.    iron-vitamin C 100-250 mg, ICAR-C, (ICAR-C) 100-250 mg Tab Take 1 tablet by mouth once daily.    levothyroxine (SYNTHROID)  50 MCG tablet Take 1 tablet (50 mcg total) by mouth once daily.    meclizine (ANTIVERT) 25 mg tablet Take 1 tablet (25 mg total) by mouth 3 (three) times daily as needed for Dizziness.    midodrine (PROAMATINE) 5 MG Tab Take 1 tablet (5 mg total) by mouth 4 (four) times daily. During waking hours    omega-3 fatty acids-vitamin E (FISH OIL) 1,000 mg Cap Take by mouth once daily.    pravastatin (PRAVACHOL) 20 MG tablet Take 1 tablet (20 mg total) by mouth once daily.    propafenone (RHTHYMOL) 150 MG Tab take 1/2 tablet by mouth three times a day    spironolactone (ALDACTONE) 25 MG tablet Take 0.5 tablets (12.5 mg total) by mouth once daily.    tramadol (ULTRAM) 50 mg tablet take 1 tablet by mouth every 8 hours if needed    triamcinolone acetonide 0.1% (KENALOG) 0.1 % cream apply THINLY to affected area ON LEGS, ARMS, AND TRUCK twice a day if needed for eczema    vitamin B12-folic acid 0.5-1 mg Tab Take 1 tablet by mouth once daily once daily.    warfarin (COUMADIN) 1 MG tablet Take 1 tablet on Wednesdays and 1/2 tablet all other days as directed by the coumadin clinic. (Patient taking differently: Take 1 tablet on Mondays and Wednesdays and 1/2 tablet all other days as directed by the coumadin clinic.)     No current facility-administered medications on file prior to visit.      Review of patient's allergies indicates:   Allergen Reactions    Sulfa (sulfonamide antibiotics) Anaphylaxis     Other reaction(s): Angioedema    Morphine      Other reaction(s): Unknown    Statins-hmg-coa reductase inhibitors Other (See Comments)     Elevated LFTs    Dronedarone Diarrhea, Nausea Only and Rash     DIZZINESS         Review of Systems   Constitution: Positive for weakness and malaise/fatigue.   Eyes: Negative for blurred vision.   Cardiovascular: Negative for chest pain, claudication, cyanosis, dyspnea on exertion, irregular heartbeat, leg swelling, near-syncope, orthopnea, palpitations and paroxysmal nocturnal  dyspnea.   Respiratory: Negative for cough, hemoptysis and shortness of breath.    Hematologic/Lymphatic: Negative for bleeding problem. Bruises/bleeds easily.   Skin: Negative for dry skin and itching.   Musculoskeletal: Negative for falls, muscle weakness and myalgias.   Gastrointestinal: Negative for abdominal pain, diarrhea, heartburn, hematemesis, hematochezia and melena.   Genitourinary: Negative for flank pain and hematuria.   Neurological: Positive for dizziness. Negative for focal weakness, headaches, light-headedness, numbness, paresthesias and seizures.   Psychiatric/Behavioral: Negative for altered mental status and memory loss. The patient is not nervous/anxious.    Allergic/Immunologic: Negative for hives.       Objective:   Physical Exam   Constitutional: She is oriented to person, place, and time. She appears well-developed and well-nourished. She does not appear ill. No distress.   HENT:   Head: Normocephalic and atraumatic.   Eyes: EOM are normal. Pupils are equal, round, and reactive to light. No scleral icterus.   Neck: Normal range of motion. Neck supple. Normal carotid pulses, no hepatojugular reflux and no JVD present. Carotid bruit is not present. No tracheal deviation present. No thyromegaly present.   Cardiovascular: Normal rate, regular rhythm, normal heart sounds and normal pulses.  Exam reveals no gallop and no friction rub.    No murmur heard.  Pulmonary/Chest: Effort normal and breath sounds normal. No respiratory distress. She has no wheezes. She has no rhonchi. She has no rales. She exhibits no tenderness.   Pacer site well healed.  Multiple incision minimal collection no tension    Abdominal: Soft. Normal appearance, normal aorta and bowel sounds are normal. She exhibits no abdominal bruit, no ascites and no pulsatile midline mass. There is no hepatomegaly. There is no tenderness.   Musculoskeletal: She exhibits no edema.        Right shoulder: She exhibits no deformity.  "  Neurological: She is alert and oriented to person, place, and time. She has normal strength. No cranial nerve deficit. Coordination normal.   Skin: Skin is warm and dry. No rash noted. She is not diaphoretic. No cyanosis or erythema. Nails show no clubbing.   Psychiatric: She has a normal mood and affect. Her speech is normal and behavior is normal.   Nursing note and vitals reviewed.    Vitals:    04/11/18 1557   BP: (!) 140/76   Pulse: 72   Weight: 63.5 kg (140 lb)   Height: 5' 3" (1.6 m)     Lab Results   Component Value Date    CHOL 126 03/10/2018    CHOL 283 (H) 09/03/2017    CHOL 287 (H) 08/23/2017     Lab Results   Component Value Date    HDL 35 (L) 03/10/2018    HDL 41 09/03/2017    HDL 42 08/23/2017     Lab Results   Component Value Date    LDLCALC 67.2 03/10/2018    LDLCALC 211.0 (H) 09/03/2017    LDLCALC 210.8 (H) 08/23/2017     Lab Results   Component Value Date    TRIG 119 03/10/2018    TRIG 155 (H) 09/03/2017    TRIG 171 (H) 08/23/2017     Lab Results   Component Value Date    CHOLHDL 27.8 03/10/2018    CHOLHDL 14.5 (L) 09/03/2017    CHOLHDL 14.6 (L) 08/23/2017       Chemistry        Component Value Date/Time     04/03/2018 0921    K 5.1 04/03/2018 0921     04/03/2018 0921    CO2 23 04/03/2018 0921    BUN 39 (H) 04/03/2018 0921    CREATININE 1.6 (H) 04/03/2018 0921    GLU 91 04/03/2018 0921        Component Value Date/Time    CALCIUM 9.3 04/03/2018 0921    ALKPHOS 56 03/09/2018 2214    AST 22 03/09/2018 2214    ALT 13 03/09/2018 2214    BILITOT 0.5 03/09/2018 2214    ESTGFRAFRICA 31.8 (A) 04/03/2018 0921    EGFRNONAA 27.6 (A) 04/03/2018 0921          Lab Results   Component Value Date    TSH 2.467 03/10/2018     Lab Results   Component Value Date    INR 3.4 04/11/2018    INR 2.3 (H) 04/03/2018    INR 4.0 (H) 04/03/2018     Lab Results   Component Value Date    WBC 6.44 04/03/2018    HGB 11.8 (L) 04/03/2018    HCT 36.7 (L) 04/03/2018     (H) 04/03/2018     04/03/2018 "     BMP  Sodium   Date Value Ref Range Status   04/03/2018 140 136 - 145 mmol/L Final     Potassium   Date Value Ref Range Status   04/03/2018 5.1 3.5 - 5.1 mmol/L Final     Chloride   Date Value Ref Range Status   04/03/2018 106 95 - 110 mmol/L Final     CO2   Date Value Ref Range Status   04/03/2018 23 23 - 29 mmol/L Final     BUN, Bld   Date Value Ref Range Status   04/03/2018 39 (H) 10 - 30 mg/dL Final     Creatinine   Date Value Ref Range Status   04/03/2018 1.6 (H) 0.5 - 1.4 mg/dL Final     Calcium   Date Value Ref Range Status   04/03/2018 9.3 8.7 - 10.5 mg/dL Final     Anion Gap   Date Value Ref Range Status   04/03/2018 11 8 - 16 mmol/L Final     eGFR if    Date Value Ref Range Status   04/03/2018 31.8 (A) >60 mL/min/1.73 m^2 Final     eGFR if non    Date Value Ref Range Status   04/03/2018 27.6 (A) >60 mL/min/1.73 m^2 Final     Comment:     Calculation used to obtain the estimated glomerular filtration  rate (eGFR) is the CKD-EPI equation.        CrCl cannot be calculated (Patient's most recent lab result is older than the maximum 7 days allowed.).    Assessment:     1. Cardiac pacemaker    2. Pure hypercholesterolemia    3. Paroxysmal atrial fibrillation    4. CKD (chronic kidney disease) stage 3, GFR 30-59 ml/min    5. Chronic diastolic HFpEF of 60% per echo 5/2017    6. Severe MR with remote mitral valve replacement on chronic anticoagulation    7. History of DVT of lower extremity right    8. Polyneuropathy    9. Bilateral carotid artery disease    10. Pacemaker end of life    11. Syncope, unspecified syncope type      Pacer site healing well. Needs to keep inr 2.5-3 max.  Compression stockings exercise to build strength.  Plan:   Continue antibiotics until over.  F/u with DR TONY as scheduled.

## 2018-04-11 NOTE — PROGRESS NOTES
Patient's INR is therapeutic today at 3.4.   Remains on Doxycycline.  Reports eating a dark leafy vegetable every day, but like to stop.  Instructed to hold dose tonight, then resume on 0.5 mg daily.  Recheck on Monday, 4/16/2018.

## 2018-04-16 ENCOUNTER — ANTI-COAG VISIT (OUTPATIENT)
Dept: CARDIOLOGY | Facility: CLINIC | Age: 83
End: 2018-04-16
Payer: MEDICARE

## 2018-04-16 DIAGNOSIS — Z79.01 LONG TERM (CURRENT) USE OF ANTICOAGULANTS: Primary | ICD-10-CM

## 2018-04-16 LAB — INR PPP: 3.1 (ref 2.5–3.5)

## 2018-04-16 PROCEDURE — 85610 PROTHROMBIN TIME: CPT | Mod: QW,S$GLB,, | Performed by: NUCLEAR MEDICINE

## 2018-04-16 NOTE — PROGRESS NOTES
Patient's INR is therapeutic at 3.1.  Remains on Doxycycline.  No changes in dosage.  Recheck in 1 week.  Please call should you have any questions or concerns at [phone] or [phone].

## 2018-04-17 DIAGNOSIS — I44.2 CHB (COMPLETE HEART BLOCK): ICD-10-CM

## 2018-04-17 DIAGNOSIS — Z95.0 CARDIAC PACEMAKER: Primary | ICD-10-CM

## 2018-04-17 DIAGNOSIS — I48.91 ATRIAL FIBRILLATION, UNSPECIFIED TYPE: ICD-10-CM

## 2018-04-20 ENCOUNTER — CLINICAL SUPPORT (OUTPATIENT)
Dept: CARDIOLOGY | Facility: CLINIC | Age: 83
End: 2018-04-20
Payer: MEDICARE

## 2018-04-20 DIAGNOSIS — Z95.0 CARDIAC PACEMAKER: ICD-10-CM

## 2018-04-20 DIAGNOSIS — I48.91 ATRIAL FIBRILLATION, UNSPECIFIED TYPE: ICD-10-CM

## 2018-04-20 DIAGNOSIS — I44.2 CHB (COMPLETE HEART BLOCK): ICD-10-CM

## 2018-04-20 PROCEDURE — 93281 PM DEVICE PROGR EVAL MULTI: CPT | Mod: 26,,, | Performed by: INTERNAL MEDICINE

## 2018-04-23 ENCOUNTER — ANTI-COAG VISIT (OUTPATIENT)
Dept: CARDIOLOGY | Facility: CLINIC | Age: 83
End: 2018-04-23
Payer: MEDICARE

## 2018-04-23 DIAGNOSIS — Z79.01 LONG TERM (CURRENT) USE OF ANTICOAGULANTS: Primary | ICD-10-CM

## 2018-04-23 LAB — INR PPP: 3.3 (ref 2.5–3.5)

## 2018-04-23 PROCEDURE — 85610 PROTHROMBIN TIME: CPT | Mod: QW,S$GLB,, | Performed by: INTERNAL MEDICINE

## 2018-04-23 NOTE — PROGRESS NOTES
Patient's INR is therapeutic at 3.3.  Remains on Doxycycline.  No changes in dose.  Recheck in 1 week.  Please call should you have any questions or concerns at [phone] or [phone].

## 2018-05-01 ENCOUNTER — TELEPHONE (OUTPATIENT)
Dept: CARDIOLOGY | Facility: CLINIC | Age: 83
End: 2018-05-01

## 2018-05-01 NOTE — TELEPHONE ENCOUNTER
----- Message -----  From: Reyna Vilchis MA  Sent: 4/30/2018  12:33 PM  To: QUETA Fox from [facility] would like you to fax over home health order for the patient. Please call [phone].  The fax number is [phone]. Thank you.

## 2018-05-02 ENCOUNTER — ANTI-COAG VISIT (OUTPATIENT)
Dept: CARDIOLOGY | Facility: CLINIC | Age: 83
End: 2018-05-02
Payer: MEDICARE

## 2018-05-02 DIAGNOSIS — Z79.01 LONG TERM (CURRENT) USE OF ANTICOAGULANTS: Primary | ICD-10-CM

## 2018-05-02 LAB — INR PPP: 2.7 (ref 2.5–3.5)

## 2018-05-02 PROCEDURE — 85610 PROTHROMBIN TIME: CPT | Mod: QW,S$GLB,, | Performed by: INTERNAL MEDICINE

## 2018-05-02 NOTE — PROGRESS NOTES
Patient's INR is therapeutic at 2.7.  No changes in dose.  Recheck in 1 month.  Please call should you have any questions or concerns at [phone] or [phone].

## 2018-06-06 ENCOUNTER — ANTI-COAG VISIT (OUTPATIENT)
Dept: CARDIOLOGY | Facility: CLINIC | Age: 83
End: 2018-06-06
Payer: MEDICARE

## 2018-06-06 DIAGNOSIS — Z79.01 LONG TERM (CURRENT) USE OF ANTICOAGULANTS: Primary | ICD-10-CM

## 2018-06-06 LAB — INR PPP: 2.7 (ref 2.5–3.5)

## 2018-06-06 PROCEDURE — 85610 PROTHROMBIN TIME: CPT | Mod: QW,S$GLB,, | Performed by: INTERNAL MEDICINE

## 2018-06-06 NOTE — PROGRESS NOTES
Patient's INR is therapeutic at 2.7.  No changes in dose.  Recheck in 1 month.  Please call should you have any questions or concerns at [phone] or [phone].

## 2018-06-15 DIAGNOSIS — I48.0 PAF (PAROXYSMAL ATRIAL FIBRILLATION): Primary | ICD-10-CM

## 2018-06-15 RX ORDER — PROPAFENONE HYDROCHLORIDE 150 MG/1
TABLET, COATED ORAL
Qty: 45 TABLET | Refills: 6 | Status: SHIPPED | OUTPATIENT
Start: 2018-06-15 | End: 2019-02-17 | Stop reason: SDUPTHER

## 2018-06-18 DIAGNOSIS — I95.1 ORTHOSTATIC HYPOTENSION: ICD-10-CM

## 2018-06-18 RX ORDER — MIDODRINE HYDROCHLORIDE 5 MG/1
5 TABLET ORAL 4 TIMES DAILY
Qty: 120 TABLET | Refills: 6 | Status: SHIPPED | OUTPATIENT
Start: 2018-06-18 | End: 2018-07-06 | Stop reason: SDUPTHER

## 2018-06-22 ENCOUNTER — PES CALL (OUTPATIENT)
Dept: ADMINISTRATIVE | Facility: CLINIC | Age: 83
End: 2018-06-22

## 2018-07-06 DIAGNOSIS — Z79.01 LONG TERM (CURRENT) USE OF ANTICOAGULANTS: ICD-10-CM

## 2018-07-06 DIAGNOSIS — I95.1 ORTHOSTATIC HYPOTENSION: ICD-10-CM

## 2018-07-06 DIAGNOSIS — B35.4 TINEA CORPORIS: ICD-10-CM

## 2018-07-06 RX ORDER — WARFARIN 1 MG/1
TABLET ORAL
Qty: 50 TABLET | Refills: 3 | Status: SHIPPED | OUTPATIENT
Start: 2018-07-06 | End: 2018-07-23 | Stop reason: SDUPTHER

## 2018-07-06 RX ORDER — MIDODRINE HYDROCHLORIDE 5 MG/1
5 TABLET ORAL 4 TIMES DAILY
Qty: 120 TABLET | Refills: 6 | Status: SHIPPED | OUTPATIENT
Start: 2018-07-06 | End: 2018-07-23

## 2018-07-06 RX ORDER — CLOTRIMAZOLE AND BETAMETHASONE DIPROPIONATE 10; .64 MG/G; MG/G
CREAM TOPICAL 2 TIMES DAILY
Qty: 45 G | Refills: 1 | Status: SHIPPED | OUTPATIENT
Start: 2018-07-06 | End: 2018-07-23 | Stop reason: SDUPTHER

## 2018-07-06 NOTE — TELEPHONE ENCOUNTER
Patient is requesting refill on lotrisone cream. Pharmacy verified ([facility]).//[email or web address]

## 2018-07-10 RX ORDER — MIDODRINE HYDROCHLORIDE 5 MG/1
5 TABLET ORAL 4 TIMES DAILY
Qty: 120 TABLET | Refills: 6 | Status: SHIPPED | OUTPATIENT
Start: 2018-07-10 | End: 2018-07-23

## 2018-07-11 ENCOUNTER — ANTI-COAG VISIT (OUTPATIENT)
Dept: CARDIOLOGY | Facility: CLINIC | Age: 83
End: 2018-07-11
Payer: MEDICARE

## 2018-07-11 DIAGNOSIS — Z79.01 LONG TERM (CURRENT) USE OF ANTICOAGULANTS: Primary | ICD-10-CM

## 2018-07-11 LAB — INR PPP: 2.3 (ref 2.5–3.5)

## 2018-07-11 PROCEDURE — 85610 PROTHROMBIN TIME: CPT | Mod: QW,S$GLB,, | Performed by: INTERNAL MEDICINE

## 2018-07-11 NOTE — PROGRESS NOTES
"Patient's INR is sub therapeutic at 2.3.  Reports a recent fall approximately 2 weeks ago - "missed x 1 dose".  Bruising to left side of upper face - visualized.  Patient did not go the ED - education given.  Refused to see a physician today.  Patient stated, "I'm doing much better now".  Instructed patient to take 1 mg today (7/11) - only, then resume on 0.5 mg daily.  Recheck in 4 weeks.  "

## 2018-07-23 DIAGNOSIS — I95.1 ORTHOSTATIC HYPOTENSION: ICD-10-CM

## 2018-07-23 DIAGNOSIS — E78.00 PURE HYPERCHOLESTEROLEMIA: Chronic | ICD-10-CM

## 2018-07-23 DIAGNOSIS — Z79.01 LONG TERM (CURRENT) USE OF ANTICOAGULANTS: ICD-10-CM

## 2018-07-23 DIAGNOSIS — E03.8 OTHER SPECIFIED HYPOTHYROIDISM: Primary | ICD-10-CM

## 2018-07-23 DIAGNOSIS — B35.4 TINEA CORPORIS: ICD-10-CM

## 2018-07-23 RX ORDER — WARFARIN 1 MG/1
TABLET ORAL
Qty: 50 TABLET | Refills: 3 | Status: SHIPPED | OUTPATIENT
Start: 2018-07-23 | End: 2020-01-22 | Stop reason: SDUPTHER

## 2018-07-23 RX ORDER — MIDODRINE HYDROCHLORIDE 5 MG/1
5 TABLET ORAL 4 TIMES DAILY
Qty: 120 TABLET | Refills: 6 | Status: SHIPPED | OUTPATIENT
Start: 2018-07-23 | End: 2020-04-06 | Stop reason: SDUPTHER

## 2018-07-24 RX ORDER — SPIRONOLACTONE 25 MG/1
12.5 TABLET ORAL DAILY
Qty: 30 TABLET | Refills: 2 | Status: SHIPPED | OUTPATIENT
Start: 2018-07-24 | End: 2018-10-26 | Stop reason: SDUPTHER

## 2018-07-24 RX ORDER — LEVOTHYROXINE SODIUM 50 UG/1
50 TABLET ORAL DAILY
Qty: 30 TABLET | Refills: 2 | Status: SHIPPED | OUTPATIENT
Start: 2018-07-24 | End: 2018-10-23 | Stop reason: SDUPTHER

## 2018-07-24 RX ORDER — CLOTRIMAZOLE AND BETAMETHASONE DIPROPIONATE 10; .64 MG/G; MG/G
CREAM TOPICAL 2 TIMES DAILY
Qty: 45 G | Refills: 2 | Status: SHIPPED | OUTPATIENT
Start: 2018-07-24

## 2018-08-08 ENCOUNTER — ANTI-COAG VISIT (OUTPATIENT)
Dept: CARDIOLOGY | Facility: CLINIC | Age: 83
End: 2018-08-08
Payer: MEDICARE

## 2018-08-08 DIAGNOSIS — Z79.01 LONG TERM (CURRENT) USE OF ANTICOAGULANTS: Primary | ICD-10-CM

## 2018-08-08 LAB — INR PPP: 3.7 (ref 2.5–3.5)

## 2018-08-08 PROCEDURE — 85610 PROTHROMBIN TIME: CPT | Mod: QW,S$GLB,, | Performed by: INTERNAL MEDICINE

## 2018-08-08 NOTE — PROGRESS NOTES
"Patient's INR is slightly elevated at 3.7.  Reports no signs of any abnormal bleeding at present.  Stated, "I didn't feel like eating my routine greens this week".  Instructed patient to hold Warfarin today - only, then resume on regular scheduled dose. Recheck in 4 weeks.  "

## 2018-08-15 ENCOUNTER — CLINICAL SUPPORT (OUTPATIENT)
Dept: CARDIOLOGY | Facility: CLINIC | Age: 83
End: 2018-08-15
Payer: MEDICARE

## 2018-08-15 DIAGNOSIS — I48.91 ATRIAL FIBRILLATION, UNSPECIFIED TYPE: ICD-10-CM

## 2018-08-15 DIAGNOSIS — I44.2 CHB (COMPLETE HEART BLOCK): ICD-10-CM

## 2018-08-15 DIAGNOSIS — Z95.0 CARDIAC PACEMAKER: ICD-10-CM

## 2018-08-15 PROCEDURE — 93281 PM DEVICE PROGR EVAL MULTI: CPT | Mod: 26,,, | Performed by: INTERNAL MEDICINE

## 2018-08-17 DIAGNOSIS — Z95.0 CARDIAC PACEMAKER IN SITU: Primary | ICD-10-CM

## 2018-08-17 DIAGNOSIS — R00.1 SYMPTOMATIC BRADYCARDIA: ICD-10-CM

## 2018-09-12 ENCOUNTER — ANTI-COAG VISIT (OUTPATIENT)
Dept: CARDIOLOGY | Facility: CLINIC | Age: 83
End: 2018-09-12
Payer: MEDICARE

## 2018-09-12 DIAGNOSIS — Z79.01 LONG TERM (CURRENT) USE OF ANTICOAGULANTS: Primary | ICD-10-CM

## 2018-09-12 LAB — INR PPP: 3.7 (ref 2.5–3.5)

## 2018-09-12 PROCEDURE — 85610 PROTHROMBIN TIME: CPT | Mod: PBBFAC,PO

## 2018-09-12 PROCEDURE — 99211 OFF/OP EST MAY X REQ PHY/QHP: CPT | Mod: PBBFAC,PO

## 2018-09-12 PROCEDURE — 99999 PR PBB SHADOW E&M-EST. PATIENT-LVL I: CPT | Mod: PBBFAC,,,

## 2018-09-12 NOTE — PROGRESS NOTES
Patient's INR remains elevated at 3.7.  Reports no signs of any abnormal bleeding at present.  No medication changes.  Instructed to hold Warfarin dose today - only, then resume on regular scheduled dose tomorrow, 9/13/2018.  Incorporate a small portion of a dark leafy vegetable on Sundays and Fridays - stay consistent.  Recheck in 3 weeks.

## 2018-10-03 ENCOUNTER — ANTI-COAG VISIT (OUTPATIENT)
Dept: CARDIOLOGY | Facility: CLINIC | Age: 83
End: 2018-10-03
Payer: MEDICARE

## 2018-10-03 ENCOUNTER — IMMUNIZATION (OUTPATIENT)
Dept: INTERNAL MEDICINE | Facility: CLINIC | Age: 83
End: 2018-10-03
Payer: MEDICARE

## 2018-10-03 DIAGNOSIS — Z79.01 LONG TERM (CURRENT) USE OF ANTICOAGULANTS: Primary | ICD-10-CM

## 2018-10-03 LAB — INR PPP: 2.5 (ref 2.5–3.5)

## 2018-10-03 PROCEDURE — 85610 PROTHROMBIN TIME: CPT | Mod: PBBFAC,PO

## 2018-10-03 PROCEDURE — 90662 IIV NO PRSV INCREASED AG IM: CPT | Mod: PBBFAC,PO

## 2018-10-03 PROCEDURE — 99999 PR PBB SHADOW E&M-EST. PATIENT-LVL II: CPT | Mod: PBBFAC,,,

## 2018-10-03 PROCEDURE — 99212 OFFICE O/P EST SF 10 MIN: CPT | Mod: PBBFAC,PO,25

## 2018-10-03 NOTE — PROGRESS NOTES
Patient's INR is therapeutic at 2.5.  Instructed to maintain dose of 0.5 mg daily.  Recheck in 3 weeks.

## 2018-10-23 DIAGNOSIS — E03.8 OTHER SPECIFIED HYPOTHYROIDISM: ICD-10-CM

## 2018-10-24 ENCOUNTER — ANTI-COAG VISIT (OUTPATIENT)
Dept: CARDIOLOGY | Facility: CLINIC | Age: 83
End: 2018-10-24
Payer: MEDICARE

## 2018-10-24 DIAGNOSIS — Z79.01 LONG TERM (CURRENT) USE OF ANTICOAGULANTS: Primary | ICD-10-CM

## 2018-10-24 LAB — INR PPP: 2.7 (ref 2.5–3.5)

## 2018-10-24 PROCEDURE — 85610 PROTHROMBIN TIME: CPT | Mod: PBBFAC,PO

## 2018-10-24 RX ORDER — LEVOTHYROXINE SODIUM 50 UG/1
TABLET ORAL
Qty: 90 TABLET | Refills: 3 | Status: SHIPPED | OUTPATIENT
Start: 2018-10-24 | End: 2018-10-31 | Stop reason: SDUPTHER

## 2018-10-24 NOTE — PROGRESS NOTES
Patient's INR is therapeutic at 2.7.  Instructed to maintain current dose of Warfarin 0.5 mg daily (1/2 of a 1 mg tablet) per dose calendar given.  Recheck in 1 month.

## 2018-10-26 DIAGNOSIS — I27.9 PULMONARY HEART DISEASE: Primary | ICD-10-CM

## 2018-10-26 NOTE — TELEPHONE ENCOUNTER
S/w pt and sched for appt w/ Dr. Rosales for Wed 10/31/18 @ 1:40PM, pt confirmed appt date/time. Advised her refill request would be sent to Dr. Rosales, pt voiced understanding.    Refill [Spironolacton] sent to provider for authorization.    LV 12/21/2017  [place] 10/31/2018

## 2018-10-26 NOTE — TELEPHONE ENCOUNTER
----- Message from Snehal Pickett sent at 10/26/2018 12:36 PM CDT -----  Contact: pt daughter - Ms Gray  Stated she calling for a refill on medication, she can be reached at [phone]     1. What is the name of the medication you are requesting? spironolac  2. What is the dose?   3. How do you take the medication? Orally, topically, etc? orally  4. How often do you take this medication? .5 a day  5. Do you need a 30 day or 90 day supply? 90  6. How many refills are you requesting?   7. What is your preferred pharmacy and location of the pharmacy?   8. Who can we contact with further questions?         [facility] [address] [place] [address] & [address] [address]  Phone: [phone] Fax: [phone]

## 2018-10-27 DIAGNOSIS — I27.9 PULMONARY HEART DISEASE: ICD-10-CM

## 2018-10-27 RX ORDER — SPIRONOLACTONE 25 MG/1
12.5 TABLET ORAL DAILY
Qty: 90 TABLET | Refills: 3 | Status: SHIPPED | OUTPATIENT
Start: 2018-10-27 | End: 2018-10-27 | Stop reason: SDUPTHER

## 2018-10-27 RX ORDER — SPIRONOLACTONE 25 MG/1
12.5 TABLET ORAL DAILY
Qty: 90 TABLET | Refills: 3 | Status: SHIPPED | OUTPATIENT
Start: 2018-10-27 | End: 2019-09-26 | Stop reason: SINTOL

## 2018-10-31 ENCOUNTER — LAB VISIT (OUTPATIENT)
Dept: LAB | Facility: HOSPITAL | Age: 83
End: 2018-10-31
Attending: PEDIATRICS
Payer: MEDICARE

## 2018-10-31 ENCOUNTER — OFFICE VISIT (OUTPATIENT)
Dept: INTERNAL MEDICINE | Facility: CLINIC | Age: 83
End: 2018-10-31
Payer: MEDICARE

## 2018-10-31 VITALS
HEIGHT: 63 IN | TEMPERATURE: 98 F | DIASTOLIC BLOOD PRESSURE: 72 MMHG | RESPIRATION RATE: 16 BRPM | OXYGEN SATURATION: 95 % | SYSTOLIC BLOOD PRESSURE: 118 MMHG | HEART RATE: 75 BPM | BODY MASS INDEX: 26.09 KG/M2 | WEIGHT: 147.25 LBS

## 2018-10-31 DIAGNOSIS — E03.8 OTHER SPECIFIED HYPOTHYROIDISM: ICD-10-CM

## 2018-10-31 DIAGNOSIS — N18.30 CKD (CHRONIC KIDNEY DISEASE) STAGE 3, GFR 30-59 ML/MIN: Chronic | ICD-10-CM

## 2018-10-31 DIAGNOSIS — M81.0 OSTEOPOROSIS, UNSPECIFIED OSTEOPOROSIS TYPE, UNSPECIFIED PATHOLOGICAL FRACTURE PRESENCE: ICD-10-CM

## 2018-10-31 DIAGNOSIS — G62.9 POLYNEUROPATHY: ICD-10-CM

## 2018-10-31 DIAGNOSIS — E78.00 PURE HYPERCHOLESTEROLEMIA: Primary | Chronic | ICD-10-CM

## 2018-10-31 DIAGNOSIS — I25.10 CORONARY ARTERY DISEASE INVOLVING NATIVE CORONARY ARTERY OF NATIVE HEART WITHOUT ANGINA PECTORIS: ICD-10-CM

## 2018-10-31 DIAGNOSIS — F32.0 DEPRESSION, MAJOR, SINGLE EPISODE, MILD: ICD-10-CM

## 2018-10-31 DIAGNOSIS — E78.00 PURE HYPERCHOLESTEROLEMIA: Chronic | ICD-10-CM

## 2018-10-31 DIAGNOSIS — I95.1 ORTHOSTATIC HYPOTENSION: ICD-10-CM

## 2018-10-31 DIAGNOSIS — E03.4 HYPOTHYROIDISM DUE TO ACQUIRED ATROPHY OF THYROID: Chronic | ICD-10-CM

## 2018-10-31 DIAGNOSIS — Z86.73 H/O: CVA (CEREBROVASCULAR ACCIDENT): Chronic | ICD-10-CM

## 2018-10-31 LAB
ALT SERPL W/O P-5'-P-CCNC: 10 U/L
ANION GAP SERPL CALC-SCNC: 7 MMOL/L
AST SERPL-CCNC: 18 U/L
BASOPHILS # BLD AUTO: 0.05 K/UL
BASOPHILS NFR BLD: 0.8 %
BUN SERPL-MCNC: 29 MG/DL
CALCIUM SERPL-MCNC: 9.2 MG/DL
CHLORIDE SERPL-SCNC: 106 MMOL/L
CHOLEST SERPL-MCNC: 190 MG/DL
CHOLEST/HDLC SERPL: 3.9 {RATIO}
CO2 SERPL-SCNC: 27 MMOL/L
CREAT SERPL-MCNC: 1.4 MG/DL
DIFFERENTIAL METHOD: ABNORMAL
EOSINOPHIL # BLD AUTO: 0.1 K/UL
EOSINOPHIL NFR BLD: 2 %
ERYTHROCYTE [DISTWIDTH] IN BLOOD BY AUTOMATED COUNT: 13.5 %
EST. GFR  (AFRICAN AMERICAN): 37.1 ML/MIN/1.73 M^2
EST. GFR  (NON AFRICAN AMERICAN): 32.2 ML/MIN/1.73 M^2
GLUCOSE SERPL-MCNC: 102 MG/DL
HCT VFR BLD AUTO: 38.7 %
HDLC SERPL-MCNC: 49 MG/DL
HDLC SERPL: 25.8 %
HGB BLD-MCNC: 12.5 G/DL
IMM GRANULOCYTES # BLD AUTO: 0.02 K/UL
IMM GRANULOCYTES NFR BLD AUTO: 0.3 %
LDLC SERPL CALC-MCNC: 117.4 MG/DL
LYMPHOCYTES # BLD AUTO: 2 K/UL
LYMPHOCYTES NFR BLD: 33.5 %
MCH RBC QN AUTO: 32.1 PG
MCHC RBC AUTO-ENTMCNC: 32.3 G/DL
MCV RBC AUTO: 100 FL
MONOCYTES # BLD AUTO: 0.5 K/UL
MONOCYTES NFR BLD: 9.1 %
NEUTROPHILS # BLD AUTO: 3.2 K/UL
NEUTROPHILS NFR BLD: 54.3 %
NONHDLC SERPL-MCNC: 141 MG/DL
NRBC BLD-RTO: 0 /100 WBC
PLATELET # BLD AUTO: 167 K/UL
PMV BLD AUTO: 11.9 FL
POTASSIUM SERPL-SCNC: 4.1 MMOL/L
RBC # BLD AUTO: 3.89 M/UL
SODIUM SERPL-SCNC: 140 MMOL/L
TRIGL SERPL-MCNC: 118 MG/DL
TSH SERPL DL<=0.005 MIU/L-ACNC: 1.77 UIU/ML
WBC # BLD AUTO: 5.94 K/UL

## 2018-10-31 PROCEDURE — 99499 UNLISTED E&M SERVICE: CPT | Mod: HCNC,,, | Performed by: PEDIATRICS

## 2018-10-31 PROCEDURE — 99213 OFFICE O/P EST LOW 20 MIN: CPT | Mod: PBBFAC,PO | Performed by: PEDIATRICS

## 2018-10-31 PROCEDURE — 84443 ASSAY THYROID STIM HORMONE: CPT

## 2018-10-31 PROCEDURE — 84460 ALANINE AMINO (ALT) (SGPT): CPT

## 2018-10-31 PROCEDURE — 85025 COMPLETE CBC W/AUTO DIFF WBC: CPT

## 2018-10-31 PROCEDURE — 99999 PR PBB SHADOW E&M-EST. PATIENT-LVL III: CPT | Mod: PBBFAC,,, | Performed by: PEDIATRICS

## 2018-10-31 PROCEDURE — 84450 TRANSFERASE (AST) (SGOT): CPT

## 2018-10-31 PROCEDURE — 1101F PT FALLS ASSESS-DOCD LE1/YR: CPT | Mod: CPTII,,, | Performed by: PEDIATRICS

## 2018-10-31 PROCEDURE — 80061 LIPID PANEL: CPT

## 2018-10-31 PROCEDURE — 36415 COLL VENOUS BLD VENIPUNCTURE: CPT | Mod: PO

## 2018-10-31 PROCEDURE — 99214 OFFICE O/P EST MOD 30 MIN: CPT | Mod: S$PBB,,, | Performed by: PEDIATRICS

## 2018-10-31 PROCEDURE — 99499 UNLISTED E&M SERVICE: CPT | Mod: HCNC,S$GLB,, | Performed by: PEDIATRICS

## 2018-10-31 PROCEDURE — 80048 BASIC METABOLIC PNL TOTAL CA: CPT

## 2018-10-31 RX ORDER — PRAVASTATIN SODIUM 20 MG/1
20 TABLET ORAL DAILY
Qty: 90 TABLET | Refills: 3 | Status: SHIPPED | OUTPATIENT
Start: 2018-10-31 | End: 2019-10-11 | Stop reason: SDUPTHER

## 2018-10-31 RX ORDER — ESCITALOPRAM OXALATE 10 MG/1
10 TABLET ORAL DAILY
Qty: 90 TABLET | Refills: 3 | Status: SHIPPED | OUTPATIENT
Start: 2018-10-31 | End: 2018-11-26 | Stop reason: SDUPTHER

## 2018-10-31 RX ORDER — ACETAMINOPHEN 500 MG
500 TABLET ORAL
COMMUNITY

## 2018-10-31 RX ORDER — ASPIRIN 81 MG/1
81 TABLET ORAL
COMMUNITY
End: 2021-05-25

## 2018-10-31 RX ORDER — LEVOTHYROXINE SODIUM 50 UG/1
50 TABLET ORAL DAILY
Qty: 90 TABLET | Refills: 3 | Status: SHIPPED | OUTPATIENT
Start: 2018-10-31 | End: 2019-10-30

## 2018-10-31 NOTE — PROGRESS NOTES
Patient, Josy Posey (MRN #[id number]), presented with a recent Estimated Glumerular Filtration Rate (EGFR) between 15 and 29 consistent with the definition of chronic kidney disease stage 4 (ICD10 - N18.4).    eGFR if non    Date Value Ref Range Status   04/03/2018 27.6 (A) >60 mL/min/1.73 m^2 Final     Comment:     Calculation used to obtain the estimated glomerular filtration  rate (eGFR) is the CKD-EPI equation.          The patient's chronic kidney disease stage 4 was monitored, evaluated, addressed and/or treated. This addendum to the medical record is made on 10/31/2018.

## 2018-10-31 NOTE — PROGRESS NOTES
Subjective:       Patient ID: Josy Posey is a 89 y.o. female.    Chief Complaint: Annual Exam    She has been relatively stable, has cardiac followup.    Hypothyroid:asymptomatic, no swallowing difficulties, no hyper/hypothyroid symptoms  Lipids: on pravastatin  Depression: stable on lexapro, no HI/SI  Polyneuropathy is still quite symptomatic, in wheel chair with traveling, uses walker at home. She self stopped gabapentin. More problematic at night but she states does not interfere with sleep.  Cardiac: develops HE/SOB easily but stable. In coumadin and pacer clinic.      Review of Systems   Constitutional: Negative for fever and unexpected weight change.   HENT: Negative for congestion and rhinorrhea.    Eyes: Negative for discharge and redness.   Respiratory: Positive for shortness of breath. Negative for cough and wheezing.         Stable   Cardiovascular: Positive for chest pain and leg swelling. Negative for palpitations.        Stable   Gastrointestinal: Negative for abdominal pain, constipation, diarrhea and vomiting.   Endocrine: Negative for cold intolerance, heat intolerance, polydipsia, polyphagia and polyuria.   Genitourinary: Negative for decreased urine volume, difficulty urinating and menstrual problem.   Musculoskeletal: Negative for arthralgias and joint swelling.   Skin: Negative for rash and wound.   Neurological: Negative for syncope and headaches.   Psychiatric/Behavioral: Negative for behavioral problems and sleep disturbance.       Objective:      Physical Exam   Constitutional: She is oriented to person, place, and time. She appears well-developed and well-nourished. No distress.   Neck: No JVD present. No thyromegaly present.   Cardiovascular: Normal rate, regular rhythm and normal heart sounds.   No murmur heard.  Pacer in place.   Pulmonary/Chest: Effort normal and breath sounds normal. No respiratory distress. She has no wheezes. She has no rales.   Abdominal: Soft. She exhibits  no distension and no mass. There is no tenderness. There is no guarding.   Musculoskeletal: She exhibits edema (trace edema.).   Lymphadenopathy:     She has no cervical adenopathy.   Neurological: She is alert and oriented to person, place, and time. No cranial nerve deficit. Coordination normal.   Skin: Capillary refill takes less than 2 seconds. No rash noted.   Psychiatric: She has a normal mood and affect. Her behavior is normal. Judgment and thought content normal.       Assessment:       1. Pure hypercholesterolemia    2. Hypothyroidism due to acquired atrophy of thyroid    3. CKD (chronic kidney disease) stage 3, GFR 30-59 ml/min    4. H/O CVA (cerebrovascular accident)    5. Depression, major, single episode, mild    6. Orthostatic hypotension    7. Osteoporosis, unspecified osteoporosis type, unspecified pathological fracture presence    8. Polyneuropathy    9. Coronary artery disease involving native coronary artery of native heart without angina pectoris    10. Other specified hypothyroidism        Plan:       Pure hypercholesterolemia  -     Lipid panel; Future; Expected date: 10/31/2018    Hypothyroidism due to acquired atrophy of thyroid  -     TSH; Future; Expected date: 10/31/2018    CKD (chronic kidney disease) stage 3, GFR 30-59 ml/min  -     CBC auto differential; Future; Expected date: 10/31/2018  -     Comprehensive metabolic panel; Future; Expected date: 10/31/2018  -     CBC auto differential; Future; Expected date: 10/31/2018    H/O CVA (cerebrovascular accident)    Depression, major, single episode, mild    Orthostatic hypotension    Osteoporosis, unspecified osteoporosis type, unspecified pathological fracture presence    Polyneuropathy    Coronary artery disease involving native coronary artery of native heart without angina pectoris  -     pravastatin (PRAVACHOL) 20 MG tablet; Take 1 tablet (20 mg total) by mouth once daily.  Dispense: 90 tablet; Refill: 3    Other specified  hypothyroidism  -     levothyroxine (SYNTHROID) 50 MCG tablet; Take 1 tablet (50 mcg total) by mouth once daily.  Dispense: 90 tablet; Refill: 3    Other orders  -     escitalopram oxalate (LEXAPRO) 10 MG tablet; Take 1 tablet (10 mg total) by mouth once daily.  Dispense: 90 tablet; Refill: 3    Cymbalta would interact with her medications, we discussed trial of lyrica for neuropathy. She declines, she has adapted and does not want to initiate new medications. Maintain meds and cardiac f/u. Await labs today. She had flu vaccine. F/U in 6 months with labs.

## 2018-11-19 ENCOUNTER — CLINICAL SUPPORT (OUTPATIENT)
Dept: CARDIOLOGY | Facility: CLINIC | Age: 83
End: 2018-11-19
Attending: INTERNAL MEDICINE
Payer: MEDICARE

## 2018-11-19 DIAGNOSIS — R00.1 SYMPTOMATIC BRADYCARDIA: ICD-10-CM

## 2018-11-19 DIAGNOSIS — Z95.0 CARDIAC PACEMAKER IN SITU: ICD-10-CM

## 2018-11-19 PROCEDURE — 93294 REM INTERROG EVL PM/LDLS PM: CPT | Mod: HCNC,S$GLB,, | Performed by: INTERNAL MEDICINE

## 2018-11-19 PROCEDURE — 93296 REM INTERROG EVL PM/IDS: CPT | Mod: HCNC,S$GLB,, | Performed by: INTERNAL MEDICINE

## 2018-11-26 DIAGNOSIS — F32.89 OTHER DEPRESSION: Primary | ICD-10-CM

## 2018-11-26 DIAGNOSIS — F41.9 ANXIETY: ICD-10-CM

## 2018-11-26 RX ORDER — ESCITALOPRAM OXALATE 10 MG/1
10 TABLET ORAL DAILY
Qty: 90 TABLET | Refills: 3 | Status: SHIPPED | OUTPATIENT
Start: 2018-11-26 | End: 2020-02-10

## 2018-11-28 ENCOUNTER — ANTI-COAG VISIT (OUTPATIENT)
Dept: CARDIOLOGY | Facility: CLINIC | Age: 83
End: 2018-11-28
Payer: MEDICARE

## 2018-11-28 ENCOUNTER — TELEPHONE (OUTPATIENT)
Dept: INTERNAL MEDICINE | Facility: CLINIC | Age: 83
End: 2018-11-28

## 2018-11-28 DIAGNOSIS — Z79.01 LONG TERM (CURRENT) USE OF ANTICOAGULANTS: Primary | ICD-10-CM

## 2018-11-28 LAB — INR PPP: 3.1 (ref 2.5–3.5)

## 2018-11-28 PROCEDURE — 85610 PROTHROMBIN TIME: CPT | Mod: QW,HCNC,S$GLB, | Performed by: INTERNAL MEDICINE

## 2018-11-28 NOTE — TELEPHONE ENCOUNTER
Pt here in clinic asking for refill of Lexapro, advised via Kelly in reception that rx was sent to pt's confirmed pharmacy on Mon 11/26/18. Pt confirmed understanding and to call back PRN.

## 2018-11-28 NOTE — PROGRESS NOTES
Patient's INR is therapeutic at 3.1.  Reports no current concerns at the time of visit.  Instructed to maintain current dose of Warfarin 0.5 mg daily.  Recheck in 1 month.

## 2018-12-26 ENCOUNTER — ANTI-COAG VISIT (OUTPATIENT)
Dept: CARDIOLOGY | Facility: CLINIC | Age: 83
End: 2018-12-26
Payer: MEDICARE

## 2018-12-26 DIAGNOSIS — Z79.01 LONG TERM (CURRENT) USE OF ANTICOAGULANTS: Primary | ICD-10-CM

## 2018-12-26 LAB — INR PPP: 2.6 (ref 2.5–3.5)

## 2018-12-26 PROCEDURE — 85610 PROTHROMBIN TIME: CPT | Mod: QW,HCNC,S$GLB, | Performed by: INTERNAL MEDICINE

## 2018-12-26 NOTE — PROGRESS NOTES
Patient's INR is therapeutic at 2.6.  Reports no current changes or concerns at the time of visit.  Instructed to maintain current dose of Warfarin 0.5 mg daily.  Recheck in 1 month at [place] - address given.  Patient voiced understanding.

## 2019-01-22 DIAGNOSIS — I25.10 CORONARY ARTERY DISEASE, ANGINA PRESENCE UNSPECIFIED, UNSPECIFIED VESSEL OR LESION TYPE, UNSPECIFIED WHETHER NATIVE OR TRANSPLANTED HEART: Primary | ICD-10-CM

## 2019-01-23 ENCOUNTER — ANTI-COAG VISIT (OUTPATIENT)
Dept: CARDIOLOGY | Facility: CLINIC | Age: 84
End: 2019-01-23
Payer: MEDICARE

## 2019-01-23 DIAGNOSIS — Z79.01 LONG TERM (CURRENT) USE OF ANTICOAGULANTS: Primary | ICD-10-CM

## 2019-01-23 LAB — INR PPP: 2.7 (ref 2.5–3.5)

## 2019-01-23 PROCEDURE — 85610 POCT INR: ICD-10-PCS | Mod: QW,HCNC,S$GLB, | Performed by: INTERNAL MEDICINE

## 2019-01-23 PROCEDURE — 85610 PROTHROMBIN TIME: CPT | Mod: QW,HCNC,S$GLB, | Performed by: INTERNAL MEDICINE

## 2019-01-23 NOTE — PROGRESS NOTES
Patient's INR is therapeutic at 2.7.  Reports no recent changes.  Instructed to maintain current dose of Warfarin 0.5 mg every evening.  Recheck in 1 month.

## 2019-01-25 ENCOUNTER — CLINICAL SUPPORT (OUTPATIENT)
Dept: CARDIOLOGY | Facility: CLINIC | Age: 84
End: 2019-01-25
Payer: MEDICARE

## 2019-01-25 ENCOUNTER — OFFICE VISIT (OUTPATIENT)
Dept: CARDIOLOGY | Facility: CLINIC | Age: 84
End: 2019-01-25
Payer: MEDICARE

## 2019-01-25 VITALS
HEART RATE: 75 BPM | SYSTOLIC BLOOD PRESSURE: 120 MMHG | HEIGHT: 63 IN | WEIGHT: 147 LBS | BODY MASS INDEX: 26.05 KG/M2 | DIASTOLIC BLOOD PRESSURE: 78 MMHG

## 2019-01-25 DIAGNOSIS — N18.30 CKD (CHRONIC KIDNEY DISEASE) STAGE 3, GFR 30-59 ML/MIN: Chronic | ICD-10-CM

## 2019-01-25 DIAGNOSIS — I95.1 ORTHOSTATIC HYPOTENSION: ICD-10-CM

## 2019-01-25 DIAGNOSIS — I65.23 BILATERAL CAROTID ARTERY STENOSIS: ICD-10-CM

## 2019-01-25 DIAGNOSIS — Z86.73 H/O: CVA (CEREBROVASCULAR ACCIDENT): Chronic | ICD-10-CM

## 2019-01-25 DIAGNOSIS — I25.10 CORONARY ARTERY DISEASE, ANGINA PRESENCE UNSPECIFIED, UNSPECIFIED VESSEL OR LESION TYPE, UNSPECIFIED WHETHER NATIVE OR TRANSPLANTED HEART: ICD-10-CM

## 2019-01-25 DIAGNOSIS — F32.0 DEPRESSION, MAJOR, SINGLE EPISODE, MILD: ICD-10-CM

## 2019-01-25 DIAGNOSIS — I51.89 LEFT VENTRICULAR DIASTOLIC DYSFUNCTION WITH PRESERVED SYSTOLIC FUNCTION: Chronic | ICD-10-CM

## 2019-01-25 DIAGNOSIS — E78.00 PURE HYPERCHOLESTEROLEMIA: Chronic | ICD-10-CM

## 2019-01-25 DIAGNOSIS — Z95.2 S/P MITRAL VALVE REPLACEMENT: Chronic | ICD-10-CM

## 2019-01-25 DIAGNOSIS — Z79.01 CHRONIC ANTICOAGULATION: ICD-10-CM

## 2019-01-25 DIAGNOSIS — I44.2 THIRD DEGREE AV BLOCK: Chronic | ICD-10-CM

## 2019-01-25 DIAGNOSIS — Z86.718 HISTORY OF DVT OF LOWER EXTREMITY: ICD-10-CM

## 2019-01-25 DIAGNOSIS — E03.4 HYPOTHYROIDISM DUE TO ACQUIRED ATROPHY OF THYROID: Chronic | ICD-10-CM

## 2019-01-25 DIAGNOSIS — I48.0 PAROXYSMAL ATRIAL FIBRILLATION: Primary | Chronic | ICD-10-CM

## 2019-01-25 DIAGNOSIS — Z95.0 CARDIAC PACEMAKER: ICD-10-CM

## 2019-01-25 DIAGNOSIS — I25.10 CORONARY ARTERY DISEASE INVOLVING NATIVE CORONARY ARTERY OF NATIVE HEART WITHOUT ANGINA PECTORIS: Chronic | ICD-10-CM

## 2019-01-25 DIAGNOSIS — I95.1 ORTHOSTATIC HYPOTENSION DYSAUTONOMIC SYNDROME: Chronic | ICD-10-CM

## 2019-01-25 PROCEDURE — 99499 RISK ADDL DX/OHS AUDIT: ICD-10-PCS | Mod: HCNC,S$GLB,, | Performed by: INTERNAL MEDICINE

## 2019-01-25 PROCEDURE — 1101F PT FALLS ASSESS-DOCD LE1/YR: CPT | Mod: HCNC,CPTII,S$GLB, | Performed by: INTERNAL MEDICINE

## 2019-01-25 PROCEDURE — 93000 EKG 12-LEAD: ICD-10-PCS | Mod: HCNC,S$GLB,, | Performed by: INTERNAL MEDICINE

## 2019-01-25 PROCEDURE — 99499 UNLISTED E&M SERVICE: CPT | Mod: HCNC,S$GLB,, | Performed by: INTERNAL MEDICINE

## 2019-01-25 PROCEDURE — 99999 PR PBB SHADOW E&M-EST. PATIENT-LVL III: ICD-10-PCS | Mod: PBBFAC,HCNC,, | Performed by: INTERNAL MEDICINE

## 2019-01-25 PROCEDURE — 99213 PR OFFICE/OUTPT VISIT, EST, LEVL III, 20-29 MIN: ICD-10-PCS | Mod: HCNC,S$GLB,, | Performed by: INTERNAL MEDICINE

## 2019-01-25 PROCEDURE — 93000 ELECTROCARDIOGRAM COMPLETE: CPT | Mod: HCNC,S$GLB,, | Performed by: INTERNAL MEDICINE

## 2019-01-25 PROCEDURE — 99999 PR PBB SHADOW E&M-EST. PATIENT-LVL III: CPT | Mod: PBBFAC,HCNC,, | Performed by: INTERNAL MEDICINE

## 2019-01-25 PROCEDURE — 99213 OFFICE O/P EST LOW 20 MIN: CPT | Mod: HCNC,S$GLB,, | Performed by: INTERNAL MEDICINE

## 2019-01-25 PROCEDURE — 1101F PR PT FALLS ASSESS DOC 0-1 FALLS W/OUT INJ PAST YR: ICD-10-PCS | Mod: HCNC,CPTII,S$GLB, | Performed by: INTERNAL MEDICINE

## 2019-01-25 NOTE — PROGRESS NOTES
"Subjective:   Patient ID:  Josy Posey is a 89 y.o. female who presents for follow up of Carotid Artery Disease (6 mth f/u)      HPI  A 89 yo female with paf mr cad s/p cabg orthostatic hypotension is here for f/u afib.she felt an episode of palpitation /afib thei morning. She is having falls from orthostatic hypotension. She is limited to her room so she will not fall. She still walks on the treadmill . Has no othe rissues clinically . No angina she takes her meds.  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 fuCT 1/3- stable,  CT's later - no residual    Lens replaced by other means 6/25/2014    Melena     Memory loss     PET scan consistent with Alzzheimers.   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    1 vessel LIMA to LAD    S/P MVR (mitral valve replacement) 9/27/2013    Skin ulcer     Squamous cell carcinoma     skin cancer X 2    Stroke     Syncope and collapse     Unspecified essential hypertension " 9/16/2013    Unspecified transient cerebral ischemia     Urinary incontinence     wears briefs       Past Surgical History:   Procedure Laterality Date    ADENOIDECTOMY      APPENDECTOMY      BREAST SURGERY  1950    right lumpectomy     CARDIAC VALVE SURGERY  10/04/2007    MVR    CATARACT EXTRACTION      CHOLECYSTECTOMY      COLONOSCOPY N/A 9/5/2014    [facility]    CORONARY ARTERY BYPASS GRAFT      ESOPHAGOGASTRODUODENOSCOPY (EGD) N/A 9/4/2014    [facility] END[facility]    EYE SURGERY      cataracts bilateral    HYSTERECTOMY      for hydatiform mole    INSERT / REPLACE / REMOVE PACEMAKER  09/11/2001    REPLACEMENT-GENERATOR-PACEMAKER Left 3/9/2018    Per[facility] Joe Rivera [facility] CATH LAB    REVISION-POCKET-PACEMAKER N/A 4/3/2018    [facility] at [facility] CATH LAB    TONSILLECTOMY         Social History     Tobacco Use    Smoking status: Never Smoker    Smokeless tobacco: Never Used   Substance Use Topics    Alcohol use: No     Alcohol/week: 0.0 oz    Drug use: No       Family History   Problem Relation Age of Onset    Tuberculosis Mother     Tuberculosis Father     Stroke Sister     Hypertension Sister     Stroke Brother     Hypertension Daughter     COPD Brother        Current Outpatient Medications   Medication Sig    acetaminophen (TYLENOL) 500 MG tablet Take 500 mg by mouth as needed for Pain.    ascorbic acid (VITAMIN C) 1000 MG tablet Take 1 tablet by mouth once daily once daily.    aspirin (ECOTRIN) 81 MG EC tablet Take 81 mg by mouth as needed for Pain.    escitalopram oxalate (LEXAPRO) 10 MG tablet Take 1 tablet (10 mg total) by mouth once daily.    fluticasone (FLONASE) 50 mcg/actuation nasal spray instill 2 sprays into each nostril once daily (Patient taking differently: instill 2 sprays into each nostril once daily as needed.)    folic acid (FOLVITE) 1 MG tablet Take 1  tablet by mouth once daily once daily.    iron-vitamin C 100-250 mg, ICAR-C, (ICAR-C) 100-250 mg Tab Take 1 tablet by mouth once daily.    levothyroxine (SYNTHROID) 50 MCG tablet Take 1 tablet (50 mcg total) by mouth once daily.    midodrine (PROAMATINE) 5 MG Tab Take 1 tablet (5 mg total) by mouth 4 (four) times daily. During waking hours    omega-3 fatty acids-vitamin E (FISH OIL) 1,000 mg Cap Take 3 capsules by mouth once daily.     pravastatin (PRAVACHOL) 20 MG tablet Take 1 tablet (20 mg total) by mouth once daily.    propafenone (RHTHYMOL) 150 MG Tab 75 mg three times a day    spironolactone (ALDACTONE) 25 MG tablet Take 0.5 tablets (12.5 mg total) by mouth once daily. APPT NEEDED!    vitamin B12-folic acid 0.5-1 mg Tab Take 1 tablet by mouth once daily once daily.    warfarin (COUMADIN) 1 MG tablet Take 1/2 tablet daily as directed by the coumadin clinic.    clotrimazole-betamethasone 1-0.05% (LOTRISONE) cream Apply topically 2 (two) times daily. APPT NEEDED!    econazole nitrate 1 % cream     Lactoperoxi/Gluc Oxid/Pot Thio (BIOTENE DRY MOUTH MM) by Mucous Membrane route as needed.    triamcinolone acetonide 0.1% (KENALOG) 0.1 % cream apply THINLY to affected area ON LEGS, ARMS, AND TRUCK twice a day if needed for eczema     No current facility-administered medications for this visit.      Current Outpatient Medications on File Prior to Visit   Medication Sig    acetaminophen (TYLENOL) 500 MG tablet Take 500 mg by mouth as needed for Pain.    ascorbic acid (VITAMIN C) 1000 MG tablet Take 1 tablet by mouth once daily once daily.    aspirin (ECOTRIN) 81 MG EC tablet Take 81 mg by mouth as needed for Pain.    escitalopram oxalate (LEXAPRO) 10 MG tablet Take 1 tablet (10 mg total) by mouth once daily.    fluticasone (FLONASE) 50 mcg/actuation nasal spray instill 2 sprays into each nostril once daily (Patient taking differently: instill 2 sprays into each nostril once daily as needed.)    folic  acid (FOLVITE) 1 MG tablet Take 1 tablet by mouth once daily once daily.    iron-vitamin C 100-250 mg, ICAR-C, (ICAR-C) 100-250 mg Tab Take 1 tablet by mouth once daily.    levothyroxine (SYNTHROID) 50 MCG tablet Take 1 tablet (50 mcg total) by mouth once daily.    midodrine (PROAMATINE) 5 MG Tab Take 1 tablet (5 mg total) by mouth 4 (four) times daily. During waking hours    omega-3 fatty acids-vitamin E (FISH OIL) 1,000 mg Cap Take 3 capsules by mouth once daily.     pravastatin (PRAVACHOL) 20 MG tablet Take 1 tablet (20 mg total) by mouth once daily.    propafenone (RHTHYMOL) 150 MG Tab 75 mg three times a day    spironolactone (ALDACTONE) 25 MG tablet Take 0.5 tablets (12.5 mg total) by mouth once daily. APPT NEEDED!    vitamin B12-folic acid 0.5-1 mg Tab Take 1 tablet by mouth once daily once daily.    warfarin (COUMADIN) 1 MG tablet Take 1/2 tablet daily as directed by the coumadin clinic.    clotrimazole-betamethasone 1-0.05% (LOTRISONE) cream Apply topically 2 (two) times daily. APPT NEEDED!    econazole nitrate 1 % cream     Lactoperoxi/Gluc Oxid/Pot Thio (BIOTENE DRY MOUTH MM) by Mucous Membrane route as needed.    triamcinolone acetonide 0.1% (KENALOG) 0.1 % cream apply THINLY to affected area ON LEGS, ARMS, AND TRUCK twice a day if needed for eczema     No current facility-administered medications on file prior to visit.      Review of patient's allergies indicates:   Allergen Reactions    Sulfa (sulfonamide antibiotics) Anaphylaxis     Other reaction(s): Angioedema    Morphine      Other reaction(s): Unknown    Statins-hmg-coa reductase inhibitors Other (See Comments)     Elevated LFTs    Dronedarone Diarrhea, Nausea Only and Rash     DIZZINESS         Review of Systems   Constitution: Positive for weakness and malaise/fatigue. Negative for diaphoresis and weight gain.   HENT: Negative for hoarse voice.    Eyes: Negative for double vision and visual disturbance.   Cardiovascular:  Positive for palpitations. Negative for chest pain, claudication, cyanosis, dyspnea on exertion, irregular heartbeat, leg swelling, near-syncope, orthopnea, paroxysmal nocturnal dyspnea and syncope.   Respiratory: Negative for cough, hemoptysis, shortness of breath and snoring.    Hematologic/Lymphatic: Negative for bleeding problem. Does not bruise/bleed easily.   Skin: Negative for color change and poor wound healing.   Musculoskeletal: Positive for falls. Negative for muscle cramps, muscle weakness and myalgias.   Gastrointestinal: Negative for bloating, abdominal pain, change in bowel habit, diarrhea, heartburn, hematemesis, hematochezia, melena and nausea.   Neurological: Positive for dizziness and light-headedness. Negative for excessive daytime sleepiness, headaches, loss of balance and numbness.   Psychiatric/Behavioral: Negative for memory loss. The patient does not have insomnia.    Allergic/Immunologic: Negative for hives.       Objective:   Physical Exam   Constitutional: She is oriented to person, place, and time. She appears well-developed and well-nourished. She does not appear ill. No distress.   HENT:   Head: Normocephalic and atraumatic.   Eyes: EOM are normal. Pupils are equal, round, and reactive to light. No scleral icterus.   Neck: Normal range of motion. Neck supple. Normal carotid pulses, no hepatojugular reflux and no JVD present. Carotid bruit is not present. No tracheal deviation present. No thyromegaly present.   Cardiovascular: Normal rate, regular rhythm, normal heart sounds, intact distal pulses and normal pulses. Exam reveals no gallop and no friction rub.   No murmur heard.  Pulmonary/Chest: Effort normal and breath sounds normal. No respiratory distress. She has no wheezes. She has no rhonchi. She has no rales. She exhibits no tenderness.   Scar cabg well healed. Scar pacer site well healed.    Abdominal: Soft. Normal appearance, normal aorta and bowel sounds are normal. She  "exhibits no abdominal bruit, no ascites and no pulsatile midline mass. There is no hepatomegaly. There is no tenderness.   Musculoskeletal: She exhibits no edema.        Right shoulder: She exhibits no deformity.   Neurological: She is alert and oriented to person, place, and time. She has normal strength. No cranial nerve deficit. Coordination normal.   Skin: Skin is warm and dry. No rash noted. She is not diaphoretic. No cyanosis or erythema. Nails show no clubbing.   Psychiatric: She has a normal mood and affect. Her speech is normal and behavior is normal.   Nursing note and vitals reviewed.    Vitals:    01/25/19 1454   BP: 120/78   Patient Position: Sitting   BP Method: Small (Manual)   Pulse: 75   Weight: 66.7 kg (147 lb)   Height: 5' 3" (1.6 m)     Lab Results   Component Value Date    CHOL 190 10/31/2018    CHOL 126 03/10/2018    CHOL 283 (H) 09/03/2017     Lab Results   Component Value Date    HDL 49 10/31/2018    HDL 35 (L) 03/10/2018    HDL 41 09/03/2017     Lab Results   Component Value Date    LDLCALC 117.4 10/31/2018    LDLCALC 67.2 03/10/2018    LDLCALC 211.0 (H) 09/03/2017     Lab Results   Component Value Date    TRIG 118 10/31/2018    TRIG 119 03/10/2018    TRIG 155 (H) 09/03/2017     Lab Results   Component Value Date    CHOLHDL 25.8 10/31/2018    CHOLHDL 27.8 03/10/2018    CHOLHDL 14.5 (L) 09/03/2017       Chemistry        Component Value Date/Time     10/31/2018 1448    K 4.1 10/31/2018 1448     10/31/2018 1448    CO2 27 10/31/2018 1448    BUN 29 10/31/2018 1448    CREATININE 1.4 10/31/2018 1448     10/31/2018 1448        Component Value Date/Time    CALCIUM 9.2 10/31/2018 1448    ALKPHOS 56 03/09/2018 2214    AST 18 10/31/2018 1448    ALT 10 10/31/2018 1448    BILITOT 0.5 03/09/2018 2214    ESTGFRAFRICA 37.1 (A) 10/31/2018 1448    EGFRNONAA 32.2 (A) 10/31/2018 1448          Lab Results   Component Value Date    TSH 1.766 10/31/2018     Lab Results   Component Value Date    " INR 2.7 01/23/2019    INR 2.6 12/26/2018    INR 3.1 11/28/2018     Lab Results   Component Value Date    WBC 5.94 10/31/2018    HGB 12.5 10/31/2018    HCT 38.7 10/31/2018     (H) 10/31/2018     10/31/2018     BMP  Sodium   Date Value Ref Range Status   10/31/2018 140 136 - 145 mmol/L Final     Potassium   Date Value Ref Range Status   10/31/2018 4.1 3.5 - 5.1 mmol/L Final     Chloride   Date Value Ref Range Status   10/31/2018 106 95 - 110 mmol/L Final     CO2   Date Value Ref Range Status   10/31/2018 27 23 - 29 mmol/L Final     BUN, Bld   Date Value Ref Range Status   10/31/2018 29 10 - 30 mg/dL Final     Creatinine   Date Value Ref Range Status   10/31/2018 1.4 0.5 - 1.4 mg/dL Final     Calcium   Date Value Ref Range Status   10/31/2018 9.2 8.7 - 10.5 mg/dL Final     Anion Gap   Date Value Ref Range Status   10/31/2018 7 (L) 8 - 16 mmol/L Final     eGFR if    Date Value Ref Range Status   10/31/2018 37.1 (A) >60 mL/min/1.73 m^2 Final     eGFR if non    Date Value Ref Range Status   10/31/2018 32.2 (A) >60 mL/min/1.73 m^2 Final     Comment:     Calculation used to obtain the estimated glomerular filtration  rate (eGFR) is the CKD-EPI equation.        CrCl cannot be calculated (Patient's most recent lab result is older than the maximum 7 days allowed.).  ekg paced rhythm   Assessment:     1. Paroxysmal atrial fibrillation    2. Pure hypercholesterolemia    3. CKD (chronic kidney disease) stage 3, GFR 30-59 ml/min    4. Coronary artery disease involving native coronary artery of native heart without angina pectoris    5. Chronic diastolic HFpEF of 60% per echo 5/2017    6. Hypothyroidism due to acquired atrophy of thyroid    7. Third degree AV block s/p PPM    8. Severe MR with remote mitral valve replacement on chronic anticoagulation    9. H/O CVA (cerebrovascular accident)    10. Hypertension with orthostatic hypotension    11. Depression, major, single episode, mild     12. Orthostatic hypotension    13. Chronic anticoagulation    14. History of DVT of lower extremity right    15. Bilateral carotid artery stenosis    16. Cardiac pacemaker      Continue current therapy she is clinically stable . Has orthostasis warned about falls and sudden movement   Encouraged to continue treadmill exercises.   Plan:   Continue current therapy  Cardiac low salt diet.  Risk factor modification and excercise program.  F/u in 6 months with lipid cmp

## 2019-02-17 DIAGNOSIS — I48.0 PAF (PAROXYSMAL ATRIAL FIBRILLATION): ICD-10-CM

## 2019-02-18 RX ORDER — PROPAFENONE HYDROCHLORIDE 150 MG/1
TABLET, COATED ORAL
Qty: 45 TABLET | Refills: 11 | Status: SHIPPED | OUTPATIENT
Start: 2019-02-18 | End: 2020-02-10

## 2019-02-20 ENCOUNTER — ANTI-COAG VISIT (OUTPATIENT)
Dept: CARDIOLOGY | Facility: CLINIC | Age: 84
End: 2019-02-20
Payer: MEDICARE

## 2019-02-20 ENCOUNTER — CLINICAL SUPPORT (OUTPATIENT)
Dept: CARDIOLOGY | Facility: CLINIC | Age: 84
End: 2019-02-20
Attending: INTERNAL MEDICINE
Payer: MEDICARE

## 2019-02-20 DIAGNOSIS — I44.2 CHB (COMPLETE HEART BLOCK): ICD-10-CM

## 2019-02-20 DIAGNOSIS — Z79.01 LONG TERM (CURRENT) USE OF ANTICOAGULANTS: Primary | ICD-10-CM

## 2019-02-20 DIAGNOSIS — Z95.0 CARDIAC PACEMAKER: ICD-10-CM

## 2019-02-20 DIAGNOSIS — I48.91 ATRIAL FIBRILLATION, UNSPECIFIED TYPE: ICD-10-CM

## 2019-02-20 LAB — INR PPP: 3.4 (ref 2.5–3.5)

## 2019-02-20 PROCEDURE — 85610 POCT INR: ICD-10-PCS | Mod: QW,HCNC,S$GLB, | Performed by: INTERNAL MEDICINE

## 2019-02-20 PROCEDURE — 85610 PROTHROMBIN TIME: CPT | Mod: QW,HCNC,S$GLB, | Performed by: INTERNAL MEDICINE

## 2019-02-20 NOTE — PROGRESS NOTES
Patient's INR is therapeutic at 3.4.  Reports no recent changes.  Will maintain current dose of Warfarin 0.5 mg every evening.  Dose calendar - given.  Recheck in 1 month.

## 2019-03-20 ENCOUNTER — ANTI-COAG VISIT (OUTPATIENT)
Dept: CARDIOLOGY | Facility: CLINIC | Age: 84
End: 2019-03-20
Payer: MEDICARE

## 2019-03-20 DIAGNOSIS — Z79.01 LONG TERM (CURRENT) USE OF ANTICOAGULANTS: Primary | ICD-10-CM

## 2019-03-20 LAB — INR PPP: 3.2 (ref 2.5–3.5)

## 2019-03-20 PROCEDURE — 85610 PROTHROMBIN TIME: CPT | Mod: QW,HCNC,S$GLB, | Performed by: INTERNAL MEDICINE

## 2019-03-20 PROCEDURE — 85610 POCT INR: ICD-10-PCS | Mod: QW,HCNC,S$GLB, | Performed by: INTERNAL MEDICINE

## 2019-03-20 NOTE — PROGRESS NOTES
Patient's INR is therapeutic at 3.2.  Reports boost drinks have been discontinued x 3 weeks ago.  Instructed to maintain current dose of Warfarin 0.5 mg every evening.  Recheck in 1 month.

## 2019-04-17 ENCOUNTER — ANTI-COAG VISIT (OUTPATIENT)
Dept: CARDIOLOGY | Facility: CLINIC | Age: 84
End: 2019-04-17
Payer: MEDICARE

## 2019-04-17 DIAGNOSIS — Z79.01 LONG TERM (CURRENT) USE OF ANTICOAGULANTS: Primary | ICD-10-CM

## 2019-04-17 LAB — INR PPP: 2.6 (ref 2.5–3.5)

## 2019-04-17 PROCEDURE — 93793 ANTICOAG MGMT PT WARFARIN: CPT | Mod: HCNC,S$GLB,,

## 2019-04-17 PROCEDURE — 85610 POCT INR: ICD-10-PCS | Mod: QW,HCNC,S$GLB, | Performed by: INTERNAL MEDICINE

## 2019-04-17 PROCEDURE — 85610 PROTHROMBIN TIME: CPT | Mod: QW,HCNC,S$GLB, | Performed by: INTERNAL MEDICINE

## 2019-04-17 PROCEDURE — 93793 PR ANTICOAGULANT MGMT FOR PT TAKING WARFARIN: ICD-10-PCS | Mod: HCNC,S$GLB,,

## 2019-04-23 ENCOUNTER — LAB VISIT (OUTPATIENT)
Dept: LAB | Facility: HOSPITAL | Age: 84
End: 2019-04-23
Attending: PEDIATRICS
Payer: MEDICARE

## 2019-04-23 DIAGNOSIS — E03.4 HYPOTHYROIDISM DUE TO ACQUIRED ATROPHY OF THYROID: Chronic | ICD-10-CM

## 2019-04-23 DIAGNOSIS — N18.30 CKD (CHRONIC KIDNEY DISEASE) STAGE 3, GFR 30-59 ML/MIN: Chronic | ICD-10-CM

## 2019-04-23 DIAGNOSIS — E78.00 PURE HYPERCHOLESTEROLEMIA: Chronic | ICD-10-CM

## 2019-04-23 LAB
ALBUMIN SERPL BCP-MCNC: 3.5 G/DL (ref 3.5–5.2)
ALP SERPL-CCNC: 57 U/L (ref 55–135)
ALT SERPL W/O P-5'-P-CCNC: 9 U/L (ref 10–44)
ANION GAP SERPL CALC-SCNC: 11 MMOL/L (ref 8–16)
AST SERPL-CCNC: 18 U/L (ref 10–40)
BASOPHILS # BLD AUTO: 0.06 K/UL (ref 0–0.2)
BASOPHILS NFR BLD: 1.1 % (ref 0–1.9)
BILIRUB SERPL-MCNC: 0.6 MG/DL (ref 0.1–1)
BUN SERPL-MCNC: 29 MG/DL (ref 10–30)
CALCIUM SERPL-MCNC: 9.4 MG/DL (ref 8.7–10.5)
CHLORIDE SERPL-SCNC: 105 MMOL/L (ref 95–110)
CHOLEST SERPL-MCNC: 187 MG/DL (ref 120–199)
CHOLEST/HDLC SERPL: 4.7 {RATIO} (ref 2–5)
CO2 SERPL-SCNC: 26 MMOL/L (ref 23–29)
CREAT SERPL-MCNC: 1.4 MG/DL (ref 0.5–1.4)
DIFFERENTIAL METHOD: ABNORMAL
EOSINOPHIL # BLD AUTO: 0.2 K/UL (ref 0–0.5)
EOSINOPHIL NFR BLD: 3.5 % (ref 0–8)
ERYTHROCYTE [DISTWIDTH] IN BLOOD BY AUTOMATED COUNT: 12.9 % (ref 11.5–14.5)
EST. GFR  (AFRICAN AMERICAN): 37.1 ML/MIN/1.73 M^2
EST. GFR  (NON AFRICAN AMERICAN): 32.2 ML/MIN/1.73 M^2
GLUCOSE SERPL-MCNC: 87 MG/DL (ref 70–110)
HCT VFR BLD AUTO: 39.2 % (ref 37–48.5)
HDLC SERPL-MCNC: 40 MG/DL (ref 40–75)
HDLC SERPL: 21.4 % (ref 20–50)
HGB BLD-MCNC: 12.5 G/DL (ref 12–16)
IMM GRANULOCYTES # BLD AUTO: 0.02 K/UL (ref 0–0.04)
IMM GRANULOCYTES NFR BLD AUTO: 0.4 % (ref 0–0.5)
LDLC SERPL CALC-MCNC: 120.6 MG/DL (ref 63–159)
LYMPHOCYTES # BLD AUTO: 1.7 K/UL (ref 1–4.8)
LYMPHOCYTES NFR BLD: 29.9 % (ref 18–48)
MCH RBC QN AUTO: 31.6 PG (ref 27–31)
MCHC RBC AUTO-ENTMCNC: 31.9 G/DL (ref 32–36)
MCV RBC AUTO: 99 FL (ref 82–98)
MONOCYTES # BLD AUTO: 0.5 K/UL (ref 0.3–1)
MONOCYTES NFR BLD: 8.5 % (ref 4–15)
NEUTROPHILS # BLD AUTO: 3.2 K/UL (ref 1.8–7.7)
NEUTROPHILS NFR BLD: 56.6 % (ref 38–73)
NONHDLC SERPL-MCNC: 147 MG/DL
NRBC BLD-RTO: 0 /100 WBC
PLATELET # BLD AUTO: 182 K/UL (ref 150–350)
PMV BLD AUTO: 12.1 FL (ref 9.2–12.9)
POTASSIUM SERPL-SCNC: 4.7 MMOL/L (ref 3.5–5.1)
PROT SERPL-MCNC: 6.8 G/DL (ref 6–8.4)
RBC # BLD AUTO: 3.95 M/UL (ref 4–5.4)
SODIUM SERPL-SCNC: 142 MMOL/L (ref 136–145)
TRIGL SERPL-MCNC: 132 MG/DL (ref 30–150)
TSH SERPL DL<=0.005 MIU/L-ACNC: 2.38 UIU/ML (ref 0.4–4)
WBC # BLD AUTO: 5.65 K/UL (ref 3.9–12.7)

## 2019-04-23 PROCEDURE — 80061 LIPID PANEL: CPT | Mod: HCNC

## 2019-04-23 PROCEDURE — 80053 COMPREHEN METABOLIC PANEL: CPT | Mod: HCNC

## 2019-04-23 PROCEDURE — 84443 ASSAY THYROID STIM HORMONE: CPT | Mod: HCNC

## 2019-04-23 PROCEDURE — 85025 COMPLETE CBC W/AUTO DIFF WBC: CPT | Mod: HCNC

## 2019-04-23 PROCEDURE — 36415 COLL VENOUS BLD VENIPUNCTURE: CPT | Mod: HCNC

## 2019-04-30 ENCOUNTER — OFFICE VISIT (OUTPATIENT)
Dept: INTERNAL MEDICINE | Facility: CLINIC | Age: 84
End: 2019-04-30
Payer: MEDICARE

## 2019-04-30 VITALS
HEART RATE: 75 BPM | HEIGHT: 63 IN | OXYGEN SATURATION: 95 % | SYSTOLIC BLOOD PRESSURE: 124 MMHG | WEIGHT: 150.38 LBS | DIASTOLIC BLOOD PRESSURE: 72 MMHG | BODY MASS INDEX: 26.64 KG/M2 | RESPIRATION RATE: 16 BRPM | TEMPERATURE: 98 F

## 2019-04-30 DIAGNOSIS — E78.00 PURE HYPERCHOLESTEROLEMIA: Primary | Chronic | ICD-10-CM

## 2019-04-30 DIAGNOSIS — I65.23 BILATERAL CAROTID ARTERY STENOSIS: ICD-10-CM

## 2019-04-30 DIAGNOSIS — F32.0 DEPRESSION, MAJOR, SINGLE EPISODE, MILD: ICD-10-CM

## 2019-04-30 DIAGNOSIS — Z95.2 S/P MITRAL VALVE REPLACEMENT: Chronic | ICD-10-CM

## 2019-04-30 DIAGNOSIS — I25.10 CORONARY ARTERY DISEASE INVOLVING NATIVE CORONARY ARTERY OF NATIVE HEART WITHOUT ANGINA PECTORIS: Chronic | ICD-10-CM

## 2019-04-30 DIAGNOSIS — I27.9 PULMONARY HEART DISEASE: ICD-10-CM

## 2019-04-30 DIAGNOSIS — I70.0 ATHEROSCLEROSIS OF AORTA: ICD-10-CM

## 2019-04-30 DIAGNOSIS — Z95.0 CARDIAC PACEMAKER: ICD-10-CM

## 2019-04-30 DIAGNOSIS — I44.2 THIRD DEGREE AV BLOCK: Chronic | ICD-10-CM

## 2019-04-30 DIAGNOSIS — G62.9 POLYNEUROPATHY: ICD-10-CM

## 2019-04-30 DIAGNOSIS — I95.1 ORTHOSTATIC HYPOTENSION: ICD-10-CM

## 2019-04-30 DIAGNOSIS — R26.81 GAIT INSTABILITY: ICD-10-CM

## 2019-04-30 DIAGNOSIS — Z86.73 H/O: CVA (CEREBROVASCULAR ACCIDENT): Chronic | ICD-10-CM

## 2019-04-30 DIAGNOSIS — I51.89 LEFT VENTRICULAR DIASTOLIC DYSFUNCTION WITH PRESERVED SYSTOLIC FUNCTION: Chronic | ICD-10-CM

## 2019-04-30 DIAGNOSIS — E83.42 HYPOMAGNESEMIA: ICD-10-CM

## 2019-04-30 DIAGNOSIS — I48.0 PAROXYSMAL ATRIAL FIBRILLATION: Chronic | ICD-10-CM

## 2019-04-30 DIAGNOSIS — E03.4 HYPOTHYROIDISM DUE TO ACQUIRED ATROPHY OF THYROID: Chronic | ICD-10-CM

## 2019-04-30 DIAGNOSIS — N18.30 CKD (CHRONIC KIDNEY DISEASE) STAGE 3, GFR 30-59 ML/MIN: Chronic | ICD-10-CM

## 2019-04-30 DIAGNOSIS — M81.0 OSTEOPOROSIS, UNSPECIFIED OSTEOPOROSIS TYPE, UNSPECIFIED PATHOLOGICAL FRACTURE PRESENCE: ICD-10-CM

## 2019-04-30 DIAGNOSIS — E61.1 IRON DEFICIENCY: ICD-10-CM

## 2019-04-30 PROCEDURE — 99214 PR OFFICE/OUTPT VISIT, EST, LEVL IV, 30-39 MIN: ICD-10-PCS | Mod: HCNC,S$GLB,, | Performed by: PEDIATRICS

## 2019-04-30 PROCEDURE — 99499 UNLISTED E&M SERVICE: CPT | Mod: HCNC,S$GLB,, | Performed by: PEDIATRICS

## 2019-04-30 PROCEDURE — 99214 OFFICE O/P EST MOD 30 MIN: CPT | Mod: HCNC,S$GLB,, | Performed by: PEDIATRICS

## 2019-04-30 PROCEDURE — 1101F PR PT FALLS ASSESS DOC 0-1 FALLS W/OUT INJ PAST YR: ICD-10-PCS | Mod: HCNC,CPTII,S$GLB, | Performed by: PEDIATRICS

## 2019-04-30 PROCEDURE — 99999 PR PBB SHADOW E&M-EST. PATIENT-LVL IV: CPT | Mod: PBBFAC,HCNC,, | Performed by: PEDIATRICS

## 2019-04-30 PROCEDURE — 99999 PR PBB SHADOW E&M-EST. PATIENT-LVL IV: ICD-10-PCS | Mod: PBBFAC,HCNC,, | Performed by: PEDIATRICS

## 2019-04-30 PROCEDURE — 1101F PT FALLS ASSESS-DOCD LE1/YR: CPT | Mod: HCNC,CPTII,S$GLB, | Performed by: PEDIATRICS

## 2019-04-30 PROCEDURE — 99499 RISK ADDL DX/OHS AUDIT: ICD-10-PCS | Mod: HCNC,S$GLB,, | Performed by: PEDIATRICS

## 2019-04-30 NOTE — PROGRESS NOTES
Subjective:       Patient ID: Josy Posey is a 89 y.o. female.    Chief Complaint: Follow-up; Results; and Shortness of Breath (HE)    Hypothyroid:asymptomatic, no swallowing difficulties, no hyper/hypothyroid symptoms  Lipids: on pravastatin  Depression: stable on lexapro, no HI/SI  Polyneuropathy is still quite symptomatic, in wheel chair with traveling, uses walker at home. She self stopped gabapentin. More problematic at night but she states does not interfere with sleep. Is fall risk  Cardiac/PVD/Carotid artery disease: develops HE/SOB easily. Has seen cardiology. In coumadin and pacer clinic.        Review of Systems   Constitutional: Negative for fever and unexpected weight change.   HENT: Negative for congestion and rhinorrhea.    Eyes: Negative for discharge and redness.   Respiratory: Positive for chest tightness and shortness of breath. Negative for cough and wheezing.    Cardiovascular: Positive for chest pain, palpitations and leg swelling.   Gastrointestinal: Negative for constipation, diarrhea and vomiting.   Endocrine: Negative for cold intolerance, heat intolerance, polydipsia, polyphagia and polyuria.   Genitourinary: Negative for decreased urine volume, difficulty urinating and menstrual problem.   Musculoskeletal: Positive for myalgias. Negative for arthralgias and joint swelling.   Skin: Negative for rash and wound.   Neurological: Negative for syncope and headaches.   Psychiatric/Behavioral: Positive for dysphoric mood. Negative for behavioral problems, self-injury, sleep disturbance and suicidal ideas. The patient is nervous/anxious.         Stable       Objective:      Physical Exam   Constitutional: She is oriented to person, place, and time. She appears well-developed and well-nourished. No distress.   Neck: No JVD present. No thyromegaly present.   Cardiovascular: Normal rate, regular rhythm and normal heart sounds.   No murmur heard.  Pacer pouch in place   Pulmonary/Chest: Effort  normal and breath sounds normal. No respiratory distress. She has no wheezes. She has no rales.   Abdominal: Soft. She exhibits no distension and no mass. There is no tenderness. There is no guarding.   Musculoskeletal: She exhibits edema (trace edema).   Lymphadenopathy:     She has no cervical adenopathy.   Neurological: She is alert and oriented to person, place, and time. No cranial nerve deficit. Coordination normal.   Skin: Capillary refill takes less than 2 seconds. No rash noted.   Psychiatric: She has a normal mood and affect. Her behavior is normal. Judgment and thought content normal.       Assessment:       1. Pure hypercholesterolemia    2. CKD (chronic kidney disease) stage 3, GFR 30-59 ml/min    3. Paroxysmal atrial fibrillation    4. Coronary artery disease involving native coronary artery of native heart without angina pectoris    5. Chronic diastolic HFpEF of 60% per echo 5/2017    6. Hypothyroidism due to acquired atrophy of thyroid    7. Third degree AV block s/p PPM    8. Severe MR with remote mitral valve replacement on chronic anticoagulation    9. H/O CVA (cerebrovascular accident)    10. Depression, major, single episode, mild    11. Orthostatic hypotension    12. Iron deficiency    13. Atherosclerosis of aorta    14. Osteoporosis, unspecified osteoporosis type, unspecified pathological fracture presence    15. Bilateral carotid artery stenosis    16. Polyneuropathy    17. Pulmonary heart disease    18. Hypomagnesemia    19. Cardiac pacemaker        Plan:       Pure hypercholesterolemia  -     Lipid panel; Future; Expected date: 04/30/2019  -     ALT (SGPT); Future; Expected date: 04/30/2019  -     AST (SGOT); Future; Expected date: 04/30/2019    CKD (chronic kidney disease) stage 3, GFR 30-59 ml/min  -     Basic metabolic panel; Future; Expected date: 04/30/2019    Paroxysmal atrial fibrillation    Coronary artery disease involving native coronary artery of native heart without angina  pectoris    Chronic diastolic HFpEF of 60% per echo 5/2017    Hypothyroidism due to acquired atrophy of thyroid  -     TSH; Future; Expected date: 04/30/2019    Third degree AV block s/p PPM    Severe MR with remote mitral valve replacement on chronic anticoagulation    H/O CVA (cerebrovascular accident)    Depression, major, single episode, mild    Orthostatic hypotension    Iron deficiency  -     CBC auto differential; Future; Expected date: 04/30/2019  -     Homocysteine, serum; Future; Expected date: 04/30/2019  -     Methylmalonic acid, serum; Future; Expected date: 04/30/2019  -     Iron and TIBC; Future; Expected date: 04/30/2019  -     Ferritin; Future; Expected date: 04/30/2019    Atherosclerosis of aorta    Osteoporosis, unspecified osteoporosis type, unspecified pathological fracture presence    Bilateral carotid artery stenosis    Polyneuropathy    Pulmonary heart disease    Hypomagnesemia    Cardiac pacemaker    Meds reviewed. Keep cardiac subspecialty care. Offered PT, she will consider. Overall she is doing as well as she can. F/U 6 months with labs.

## 2019-05-07 ENCOUNTER — CLINICAL SUPPORT (OUTPATIENT)
Dept: REHABILITATION | Facility: HOSPITAL | Age: 84
End: 2019-05-07
Attending: PEDIATRICS
Payer: MEDICARE

## 2019-05-07 DIAGNOSIS — R26.81 GAIT INSTABILITY: Primary | ICD-10-CM

## 2019-05-07 DIAGNOSIS — G62.9 POLYNEUROPATHY: ICD-10-CM

## 2019-05-07 PROCEDURE — 97162 PT EVAL MOD COMPLEX 30 MIN: CPT | Mod: HCNC | Performed by: PHYSICAL THERAPIST

## 2019-05-07 NOTE — PLAN OF CARE
"PHYSICAL THERAPY INITIAL OUTPATIENT EVALUATION    Referring Provider:  Dr. FAM Rosales Jr.    Diagnosis:         ICD-10-CM ICD-9-CM    1. Gait instability R26.81 781.2    2. Polyneuropathy G62.9 356.9        Orders:  Evaluate and Treat    Date of Initial Evaluation:  19    Orders :  19    Coding Cycle Visit # 1    SUBJECTIVE/BACKGROUND:  Patient reports Dr. Rosales sent her to therapy to help with her balance and to help her walk by herself.  Gets "very short of breath" when walking.  Heart is "very bad." Cardiac Pacemaker in place.  Has not fallen in the last 4 months though does have a history of falls.  Goes to bathroom with daughter at night. States she is worse now than when she has come to therapy in the past. Has a call alert button that she wears. Has a treadmill at home - walks 2 minutes.  "Foot and arm bike" - 20 minutes with legs, does not work with her arms - gets too short of breath. States she "cracks all over" when she moves.     Home Environment:  Lives with her daughter.  2 story home.  Does not go upstairs.  If home by herself does not go outside of her room.  Has a caregiver that comes daily 8 AM to 4 PM.      Patient/Family Goals:  To be able to get up, stand up, and walk by myself even if it is with my walker.     Previous therapy: Has been to therapy "many times" for general strengthening.      ADLs:  Caregiver prepares breakfast and lunch.  Daughter prepares dinner.  Caregiver helps with bathing.    DME:  Bedside commode.  Grab bars in the bathtub.  Has tub chair but does not use.  Stands up in the tub with caregiver with her.  Has a bed that feet and head can be raised. Has a rollator walker with seat and wheelchair at home.      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 fuCT 1/3- stable,  CT's later - no residual    Lens replaced by other means 6/25/2014    Melena     Memory loss     PET scan consistent with Alzzheimers.   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    1 vessel LIMA to LAD    S/P MVR (mitral valve replacement) 9/27/2013    Skin ulcer     Squamous cell carcinoma     skin cancer X 2    Stroke     Syncope and collapse     Unspecified essential hypertension 9/16/2013    Unspecified transient cerebral ischemia     Urinary incontinence     wears briefs       Patient Active Problem List   Diagnosis    Depression, major, single episode, mild    Pure hypercholesterolemia    Orthostatic hypotension    Paroxysmal atrial fibrillation    CKD (chronic kidney disease) stage 3, GFR 30-59 ml/min    Iron deficiency    Atherosclerosis of aorta    CAD (coronary artery disease) with remote CABG x 1V (LIMA-LAD)    Chronic diastolic HFpEF of 60% per echo 5/2017    Osteoporosis    Diverticulosis of sigmoid colon    Hypothyroidism    Chronic anticoagulation    History of DVT of lower extremity right    Bilateral carotid artery disease    Third degree AV block s/p PPM    " Severe MR with remote mitral valve replacement on chronic anticoagulation    Polyneuropathy    Meralgia paresthetica of right side    H/O CVA (cerebrovascular accident)    Pseudophakia    Pulmonary heart disease    Pacemaker end of life    Syncope    Hypomagnesemia    Pacemaker pocket hematoma    Cardiac pacemaker         Current Outpatient Medications:     acetaminophen (TYLENOL) 500 MG tablet, Take 500 mg by mouth as needed for Pain., Disp: , Rfl:     ascorbic acid (VITAMIN C) 1000 MG tablet, Take 1 tablet by mouth once daily once daily., Disp: , Rfl:     aspirin (ECOTRIN) 81 MG EC tablet, Take 81 mg by mouth as needed for Pain., Disp: , Rfl:     clotrimazole-betamethasone 1-0.05% (LOTRISONE) cream, Apply topically 2 (two) times daily. APPT NEEDED!, Disp: 45 g, Rfl: 2    econazole nitrate 1 % cream, Apply topically as needed. , Disp: , Rfl:     escitalopram oxalate (LEXAPRO) 10 MG tablet, Take 1 tablet (10 mg total) by mouth once daily., Disp: 90 tablet, Rfl: 3    fluticasone (FLONASE) 50 mcg/actuation nasal spray, instill 2 sprays into each nostril once daily (Patient taking differently: instill 2 sprays into each nostril once daily as needed.), Disp: 16 g, Rfl: 11    folic acid (FOLVITE) 1 MG tablet, Take 1 tablet by mouth once daily once daily., Disp: , Rfl:     iron-vitamin C 100-250 mg, ICAR-C, (ICAR-C) 100-250 mg Tab, Take 1 tablet by mouth once daily., Disp: 30 tablet, Rfl: 3    Lactoperoxi/Gluc Oxid/Pot Thio (BIOTENE DRY MOUTH MM), by Mucous Membrane route as needed., Disp: , Rfl:     levothyroxine (SYNTHROID) 50 MCG tablet, Take 1 tablet (50 mcg total) by mouth once daily., Disp: 90 tablet, Rfl: 3    midodrine (PROAMATINE) 5 MG Tab, Take 1 tablet (5 mg total) by mouth 4 (four) times daily. During waking hours, Disp: 120 tablet, Rfl: 6    omega-3 fatty acids-vitamin E (FISH OIL) 1,000 mg Cap, Take 1 capsule by mouth every other day. , Disp: , Rfl:     pravastatin (PRAVACHOL) 20 MG  "tablet, Take 1 tablet (20 mg total) by mouth once daily., Disp: 90 tablet, Rfl: 3    propafenone (RHTHYMOL) 150 MG Tab, TAKE 1/2 TABLET BY MOUTH THREE TIMES A DAY, Disp: 45 tablet, Rfl: 11    spironolactone (ALDACTONE) 25 MG tablet, Take 0.5 tablets (12.5 mg total) by mouth once daily. APPT NEEDED! (Patient taking differently: Take 12.5 mg by mouth once daily. ), Disp: 90 tablet, Rfl: 3    vitamin B12-folic acid 0.5-1 mg Tab, Take 1 tablet by mouth once daily once daily., Disp: , Rfl:     warfarin (COUMADIN) 1 MG tablet, Take 1/2 tablet daily as directed by the coumadin clinic., Disp: 50 tablet, Rfl: 3    OBJECTIVE:  Pain: 5-6 /10   Pain from "head to toe"      Sensation:  impaired to light touch distal B LE     ROM:  L dorsiflexion to neutral.              Limited end range B shoulder flexion/abduction             Limited thoracic extension    Strength:      B UE:  Grossly 4 to 4+/5   throughout  L LE:  Grossly 3+ to 4-/5 througout  R LE:  Grossly 4- to 4/5 throughout    Function:  Optimal Instrument     The following scores are patient-reported values, with 1 representing the ability to do the activity without any difficulty, 2 representing the ability to do the activity with little difficulty, 3 representing the ability to do with moderate difficulty, 4 representing the ability to do the activity with much difficulty, 5 representing the inability to do do the activity, and 9 representing that the activity is not applicable.    1.  Lying flat   1  2.  Rolling over  1  3.  Moving - lying to sitting 4  4.  Sitting   1  5.  Squatting   5  6.  Bending/stooping  5  7.  Balancing   5  8.  Kneeling   5  9.  Standing   4  10.  Walking - short distance 5  11.  Walking - long distance 5  12.  Walking - outdoors 5  13.  Climbing stairs  5  14.  Hopping   9  15.  Jumping   9  16.  Running   9  17.  Pushing   5  18.  Pulling   5  19.  Reaching   2  20.  Grasping   2  21.  Lifting   5  22.  " Carrying   5      Total  75    Patient reports 74% impairment on the Optimal Instrument.  The activities she would most like to be able to do without any difficulty are:  Moving- lying to sitting, standing, and walking-short distance.  The primary activity she would most like to be able to do without any difficulty is walking-sort distance.       30 sec Sit to Stand:    0   (Unable to stand without UE support.)    Fitness standard to maintain physical independence:  9    Other:     Posture:  Kypohotic, forward head   Gait/Stairs: Ambulates with anterior rolling walker w/seat, decreased tejal, decreased heel strike more evident on the L, forward head and trunk flexed. Required mod/max support to ascend/descend 2  Steps with step-together pattern.     Integumentary: Intact.  Mild/mod edema B LE  Transitional skills:  Mod/max UE support sit to stand, UE support to transfer mat <> chair, sit > supine independently, Supine > side lying with slight increase in effort with report of increased  discomfort/back pain with movement, Supine > sit with moderate increase in effort (sits straight up vs rolling to side)    Cardiovascular/Endurance:  HR:  75  (pacemaker)  BP:  148/82  SpO2:  98                                SpO2 (after ambulating 40 ft): 98                                SpO2 (after ascending/descending 2 steps/stairs): 93  (back up to 98 within a few seconds)             Shortness of breath with all activities  Balance:  Unable to stand > 15 seconds without UE stabilization - requires close supervision    ASSESSMENT:  The patient is a 89 y.o. year old female who presents to physical therapy with complaints of increased weakness, difficulty with standing, shortness of breath with activity and difficulty with walking even short distances .  Patient's impairments include decreased strength, decreased endurance/tolerance to activity, limited ability to independently complete transitions, inability to walk > 40 ft  without shortness of breath, decreased functional mobility and decreased balance.  These impairments are limiting patient's ability to independently and safely negotiate her environment.  Patient's prognosis is fair to good.  Patient will benefit from skilled physical therapy intervention to improve strength, balance, and endurance to reduce risk of falls and increase independence at home.    Co-morbidities which may impact the plan of care and potentially impede the patient's progress in therapy include:  CAD, mitral valve replacement on chronic anticoagulation, depression, orthostatic hypotension, osteoporosis, cardiac pacemaker, H/O DVT R LE, and syncope    The patient's clinical presentation is evolving.  Based on patient's evolving clinical presentation, 3+ co-morbidities, and examination of multiple body systems, patient presents with moderate complexity.    Short Term Goals:  (4 weeks)  1.  Patient will demonstrate the ability to ambulate 50 feet without increased fatigue and shortness of breath  2.  Patient will demonstrate sit to stand with minimal UE support  3.  The patient will be educated on HEP and report compliance in order to progress functional mobility.      Long Term Goals:  (8 weeks)  1.  Patient will be able to stand independently for 1 minute  2.  Patient will demonstrate improved strength B LE to grossly 4/5 throughout  3.  Patient will report improved functional impairment level to 65% impaired.  4.  Patient will ambulate 100 feet with supervision only.     TREATMENT PROVIDED:    Initial evaluation completed.      Patient was educated on proper technique to negotiate steps/stairs ascending leading with R LE and descending leading with L LE with a step-together pattern .    PLAN:  Patient will benefit from physical therapy (2) x/week for (8) weeks including manual therapy, neuromuscular re-education, therapeutic exercise, balance activity, functional activities, modalities, and patient  education.    Thank you for this referral.    These services are reasonable and necessary for the conditions set forth above while under my care.

## 2019-05-14 ENCOUNTER — CLINICAL SUPPORT (OUTPATIENT)
Dept: REHABILITATION | Facility: HOSPITAL | Age: 84
End: 2019-05-14
Payer: MEDICARE

## 2019-05-14 DIAGNOSIS — R26.81 GAIT INSTABILITY: Primary | ICD-10-CM

## 2019-05-14 DIAGNOSIS — G62.9 POLYNEUROPATHY: ICD-10-CM

## 2019-05-14 PROCEDURE — 97530 THERAPEUTIC ACTIVITIES: CPT | Mod: HCNC | Performed by: PHYSICAL THERAPIST

## 2019-05-14 PROCEDURE — 97110 THERAPEUTIC EXERCISES: CPT | Mod: HCNC | Performed by: PHYSICAL THERAPIST

## 2019-05-14 NOTE — PROGRESS NOTES
"PHYSICAL THERAPY DAILY NOTE    Referring Provider:  Dr. FAM Rosales Jr.    Diagnosis:         ICD-10-CM ICD-9-CM    1. Gait instability R26.81 781.2    2. Polyneuropathy G62.9 356.9        Orders:  Evaluate and Treat    Date of Initial Evaluation:  19    Orders :  19    Coding Cycle Visit # 2    SUBJECTIVE/BACKGROUND:  Patient reports Dr. Rosales sent her to therapy to help with her balance and to help her walk by herself.  Gets "very short of breath" when walking.  Heart is "very bad." Cardiac Pacemaker in place.  Has not fallen in the last 4 months though does have a history of falls.  Goes to bathroom with daughter at night. States she is worse now than when she has come to therapy in the past. Has a call alert button that she wears. Has a treadmill at home - walks 2 minutes.  "Foot and arm bike" - 20 minutes with legs, does not work with her arms - gets too short of breath. States she "cracks all over" when she moves.     Today:  Did not have a good week.  Very short of breath "as soon as I take a couple of steps."  Even wakes her up at night.  Was unable to be with family on Mother's Day - felt too weak to leave her room.     Home Environment:  Lives with her daughter.  2 story home.  Does not go upstairs.  If home by herself does not go outside of her room.  Has a caregiver that comes daily 8 AM to 4 PM.      Patient/Family Goals:  To be able to get up, stand up, and walk by myself even if it is with my walker.     Previous therapy: Has been to therapy "many times" for general strengthening.      ADLs:  Caregiver prepares breakfast and lunch.  Daughter prepares dinner.  Caregiver helps with bathing.    DME:  Bedside commode.  Grab bars in the bathtub.  Has tub chair but does not use.  Stands up in the tub with caregiver with her.  Has a bed that feet and head can be raised. Has a rollator walker with seat and wheelchair at home.      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 fuCT 1/3- stable,  CT's later - no residual    Lens replaced by other means 6/25/2014    Melena     Memory loss     PET scan consistent with Alzzheimers.   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    1 vessel LIMA to LAD    S/P MVR (mitral valve replacement) 9/27/2013    Skin ulcer     Squamous cell carcinoma     skin cancer X 2    Stroke     Syncope and collapse     Unspecified essential hypertension 9/16/2013    Unspecified transient cerebral ischemia     Urinary incontinence     wears briefs       Patient Active Problem List   Diagnosis    Depression, major, single episode, mild    Pure hypercholesterolemia    Orthostatic hypotension    Paroxysmal atrial fibrillation    CKD (chronic kidney disease) stage 3, GFR 30-59 ml/min    Iron deficiency    Atherosclerosis of aorta    CAD (coronary artery disease) with remote CABG x 1V (LIMA-LAD)    Chronic diastolic HFpEF of 60% per echo 5/2017    Osteoporosis    " Diverticulosis of sigmoid colon    Hypothyroidism    Chronic anticoagulation    History of DVT of lower extremity right    Bilateral carotid artery disease    Third degree AV block s/p PPM    Severe MR with remote mitral valve replacement on chronic anticoagulation    Polyneuropathy    Meralgia paresthetica of right side    H/O CVA (cerebrovascular accident)    Pseudophakia    Pulmonary heart disease    Pacemaker end of life    Syncope    Hypomagnesemia    Pacemaker pocket hematoma    Cardiac pacemaker         Current Outpatient Medications:     acetaminophen (TYLENOL) 500 MG tablet, Take 500 mg by mouth as needed for Pain., Disp: , Rfl:     ascorbic acid (VITAMIN C) 1000 MG tablet, Take 1 tablet by mouth once daily once daily., Disp: , Rfl:     aspirin (ECOTRIN) 81 MG EC tablet, Take 81 mg by mouth as needed for Pain., Disp: , Rfl:     clotrimazole-betamethasone 1-0.05% (LOTRISONE) cream, Apply topically 2 (two) times daily. APPT NEEDED!, Disp: 45 g, Rfl: 2    econazole nitrate 1 % cream, Apply topically as needed. , Disp: , Rfl:     escitalopram oxalate (LEXAPRO) 10 MG tablet, Take 1 tablet (10 mg total) by mouth once daily., Disp: 90 tablet, Rfl: 3    fluticasone (FLONASE) 50 mcg/actuation nasal spray, instill 2 sprays into each nostril once daily (Patient taking differently: instill 2 sprays into each nostril once daily as needed.), Disp: 16 g, Rfl: 11    folic acid (FOLVITE) 1 MG tablet, Take 1 tablet by mouth once daily once daily., Disp: , Rfl:     iron-vitamin C 100-250 mg, ICAR-C, (ICAR-C) 100-250 mg Tab, Take 1 tablet by mouth once daily., Disp: 30 tablet, Rfl: 3    Lactoperoxi/Gluc Oxid/Pot Thio (BIOTENE DRY MOUTH MM), by Mucous Membrane route as needed., Disp: , Rfl:     levothyroxine (SYNTHROID) 50 MCG tablet, Take 1 tablet (50 mcg total) by mouth once daily., Disp: 90 tablet, Rfl: 3    midodrine (PROAMATINE) 5 MG Tab, Take 1 tablet (5 mg total) by mouth 4 (four) times daily.  "During waking hours, Disp: 120 tablet, Rfl: 6    omega-3 fatty acids-vitamin E (FISH OIL) 1,000 mg Cap, Take 1 capsule by mouth every other day. , Disp: , Rfl:     pravastatin (PRAVACHOL) 20 MG tablet, Take 1 tablet (20 mg total) by mouth once daily., Disp: 90 tablet, Rfl: 3    propafenone (RHTHYMOL) 150 MG Tab, TAKE 1/2 TABLET BY MOUTH THREE TIMES A DAY, Disp: 45 tablet, Rfl: 11    spironolactone (ALDACTONE) 25 MG tablet, Take 0.5 tablets (12.5 mg total) by mouth once daily. APPT NEEDED! (Patient taking differently: Take 12.5 mg by mouth once daily. ), Disp: 90 tablet, Rfl: 3    vitamin B12-folic acid 0.5-1 mg Tab, Take 1 tablet by mouth once daily once daily., Disp: , Rfl:     warfarin (COUMADIN) 1 MG tablet, Take 1/2 tablet daily as directed by the coumadin clinic., Disp: 50 tablet, Rfl: 3    OBJECTIVE:  Pain: 5-6 /10   Pain from "head to toe"      Sensation:  impaired to light touch distal B LE     ROM:  L dorsiflexion to neutral.              Limited end range B shoulder flexion/abduction             Limited thoracic extension    Strength:      B UE:  Grossly 4 to 4+/5   throughout  L LE:  Grossly 3+ to 4-/5 througout  R LE:  Grossly 4- to 4/5 throughout    Function:  Optimal Instrument     The following scores are patient-reported values, with 1 representing the ability to do the activity without any difficulty, 2 representing the ability to do the activity with little difficulty, 3 representing the ability to do with moderate difficulty, 4 representing the ability to do the activity with much difficulty, 5 representing the inability to do do the activity, and 9 representing that the activity is not applicable.    1.  Lying flat   1  2.  Rolling over  1  3.  Moving - lying to sitting 4  4.  Sitting   1  5.  Squatting   5  6.  Bending/stooping  5  7.  Balancing   5  8.  Kneeling   5  9.  Standing   4  10.  Walking - short distance 5  11.  Walking - long distance 5  12.  Walking - outdoors 5  13.  Climbing " stairs  5  14.  Hopping   9  15.  Jumping   9  16.  Running   9  17.  Pushing   5  18.  Pulling   5  19.  Reaching   2  20.  Grasping   2  21.  Lifting   5  22.  Carrying   5      Total  75    Patient reports 74% impairment on the Optimal Instrument.  The activities she would most like to be able to do without any difficulty are:  Moving- lying to sitting, standing, and walking-short distance.  The primary activity she would most like to be able to do without any difficulty is walking-short distance.       30 sec Sit to Stand:    0   (Unable to stand without UE support.)    Fitness standard to maintain physical independence:  9    Other:     Posture:  Kypohotic, forward head   Gait/Stairs: Ambulates with anterior rolling walker w/seat, decreased tejal, decreased heel strike more evident on the L, forward head and trunk flexed. Required mod/max support to ascend/descend 2  Steps with step-together pattern.     Integumentary: Intact.  Mild/mod edema B LE  Transitional skills:  Mod/max UE support sit to stand, UE support to transfer mat <> chair, sit > supine independently, Supine > side lying with slight increase in effort with report of increased discomfort/back pain with movement, Supine > sit with moderate increase in effort (sits straight up vs rolling to side)    Cardiovascular/Endurance:  HR:  75  (pacemaker)  BP:  148/82  SpO2:  98                                SpO2 (after ambulating 40 ft): 98                                SpO2 (after ascending/descending 2 steps/stairs): 93  (back up to 98 within a few seconds)             Shortness of breath with all activities  Balance:  Unable to stand > 15 seconds without UE stabilization - requires close supervision    ASSESSMENT:  The patient is a 89 y.o. year old female who presents to physical therapy with complaints of increased weakness, difficulty with standing, shortness of breath with activity and difficulty with walking even short distances .  Patient's  impairments include decreased strength, decreased endurance/tolerance to activity, limited ability to independently complete transitions, inability to walk > 40 ft without shortness of breath, decreased functional mobility and decreased balance.  These impairments are limiting patient's ability to independently and safely negotiate her environment.  Patient's prognosis is fair to good.  Patient will benefit from skilled physical therapy intervention to improve strength, balance, and endurance to reduce risk of falls and increase independence at home.    Co-morbidities which may impact the plan of care and potentially impede the patient's progress in therapy include:  CAD, mitral valve replacement on chronic anticoagulation, depression, orthostatic hypotension, osteoporosis, cardiac pacemaker, H/O DVT R LE, and syncope    The patient's clinical presentation is evolving.  Based on patient's evolving clinical presentation, 3+ co-morbidities, and examination of multiple body systems, patient presents with moderate complexity.    Short Term Goals:  (4 weeks)  1.  Patient will demonstrate the ability to ambulate 50 feet without increased fatigue and shortness of breath  2.  Patient will demonstrate sit to stand with minimal UE support  3.  The patient will be educated on HEP and report compliance in order to progress functional mobility.      Long Term Goals:  (8 weeks)  1.  Patient will be able to stand independently for 1 minute  2.  Patient will demonstrate improved strength B LE to grossly 4/5 throughout  3.  Patient will report improved functional impairment level to 65% impaired.  4.  Patient will ambulate 100 feet with supervision only.     TREATMENT PROVIDED:    Resting  BP:    128/80   HR:  75    SpO2:  96    Therapeutic Exercise:  1 on 1 with therapist:  15 min.  - LE ergometer:  5 min    SpO2:  95  - UE rows  )PT holding yellow Theraband):  2 x 10  - B elbow extension (PT holding yellow Theraband): 2 x 10  -  Sitting hip add/ball squeeze: 2 x 10   - Sitting hip abduction (yellow Theraband):  2 x 10        SpO2 during TE:  97 - 98    Therapeutic Activity: 1 on 1 with therapist:  20 min  - addressed transitioning sit <> stand   - scooting forward/backward in chair with pelvic pro/retraction  - standing balance shifting side to side and upper trunk rotation with UE support:  5 min x 2  - ambulated 60 ft x 1 and ~ 180 ft x 1 (to car) with RW and close supervision    SpO2 during TA:  96 - 98     5/7/19:  Patient was educated on proper technique to negotiate steps/stairs ascending leading with R LE and descending leading with L LE with a step-together pattern .    Assessment today's visit:  Patient required frequent rest breaks during session though was able to stand for 5 min with UE support.  Shortness of breath with rapid shallow breathing though able to slow breathing with cues.  Has already shown slight improvement with sit > stand after repetition with visual, verbal, and tactile cues.     PLAN:  Patient will benefit from physical therapy (2) x/week for (8) weeks including manual therapy, neuromuscular re-education, therapeutic exercise, balance activity, functional activities, modalities, and patient education.    Thank you for this referral.    These services are reasonable and necessary for the conditions set forth above while under my care.

## 2019-05-15 ENCOUNTER — ANTI-COAG VISIT (OUTPATIENT)
Dept: CARDIOLOGY | Facility: CLINIC | Age: 84
End: 2019-05-15
Payer: MEDICARE

## 2019-05-15 DIAGNOSIS — I48.0 PAROXYSMAL ATRIAL FIBRILLATION: Chronic | ICD-10-CM

## 2019-05-15 DIAGNOSIS — Z79.01 LONG TERM (CURRENT) USE OF ANTICOAGULANTS: Primary | ICD-10-CM

## 2019-05-15 DIAGNOSIS — Z86.718 HISTORY OF DVT OF LOWER EXTREMITY: ICD-10-CM

## 2019-05-15 LAB — INR PPP: 3.9 (ref 2.5–3.5)

## 2019-05-15 PROCEDURE — 93793 ANTICOAG MGMT PT WARFARIN: CPT | Mod: HCNC,S$GLB,,

## 2019-05-15 PROCEDURE — 93793 PR ANTICOAGULANT MGMT FOR PT TAKING WARFARIN: ICD-10-PCS | Mod: HCNC,S$GLB,,

## 2019-05-15 PROCEDURE — 85610 POCT INR: ICD-10-PCS | Mod: QW,HCNC,S$GLB, | Performed by: INTERNAL MEDICINE

## 2019-05-15 PROCEDURE — 85610 PROTHROMBIN TIME: CPT | Mod: QW,HCNC,S$GLB, | Performed by: INTERNAL MEDICINE

## 2019-05-15 NOTE — PROGRESS NOTES
Patient's INR is supra-therapeutic at 3.9.  No significant changes or extra doses reported.  No signs of bleeding noted; advised patient to seek immediate medical attention if she notices any abnormal bleeding.  Instructions given for patient to hold dose of coumadin on today; then resume current dose of 0.5mg every evening.  Patient voiced understanding.  Follow-up in 2 weeks.

## 2019-05-17 ENCOUNTER — CLINICAL SUPPORT (OUTPATIENT)
Dept: REHABILITATION | Facility: HOSPITAL | Age: 84
End: 2019-05-17
Payer: MEDICARE

## 2019-05-17 DIAGNOSIS — R26.81 GAIT INSTABILITY: Primary | ICD-10-CM

## 2019-05-17 DIAGNOSIS — G62.9 POLYNEUROPATHY: ICD-10-CM

## 2019-05-17 PROCEDURE — 97110 THERAPEUTIC EXERCISES: CPT | Mod: HCNC | Performed by: PHYSICAL THERAPIST

## 2019-05-17 PROCEDURE — 97530 THERAPEUTIC ACTIVITIES: CPT | Mod: HCNC | Performed by: PHYSICAL THERAPIST

## 2019-05-17 NOTE — PROGRESS NOTES
"PHYSICAL THERAPY DAILY NOTE    Referring Provider:  Dr. FAM Rosales Jr.    Diagnosis:         ICD-10-CM ICD-9-CM    1. Gait instability R26.81 781.2    2. Polyneuropathy G62.9 356.9        Orders:  Evaluate and Treat    Date of Initial Evaluation:  19    Orders :  19    Coding Cycle Visit # 3    SUBJECTIVE/BACKGROUND:  Patient reports Dr. Rosales sent her to therapy to help with her balance and to help her walk by herself.  Gets "very short of breath" when walking.  Heart is "very bad." Cardiac Pacemaker in place.  Has not fallen in the last 4 months though does have a history of falls.  Goes to bathroom with daughter at night. States she is worse now than when she has come to therapy in the past. Has a call alert button that she wears. Has a treadmill at home - walks 2 minutes.  "Foot and arm bike" - 20 minutes with legs, does not work with her arms - gets too short of breath. States she "cracks all over" when she moves.     Today: Too tired, "lack of air,"  Feels like she can't do anything without getting fatigued.  Can't hold her 7 month old great grand baby.  Feeling worse today.  "My breathing is bad today."  States she originally thought she did want to come back next week though after session realized how it is helping and she is already moving a little better.     Home Environment:  Lives with her daughter.  2 story home.  Does not go upstairs.  If home by herself does not go outside of her room.  Has a caregiver that comes daily 8 AM to 4 PM.      Patient/Family Goals:  To be able to get up, stand up, and walk by myself even if it is with my walker.     Previous therapy: Has been to therapy "many times" for general strengthening.      ADLs:  Caregiver prepares breakfast and lunch.  Daughter prepares dinner.  Caregiver helps with bathing.    DME:  Bedside commode.  Grab bars in the bathtub.  Has tub chair but does not use.  Stands up in the tub with caregiver with her.  Has a bed that feet " "and head can be raised. Has a rollator walker with seat and wheelchair at home.      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 fuCT 1/3- stable,  CT's later - no residual    Lens replaced by other means 6/25/2014    Melena     Memory loss     PET scan consistent with Alzzheimers.   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    1 vessel LIMA to LAD    S/P MVR (mitral valve replacement) 9/27/2013    Skin ulcer     Squamous cell carcinoma     skin cancer X 2    Stroke     Syncope and collapse     Unspecified essential hypertension 9/16/2013    Unspecified transient cerebral ischemia     Urinary incontinence     wears briefs       Patient Active Problem List   Diagnosis    Depression, major, single episode, mild    Pure hypercholesterolemia    Orthostatic hypotension    Paroxysmal atrial fibrillation    CKD (chronic kidney disease) stage 3, GFR 30-59 ml/min    Iron deficiency    Atherosclerosis of aorta    " CAD (coronary artery disease) with remote CABG x 1V (LIMA-LAD)    Chronic diastolic HFpEF of 60% per echo 5/2017    Osteoporosis    Diverticulosis of sigmoid colon    Hypothyroidism    Chronic anticoagulation    History of DVT of lower extremity right    Bilateral carotid artery disease    Third degree AV block s/p PPM    Severe MR with remote mitral valve replacement on chronic anticoagulation    Polyneuropathy    Meralgia paresthetica of right side    H/O CVA (cerebrovascular accident)    Pseudophakia    Pulmonary heart disease    Pacemaker end of life    Syncope    Hypomagnesemia    Pacemaker pocket hematoma    Cardiac pacemaker         Current Outpatient Medications:     acetaminophen (TYLENOL) 500 MG tablet, Take 500 mg by mouth as needed for Pain., Disp: , Rfl:     ascorbic acid (VITAMIN C) 1000 MG tablet, Take 1 tablet by mouth once daily once daily., Disp: , Rfl:     aspirin (ECOTRIN) 81 MG EC tablet, Take 81 mg by mouth as needed for Pain., Disp: , Rfl:     clotrimazole-betamethasone 1-0.05% (LOTRISONE) cream, Apply topically 2 (two) times daily. APPT NEEDED!, Disp: 45 g, Rfl: 2    econazole nitrate 1 % cream, Apply topically as needed. , Disp: , Rfl:     escitalopram oxalate (LEXAPRO) 10 MG tablet, Take 1 tablet (10 mg total) by mouth once daily., Disp: 90 tablet, Rfl: 3    fluticasone (FLONASE) 50 mcg/actuation nasal spray, instill 2 sprays into each nostril once daily (Patient taking differently: instill 2 sprays into each nostril once daily as needed.), Disp: 16 g, Rfl: 11    folic acid (FOLVITE) 1 MG tablet, Take 1 tablet by mouth once daily once daily., Disp: , Rfl:     iron-vitamin C 100-250 mg, ICAR-C, (ICAR-C) 100-250 mg Tab, Take 1 tablet by mouth once daily., Disp: 30 tablet, Rfl: 3    Lactoperoxi/Gluc Oxid/Pot Thio (BIOTENE DRY MOUTH MM), by Mucous Membrane route as needed., Disp: , Rfl:     levothyroxine (SYNTHROID) 50 MCG tablet, Take 1 tablet (50 mcg total) by  "mouth once daily., Disp: 90 tablet, Rfl: 3    midodrine (PROAMATINE) 5 MG Tab, Take 1 tablet (5 mg total) by mouth 4 (four) times daily. During waking hours, Disp: 120 tablet, Rfl: 6    omega-3 fatty acids-vitamin E (FISH OIL) 1,000 mg Cap, Take 1 capsule by mouth every other day. , Disp: , Rfl:     pravastatin (PRAVACHOL) 20 MG tablet, Take 1 tablet (20 mg total) by mouth once daily., Disp: 90 tablet, Rfl: 3    propafenone (RHTHYMOL) 150 MG Tab, TAKE 1/2 TABLET BY MOUTH THREE TIMES A DAY, Disp: 45 tablet, Rfl: 11    spironolactone (ALDACTONE) 25 MG tablet, Take 0.5 tablets (12.5 mg total) by mouth once daily. APPT NEEDED! (Patient taking differently: Take 12.5 mg by mouth once daily. ), Disp: 90 tablet, Rfl: 3    vitamin B12-folic acid 0.5-1 mg Tab, Take 1 tablet by mouth once daily once daily., Disp: , Rfl:     warfarin (COUMADIN) 1 MG tablet, Take 1/2 tablet daily as directed by the coumadin clinic., Disp: 50 tablet, Rfl: 3    OBJECTIVE:  Pain: 5-6 /10   Pain from "head to toe"      Sensation:  impaired to light touch distal B LE     ROM:  L dorsiflexion to neutral.              Limited end range B shoulder flexion/abduction             Limited thoracic extension    Strength:      B UE:  Grossly 4 to 4+/5   throughout  L LE:  Grossly 3+ to 4-/5 througout  R LE:  Grossly 4- to 4/5 throughout    Function:  Optimal Instrument     The following scores are patient-reported values, with 1 representing the ability to do the activity without any difficulty, 2 representing the ability to do the activity with little difficulty, 3 representing the ability to do with moderate difficulty, 4 representing the ability to do the activity with much difficulty, 5 representing the inability to do do the activity, and 9 representing that the activity is not applicable.    1.  Lying flat   1  2.  Rolling over  1  3.  Moving - lying to sitting 4  4.  Sitting   1  5.  Squatting   5  6.  Bending/stooping  5  7.  Balancing   5  8.  " Kneeling   5  9.  Standing   4  10.  Walking - short distance 5  11.  Walking - long distance 5  12.  Walking - outdoors 5  13.  Climbing stairs  5  14.  Hopping   9  15.  Jumping   9  16.  Running   9  17.  Pushing   5  18.  Pulling   5  19.  Reaching   2  20.  Grasping   2  21.  Lifting   5  22.  Carrying   5      Total  75    Patient reports 74% impairment on the Optimal Instrument.  The activities she would most like to be able to do without any difficulty are:  Moving- lying to sitting, standing, and walking-short distance.  The primary activity she would most like to be able to do without any difficulty is walking-short distance.       30 sec Sit to Stand:    0   (Unable to stand without UE support.)    Fitness standard to maintain physical independence:  9    Other:     Posture:  Kypohotic, forward head   Gait/Stairs: Ambulates with anterior rolling walker w/seat, decreased tejal, decreased heel strike more evident on the L, forward head and trunk flexed. Required mod/max support to ascend/descend 2  Steps with step-together pattern.     Integumentary: Intact.  Mild/mod edema B LE  Transitional skills:  Mod/max UE support sit to stand, UE support to transfer mat <> chair, sit > supine independently, Supine > side lying with slight increase in effort with report of increased discomfort/back pain with movement, Supine > sit with moderate increase in effort (sits straight up vs rolling to side)    Cardiovascular/Endurance:  HR:  75  (pacemaker)  BP:  148/82  SpO2:  98                                SpO2 (after ambulating 40 ft): 98                                SpO2 (after ascending/descending 2 steps/stairs): 93  (back up to 98 within a few seconds)             Shortness of breath with all activities  Balance:  Unable to stand > 15 seconds without UE stabilization - requires close supervision    ASSESSMENT:  The patient is a 89 y.o. year old female who presents to physical therapy with complaints of  increased weakness, difficulty with standing, shortness of breath with activity and difficulty with walking even short distances .  Patient's impairments include decreased strength, decreased endurance/tolerance to activity, limited ability to independently complete transitions, inability to walk > 40 ft without shortness of breath, decreased functional mobility and decreased balance.  These impairments are limiting patient's ability to independently and safely negotiate her environment.  Patient's prognosis is fair to good.  Patient will benefit from skilled physical therapy intervention to improve strength, balance, and endurance to reduce risk of falls and increase independence at home.    Co-morbidities which may impact the plan of care and potentially impede the patient's progress in therapy include:  CAD, mitral valve replacement on chronic anticoagulation, depression, orthostatic hypotension, osteoporosis, cardiac pacemaker, H/O DVT R LE, and syncope    The patient's clinical presentation is evolving.  Based on patient's evolving clinical presentation, 3+ co-morbidities, and examination of multiple body systems, patient presents with moderate complexity.    Short Term Goals:  (4 weeks)  1.  Patient will demonstrate the ability to ambulate 50 feet without increased fatigue and shortness of breath  2.  Patient will demonstrate sit to stand with minimal UE support  Met 5/17/19  3.  The patient will be educated on HEP and report compliance in order to progress functional mobility.      Long Term Goals:  (8 weeks)  1.  Patient will be able to stand independently for 1 minute  2.  Patient will demonstrate improved strength B LE to grossly 4/5 throughout  3.  Patient will report improved functional impairment level to 65% impaired.  4.  Patient will ambulate 100 feet with supervision only.     TREATMENT PROVIDED:    Resting  BP:    138/80  HR:  75    SpO2:  92 after ambulating in to gym though increased to 96 after  sitting to rest.     Therapeutic Exercise:  1 on 1 with therapist:  15 min.  - LE ergometer:  5 min    SpO2:  95 - 97  - UE rows  (PT holding yellow Theraband):  2 x 10  - B elbow extension (PT holding yellow Theraband): 2 x 10  - Sitting hip add/ball squeeze: 2 x 10   - Sitting hip abduction (yellow Theraband):  2 x 10        SpO2 during TE:  95 - 98    Therapeutic Activity: 1 on 1 with therapist:  25 min  - addressed transitioning sit <> stand   - scooting forward/backward in chair with pelvic pro/retraction  - standing balance shifting side to side and upper trunk rotation with UE support:  5 min x 2  - ambulated 50 ft x 2 with HHA and support with gait belt.    Educated caregiver on assisting with transitional skills and importance of not pulling her up to stand.     SpO2 during TA:  97 - 98     5/7/19:  Patient was educated on proper technique to negotiate steps/stairs ascending leading with R LE and descending leading with L LE with a step-together pattern .    Assessment today's visit:  Opted not to add anything to exercise program today due to c/o fatigue and SOB.  However, she was able to tolerate increased time working on transitioning sit<> stand and was able to hold conversation while working. With practice, verbal, and visual cues was able to transition sit > stand with MIN contact guarding.      PLAN:  Patient will benefit from physical therapy (2) x/week for (8) weeks including manual therapy, neuromuscular re-education, therapeutic exercise, balance activity, functional activities, modalities, and patient education.  Will progress program as tolerated.      Thank you for this referral.    These services are reasonable and necessary for the conditions set forth above while under my care.

## 2019-05-21 ENCOUNTER — CLINICAL SUPPORT (OUTPATIENT)
Dept: REHABILITATION | Facility: HOSPITAL | Age: 84
End: 2019-05-21
Payer: MEDICARE

## 2019-05-21 DIAGNOSIS — G62.9 POLYNEUROPATHY: ICD-10-CM

## 2019-05-21 DIAGNOSIS — R26.81 GAIT INSTABILITY: Primary | ICD-10-CM

## 2019-05-21 PROCEDURE — 97110 THERAPEUTIC EXERCISES: CPT | Mod: HCNC | Performed by: PHYSICAL THERAPIST

## 2019-05-21 PROCEDURE — 97530 THERAPEUTIC ACTIVITIES: CPT | Mod: HCNC | Performed by: PHYSICAL THERAPIST

## 2019-05-21 NOTE — PROGRESS NOTES
"PHYSICAL THERAPY DAILY NOTE    Referring Provider:  Dr. FAM Rosales Jr.    Diagnosis:         ICD-10-CM ICD-9-CM    1. Gait instability R26.81 781.2    2. Polyneuropathy G62.9 356.9        Orders:  Evaluate and Treat    Date of Initial Evaluation:  19    Orders :  19    Coding Cycle Visit # 4    SUBJECTIVE/BACKGROUND:  Patient reports Dr. Rosales sent her to therapy to help with her balance and to help her walk by herself.  Gets "very short of breath" when walking.  Heart is "very bad." Cardiac Pacemaker in place.  Has not fallen in the last 4 months though does have a history of falls.  Goes to bathroom with daughter at night. States she is worse now than when she has come to therapy in the past. Has a call alert button that she wears. Has a treadmill at home - walks 2 minutes.  "Foot and arm bike" - 20 minutes with legs, does not work with her arms - gets too short of breath. States she "cracks all over" when she moves.     Today:  "I'm too old for all this."  Would like to make Friday her last day.  I can get up better now.  Has a LE ergometer at home that she uses for 10 - 15 min. Feels she can continue exercises at home after next visit.      Home Environment:  Lives with her daughter.  2 story home.  Does not go upstairs.  If home by herself does not go outside of her room.  Has a caregiver that comes daily 8 AM to 4 PM.      Patient/Family Goals:  To be able to get up, stand up, and walk by myself even if it is with my walker.     Previous therapy: Has been to therapy "many times" for general strengthening.      ADLs:  Caregiver prepares breakfast and lunch.  Daughter prepares dinner.  Caregiver helps with bathing.    DME:  Bedside commode.  Grab bars in the bathtub.  Has tub chair but does not use.  Stands up in the tub with caregiver with her.  Has a bed that feet and head can be raised. Has a rollator walker with seat and wheelchair at home.      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 fuCT 1/3- stable,  CT's later - no residual    Lens replaced by other means 6/25/2014    Melena     Memory loss     PET scan consistent with Alzzheimers.   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    1 vessel LIMA to LAD    S/P MVR (mitral valve replacement) 9/27/2013    Skin ulcer     Squamous cell carcinoma     skin cancer X 2    Stroke     Syncope and collapse     Unspecified essential hypertension 9/16/2013    Unspecified transient cerebral ischemia     Urinary incontinence     wears briefs       Patient Active Problem List   Diagnosis    Depression, major, single episode, mild    Pure hypercholesterolemia    Orthostatic hypotension    Paroxysmal atrial fibrillation    CKD (chronic kidney disease) stage 3, GFR 30-59 ml/min    Iron deficiency    Atherosclerosis of aorta    CAD (coronary artery disease) with remote CABG x 1V (LIMA-LAD)    Chronic diastolic HFpEF of 60% per echo 5/2017    " Osteoporosis    Diverticulosis of sigmoid colon    Hypothyroidism    Chronic anticoagulation    History of DVT of lower extremity right    Bilateral carotid artery disease    Third degree AV block s/p PPM    Severe MR with remote mitral valve replacement on chronic anticoagulation    Polyneuropathy    Meralgia paresthetica of right side    H/O CVA (cerebrovascular accident)    Pseudophakia    Pulmonary heart disease    Pacemaker end of life    Syncope    Hypomagnesemia    Pacemaker pocket hematoma    Cardiac pacemaker         Current Outpatient Medications:     acetaminophen (TYLENOL) 500 MG tablet, Take 500 mg by mouth as needed for Pain., Disp: , Rfl:     ascorbic acid (VITAMIN C) 1000 MG tablet, Take 1 tablet by mouth once daily once daily., Disp: , Rfl:     aspirin (ECOTRIN) 81 MG EC tablet, Take 81 mg by mouth as needed for Pain., Disp: , Rfl:     clotrimazole-betamethasone 1-0.05% (LOTRISONE) cream, Apply topically 2 (two) times daily. APPT NEEDED!, Disp: 45 g, Rfl: 2    econazole nitrate 1 % cream, Apply topically as needed. , Disp: , Rfl:     escitalopram oxalate (LEXAPRO) 10 MG tablet, Take 1 tablet (10 mg total) by mouth once daily., Disp: 90 tablet, Rfl: 3    fluticasone (FLONASE) 50 mcg/actuation nasal spray, instill 2 sprays into each nostril once daily (Patient taking differently: instill 2 sprays into each nostril once daily as needed.), Disp: 16 g, Rfl: 11    folic acid (FOLVITE) 1 MG tablet, Take 1 tablet by mouth once daily once daily., Disp: , Rfl:     iron-vitamin C 100-250 mg, ICAR-C, (ICAR-C) 100-250 mg Tab, Take 1 tablet by mouth once daily., Disp: 30 tablet, Rfl: 3    Lactoperoxi/Gluc Oxid/Pot Thio (BIOTENE DRY MOUTH MM), by Mucous Membrane route as needed., Disp: , Rfl:     levothyroxine (SYNTHROID) 50 MCG tablet, Take 1 tablet (50 mcg total) by mouth once daily., Disp: 90 tablet, Rfl: 3    midodrine (PROAMATINE) 5 MG Tab, Take 1 tablet (5 mg total) by mouth 4  "(four) times daily. During waking hours, Disp: 120 tablet, Rfl: 6    omega-3 fatty acids-vitamin E (FISH OIL) 1,000 mg Cap, Take 1 capsule by mouth every other day. , Disp: , Rfl:     pravastatin (PRAVACHOL) 20 MG tablet, Take 1 tablet (20 mg total) by mouth once daily., Disp: 90 tablet, Rfl: 3    propafenone (RHTHYMOL) 150 MG Tab, TAKE 1/2 TABLET BY MOUTH THREE TIMES A DAY, Disp: 45 tablet, Rfl: 11    spironolactone (ALDACTONE) 25 MG tablet, Take 0.5 tablets (12.5 mg total) by mouth once daily. APPT NEEDED! (Patient taking differently: Take 12.5 mg by mouth once daily. ), Disp: 90 tablet, Rfl: 3    vitamin B12-folic acid 0.5-1 mg Tab, Take 1 tablet by mouth once daily once daily., Disp: , Rfl:     warfarin (COUMADIN) 1 MG tablet, Take 1/2 tablet daily as directed by the coumadin clinic., Disp: 50 tablet, Rfl: 3    OBJECTIVE:  Pain: 5-6 /10   Pain from "head to toe"      Sensation:  impaired to light touch distal B LE     ROM:  L dorsiflexion to neutral.              Limited end range B shoulder flexion/abduction             Limited thoracic extension    Strength:      B UE:  Grossly 4 to 4+/5   throughout  L LE:  Grossly 3+ to 4-/5 througout  R LE:  Grossly 4- to 4/5 throughout    Function:  Optimal Instrument     The following scores are patient-reported values, with 1 representing the ability to do the activity without any difficulty, 2 representing the ability to do the activity with little difficulty, 3 representing the ability to do with moderate difficulty, 4 representing the ability to do the activity with much difficulty, 5 representing the inability to do do the activity, and 9 representing that the activity is not applicable.    1.  Lying flat   1  2.  Rolling over  1  3.  Moving - lying to sitting 4  4.  Sitting   1  5.  Squatting   5  6.  Bending/stooping  5  7.  Balancing   5  8.  Kneeling   5  9.  Standing   4  10.  Walking - short distance 5  11.  Walking - long distance 5  12.  Walking - " outdoors 5  13.  Climbing stairs  5  14.  Hopping   9  15.  Jumping   9  16.  Running   9  17.  Pushing   5  18.  Pulling   5  19.  Reaching   2  20.  Grasping   2  21.  Lifting   5  22.  Carrying   5      Total  75    Patient reports 74% impairment on the Optimal Instrument.  The activities she would most like to be able to do without any difficulty are:  Moving- lying to sitting, standing, and walking-short distance.  The primary activity she would most like to be able to do without any difficulty is walking-short distance.       30 sec Sit to Stand:    0   (Unable to stand without UE support.)    Fitness standard to maintain physical independence:  9    Other:     Posture:  Kypohotic, forward head   Gait/Stairs: Ambulates with anterior rolling walker w/seat, decreased tejal, decreased heel strike more evident on the L, forward head and trunk flexed. Required mod/max support to ascend/descend 2  Steps with step-together pattern.     Integumentary: Intact.  Mild/mod edema B LE  Transitional skills:  Mod/max UE support sit to stand, UE support to transfer mat <> chair, sit > supine independently, Supine > side lying with slight increase in effort with report of increased discomfort/back pain with movement, Supine > sit with moderate increase in effort (sits straight up vs rolling to side)    Cardiovascular/Endurance:  HR:  75  (pacemaker)  BP:  148/82  SpO2:  98                                SpO2 (after ambulating 40 ft): 98                                SpO2 (after ascending/descending 2 steps/stairs): 93  (back up to 98 within a few seconds)             Shortness of breath with all activities  Balance:  Unable to stand > 15 seconds without UE stabilization - requires close supervision    ASSESSMENT:  The patient is a 89 y.o. year old female who presents to physical therapy with complaints of increased weakness, difficulty with standing, shortness of breath with activity and difficulty with walking even short  distances .  Patient's impairments include decreased strength, decreased endurance/tolerance to activity, limited ability to independently complete transitions, inability to walk > 40 ft without shortness of breath, decreased functional mobility and decreased balance.  These impairments are limiting patient's ability to independently and safely negotiate her environment.  Patient's prognosis is fair to good.  Patient will benefit from skilled physical therapy intervention to improve strength, balance, and endurance to reduce risk of falls and increase independence at home.    Co-morbidities which may impact the plan of care and potentially impede the patient's progress in therapy include:  CAD, mitral valve replacement on chronic anticoagulation, depression, orthostatic hypotension, osteoporosis, cardiac pacemaker, H/O DVT R LE, and syncope    The patient's clinical presentation is evolving.  Based on patient's evolving clinical presentation, 3+ co-morbidities, and examination of multiple body systems, patient presents with moderate complexity.    Short Term Goals:  (4 weeks)  1.  Patient will demonstrate the ability to ambulate 50 feet without increased fatigue and shortness of breath  Met 5/21/19  2.  Patient will demonstrate sit to stand with minimal UE support  Met 5/17/19  3.  The patient will be educated on HEP and report compliance in order to progress functional mobility.  5/21/19      Long Term Goals:  (8 weeks)  1.  Patient will be able to stand independently for 1 minute  2.  Patient will demonstrate improved strength B LE to grossly 4/5 throughout  3.  Patient will report improved functional impairment level to 65% impaired.  4.  Patient will ambulate 100 feet with supervision only. Met 5/21/19    TREATMENT PROVIDED:    Resting  BP:    130/84  HR:  75    SpO2:  96     Therapeutic Exercise:  1 on 1 with therapist:  20 min.  - Seated bike Level 1 x 6 min    SpO2:  96 - 97  - UE rows  (PT holding yellow  Theraband):  2 x 10  - B elbow extension (PT holding yellow Theraband): 2 x 10  - Sitting hip add/ball squeeze: 2 x 10   - Sitting hip abduction (yellow Theraband):  2 x 10     - B shoulder ER with elbows at 90 (yellow band) x 5  - B shoulder IR (yellow band ) x 5  - B shoulder flexion (yellow band) x 5  - B shoulder abduction (yellow band ) x 5   - B horizontal abduction (yellow band ) x 5     SpO2 during TE:  95 - 97    Therapeutic Activity: 1 on 1 with therapist:  25 min  - addressed transitioning sit <> stand   - scooting forward/backward in chair with pelvic pro/retraction  - standing balance shifting side to side and upper trunk rotation with UE support:  5 min x 1  - ambulated 50 ft x 2 with and 200 ft x 1 with supervision only.     SpO2 during TA:  97 - 98    30 second sit to stand:  4    Patient given written HEP with pictures for UE Theraband exercises, hip abduction with band in sitting and ball squeeze.     5/7/19:  Patient was educated on proper technique to negotiate steps/stairs ascending leading with R LE and descending leading with L LE with a step-together pattern .    Assessment today's visit:  Patient able to transition sit > stand today without UE support.  Ambulating longer distance with encouragement.     PLAN:  Patient will benefit from physical therapy (2) x/week for (8) weeks including manual therapy, neuromuscular re-education, therapeutic exercise, balance activity, functional activities, modalities, and patient education.  Patient will attend PT x 1 more visit.       Thank you for this referral.    These services are reasonable and necessary for the conditions set forth above while under my care.

## 2019-05-24 ENCOUNTER — CLINICAL SUPPORT (OUTPATIENT)
Dept: REHABILITATION | Facility: HOSPITAL | Age: 84
End: 2019-05-24
Payer: MEDICARE

## 2019-05-24 DIAGNOSIS — R26.81 GAIT INSTABILITY: Primary | ICD-10-CM

## 2019-05-24 DIAGNOSIS — G62.9 POLYNEUROPATHY: ICD-10-CM

## 2019-05-24 PROCEDURE — 97530 THERAPEUTIC ACTIVITIES: CPT | Mod: HCNC | Performed by: PHYSICAL THERAPIST

## 2019-05-24 NOTE — PROGRESS NOTES
"PHYSICAL THERAPY DISCHARGE/PROGRESS NOTE    Treatment period:  19 - 19    Referring Provider:  Dr. FAM Rosales Jr.    Diagnosis:         ICD-10-CM ICD-9-CM    1. Gait instability R26.81 781.2    2. Polyneuropathy G62.9 356.9        Orders:  Evaluate and Treat    Date of Initial Evaluation:  19    Orders :  19    Coding Cycle Visit # 5    SUBJECTIVE/BACKGROUND:  Patient reports Dr. Rosales sent her to therapy to help with her balance and to help her walk by herself.  Gets "very short of breath" when walking.  Heart is "very bad." Cardiac Pacemaker in place.  Has not fallen in the last 4 months though does have a history of falls.  Goes to bathroom with daughter at night. States she is worse now than when she has come to therapy in the past. Has a call alert button that she wears. Has a treadmill at home - walks 2 minutes.  "Foot and arm bike" - 20 minutes with legs, does not work with her arms - gets too short of breath. States she "cracks all over" when she moves.     Today:  "I feel lousy."  Can't stand up and move like I want to to go to the bathroom.  "I stand up and I start wobbling."  Had difficulty getting out of the car.  Worse in the morning.  Better as the day goes on.  Would like today to be her last day.  She does feel like she is getting up out of the chair better since coming to therapy.  Using her LE ergometer at home.  Walking in the evening to go to the bathroom by herself.  Will then call for her daughter to come walk back with her.      Home Environment:  Lives with her daughter.  2 story home.  Does not go upstairs.  If home by herself does not go outside of her room.  Has a caregiver that comes daily 8 AM to 4 PM.      Patient/Family Goals:  To be able to get up, stand up, and walk by myself even if it is with my walker.     Previous therapy: Has been to therapy "many times" for general strengthening.      ADLs:  Caregiver prepares breakfast and lunch.  Daughter prepares " "dinner.  Caregiver helps with bathing.    DME:  Bedside commode.  Grab bars in the bathtub.  Has tub chair but does not use.  Stands up in the tub with caregiver with her.  Has a bed that feet and head can be raised. Has a rollator walker with seat and wheelchair at home.      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 fuCT 1/3- stable,  CT's later - no residual    Lens replaced by other means 6/25/2014    Melena     Memory loss     PET scan consistent with Alzzheimers.   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    1 vessel LIMA to LAD    S/P MVR (mitral valve replacement) 9/27/2013    Skin ulcer     Squamous cell carcinoma     skin cancer X 2    Stroke     Syncope and collapse     Unspecified essential hypertension 9/16/2013    Unspecified transient cerebral ischemia     Urinary incontinence     wears briefs       Patient Active Problem List   Diagnosis    Depression, major, single episode, mild    Pure " hypercholesterolemia    Orthostatic hypotension    Paroxysmal atrial fibrillation    CKD (chronic kidney disease) stage 3, GFR 30-59 ml/min    Iron deficiency    Atherosclerosis of aorta    CAD (coronary artery disease) with remote CABG x 1V (LIMA-LAD)    Chronic diastolic HFpEF of 60% per echo 5/2017    Osteoporosis    Diverticulosis of sigmoid colon    Hypothyroidism    Chronic anticoagulation    History of DVT of lower extremity right    Bilateral carotid artery disease    Third degree AV block s/p PPM    Severe MR with remote mitral valve replacement on chronic anticoagulation    Polyneuropathy    Meralgia paresthetica of right side    H/O CVA (cerebrovascular accident)    Pseudophakia    Pulmonary heart disease    Pacemaker end of life    Syncope    Hypomagnesemia    Pacemaker pocket hematoma    Cardiac pacemaker         Current Outpatient Medications:     acetaminophen (TYLENOL) 500 MG tablet, Take 500 mg by mouth as needed for Pain., Disp: , Rfl:     ascorbic acid (VITAMIN C) 1000 MG tablet, Take 1 tablet by mouth once daily once daily., Disp: , Rfl:     aspirin (ECOTRIN) 81 MG EC tablet, Take 81 mg by mouth as needed for Pain., Disp: , Rfl:     clotrimazole-betamethasone 1-0.05% (LOTRISONE) cream, Apply topically 2 (two) times daily. APPT NEEDED!, Disp: 45 g, Rfl: 2    econazole nitrate 1 % cream, Apply topically as needed. , Disp: , Rfl:     escitalopram oxalate (LEXAPRO) 10 MG tablet, Take 1 tablet (10 mg total) by mouth once daily., Disp: 90 tablet, Rfl: 3    fluticasone (FLONASE) 50 mcg/actuation nasal spray, instill 2 sprays into each nostril once daily (Patient taking differently: instill 2 sprays into each nostril once daily as needed.), Disp: 16 g, Rfl: 11    folic acid (FOLVITE) 1 MG tablet, Take 1 tablet by mouth once daily once daily., Disp: , Rfl:     iron-vitamin C 100-250 mg, ICAR-C, (ICAR-C) 100-250 mg Tab, Take 1 tablet by mouth once daily., Disp: 30 tablet,  "Rfl: 3    Lactoperoxi/Gluc Oxid/Pot Thio (BIOTENE DRY MOUTH MM), by Mucous Membrane route as needed., Disp: , Rfl:     levothyroxine (SYNTHROID) 50 MCG tablet, Take 1 tablet (50 mcg total) by mouth once daily., Disp: 90 tablet, Rfl: 3    midodrine (PROAMATINE) 5 MG Tab, Take 1 tablet (5 mg total) by mouth 4 (four) times daily. During waking hours, Disp: 120 tablet, Rfl: 6    omega-3 fatty acids-vitamin E (FISH OIL) 1,000 mg Cap, Take 1 capsule by mouth every other day. , Disp: , Rfl:     pravastatin (PRAVACHOL) 20 MG tablet, Take 1 tablet (20 mg total) by mouth once daily., Disp: 90 tablet, Rfl: 3    propafenone (RHTHYMOL) 150 MG Tab, TAKE 1/2 TABLET BY MOUTH THREE TIMES A DAY, Disp: 45 tablet, Rfl: 11    spironolactone (ALDACTONE) 25 MG tablet, Take 0.5 tablets (12.5 mg total) by mouth once daily. APPT NEEDED! (Patient taking differently: Take 12.5 mg by mouth once daily. ), Disp: 90 tablet, Rfl: 3    vitamin B12-folic acid 0.5-1 mg Tab, Take 1 tablet by mouth once daily once daily., Disp: , Rfl:     warfarin (COUMADIN) 1 MG tablet, Take 1/2 tablet daily as directed by the coumadin clinic., Disp: 50 tablet, Rfl: 3    OBJECTIVE:  Pain: 5-6 /10   Pain from "head to toe"         Today:  0/10 upon arrival.  4/10 after ambulating ~ 200 ft. With HHA    Sensation:  impaired to light touch distal B LE     ROM:  L dorsiflexion to neutral.              Limited end range B shoulder flexion/abduction             Limited thoracic extension    Strength:  Initial                                                5/24/19    B UE:  Grossly 4 to 4+/5   Throughout              L LE:  Grossly 3+ to 4-/5 throughout              L LE:  4- to 4/5  throughout                 R LE:  Grossly 4- to 4/5 throughout                R LE:  4 to 5/5   throughout    Function:  Optimal Instrument     The following scores are patient-reported values, with 1 representing the ability to do the activity without any difficulty, 2 representing the " ability to do the activity with little difficulty, 3 representing the ability to do with moderate difficulty, 4 representing the ability to do the activity with much difficulty, 5 representing the inability to do do the activity, and 9 representing that the activity is not applicable.                                                 Initial                    5/24/19  1.  Lying flat   1   1  2.  Rolling over  1   2  3.  Moving - lying to sitting 4   3  4.  Sitting   1   1  5.  Squatting   5   5  6.  Bending/stooping  5   4  7.  Balancing   5   4  8.  Kneeling   5   5  9.  Standing   4   2  10.  Walking - short distance 5   2  With walker  11.  Walking - long distance 5   4  12.  Walking - outdoors 5   5  13.  Climbing stairs  5   4  14.  Hopping   9   9  15.  Jumping   9   9  16.  Running   9   9  17.  Pushing   5   3  18.  Pulling   5   3  19.  Reaching   2   1  20.  Grasping   2   1  21.  Lifting   5   5  22.  Carrying   5   4      Total  75   58    Patient reports 74% impairment on the Optimal Instrument.  The activities she would most like to be able to do without any difficulty are:  Moving- lying to sitting, standing, and walking-short distance.  The primary activity she would most like to be able to do without any difficulty is walking-short distance. Patient now reports 53% impairment on the Optimal Instrument.      30 sec Sit to Stand:    0   (Unable to stand without UE support.)    Fitness standard to maintain physical independence:  9                             5/21/19:   4    Other:     Posture:  Kypohotic, forward head   Gait/Stairs: Ambulates with anterior rolling walker w/seat, decreased tejal, decreased heel strike more evident on the L, forward head and trunk flexed. Required mod/max support to ascend/descend 2  Steps with step-together pattern.     Integumentary: Intact.  Mild/mod edema B LE  Transitional skills:  Mod/max UE support sit to stand, UE support to transfer mat <> chair, sit > supine  independently, Supine > side lying with slight increase in effort with report of increased discomfort/back pain with movement, Supine > sit with moderate increase in effort (sits straight up vs rolling to side)    Cardiovascular/Endurance:  HR:  75  (pacemaker)  BP:  148/82  SpO2:  98                                SpO2 (after ambulating 40 ft): 98                                SpO2 (after ascending/descending 2 steps/stairs): 93  (back up to 98 within a few seconds)             Shortness of breath with all activities  Balance:  Unable to stand > 15 seconds without UE stabilization - requires close supervision    ASSESSMENT:  The patient is a 89 y.o. year old female who presents to physical therapy with complaints of increased weakness, difficulty with standing, shortness of breath with activity and difficulty with walking even short distances .  Patient's impairments include decreased strength, decreased endurance/tolerance to activity, limited ability to independently complete transitions, inability to walk > 40 ft without shortness of breath, decreased functional mobility and decreased balance.  These impairments are limiting patient's ability to independently and safely negotiate her environment.  Patient's prognosis is fair to good.  Patient will benefit from skilled physical therapy intervention to improve strength, balance, and endurance to reduce risk of falls and increase independence at home.    Co-morbidities which may impact the plan of care and potentially impede the patient's progress in therapy include:  CAD, mitral valve replacement on chronic anticoagulation, depression, orthostatic hypotension, osteoporosis, cardiac pacemaker, H/O DVT R LE, and syncope    The patient's clinical presentation is evolving.  Based on patient's evolving clinical presentation, 3+ co-morbidities, and examination of multiple body systems, patient presents with moderate complexity.    Short Term Goals:  (4 weeks)  1.   Patient will demonstrate the ability to ambulate 50 feet without increased fatigue and shortness of breath  Met 5/21/19  2.  Patient will demonstrate sit to stand with minimal UE support  Met 5/17/19  3.  The patient will be educated on HEP and report compliance in order to progress functional mobility.  5/21/19      Long Term Goals:  (8 weeks)  1.  Patient will be able to stand independently for 1 minute  Met 5/24/19  2.  Patient will demonstrate improved strength B LE to grossly 4/5 throughout  Partially met   (L LE 4- to 4/5)  3.  Patient will report improved functional impairment level to 65% impaired.  Met 5/24/19  4.  Patient will ambulate 100 feet with supervision only. Met 5/21/19    TREATMENT PROVIDED:    Resting  BP:    118/68  HR:  75    SpO2:  96     All Therapeutic Exercise deferred today.     Therapeutic Exercise:  1 on 1 with therapist:  0 min.  - Seated bike Level 1 x 6 min    SpO2:  96 - 97  - UE rows  (PT holding yellow Theraband):  2 x 10  - B elbow extension (PT holding yellow Theraband): 2 x 10  - Sitting hip add/ball squeeze: 2 x 10   - Sitting hip abduction (yellow Theraband):  2 x 10     - B shoulder ER with elbows at 90 (yellow band) x 5  - B shoulder IR (yellow band ) x 5  - B shoulder flexion (yellow band) x 5  - B shoulder abduction (yellow band ) x 5   - B horizontal abduction (yellow band ) x 5     SpO2 during TE:  95 - 97    Therapeutic Activity: 1 on 1 with therapist:  15 min  - addressed transitioning sit <> stand   - scooting forward/backward in chair with pelvic pro/retraction  - standing balance shifting side to side and upper trunk rotation with UE support:  5 min x 1  - Stairs: ascending and descending reciprocally with B rails  - ambulated  200 ft x 1 with HHA.   - standing balance - able to stand 2 min independently    SpO2 during TA:  97 - 98    Discussion with caregiver re:  Assisting with gait and transitions, not pulling on patient and allowing her to perform activities as  independently as possible.     Total treatment time:  40 minutes      5/21/19:  Patient given written HEP with pictures for UE Theraband exercises, hip abduction with band in sitting and ball squeeze.     5/7/19:  Patient was educated on proper technique to negotiate steps/stairs ascending leading with R LE and descending leading with L LE with a step-together pattern .    Assessment today's visit:  Patient able to transition sit > stand  without UE support.  Ambulating longer distance with encouragement. Able to ascend and descend stairs with 2 rails and close supervision only. Able to stand independently with improved balance. Patient tends to participate in activities better than what she verbalizes how she feels.     PLAN:  Patient is discharged with HEP.  She is encouraged to keep mobile - using her LE ergometer and performing UE band exercises.  Her caregiver plans to continue to ambulate with patient daily.  She will call if any questions or concerns arise.     Thank you for this referral.    These services are reasonable and necessary for the conditions set forth above while under my care.

## 2019-05-29 ENCOUNTER — ANTI-COAG VISIT (OUTPATIENT)
Dept: CARDIOLOGY | Facility: CLINIC | Age: 84
End: 2019-05-29
Payer: MEDICARE

## 2019-05-29 DIAGNOSIS — Z86.73 H/O: CVA (CEREBROVASCULAR ACCIDENT): Chronic | ICD-10-CM

## 2019-05-29 DIAGNOSIS — Z79.01 LONG TERM (CURRENT) USE OF ANTICOAGULANTS: Primary | ICD-10-CM

## 2019-05-29 DIAGNOSIS — I48.0 PAROXYSMAL ATRIAL FIBRILLATION: Chronic | ICD-10-CM

## 2019-05-29 DIAGNOSIS — Z95.2 S/P MITRAL VALVE REPLACEMENT: Chronic | ICD-10-CM

## 2019-05-29 LAB — INR PPP: 3 (ref 2.5–3.5)

## 2019-05-29 PROCEDURE — 93793 PR ANTICOAGULANT MGMT FOR PT TAKING WARFARIN: ICD-10-PCS | Mod: HCNC,S$GLB,,

## 2019-05-29 PROCEDURE — 85610 POCT INR: ICD-10-PCS | Mod: QW,HCNC,S$GLB, | Performed by: INTERNAL MEDICINE

## 2019-05-29 PROCEDURE — 85610 PROTHROMBIN TIME: CPT | Mod: QW,HCNC,S$GLB, | Performed by: INTERNAL MEDICINE

## 2019-05-29 PROCEDURE — 93793 ANTICOAG MGMT PT WARFARIN: CPT | Mod: HCNC,S$GLB,,

## 2019-05-29 NOTE — PROGRESS NOTES
Patient's INR is therapeutic at 3.0.  Reports no recent changes.  Instructed to maintain current dose of Warfarin 0.5 mg daily.  Dose calendar - given.  Recheck in 3 weeks.

## 2019-06-19 ENCOUNTER — ANTI-COAG VISIT (OUTPATIENT)
Dept: CARDIOLOGY | Facility: CLINIC | Age: 84
End: 2019-06-19
Payer: MEDICARE

## 2019-06-19 DIAGNOSIS — I48.0 PAROXYSMAL ATRIAL FIBRILLATION: Chronic | ICD-10-CM

## 2019-06-19 DIAGNOSIS — Z79.01 LONG TERM (CURRENT) USE OF ANTICOAGULANTS: Primary | ICD-10-CM

## 2019-06-19 LAB — INR PPP: 3.4 (ref 2.5–3.5)

## 2019-06-19 PROCEDURE — 93793 PR ANTICOAGULANT MGMT FOR PT TAKING WARFARIN: ICD-10-PCS | Mod: HCNC,S$GLB,,

## 2019-06-19 PROCEDURE — 85610 POCT INR: ICD-10-PCS | Mod: QW,HCNC,S$GLB, | Performed by: INTERNAL MEDICINE

## 2019-06-19 PROCEDURE — 85610 PROTHROMBIN TIME: CPT | Mod: QW,HCNC,S$GLB, | Performed by: INTERNAL MEDICINE

## 2019-06-19 PROCEDURE — 93793 ANTICOAG MGMT PT WARFARIN: CPT | Mod: HCNC,S$GLB,,

## 2019-06-19 NOTE — PROGRESS NOTES
Patient's INR is therapeutic at 3.4.  Reports no recent changes.  Instructed to maintain current dose of Warfarin 0.5 mg (1/2 tablet) daily.  Recheck in 2 weeks.

## 2019-06-20 ENCOUNTER — CLINICAL SUPPORT (OUTPATIENT)
Dept: CARDIOLOGY | Facility: CLINIC | Age: 84
End: 2019-06-20
Payer: MEDICARE

## 2019-06-20 PROCEDURE — 93281 PM DEVICE PROGR EVAL MULTI: CPT | Mod: 26,HCNC,, | Performed by: INTERNAL MEDICINE

## 2019-06-20 PROCEDURE — 93281 PACEMAKER PROGRAMMING: ICD-10-PCS | Mod: 26,HCNC,, | Performed by: INTERNAL MEDICINE

## 2019-06-20 NOTE — PROGRESS NOTES
Dr. Webb reviewed interrogation, inspected device pocket, and discussed with patient.  Pt will return to clinic in 6 months for Bi[employer] pacemaker check, remote transmission in 3 months.

## 2019-07-01 ENCOUNTER — ANTI-COAG VISIT (OUTPATIENT)
Dept: CARDIOLOGY | Facility: CLINIC | Age: 84
End: 2019-07-01
Payer: MEDICARE

## 2019-07-01 DIAGNOSIS — I48.0 PAROXYSMAL ATRIAL FIBRILLATION: Chronic | ICD-10-CM

## 2019-07-01 DIAGNOSIS — Z79.01 LONG TERM (CURRENT) USE OF ANTICOAGULANTS: Primary | ICD-10-CM

## 2019-07-01 LAB — INR PPP: 2.9 (ref 2.5–3.5)

## 2019-07-01 PROCEDURE — 85610 PROTHROMBIN TIME: CPT | Mod: QW,HCNC,S$GLB, | Performed by: INTERNAL MEDICINE

## 2019-07-01 PROCEDURE — 93793 ANTICOAG MGMT PT WARFARIN: CPT | Mod: HCNC,S$GLB,,

## 2019-07-01 PROCEDURE — 85610 POCT INR: ICD-10-PCS | Mod: QW,HCNC,S$GLB, | Performed by: INTERNAL MEDICINE

## 2019-07-01 PROCEDURE — 93793 PR ANTICOAGULANT MGMT FOR PT TAKING WARFARIN: ICD-10-PCS | Mod: HCNC,S$GLB,,

## 2019-07-01 NOTE — PROGRESS NOTES
Patient's INR is therapeutic at 2.9.  Reports no recent changes.  Instructed to maintain current dose of Warfarin 0.5 mg every evening.  Recheck in 4 weeks.

## 2019-07-29 ENCOUNTER — ANTI-COAG VISIT (OUTPATIENT)
Dept: CARDIOLOGY | Facility: CLINIC | Age: 84
End: 2019-07-29
Payer: MEDICARE

## 2019-07-29 ENCOUNTER — LAB VISIT (OUTPATIENT)
Dept: LAB | Facility: HOSPITAL | Age: 84
End: 2019-07-29
Attending: INTERNAL MEDICINE
Payer: MEDICARE

## 2019-07-29 DIAGNOSIS — Z79.01 LONG TERM (CURRENT) USE OF ANTICOAGULANTS: Primary | ICD-10-CM

## 2019-07-29 DIAGNOSIS — E78.00 PURE HYPERCHOLESTEROLEMIA: Chronic | ICD-10-CM

## 2019-07-29 DIAGNOSIS — I48.0 PAROXYSMAL ATRIAL FIBRILLATION: Chronic | ICD-10-CM

## 2019-07-29 LAB
ALBUMIN SERPL BCP-MCNC: 3.5 G/DL (ref 3.5–5.2)
ALP SERPL-CCNC: 56 U/L (ref 55–135)
ALT SERPL W/O P-5'-P-CCNC: 13 U/L (ref 10–44)
ANION GAP SERPL CALC-SCNC: 10 MMOL/L (ref 8–16)
AST SERPL-CCNC: 19 U/L (ref 10–40)
BILIRUB SERPL-MCNC: 0.6 MG/DL (ref 0.1–1)
BUN SERPL-MCNC: 28 MG/DL (ref 10–30)
CALCIUM SERPL-MCNC: 9.2 MG/DL (ref 8.7–10.5)
CHLORIDE SERPL-SCNC: 107 MMOL/L (ref 95–110)
CHOLEST SERPL-MCNC: 181 MG/DL (ref 120–199)
CHOLEST/HDLC SERPL: 4.4 {RATIO} (ref 2–5)
CO2 SERPL-SCNC: 25 MMOL/L (ref 23–29)
CREAT SERPL-MCNC: 1.3 MG/DL (ref 0.5–1.4)
EST. GFR  (AFRICAN AMERICAN): 40.6 ML/MIN/1.73 M^2
EST. GFR  (NON AFRICAN AMERICAN): 35.2 ML/MIN/1.73 M^2
GLUCOSE SERPL-MCNC: 97 MG/DL (ref 70–110)
HDLC SERPL-MCNC: 41 MG/DL (ref 40–75)
HDLC SERPL: 22.7 % (ref 20–50)
INR PPP: 3.4 (ref 2.5–3.5)
LDLC SERPL CALC-MCNC: 117.6 MG/DL (ref 63–159)
NONHDLC SERPL-MCNC: 140 MG/DL
POTASSIUM SERPL-SCNC: 4.3 MMOL/L (ref 3.5–5.1)
PROT SERPL-MCNC: 6.7 G/DL (ref 6–8.4)
SODIUM SERPL-SCNC: 142 MMOL/L (ref 136–145)
TRIGL SERPL-MCNC: 112 MG/DL (ref 30–150)

## 2019-07-29 PROCEDURE — 85610 PROTHROMBIN TIME: CPT | Mod: QW,HCNC,S$GLB, | Performed by: NUCLEAR MEDICINE

## 2019-07-29 PROCEDURE — 36415 COLL VENOUS BLD VENIPUNCTURE: CPT | Mod: HCNC

## 2019-07-29 PROCEDURE — 93793 ANTICOAG MGMT PT WARFARIN: CPT | Mod: HCNC,S$GLB,,

## 2019-07-29 PROCEDURE — 85610 POCT INR: ICD-10-PCS | Mod: QW,HCNC,S$GLB, | Performed by: NUCLEAR MEDICINE

## 2019-07-29 PROCEDURE — 93793 PR ANTICOAGULANT MGMT FOR PT TAKING WARFARIN: ICD-10-PCS | Mod: HCNC,S$GLB,,

## 2019-07-29 PROCEDURE — 80053 COMPREHEN METABOLIC PANEL: CPT | Mod: HCNC

## 2019-07-29 PROCEDURE — 80061 LIPID PANEL: CPT | Mod: HCNC

## 2019-07-29 NOTE — PROGRESS NOTES
Patient's INR is therapeutic at 3.4.  Reports no recent changes.  Instructed to maintain current dose of Warfarin 0.5 mg every day.  Dose calendar given and reviewed with patient.  Recheck in 1 month.

## 2019-08-26 ENCOUNTER — ANTI-COAG VISIT (OUTPATIENT)
Dept: CARDIOLOGY | Facility: CLINIC | Age: 84
End: 2019-08-26
Payer: MEDICARE

## 2019-08-26 DIAGNOSIS — I48.0 PAROXYSMAL ATRIAL FIBRILLATION: Chronic | ICD-10-CM

## 2019-08-26 DIAGNOSIS — Z86.718 HISTORY OF DVT OF LOWER EXTREMITY: ICD-10-CM

## 2019-08-26 DIAGNOSIS — Z79.01 LONG TERM (CURRENT) USE OF ANTICOAGULANTS: Primary | ICD-10-CM

## 2019-08-26 DIAGNOSIS — Z86.73 H/O: CVA (CEREBROVASCULAR ACCIDENT): Chronic | ICD-10-CM

## 2019-08-26 LAB — INR PPP: 2 (ref 2.5–3.5)

## 2019-08-26 PROCEDURE — 85610 PROTHROMBIN TIME: CPT | Mod: QW,HCNC,S$GLB, | Performed by: INTERNAL MEDICINE

## 2019-08-26 PROCEDURE — 93793 ANTICOAG MGMT PT WARFARIN: CPT | Mod: HCNC,S$GLB,,

## 2019-08-26 PROCEDURE — 85610 POCT INR: ICD-10-PCS | Mod: QW,HCNC,S$GLB, | Performed by: INTERNAL MEDICINE

## 2019-08-26 PROCEDURE — 93793 PR ANTICOAGULANT MGMT FOR PT TAKING WARFARIN: ICD-10-PCS | Mod: HCNC,S$GLB,,

## 2019-08-26 NOTE — PROGRESS NOTES
Patient's INR is sub-therapeutic at 2.0.  Mrs. Posey reports eating more vegetables this past week.  Consistent intake discussed.  No abnormal pain, swelling or weakness noted.  Instructions given for patient to take warfarin 1mg on today; then resume current dose of 0.5mg every evening.  Patient/caregiver voiced understanding.  Follow-up on 9/23/19 before other appt.

## 2019-09-16 ENCOUNTER — ANTI-COAG VISIT (OUTPATIENT)
Dept: CARDIOLOGY | Facility: CLINIC | Age: 84
End: 2019-09-16
Payer: MEDICARE

## 2019-09-16 DIAGNOSIS — Z86.718 HISTORY OF DVT OF LOWER EXTREMITY: ICD-10-CM

## 2019-09-16 DIAGNOSIS — Z79.01 LONG TERM (CURRENT) USE OF ANTICOAGULANTS: Primary | ICD-10-CM

## 2019-09-16 DIAGNOSIS — I48.0 PAROXYSMAL ATRIAL FIBRILLATION: Chronic | ICD-10-CM

## 2019-09-16 DIAGNOSIS — Z86.73 H/O: CVA (CEREBROVASCULAR ACCIDENT): Chronic | ICD-10-CM

## 2019-09-16 LAB — INR PPP: 2.8 (ref 2.5–3.5)

## 2019-09-16 PROCEDURE — 85610 PROTHROMBIN TIME: CPT | Mod: QW,HCNC,S$GLB, | Performed by: INTERNAL MEDICINE

## 2019-09-16 PROCEDURE — 85610 POCT INR: ICD-10-PCS | Mod: QW,HCNC,S$GLB, | Performed by: INTERNAL MEDICINE

## 2019-09-16 PROCEDURE — 93793 ANTICOAG MGMT PT WARFARIN: CPT | Mod: HCNC,S$GLB,,

## 2019-09-16 PROCEDURE — 93793 PR ANTICOAGULANT MGMT FOR PT TAKING WARFARIN: ICD-10-PCS | Mod: HCNC,S$GLB,,

## 2019-09-16 RX ORDER — AMOXICILLIN 500 MG/1
1 CAPSULE ORAL EVERY 6 HOURS
Refills: 0 | Status: ON HOLD | COMMUNITY
Start: 2019-09-04 | End: 2020-01-29 | Stop reason: HOSPADM

## 2019-09-16 NOTE — PROGRESS NOTES
Mrs. Posey was called after her dental appt.  Dentist pulled 2 teeth on today.  Patient currently prescribed amoxicillin 500mg Q6H.  Patient will hold dose of warfarin on today; then resume current dose of 0.5mg every evening.  Recheck INR on 9/25/19.

## 2019-09-16 NOTE — PROGRESS NOTES
Patient's INR is therapeutic at 2.8.   Mrs. Posey has a dental appointment scheduled for today, she states she may be having 2-3 teeth pulled on today.  Per patient, dentist did not advise her she needed to hold coumadin but he preferred INR to be near 2.0.  Coumadin clinic attempted to contact Dr. Geovany Allison, [facility] [phone].  Message was left.  Patient was advised to resume current dose of warfarin 0.5mg every evening until further instructions are received.  Patient voiced understanding.    Recheck in 1 week.  Please call should you have any questions or concerns at [phone] or [phone].

## 2019-09-25 ENCOUNTER — ANTI-COAG VISIT (OUTPATIENT)
Dept: CARDIOLOGY | Facility: CLINIC | Age: 84
End: 2019-09-25
Payer: MEDICARE

## 2019-09-25 ENCOUNTER — HOSPITAL ENCOUNTER (OUTPATIENT)
Dept: RADIOLOGY | Facility: HOSPITAL | Age: 84
Discharge: HOME | End: 2019-09-25
Attending: PHYSICIAN ASSISTANT
Payer: MEDICARE

## 2019-09-25 ENCOUNTER — TELEPHONE (OUTPATIENT)
Dept: CARDIOLOGY | Facility: CLINIC | Age: 84
End: 2019-09-25

## 2019-09-25 ENCOUNTER — OFFICE VISIT (OUTPATIENT)
Dept: CARDIOLOGY | Facility: CLINIC | Age: 84
End: 2019-09-25
Payer: MEDICARE

## 2019-09-25 VITALS — DIASTOLIC BLOOD PRESSURE: 79 MMHG | HEART RATE: 75 BPM | SYSTOLIC BLOOD PRESSURE: 125 MMHG

## 2019-09-25 DIAGNOSIS — Z79.01 LONG TERM (CURRENT) USE OF ANTICOAGULANTS: Primary | ICD-10-CM

## 2019-09-25 DIAGNOSIS — Z86.73 H/O: CVA (CEREBROVASCULAR ACCIDENT): Chronic | ICD-10-CM

## 2019-09-25 DIAGNOSIS — Z86.73 H/O: CVA (CEREBROVASCULAR ACCIDENT): ICD-10-CM

## 2019-09-25 DIAGNOSIS — I51.89 LEFT VENTRICULAR DIASTOLIC DYSFUNCTION WITH PRESERVED SYSTOLIC FUNCTION: Chronic | ICD-10-CM

## 2019-09-25 DIAGNOSIS — I65.23 BILATERAL CAROTID ARTERY STENOSIS: ICD-10-CM

## 2019-09-25 DIAGNOSIS — Z95.0 CARDIAC PACEMAKER: ICD-10-CM

## 2019-09-25 DIAGNOSIS — I25.10 CORONARY ARTERY DISEASE INVOLVING NATIVE CORONARY ARTERY OF NATIVE HEART WITHOUT ANGINA PECTORIS: Chronic | ICD-10-CM

## 2019-09-25 DIAGNOSIS — I95.1 ORTHOSTATIC HYPOTENSION: ICD-10-CM

## 2019-09-25 DIAGNOSIS — I48.0 PAROXYSMAL ATRIAL FIBRILLATION: Chronic | ICD-10-CM

## 2019-09-25 DIAGNOSIS — R53.1 WEAKNESS: ICD-10-CM

## 2019-09-25 DIAGNOSIS — N18.30 CKD (CHRONIC KIDNEY DISEASE) STAGE 3, GFR 30-59 ML/MIN: Chronic | ICD-10-CM

## 2019-09-25 DIAGNOSIS — I70.0 ATHEROSCLEROSIS OF AORTA: ICD-10-CM
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
## 2019-09-25 DIAGNOSIS — Z86.718 HISTORY OF DVT OF LOWER EXTREMITY: ICD-10-CM

## 2019-09-25 DIAGNOSIS — Z95.2 S/P MITRAL VALVE REPLACEMENT: Chronic | ICD-10-CM

## 2019-09-25 LAB — INR PPP: 3 (ref 2.5–3.5)

## 2019-09-25 PROCEDURE — 85610 POCT INR: ICD-10-PCS | Mod: QW,HCNC,S$GLB, | Performed by: INTERNAL MEDICINE

## 2019-09-25 PROCEDURE — 99214 OFFICE O/P EST MOD 30 MIN: CPT | Mod: HCNC,S$GLB,, | Performed by: PHYSICIAN ASSISTANT

## 2019-09-25 PROCEDURE — 85610 PROTHROMBIN TIME: CPT | Mod: QW,HCNC,S$GLB, | Performed by: INTERNAL MEDICINE

## 2019-09-25 PROCEDURE — 93000 EKG 12-LEAD: ICD-10-PCS | Mod: HCNC,S$GLB,, | Performed by: INTERNAL MEDICINE

## 2019-09-25 PROCEDURE — 99999 PR PBB SHADOW E&M-EST. PATIENT-LVL IV: ICD-10-PCS | Mod: PBBFAC,HCNC,, | Performed by: PHYSICIAN ASSISTANT

## 2019-09-25 PROCEDURE — 1101F PT FALLS ASSESS-DOCD LE1/YR: CPT | Mod: HCNC,CPTII,S$GLB, | Performed by: PHYSICIAN ASSISTANT

## 2019-09-25 PROCEDURE — 99214 PR OFFICE/OUTPT VISIT, EST, LEVL IV, 30-39 MIN: ICD-10-PCS | Mod: HCNC,S$GLB,, | Performed by: PHYSICIAN ASSISTANT

## 2019-09-25 PROCEDURE — 99999 PR PBB SHADOW E&M-EST. PATIENT-LVL IV: CPT | Mod: PBBFAC,HCNC,, | Performed by: PHYSICIAN ASSISTANT

## 2019-09-25 PROCEDURE — 93000 ELECTROCARDIOGRAM COMPLETE: CPT | Mod: HCNC,S$GLB,, | Performed by: INTERNAL MEDICINE

## 2019-09-25 PROCEDURE — 70450 CT HEAD/BRAIN W/O DYE: CPT | Mod: TC,HCNC

## 2019-09-25 PROCEDURE — 70450 CT HEAD WITHOUT CONTRAST: ICD-10-PCS | Mod: 26,HCNC,, | Performed by: RADIOLOGY

## 2019-09-25 PROCEDURE — 70450 CT HEAD/BRAIN W/O DYE: CPT | Mod: 26,HCNC,, | Performed by: RADIOLOGY

## 2019-09-25 PROCEDURE — 1101F PR PT FALLS ASSESS DOC 0-1 FALLS W/OUT INJ PAST YR: ICD-10-PCS | Mod: HCNC,CPTII,S$GLB, | Performed by: PHYSICIAN ASSISTANT

## 2019-09-25 NOTE — PROGRESS NOTES
Mrs. Posey experienced a fall last week after taking a dose of pain medication following her dental extraction.  She has extensive bruising to her face, arms and back.  Patient was evaluated by cardiology today and will be having a CT scan performed.  She was advised of importance of seeking immediate medical attention if she experiences a fall due to risk of internal bleeding.       Patient's INR is therapeutic at 3.0.  Amoxicillin has been completed and patient will not be taking Norco in the future.  No change in warfarin dose.  Continue current dose of warfarin 0.5mg every evening.  Patient voiced understanding to all matters discussed.  Recheck in 2 weeks.  Please call should you have any questions or concerns at [phone] or [phone].

## 2019-09-25 NOTE — TELEPHONE ENCOUNTER
Please phone patient. CT of head negative for acute findings.     UA did not show UTI, awaiting other lab results    THanks

## 2019-09-26 ENCOUNTER — TELEPHONE (OUTPATIENT)
Dept: CARDIOLOGY | Facility: CLINIC | Age: 84
End: 2019-09-26

## 2019-09-26 DIAGNOSIS — I25.10 CORONARY ARTERY DISEASE INVOLVING NATIVE CORONARY ARTERY OF NATIVE HEART WITHOUT ANGINA PECTORIS: Primary | Chronic | ICD-10-CM

## 2019-09-26 DIAGNOSIS — I27.9 PULMONARY HEART DISEASE: ICD-10-CM

## 2019-09-26 NOTE — TELEPHONE ENCOUNTER
Please phone patient. CBC stable. CMP reviewed, her creatinine has bumped to 1.6, appears dehydrated.     Recommend she stop spironolactone. Please schedule repeat BMP in 1 week.    I recommend she f/u with PCP regarding her weakness. ED if any severe symptoms.     Thanks

## 2019-10-03 ENCOUNTER — TELEPHONE (OUTPATIENT)
Dept: CARDIOLOGY | Facility: CLINIC | Age: 84
End: 2019-10-03

## 2019-10-03 ENCOUNTER — LAB VISIT (OUTPATIENT)
Dept: LAB | Facility: HOSPITAL | Age: 84
End: 2019-10-03
Attending: PHYSICIAN ASSISTANT
Payer: MEDICARE

## 2019-10-03 DIAGNOSIS — I27.9 PULMONARY HEART DISEASE: ICD-10-CM

## 2019-10-03 DIAGNOSIS — I25.10 CORONARY ARTERY DISEASE INVOLVING NATIVE CORONARY ARTERY OF NATIVE HEART WITHOUT ANGINA PECTORIS: Chronic | ICD-10-CM

## 2019-10-03 LAB
ANION GAP SERPL CALC-SCNC: 10 MMOL/L (ref 8–16)
BUN SERPL-MCNC: 27 MG/DL (ref 10–30)
CALCIUM SERPL-MCNC: 9.5 MG/DL (ref 8.7–10.5)
CHLORIDE SERPL-SCNC: 104 MMOL/L (ref 95–110)
CO2 SERPL-SCNC: 27 MMOL/L (ref 23–29)
CREAT SERPL-MCNC: 1.6 MG/DL (ref 0.5–1.4)
EST. GFR  (AFRICAN AMERICAN): 31 ML/MIN/1.73 M^2
EST. GFR  (NON AFRICAN AMERICAN): 27 ML/MIN/1.73 M^2
GLUCOSE SERPL-MCNC: 96 MG/DL (ref 70–110)
POTASSIUM SERPL-SCNC: 5 MMOL/L (ref 3.5–5.1)
SODIUM SERPL-SCNC: 141 MMOL/L (ref 136–145)

## 2019-10-03 PROCEDURE — 36415 COLL VENOUS BLD VENIPUNCTURE: CPT | Mod: HCNC

## 2019-10-03 PROCEDURE — 80048 BASIC METABOLIC PNL TOTAL CA: CPT | Mod: HCNC

## 2019-10-03 NOTE — TELEPHONE ENCOUNTER
Please phone patient. BMP reviewed. Creatinine 1.6, BUN improved.    Stay off aldactone.    How is she feeling?

## 2019-10-03 NOTE — TELEPHONE ENCOUNTER
The patient has been notified of this information and all questions answered.  Pt says she is weak but ok. No new symptoms.

## 2019-10-07 ENCOUNTER — OFFICE VISIT (OUTPATIENT)
Dept: INTERNAL MEDICINE | Facility: CLINIC | Age: 84
End: 2019-10-07
Payer: MEDICARE

## 2019-10-07 VITALS
TEMPERATURE: 96 F | DIASTOLIC BLOOD PRESSURE: 78 MMHG | OXYGEN SATURATION: 95 % | HEART RATE: 75 BPM | SYSTOLIC BLOOD PRESSURE: 124 MMHG | BODY MASS INDEX: 27.42 KG/M2 | HEIGHT: 63 IN | WEIGHT: 154.75 LBS

## 2019-10-07 DIAGNOSIS — R42 DIZZINESS: ICD-10-CM

## 2019-10-07 DIAGNOSIS — S09.90XD INJURY OF HEAD, SUBSEQUENT ENCOUNTER: ICD-10-CM

## 2019-10-07 DIAGNOSIS — R51.9 RIGHT-SIDED HEADACHE: Primary | ICD-10-CM

## 2019-10-07 PROCEDURE — 1101F PT FALLS ASSESS-DOCD LE1/YR: CPT | Mod: HCNC,CPTII,S$GLB, | Performed by: NURSE PRACTITIONER

## 2019-10-07 PROCEDURE — 99999 PR PBB SHADOW E&M-EST. PATIENT-LVL V: ICD-10-PCS | Mod: PBBFAC,HCNC,, | Performed by: NURSE PRACTITIONER

## 2019-10-07 PROCEDURE — 99213 PR OFFICE/OUTPT VISIT, EST, LEVL III, 20-29 MIN: ICD-10-PCS | Mod: HCNC,S$GLB,, | Performed by: NURSE PRACTITIONER

## 2019-10-07 PROCEDURE — 99999 PR PBB SHADOW E&M-EST. PATIENT-LVL V: CPT | Mod: PBBFAC,HCNC,, | Performed by: NURSE PRACTITIONER

## 2019-10-07 PROCEDURE — 99213 OFFICE O/P EST LOW 20 MIN: CPT | Mod: HCNC,S$GLB,, | Performed by: NURSE PRACTITIONER

## 2019-10-07 PROCEDURE — 1101F PR PT FALLS ASSESS DOC 0-1 FALLS W/OUT INJ PAST YR: ICD-10-PCS | Mod: HCNC,CPTII,S$GLB, | Performed by: NURSE PRACTITIONER

## 2019-10-07 NOTE — PROGRESS NOTES
Subjective:       Patient ID: Josy Posey is a 89 y.o. female.    Chief Complaint: Headache    HPI     Pt here with right sided headache. Reports off and on throbbing. Reports pain 3/10. Taking tylenol as needed since fall. See below.  She does have chronic polyneuropathy/weakness/dizziness. She denies fever, blurred vision, increased dizziness, cp, sob, confusion. No further falls since below incident     Seen by cardiology 9/25: notes read    Suffered a fall last week. States she had taken hydrocodone earlier that day after having teeth pulled and was walking around her bed and turned the wrong direction and landed directly on her face. She also injured her bilateral shoulders, right knee, and right arm. Since that fall, she is feeling more weak. Using her wheelchair more, having to stop and rest. She also feels she is slightly confused.    Review of Systems   Constitutional: Negative for activity change, appetite change, chills, diaphoresis, fatigue, fever and unexpected weight change.   HENT: Negative for congestion, ear pain, postnasal drip, rhinorrhea, sinus pressure, sinus pain, sneezing, sore throat, tinnitus, trouble swallowing and voice change.    Eyes: Negative for photophobia, pain and visual disturbance.   Respiratory: Negative for cough, chest tightness, shortness of breath and wheezing.    Cardiovascular: Negative for chest pain, palpitations and leg swelling.   Gastrointestinal: Negative for abdominal distention, abdominal pain, constipation, diarrhea, nausea and vomiting.   Genitourinary: Negative for decreased urine volume, difficulty urinating, dysuria, flank pain, frequency, hematuria and urgency.   Musculoskeletal: Negative for arthralgias, back pain, joint swelling, neck pain and neck stiffness.   Allergic/Immunologic: Negative for immunocompromised state.   Neurological: Positive for dizziness (chronic ) and headaches. Negative for tremors, seizures, syncope, facial asymmetry, speech  difficulty, weakness, light-headedness and numbness.   Hematological: Negative for adenopathy. Does not bruise/bleed easily.   Psychiatric/Behavioral: Negative for confusion and sleep disturbance.       Objective:      Physical Exam   Constitutional: She is oriented to person, place, and time.   HENT:   Head: Normocephalic and atraumatic.   Right Ear: Tympanic membrane normal.   Left Ear: Tympanic membrane normal.   Eyes: Pupils are equal, round, and reactive to light. Conjunctivae and EOM are normal.   Neck: Normal range of motion. Neck supple.   Cardiovascular: Normal rate, regular rhythm, normal heart sounds and intact distal pulses.   Pulmonary/Chest: Effort normal and breath sounds normal.   Abdominal: Soft. Bowel sounds are normal.   Musculoskeletal: Normal range of motion.   Neurological: She is alert and oriented to person, place, and time. She has normal strength. She displays normal reflexes. No cranial nerve deficit or sensory deficit. She exhibits normal muscle tone. Coordination (pt dizzy upon standing. uses wheelchair/needs additional help with mobility- sitter present ) abnormal. GCS eye subscore is 4. GCS verbal subscore is 5. GCS motor subscore is 6.   Skin: Skin is warm and dry.   Psychiatric: She has a normal mood and affect.       Assessment:       1. Right-sided headache    2. Injury of head, subsequent encounter    3. Dizziness        Plan:   No new findings on exam that warrant additional imaging  CT normal on 9/25/19  Tylenol PRN/fluids/rest/cool compresses to head- no contusions/hematomas evident  Worrisome symptoms discussed in detail with pt and sitter  Return PRN

## 2019-10-07 NOTE — PATIENT INSTRUCTIONS
Head Injury (Adult)    You have a head injury. It does not appear serious at this time. But symptoms of a more serious problem, such as a mild brain injury (concussion) or bruising or bleeding in the brain, may appear later. For this reason, you or someone caring for you will need to watch for the symptoms listed below. Once youre home, also be sure to follow any care instructions youre given.  Home care  Watch for the following symptoms  Seek emergency medical care if you have any of these symptoms over the next hours to days:   · Headache  · Nausea or vomiting  · Dizziness  · Sensitivity to light or noise  · Unusual sleepiness or grogginess  · Trouble falling asleep  · Personality changes  · Vision changes  · Memory loss  · Confusion  · Trouble walking or clumsiness  · Loss of consciousness (even for a short time)  · Inability to be awakened  · Stiff neck  · Weakness or numbness in any part of the body  · Seizures  General care  · If you were prescribed medicines for pain, use them as directed. Note: Dont take other medicines for pain without talking to your provider first.  · To help reduce swelling and pain, apply a cold source to the injured area for up to 20 minutes at a time. Do this as often as directed. Use a cold pack or bag of ice wrapped in a thin towel. Never apply a cold source directly to the skin.  · If you have cuts or scrapes as a result of your head injury, care for them as directed.  · For the next 24 hours (or longer, if instructed):  ¨ Dont drink alcohol or use sedatives or other medicines that make you sleepy.  ¨ Dont drive or operate machinery.  ¨ Dont do anything strenuous, such as heavy lifting or straining.  ¨ Limit tasks that require concentration. This includes reading, using a smartphone or computer, watching TV, and playing video games.  ¨ Dont return to sports or other activities that could result in another head injury.  Follow-up care  Follow up with your healthcare  provider, or as directed. If imaging tests were done, they will be reviewed by a doctor. You will be told the results and any new findings that may affect your care.  When to seek medical advice  Call your healthcare provider right away if any of these occur:  · Pain doesnt get better or worsens  · New or increased swelling or bruising  · Fever of 100.4°F (38°C) or higher, or as directed by your provider  · Increased redness, warmth, drainage, or bleeding from the injured area  · Fluid drainage or bleeding from the nose or ears  · Any depression or bony abnormality in the injured area  Date Last Reviewed: 9/26/2015 © 2000-2017 [employer]. [address] [place] [address], [place] [address]. All rights reserved. This information is not intended as a substitute for professional medical care. Always follow your healthcare professional's instructions.      Tylenol (2 pills) every 8 hours as needed for headache  Use cool/warm compresses to head as needed  Self-Care for Headaches  Most headaches aren't serious and can be relieved with self-care. But some headaches may be a sign of another health problem like eye trouble or high blood pressure. To find the best treatment, learn what kind of headaches you get. For tension headaches, self-care will usually help. To treat migraines, ask your healthcare provider for advice. It is also possible to get both tension and migraine headaches. Self-care involves relieving the pain and avoiding headache triggers if you can.    Ways to reduce pain and tension  Try these steps:  · Apply a cold compress or ice pack to the pain site.  · Drink fluids. If nausea makes it hard to drink, try sucking on ice.  · Rest. Protect yourself from bright light and loud noises.  · Calm your emotions by imagining a peaceful scene.  · Massage tight neck, shoulder, and head muscles.  · To relax muscles, soak in a hot bath or use a hot shower.  Use medicines  Aspirin or aspirin substitutes, such as  "ibuprofen and acetaminophen, can relieve headache. Remember: Never give aspirin to anyone 18 years old or younger because of the risk of developing Reye syndrome. Use pain medicines only when necessary.  Track your headaches  Keeping a headache diary can help you and your healthcare provider identify what's causing your headaches:  · Note when each headache happens.  · Identify your activities and the foods you've eaten 6 to 8 hours before the headache began.  · Look for any trends or "triggers."  Signs of tension headache  Any of the following can be signs:  · Dull pain or feeling of pressure in a tight band around your head  · Pain in your neck or shoulders  · Headache without a definite beginning or end  · Headache after an activity such as driving or working on a computer  Signs of migraine  Any of the following can be signs:  · Throbbing pain on one or both sides of your head  · Nausea or vomiting  · Extreme sensitivity to light, sound, and smells  · Bright spots, flashes, or other visual changes  · Pain or nausea so severe that you can't continue your daily activities  Call your healthcare provider   If you have any of the following symptoms, contact your healthcare provider:  · A headache that lingers after a recent injury or bump to the head.  · A fever with a stiff neck or pain when you bend your head toward your chest.  · A headache along with slurred speech, changes in your vision, or numbness or weakness in your arms or legs.  · A headache for longer than 3 days.  · Frequent headaches, especially in the morning.  · Headaches with seizures   · Seek immediate medical attention if you have a headache that you would call "the worst headache you have ever had."   Date Last Reviewed: 10/4/2015  © 5257-7446 [employer]. [address] [place] [address], [place] [address]. All rights reserved. This information is not intended as a substitute for professional medical care. Always follow your healthcare " professional's instructions.

## 2019-10-09 ENCOUNTER — ANTI-COAG VISIT (OUTPATIENT)
Dept: CARDIOLOGY | Facility: CLINIC | Age: 84
End: 2019-10-09
Payer: MEDICARE

## 2019-10-09 DIAGNOSIS — I48.0 PAROXYSMAL ATRIAL FIBRILLATION: Chronic | ICD-10-CM

## 2019-10-09 DIAGNOSIS — Z86.73 H/O: CVA (CEREBROVASCULAR ACCIDENT): Chronic | ICD-10-CM

## 2019-10-09 DIAGNOSIS — Z79.01 LONG TERM (CURRENT) USE OF ANTICOAGULANTS: Primary | ICD-10-CM

## 2019-10-09 DIAGNOSIS — Z86.718 HISTORY OF DVT OF LOWER EXTREMITY: ICD-10-CM

## 2019-10-09 LAB — INR PPP: 3.8 (ref 2.5–3.5)

## 2019-10-09 PROCEDURE — 93793 ANTICOAG MGMT PT WARFARIN: CPT | Mod: HCNC,S$GLB,,

## 2019-10-09 PROCEDURE — 93793 PR ANTICOAGULANT MGMT FOR PT TAKING WARFARIN: ICD-10-PCS | Mod: HCNC,S$GLB,,

## 2019-10-09 PROCEDURE — 85610 PROTHROMBIN TIME: CPT | Mod: QW,HCNC,S$GLB, | Performed by: INTERNAL MEDICINE

## 2019-10-09 PROCEDURE — 85610 POCT INR: ICD-10-PCS | Mod: QW,HCNC,S$GLB, | Performed by: INTERNAL MEDICINE

## 2019-10-09 NOTE — PROGRESS NOTES
Patient's INR is supra-therapeutic at 3.8.  Spironolactone has been discontinued - interactions discussed (medication has potential to decrease INR) .  No signs of bleeding noted; advised patient to seek immediate medical attention if she notices any abnormal bleeding.  Instructions given for patient to lower dose of warfarin to 0.5mg every evening except on Wednesdays. Dose of warfarin will be held on Wednesdays. Patient/caregiver voiced understanding.   Recheck on 10/22/19 with labs.

## 2019-10-11 DIAGNOSIS — I25.10 CORONARY ARTERY DISEASE INVOLVING NATIVE CORONARY ARTERY OF NATIVE HEART WITHOUT ANGINA PECTORIS: ICD-10-CM

## 2019-10-11 RX ORDER — PRAVASTATIN SODIUM 20 MG/1
TABLET ORAL
Qty: 90 TABLET | Refills: 4 | Status: SHIPPED | OUTPATIENT
Start: 2019-10-11 | End: 2020-11-04 | Stop reason: SDUPTHER

## 2019-10-18 ENCOUNTER — CLINICAL SUPPORT (OUTPATIENT)
Dept: CARDIOLOGY | Facility: CLINIC | Age: 84
End: 2019-10-18
Attending: INTERNAL MEDICINE
Payer: MEDICARE

## 2019-10-18 DIAGNOSIS — Z95.0 CARDIAC PACEMAKER IN SITU: ICD-10-CM

## 2019-10-18 DIAGNOSIS — R00.1 SYMPTOMATIC BRADYCARDIA: ICD-10-CM

## 2019-10-18 PROCEDURE — 93294 REM INTERROG EVL PM/LDLS PM: CPT | Mod: HCNC,S$GLB,, | Performed by: INTERNAL MEDICINE

## 2019-10-18 PROCEDURE — 93294 PACEMAKER REMOTE: ICD-10-PCS | Mod: HCNC,S$GLB,, | Performed by: INTERNAL MEDICINE

## 2019-10-18 PROCEDURE — 93296 PACEMAKER REMOTE: ICD-10-PCS | Mod: HCNC,S$GLB,, | Performed by: INTERNAL MEDICINE

## 2019-10-18 PROCEDURE — 93296 REM INTERROG EVL PM/IDS: CPT | Mod: HCNC,S$GLB,, | Performed by: INTERNAL MEDICINE

## 2019-10-22 ENCOUNTER — LAB VISIT (OUTPATIENT)
Dept: LAB | Facility: HOSPITAL | Age: 84
End: 2019-10-22
Attending: PEDIATRICS
Payer: MEDICARE

## 2019-10-22 ENCOUNTER — ANTI-COAG VISIT (OUTPATIENT)
Dept: CARDIOLOGY | Facility: CLINIC | Age: 84
End: 2019-10-22
Payer: MEDICARE

## 2019-10-22 DIAGNOSIS — N18.30 CKD (CHRONIC KIDNEY DISEASE) STAGE 3, GFR 30-59 ML/MIN: Chronic | ICD-10-CM

## 2019-10-22 DIAGNOSIS — I48.0 PAROXYSMAL ATRIAL FIBRILLATION: Chronic | ICD-10-CM

## 2019-10-22 DIAGNOSIS — E78.00 PURE HYPERCHOLESTEROLEMIA: Chronic | ICD-10-CM

## 2019-10-22 DIAGNOSIS — Z79.01 LONG TERM (CURRENT) USE OF ANTICOAGULANTS: ICD-10-CM

## 2019-10-22 DIAGNOSIS — Z86.73 H/O: CVA (CEREBROVASCULAR ACCIDENT): Chronic | ICD-10-CM

## 2019-10-22 DIAGNOSIS — E03.4 HYPOTHYROIDISM DUE TO ACQUIRED ATROPHY OF THYROID: Chronic | ICD-10-CM

## 2019-10-22 DIAGNOSIS — Z86.718 HISTORY OF DVT OF LOWER EXTREMITY: ICD-10-CM

## 2019-10-22 DIAGNOSIS — E61.1 IRON DEFICIENCY: ICD-10-CM

## 2019-10-22 LAB
ALT SERPL W/O P-5'-P-CCNC: 11 U/L (ref 10–44)
ANION GAP SERPL CALC-SCNC: 8 MMOL/L (ref 8–16)
AST SERPL-CCNC: 19 U/L (ref 10–40)
BASOPHILS # BLD AUTO: 0.04 K/UL (ref 0–0.2)
BASOPHILS NFR BLD: 0.8 % (ref 0–1.9)
BUN SERPL-MCNC: 29 MG/DL (ref 10–30)
CALCIUM SERPL-MCNC: 9.2 MG/DL (ref 8.7–10.5)
CHLORIDE SERPL-SCNC: 105 MMOL/L (ref 95–110)
CHOLEST SERPL-MCNC: 185 MG/DL (ref 120–199)
CHOLEST/HDLC SERPL: 4.3 {RATIO} (ref 2–5)
CO2 SERPL-SCNC: 28 MMOL/L (ref 23–29)
CREAT SERPL-MCNC: 1.4 MG/DL (ref 0.5–1.4)
DIFFERENTIAL METHOD: ABNORMAL
EOSINOPHIL # BLD AUTO: 0.2 K/UL (ref 0–0.5)
EOSINOPHIL NFR BLD: 4.4 % (ref 0–8)
ERYTHROCYTE [DISTWIDTH] IN BLOOD BY AUTOMATED COUNT: 13.4 % (ref 11.5–14.5)
EST. GFR  (AFRICAN AMERICAN): 36.8 ML/MIN/1.73 M^2
EST. GFR  (NON AFRICAN AMERICAN): 32 ML/MIN/1.73 M^2
FERRITIN SERPL-MCNC: 68 NG/ML (ref 20–300)
GLUCOSE SERPL-MCNC: 92 MG/DL (ref 70–110)
HCT VFR BLD AUTO: 37.5 % (ref 37–48.5)
HCYS SERPL-SCNC: 16.3 UMOL/L (ref 4–15.5)
HDLC SERPL-MCNC: 43 MG/DL (ref 40–75)
HDLC SERPL: 23.2 % (ref 20–50)
HGB BLD-MCNC: 12.1 G/DL (ref 12–16)
IMM GRANULOCYTES # BLD AUTO: 0.01 K/UL (ref 0–0.04)
IMM GRANULOCYTES NFR BLD AUTO: 0.2 % (ref 0–0.5)
INR PPP: 2.2 (ref 0.8–1.2)
IRON SERPL-MCNC: 78 UG/DL (ref 30–160)
LDLC SERPL CALC-MCNC: 116 MG/DL (ref 63–159)
LYMPHOCYTES # BLD AUTO: 1.5 K/UL (ref 1–4.8)
LYMPHOCYTES NFR BLD: 28.7 % (ref 18–48)
MCH RBC QN AUTO: 31.6 PG (ref 27–31)
MCHC RBC AUTO-ENTMCNC: 32.3 G/DL (ref 32–36)
MCV RBC AUTO: 98 FL (ref 82–98)
MONOCYTES # BLD AUTO: 0.5 K/UL (ref 0.3–1)
MONOCYTES NFR BLD: 8.6 % (ref 4–15)
NEUTROPHILS # BLD AUTO: 3 K/UL (ref 1.8–7.7)
NEUTROPHILS NFR BLD: 57.5 % (ref 38–73)
NONHDLC SERPL-MCNC: 142 MG/DL
NRBC BLD-RTO: 0 /100 WBC
PLATELET # BLD AUTO: 198 K/UL (ref 150–350)
PMV BLD AUTO: 11.1 FL (ref 9.2–12.9)
POTASSIUM SERPL-SCNC: 4.5 MMOL/L (ref 3.5–5.1)
PROTHROMBIN TIME: 22.3 SEC (ref 9–12.5)
RBC # BLD AUTO: 3.83 M/UL (ref 4–5.4)
SATURATED IRON: 22 % (ref 20–50)
SODIUM SERPL-SCNC: 141 MMOL/L (ref 136–145)
T4 FREE SERPL-MCNC: 1.13 NG/DL (ref 0.71–1.51)
TOTAL IRON BINDING CAPACITY: 360 UG/DL (ref 250–450)
TRANSFERRIN SERPL-MCNC: 243 MG/DL (ref 200–375)
TRIGL SERPL-MCNC: 130 MG/DL (ref 30–150)
TSH SERPL DL<=0.005 MIU/L-ACNC: 4.66 UIU/ML (ref 0.4–4)
WBC # BLD AUTO: 5.22 K/UL (ref 3.9–12.7)

## 2019-10-22 PROCEDURE — 84450 TRANSFERASE (AST) (SGOT): CPT | Mod: HCNC

## 2019-10-22 PROCEDURE — 85025 COMPLETE CBC W/AUTO DIFF WBC: CPT | Mod: HCNC

## 2019-10-22 PROCEDURE — 83540 ASSAY OF IRON: CPT | Mod: HCNC

## 2019-10-22 PROCEDURE — 80061 LIPID PANEL: CPT | Mod: HCNC

## 2019-10-22 PROCEDURE — 80048 BASIC METABOLIC PNL TOTAL CA: CPT | Mod: HCNC

## 2019-10-22 PROCEDURE — 83090 ASSAY OF HOMOCYSTEINE: CPT | Mod: HCNC

## 2019-10-22 PROCEDURE — 93793 PR ANTICOAGULANT MGMT FOR PT TAKING WARFARIN: ICD-10-PCS | Mod: S$GLB,,,

## 2019-10-22 PROCEDURE — 93793 ANTICOAG MGMT PT WARFARIN: CPT | Mod: S$GLB,,,

## 2019-10-22 PROCEDURE — 83921 ORGANIC ACID SINGLE QUANT: CPT | Mod: HCNC

## 2019-10-22 PROCEDURE — 84443 ASSAY THYROID STIM HORMONE: CPT | Mod: HCNC

## 2019-10-22 PROCEDURE — 84439 ASSAY OF FREE THYROXINE: CPT | Mod: HCNC

## 2019-10-22 PROCEDURE — 85610 PROTHROMBIN TIME: CPT | Mod: HCNC

## 2019-10-22 PROCEDURE — 82728 ASSAY OF FERRITIN: CPT | Mod: HCNC

## 2019-10-22 PROCEDURE — 84460 ALANINE AMINO (ALT) (SGPT): CPT | Mod: HCNC

## 2019-10-22 PROCEDURE — 36415 COLL VENOUS BLD VENIPUNCTURE: CPT | Mod: HCNC

## 2019-10-23 ENCOUNTER — TELEPHONE (OUTPATIENT)
Dept: INTERNAL MEDICINE | Facility: CLINIC | Age: 84
End: 2019-10-23

## 2019-10-23 NOTE — TELEPHONE ENCOUNTER
Returned call to pt regarding missed call from clinic. I advised her that the call was not from Dr. Rosales's office. It was from the coumadin clinic with [facility]. I advised her of phone number, [phone], to return call. Pt verbalized understanding.

## 2019-10-23 NOTE — PROGRESS NOTES
Patient's INR is sub-therapeutic at 2.2. Patient contacted.  No missed doses or significant changes reported.  Mrs. Posey has been consistent with vitamin K intake (2 servings per week).  No abnormal pain, swelling or weakness noted.  Instructions given for patient to take warfarin 0.5mg every evening until her next INR check.  Patient repeated directions and voiced understanding.  Follow-up on 10/30/19 at [facility].

## 2019-10-23 NOTE — TELEPHONE ENCOUNTER
----- Message from Kassidy Sylvester sent at 10/23/2019  1:48 PM CDT -----  Contact: Pt  Type:  Patient Returning Call    Who Called:PT  Who Left Message for Patient:NURSE/MA  Does the patient know what this is regarding?:YES  Would the patient rather a call back or a response via [facility]? CALL BACK  Best Call Back Number:[phone]  Additional Information:

## 2019-10-23 NOTE — TELEPHONE ENCOUNTER
----- Message from Chapis Washington sent at 10/23/2019  2:26 PM CDT -----  .Type:  Patient Returning Call    Who Called:self  Who Left Message for Patient:MELLISA  Does the patient know what this is regarding?:yes  Would the patient rather a call back or a response via [facility]? call  Best Call Back Number:.[phone] (home)   Additional Information:

## 2019-10-25 LAB — METHYLMALONATE SERPL-SCNC: 0.31 UMOL/L

## 2019-10-30 ENCOUNTER — OFFICE VISIT (OUTPATIENT)
Dept: INTERNAL MEDICINE | Facility: CLINIC | Age: 84
End: 2019-10-30
Payer: MEDICARE

## 2019-10-30 VITALS
SYSTOLIC BLOOD PRESSURE: 136 MMHG | TEMPERATURE: 97 F | BODY MASS INDEX: 26.2 KG/M2 | DIASTOLIC BLOOD PRESSURE: 60 MMHG | WEIGHT: 147.94 LBS | OXYGEN SATURATION: 96 % | HEART RATE: 75 BPM

## 2019-10-30 VITALS
SYSTOLIC BLOOD PRESSURE: 136 MMHG | HEIGHT: 63 IN | DIASTOLIC BLOOD PRESSURE: 60 MMHG | BODY MASS INDEX: 26.21 KG/M2 | WEIGHT: 147.94 LBS | TEMPERATURE: 97 F | OXYGEN SATURATION: 96 % | HEART RATE: 75 BPM

## 2019-10-30 DIAGNOSIS — I25.10 CORONARY ARTERY DISEASE INVOLVING NATIVE CORONARY ARTERY OF NATIVE HEART WITHOUT ANGINA PECTORIS: Chronic | ICD-10-CM

## 2019-10-30 DIAGNOSIS — E83.42 HYPOMAGNESEMIA: ICD-10-CM

## 2019-10-30 DIAGNOSIS — N18.30 CKD (CHRONIC KIDNEY DISEASE) STAGE 3, GFR 30-59 ML/MIN: Chronic | ICD-10-CM

## 2019-10-30 DIAGNOSIS — I51.89 LEFT VENTRICULAR DIASTOLIC DYSFUNCTION WITH PRESERVED SYSTOLIC FUNCTION: Chronic | ICD-10-CM

## 2019-10-30 DIAGNOSIS — I48.0 PAROXYSMAL ATRIAL FIBRILLATION: Chronic | ICD-10-CM

## 2019-10-30 DIAGNOSIS — I70.0 ATHEROSCLEROSIS OF AORTA: ICD-10-CM

## 2019-10-30 DIAGNOSIS — I65.23 BILATERAL CAROTID ARTERY STENOSIS: ICD-10-CM

## 2019-10-30 DIAGNOSIS — E03.8 OTHER SPECIFIED HYPOTHYROIDISM: ICD-10-CM

## 2019-10-30 DIAGNOSIS — E78.00 PURE HYPERCHOLESTEROLEMIA: Primary | Chronic | ICD-10-CM

## 2019-10-30 DIAGNOSIS — E03.4 HYPOTHYROIDISM DUE TO ACQUIRED ATROPHY OF THYROID: Chronic | ICD-10-CM

## 2019-10-30 DIAGNOSIS — I44.2 THIRD DEGREE AV BLOCK: Chronic | ICD-10-CM

## 2019-10-30 DIAGNOSIS — Z95.0 CARDIAC PACEMAKER: ICD-10-CM

## 2019-10-30 DIAGNOSIS — I95.1 ORTHOSTATIC HYPOTENSION: ICD-10-CM

## 2019-10-30 DIAGNOSIS — R55 SYNCOPE, UNSPECIFIED SYNCOPE TYPE: ICD-10-CM

## 2019-10-30 DIAGNOSIS — E78.00 PURE HYPERCHOLESTEROLEMIA: Chronic | ICD-10-CM

## 2019-10-30 DIAGNOSIS — M81.0 OSTEOPOROSIS, UNSPECIFIED OSTEOPOROSIS TYPE, UNSPECIFIED PATHOLOGICAL FRACTURE PRESENCE: ICD-10-CM

## 2019-10-30 DIAGNOSIS — E61.1 IRON DEFICIENCY: ICD-10-CM

## 2019-10-30 DIAGNOSIS — Z95.2 S/P MITRAL VALVE REPLACEMENT: Chronic | ICD-10-CM

## 2019-10-30 DIAGNOSIS — G62.9 POLYNEUROPATHY: ICD-10-CM

## 2019-10-30 DIAGNOSIS — Z86.73 H/O: CVA (CEREBROVASCULAR ACCIDENT): Chronic | ICD-10-CM

## 2019-10-30 DIAGNOSIS — Z79.01 CHRONIC ANTICOAGULATION: ICD-10-CM

## 2019-10-30 DIAGNOSIS — F32.0 DEPRESSION, MAJOR, SINGLE EPISODE, MILD: ICD-10-CM

## 2019-10-30 DIAGNOSIS — I27.9 PULMONARY HEART DISEASE: ICD-10-CM

## 2019-10-30 DIAGNOSIS — G57.11 MERALGIA PARESTHETICA OF RIGHT SIDE: ICD-10-CM

## 2019-10-30 DIAGNOSIS — K57.30 DIVERTICULOSIS OF SIGMOID COLON: ICD-10-CM

## 2019-10-30 DIAGNOSIS — Z00.00 ENCOUNTER FOR PREVENTIVE HEALTH EXAMINATION: Primary | ICD-10-CM

## 2019-10-30 DIAGNOSIS — Z96.1 PSEUDOPHAKIA: ICD-10-CM

## 2019-10-30 PROCEDURE — 99999 PR PBB SHADOW E&M-EST. PATIENT-LVL III: CPT | Mod: PBBFAC,HCNC,, | Performed by: NURSE PRACTITIONER

## 2019-10-30 PROCEDURE — 99214 OFFICE O/P EST MOD 30 MIN: CPT | Mod: HCNC,25,S$GLB, | Performed by: PEDIATRICS

## 2019-10-30 PROCEDURE — 90662 IIV NO PRSV INCREASED AG IM: CPT | Mod: HCNC,S$GLB,, | Performed by: PEDIATRICS

## 2019-10-30 PROCEDURE — 90662 FLU VACCINE - HIGH DOSE (65+) PRESERVATIVE FREE IM: ICD-10-PCS | Mod: HCNC,S$GLB,, | Performed by: PEDIATRICS

## 2019-10-30 PROCEDURE — G0439 PR MEDICARE ANNUAL WELLNESS SUBSEQUENT VISIT: ICD-10-PCS | Mod: HCNC,S$GLB,, | Performed by: NURSE PRACTITIONER

## 2019-10-30 PROCEDURE — G0008 FLU VACCINE - HIGH DOSE (65+) PRESERVATIVE FREE IM: ICD-10-PCS | Mod: HCNC,S$GLB,, | Performed by: PEDIATRICS

## 2019-10-30 PROCEDURE — 99214 PR OFFICE/OUTPT VISIT, EST, LEVL IV, 30-39 MIN: ICD-10-PCS | Mod: HCNC,25,S$GLB, | Performed by: PEDIATRICS

## 2019-10-30 PROCEDURE — 99999 PR PBB SHADOW E&M-EST. PATIENT-LVL III: CPT | Mod: PBBFAC,HCNC,, | Performed by: PEDIATRICS

## 2019-10-30 PROCEDURE — 1101F PR PT FALLS ASSESS DOC 0-1 FALLS W/OUT INJ PAST YR: ICD-10-PCS | Mod: HCNC,CPTII,S$GLB, | Performed by: PEDIATRICS

## 2019-10-30 PROCEDURE — 99999 PR PBB SHADOW E&M-EST. PATIENT-LVL III: ICD-10-PCS | Mod: PBBFAC,HCNC,, | Performed by: PEDIATRICS

## 2019-10-30 PROCEDURE — G0439 PPPS, SUBSEQ VISIT: HCPCS | Mod: HCNC,S$GLB,, | Performed by: NURSE PRACTITIONER

## 2019-10-30 PROCEDURE — 1101F PT FALLS ASSESS-DOCD LE1/YR: CPT | Mod: HCNC,CPTII,S$GLB, | Performed by: PEDIATRICS

## 2019-10-30 PROCEDURE — G0008 ADMIN INFLUENZA VIRUS VAC: HCPCS | Mod: HCNC,S$GLB,, | Performed by: PEDIATRICS

## 2019-10-30 PROCEDURE — 99999 PR PBB SHADOW E&M-EST. PATIENT-LVL III: ICD-10-PCS | Mod: PBBFAC,HCNC,, | Performed by: NURSE PRACTITIONER

## 2019-10-30 RX ORDER — LEVOTHYROXINE SODIUM 50 UG/1
TABLET ORAL
Qty: 135 TABLET | Refills: 3 | Status: SHIPPED | OUTPATIENT
Start: 2019-10-30 | End: 2021-01-13 | Stop reason: SDUPTHER

## 2019-10-30 NOTE — PROGRESS NOTES
Subjective:       Patient ID: Josy Posey is a 89 y.o. female.     Chief Complaint: Follow-up;      Hypothyroid:asymptomatic, no swallowing difficulties, no hyper/hypothyroid symptoms  Lipids: on pravastatin  Depression: stable on lexapro, no HI/SI  Polyneuropathy is still quite symptomatic, in wheel chair with traveling, uses walker at home. She self stopped gabapentin. More problematic at night but she states does not interfere with sleep. Is fall risk  Cardiac/PVD/Carotid artery disease: develops HE/SOB easily. Followed in cardiology. In coumadin and pacer clinic.           Review of Systems   Constitutional: Negative for fever and unexpected weight change.   HENT: Negative for congestion and rhinorrhea.    Eyes: Negative for discharge and redness.   Respiratory: Positive for chest tightness and shortness of breath. Negative for cough and wheezing.    Cardiovascular: Positive for chest pain, palpitations and leg swelling.   Gastrointestinal: Negative for constipation, diarrhea and vomiting.   Endocrine: Negative for cold intolerance, heat intolerance, polydipsia, polyphagia and polyuria.   Genitourinary: Negative for decreased urine volume, difficulty urinating and menstrual problem.   Musculoskeletal: Positive for myalgias. Negative for arthralgias and joint swelling.   Skin: Negative for rash and wound.   Neurological: Negative for syncope and headaches.   Psychiatric/Behavioral: Positive for dysphoric mood. Negative for behavioral problems, self-injury, sleep disturbance and suicidal ideas. The patient is nervous/anxious.         Stable       Objective:   Physical Exam   Constitutional: She is oriented to person, place, and time. She appears well-developed and well-nourished. No distress.   Neck: No JVD present. No thyromegaly present.   Cardiovascular: Normal rate, regular rhythm and normal heart sounds.   No murmur heard.  Pacer pouch in place   Pulmonary/Chest: Effort normal and breath sounds normal.  No respiratory distress. She has no wheezes. She has no rales.   Abdominal: Soft. She exhibits no distension and no mass. There is no tenderness. There is no guarding.   Musculoskeletal: She exhibits edema (trace edema).   Lymphadenopathy:     She has no cervical adenopathy.   Neurological: She is alert and oriented to person, place, and time. No cranial nerve deficit. Coordination normal.   Skin: Capillary refill takes less than 2 seconds. No rash noted.   Psychiatric: She has a normal mood and affect. Her behavior is normal. Judgment and thought content normal.       Assessment:       1. Pure hypercholesterolemia    2. CKD (chronic kidney disease) stage 3, GFR 30-59 ml/min    3. Paroxysmal atrial fibrillation    4. Coronary artery disease involving native coronary artery of native heart without angina pectoris    5. Chronic diastolic HFpEF of 60% per echo 5/2017    6. Hypothyroidism due to acquired atrophy of thyroid    7. Third degree AV block s/p PPM    8. Severe MR with remote mitral valve replacement on chronic anticoagulation    9. H/O CVA (cerebrovascular accident)    10. Depression, major, single episode, mild    11. Orthostatic hypotension    12. Iron deficiency    13. Atherosclerosis of aorta    14. Osteoporosis, unspecified osteoporosis type, unspecified pathological fracture presence    15. Bilateral carotid artery stenosis    16. Polyneuropathy    17. Pulmonary heart disease    18. Hypomagnesemia    19. Cardiac pacemaker        Plan:     Meds reviewed, increase synthroid to 50 mcg M-F and 75 mcg S./S. TSH in 6 weeks. Keep cardiac subspecialty care. Offered PT, she will consider. Overall she is doing as well as she can. Her B/P and lipids for risk mod are at or near goals. F/U 6 months with labs.

## 2019-10-30 NOTE — PATIENT INSTRUCTIONS
Counseling and Referral of Other Preventative  (Italic type indicates deductible and co-insurance are waived)    Patient Name: Josy Posey  Today's Date: 10/30/2019    Health Maintenance       Date Due Completion Date    TETANUS VACCINE 09/18/1942 ---    Shingles Vaccine (2 of 3) 10/09/2007 8/14/2007    Pneumococcal Vaccine (65+ High/Highest Risk) (2 of 2 - PPSV23) 03/21/2016 1/25/2016    DEXA SCAN 12/21/2019 12/21/2017 (Declined)    Override on 12/21/2017: Declined    Override on 7/1/2015: Declined (Reports will discuss with PCP in August Appt.)    Override on 3/27/2013: Done (Osteoporosis; HIgh Fx. Risk; Repeat in 2 years)    Lipid Panel 10/22/2024 10/22/2019        No orders of the defined types were placed in this encounter.    The following information is provided to all patients.  This information is to help you find resources for any of the problems found today that may be affecting your health:                Living healthy guide: www.[facility].[place].gov      Understanding Diabetes: www.diabetes.org      Eating healthy: www.cdc.gov/healthyweight      CDC home safety checklist: www.cdc.gov/steadi/patient.html      Agency on Aging: www.goea.[place].gov      Alcoholics anonymous (AA): www.aa.org      Physical Activity: www.ricardo.nih.gov/hy0yxwj      Tobacco use: www.quitwithusla.org

## 2019-11-04 ENCOUNTER — ANTI-COAG VISIT (OUTPATIENT)
Dept: CARDIOLOGY | Facility: CLINIC | Age: 84
End: 2019-11-04
Payer: MEDICARE

## 2019-11-04 DIAGNOSIS — Z86.718 HISTORY OF DVT OF LOWER EXTREMITY: ICD-10-CM

## 2019-11-04 DIAGNOSIS — Z79.01 LONG TERM (CURRENT) USE OF ANTICOAGULANTS: Primary | ICD-10-CM

## 2019-11-04 DIAGNOSIS — Z86.73 H/O: CVA (CEREBROVASCULAR ACCIDENT): Chronic | ICD-10-CM

## 2019-11-04 LAB — INR PPP: 2.6 (ref 2.5–3.5)

## 2019-11-04 PROCEDURE — 93793 ANTICOAG MGMT PT WARFARIN: CPT | Mod: HCNC,S$GLB,,

## 2019-11-04 PROCEDURE — 85610 PROTHROMBIN TIME: CPT | Mod: QW,HCNC,S$GLB, | Performed by: INTERNAL MEDICINE

## 2019-11-04 PROCEDURE — 85610 POCT INR: ICD-10-PCS | Mod: QW,HCNC,S$GLB, | Performed by: INTERNAL MEDICINE

## 2019-11-04 PROCEDURE — 93793 PR ANTICOAGULANT MGMT FOR PT TAKING WARFARIN: ICD-10-PCS | Mod: HCNC,S$GLB,,

## 2019-11-04 NOTE — PROGRESS NOTES
Patient's INR is therapeutic at 2.6.  No upcoming procedures or changes reported.  Previous instructions followed.   Instructions given for patient to continue current dose of warfarin 0.5mg every evening.   Patient voiced understanding.   Recheck in 1 week.  Please call should you have any questions or concerns at [phone] or [phone].

## 2019-11-06 ENCOUNTER — TELEPHONE (OUTPATIENT)
Dept: CARDIOLOGY | Facility: HOSPITAL | Age: 84
End: 2019-11-06

## 2019-11-10 DIAGNOSIS — E03.8 OTHER SPECIFIED HYPOTHYROIDISM: ICD-10-CM

## 2019-11-11 RX ORDER — LEVOTHYROXINE SODIUM 50 UG/1
TABLET ORAL
Qty: 90 TABLET | Refills: 3 | Status: ON HOLD | OUTPATIENT
Start: 2019-11-11 | End: 2020-01-29 | Stop reason: HOSPADM

## 2019-11-11 RX ORDER — ESCITALOPRAM OXALATE 10 MG/1
TABLET ORAL
Qty: 90 TABLET | Refills: 3 | Status: ON HOLD | OUTPATIENT
Start: 2019-11-11 | End: 2020-01-29 | Stop reason: HOSPADM

## 2019-11-13 ENCOUNTER — ANTI-COAG VISIT (OUTPATIENT)
Dept: CARDIOLOGY | Facility: CLINIC | Age: 84
End: 2019-11-13
Payer: MEDICARE

## 2019-11-13 DIAGNOSIS — Z79.01 LONG TERM (CURRENT) USE OF ANTICOAGULANTS: Primary | ICD-10-CM

## 2019-11-13 DIAGNOSIS — Z86.718 HISTORY OF DVT OF LOWER EXTREMITY: ICD-10-CM

## 2019-11-13 DIAGNOSIS — Z86.73 H/O: CVA (CEREBROVASCULAR ACCIDENT): Chronic | ICD-10-CM

## 2019-11-13 DIAGNOSIS — I48.0 PAROXYSMAL ATRIAL FIBRILLATION: Chronic | ICD-10-CM

## 2019-11-13 LAB — INR PPP: 3.1 (ref 2.5–3.5)

## 2019-11-13 PROCEDURE — 93793 PR ANTICOAGULANT MGMT FOR PT TAKING WARFARIN: ICD-10-PCS | Mod: HCNC,S$GLB,,

## 2019-11-13 PROCEDURE — 93793 ANTICOAG MGMT PT WARFARIN: CPT | Mod: HCNC,S$GLB,,

## 2019-11-13 PROCEDURE — 85610 POCT INR: ICD-10-PCS | Mod: QW,HCNC,S$GLB, | Performed by: INTERNAL MEDICINE

## 2019-11-13 PROCEDURE — 85610 PROTHROMBIN TIME: CPT | Mod: QW,HCNC,S$GLB, | Performed by: INTERNAL MEDICINE

## 2019-11-13 NOTE — PROGRESS NOTES
Patient's INR is therapeutic at 3.1.  No upcoming procedures or changes reported.  No changes in dose.  Continue current dose of warfarin 0.5mg every evening.  Patient voiced understanding.   Recheck on 11/25/19.  Please call should you have any questions or concerns at [phone] or [phone].

## 2019-11-19 NOTE — PROGRESS NOTES
Josy Posey presented for a  Medicare AWV and comprehensive Health Risk Assessment today. The following components were reviewed and updated:    · Medical history  · Family History  · Social history  · Allergies and Current Medications  · Health Risk Assessment  · Health Maintenance  · Care Team     ** See Completed Assessments for Annual Wellness Visit within the encounter summary.**       The following assessments were completed:  · Living Situation  · CAGE  · Depression Screening  · Timed Get Up and Go  · Whisper Test  · Cognitive Function Screening  · Nutrition Screening  · ADL Screening  · PAQ Screening    Vitals:    10/30/19 1329   BP: 136/60   BP Location: Left arm   Patient Position: Sitting   BP Method: Medium (Manual)   Pulse: 75   Temp: 96.7 °F (35.9 °C)   TempSrc: Tympanic   SpO2: 96%   Weight: 67.1 kg (147 lb 14.9 oz)     Body mass index is 26.2 kg/m².  Physical Exam   Constitutional: She is oriented to person, place, and time. She appears well-developed and well-nourished.   HENT:   Head: Normocephalic and atraumatic.   Neck: Normal range of motion.   Cardiovascular: Normal rate and regular rhythm.   Pulmonary/Chest: Effort normal and breath sounds normal.   Musculoskeletal: Normal range of motion.   Neurological: She is alert and oriented to person, place, and time.             Diagnoses and health risks identified today and associated recommendations/orders:    1. Encounter for preventive health examination      2. Meralgia paresthetica of right side  Stable.  Will continue current treatment plan.     3. Polyneuropathy  Stable.  Will continue current treatment plan.    4. Depression, major, single episode, mild  Stable.  Will continue current treatment plan.     5. Pseudophakia  Stable.  Will continue current treatment plan.     6. Pulmonary heart disease  Stable.  Will continue current treatment plan.     7. Atherosclerosis of aorta  Stable.  Will continue current treatment plan.     8. Bilateral  carotid artery stenosis  Stable.  Will continue current treatment plan.     9. Coronary artery disease involving native coronary artery of native heart without angina pectoris  Stable.  Will continue current treatment plan.     10. Cardiac pacemaker  Stable.  Will continue current treatment plan.     11. Chronic diastolic HFpEF of 60% per echo 5/2017  Stable.  Will continue current treatment plan.     12. Orthostatic hypotension  Stable.  Will continue current treatment plan.     13. Paroxysmal atrial fibrillation  Stable.  Will continue current treatment plan.     14. Pure hypercholesterolemia  Stable.  Will continue current treatment plan.   Component      Latest Ref Rng & Units 10/22/2019 7/29/2019   Cholesterol      120 - 199 mg/dL 185 181   Triglycerides      30 - 150 mg/dL 130 112   HDL      40 - 75 mg/dL 43 41   LDL Cholesterol External      63.0 - 159.0 mg/dL 116.0 117.6   Hdl/Cholesterol Ratio      20.0 - 50.0 % 23.2 22.7   Total Cholesterol/HDL Ratio      2.0 - 5.0 4.3 4.4   Non-HDL Cholesterol      mg/dL 142 140     15. Severe MR with remote mitral valve replacement on chronic anticoagulation  Stable.  Will continue current treatment plan.     16. CKD (chronic kidney disease) stage 3, GFR 30-59 ml/min  Stable.  Will continue current treatment plan.     17. Hypomagnesemia  Stable.  Will continue current treatment plan.     18. Chronic anticoagulation  Stable.  Taking warfarin.  Will continue current treatment plan.    19. Hypothyroidism due to acquired atrophy of thyroid  Stable.  Will continue current treatment plan.   Component      Latest Ref Rng & Units 10/22/2019 4/23/2019   TSH      0.400 - 4.000 uIU/mL 4.659 (H) 2.378       20. Iron deficiency  Stable.  Will continue current treatment plan.     21. Diverticulosis of sigmoid colon  Stable.  Will continue current treatment plan.     22. Osteoporosis, unspecified osteoporosis type, unspecified pathological fracture presence  Stable.  Will continue  current treatment plan.     23. Syncope, unspecified syncope type  Stable.  Will continue current treatment plan.       Provided Josy with a 5-10 year written screening schedule and personal prevention plan. Recommendations were developed using the USPSTF age appropriate recommendations. Education, counseling, and referrals were provided as needed. After Visit Summary printed and given to patient which includes a list of additional screenings\tests needed.    Follow up for HRA.    Latonya Rivers NP

## 2019-11-25 ENCOUNTER — ANTI-COAG VISIT (OUTPATIENT)
Dept: CARDIOLOGY | Facility: CLINIC | Age: 84
End: 2019-11-25
Payer: MEDICARE

## 2019-11-25 DIAGNOSIS — Z86.718 HISTORY OF DVT OF LOWER EXTREMITY: ICD-10-CM

## 2019-11-25 DIAGNOSIS — Z86.73 H/O: CVA (CEREBROVASCULAR ACCIDENT): Chronic | ICD-10-CM

## 2019-11-25 DIAGNOSIS — I48.0 PAROXYSMAL ATRIAL FIBRILLATION: Chronic | ICD-10-CM

## 2019-11-25 DIAGNOSIS — Z79.01 LONG TERM (CURRENT) USE OF ANTICOAGULANTS: Primary | ICD-10-CM

## 2019-11-25 LAB — INR PPP: 3 (ref 2.5–3.5)

## 2019-11-25 PROCEDURE — 85610 PROTHROMBIN TIME: CPT | Mod: QW,HCNC,S$GLB, | Performed by: NUCLEAR MEDICINE

## 2019-11-25 PROCEDURE — 85610 POCT INR: ICD-10-PCS | Mod: QW,HCNC,S$GLB, | Performed by: NUCLEAR MEDICINE

## 2019-11-25 PROCEDURE — 93793 ANTICOAG MGMT PT WARFARIN: CPT | Mod: HCNC,S$GLB,,

## 2019-11-25 PROCEDURE — 93793 PR ANTICOAGULANT MGMT FOR PT TAKING WARFARIN: ICD-10-PCS | Mod: HCNC,S$GLB,,

## 2019-11-25 NOTE — PROGRESS NOTES
Patient's INR is therapeutic at 3.0.  No upcoming procedures or changes reported.  No changes in dose.  Continue current dose of warfarin 0.5mg every evening.  Patient voiced understanding.   Recheck INR in 4 weeks.   Please call should you have any questions or concerns at [phone] or [phone].

## 2019-12-11 ENCOUNTER — LAB VISIT (OUTPATIENT)
Dept: LAB | Facility: HOSPITAL | Age: 84
End: 2019-12-11
Attending: PEDIATRICS
Payer: MEDICARE

## 2019-12-11 DIAGNOSIS — E03.4 HYPOTHYROIDISM DUE TO ACQUIRED ATROPHY OF THYROID: Chronic | ICD-10-CM

## 2019-12-11 LAB — TSH SERPL DL<=0.005 MIU/L-ACNC: 2 UIU/ML (ref 0.4–4)

## 2019-12-11 PROCEDURE — 36415 COLL VENOUS BLD VENIPUNCTURE: CPT | Mod: HCNC

## 2019-12-11 PROCEDURE — 84443 ASSAY THYROID STIM HORMONE: CPT | Mod: HCNC

## 2019-12-23 ENCOUNTER — ANTI-COAG VISIT (OUTPATIENT)
Dept: CARDIOLOGY | Facility: CLINIC | Age: 84
End: 2019-12-23
Payer: MEDICARE

## 2019-12-23 DIAGNOSIS — I48.0 PAROXYSMAL ATRIAL FIBRILLATION: Chronic | ICD-10-CM

## 2019-12-23 DIAGNOSIS — Z79.01 LONG TERM (CURRENT) USE OF ANTICOAGULANTS: Primary | ICD-10-CM

## 2019-12-23 DIAGNOSIS — Z86.718 HISTORY OF DVT OF LOWER EXTREMITY: ICD-10-CM

## 2019-12-23 LAB — INR PPP: 3.7 (ref 2.5–3.5)

## 2019-12-23 PROCEDURE — 85610 POCT INR: ICD-10-PCS | Mod: QW,HCNC,S$GLB, | Performed by: NUCLEAR MEDICINE

## 2019-12-23 PROCEDURE — 93793 PR ANTICOAGULANT MGMT FOR PT TAKING WARFARIN: ICD-10-PCS | Mod: HCNC,S$GLB,,

## 2019-12-23 PROCEDURE — 85610 PROTHROMBIN TIME: CPT | Mod: QW,HCNC,S$GLB, | Performed by: NUCLEAR MEDICINE

## 2019-12-23 PROCEDURE — 93793 ANTICOAG MGMT PT WARFARIN: CPT | Mod: HCNC,S$GLB,,

## 2019-12-23 NOTE — PROGRESS NOTES
Patient's INR is supra-therapeutic at 3.7.  No significant changes or extra doses reported.  No signs of bleeding noted; advised patient to seek immediate medical attention if she notices any abnormal bleeding.  Instructions given for patient to hold dose of warfarin on today; then resume current dose of warfarin 0.5mg every evening.  Patient voiced understanding.  Recheck in 2 weeks.

## 2020-01-06 ENCOUNTER — ANTI-COAG VISIT (OUTPATIENT)
Dept: CARDIOLOGY | Facility: CLINIC | Age: 85
End: 2020-01-06
Payer: MEDICARE

## 2020-01-06 DIAGNOSIS — Z95.2 S/P MITRAL VALVE REPLACEMENT: Chronic | ICD-10-CM

## 2020-01-06 DIAGNOSIS — I48.0 PAROXYSMAL ATRIAL FIBRILLATION: Chronic | ICD-10-CM

## 2020-01-06 DIAGNOSIS — Z79.01 LONG TERM (CURRENT) USE OF ANTICOAGULANTS: Primary | ICD-10-CM

## 2020-01-06 LAB — INR PPP: 2.6 (ref 2.5–3.5)

## 2020-01-06 PROCEDURE — 85610 PROTHROMBIN TIME: CPT | Mod: QW,HCNC,S$GLB, | Performed by: INTERNAL MEDICINE

## 2020-01-06 PROCEDURE — 93793 PR ANTICOAGULANT MGMT FOR PT TAKING WARFARIN: ICD-10-PCS | Mod: HCNC,S$GLB,,

## 2020-01-06 PROCEDURE — 93793 ANTICOAG MGMT PT WARFARIN: CPT | Mod: HCNC,S$GLB,,

## 2020-01-06 PROCEDURE — 85610 POCT INR: ICD-10-PCS | Mod: QW,HCNC,S$GLB, | Performed by: INTERNAL MEDICINE

## 2020-01-06 NOTE — PROGRESS NOTES
Patient's INR is therapeutic at 2.6.  Previous instructions were followed.  No other changes reported.  Instructions were given to maintain current dose of Warfarin 0.5 mg every evening.  Dose given and reviewed with patient and patient caregiver - both voiced understanding.  Recheck in 1 month.

## 2020-01-09 DIAGNOSIS — I27.9 PULMONARY HEART DISEASE: ICD-10-CM

## 2020-01-09 RX ORDER — SPIRONOLACTONE 25 MG/1
TABLET ORAL
Qty: 90 TABLET | Refills: 3 | Status: SHIPPED | OUTPATIENT
Start: 2020-01-09 | End: 2021-02-12 | Stop reason: SDUPTHER

## 2020-01-22 DIAGNOSIS — I95.1 ORTHOSTATIC HYPOTENSION: ICD-10-CM

## 2020-01-22 DIAGNOSIS — Z79.01 LONG TERM (CURRENT) USE OF ANTICOAGULANTS: ICD-10-CM

## 2020-01-22 RX ORDER — WARFARIN 1 MG/1
TABLET ORAL
Qty: 50 TABLET | Refills: 3 | Status: SHIPPED | OUTPATIENT
Start: 2020-01-22 | End: 2021-01-17

## 2020-01-23 ENCOUNTER — LAB VISIT (OUTPATIENT)
Dept: LAB | Facility: HOSPITAL | Age: 85
End: 2020-01-23
Attending: INTERNAL MEDICINE
Payer: MEDICARE

## 2020-01-23 ENCOUNTER — CLINICAL SUPPORT (OUTPATIENT)
Dept: CARDIOLOGY | Facility: CLINIC | Age: 85
End: 2020-01-23
Attending: INTERNAL MEDICINE
Payer: MEDICARE

## 2020-01-23 DIAGNOSIS — Z95.0 CARDIAC PACEMAKER: ICD-10-CM

## 2020-01-23 DIAGNOSIS — I25.10 CORONARY ARTERY DISEASE INVOLVING NATIVE CORONARY ARTERY OF NATIVE HEART WITHOUT ANGINA PECTORIS: ICD-10-CM

## 2020-01-23 DIAGNOSIS — I48.91 ATRIAL FIBRILLATION, UNSPECIFIED TYPE: Primary | ICD-10-CM

## 2020-01-23 DIAGNOSIS — I44.2 CHB (COMPLETE HEART BLOCK): ICD-10-CM

## 2020-01-23 DIAGNOSIS — I48.91 ATRIAL FIBRILLATION, UNSPECIFIED TYPE: ICD-10-CM

## 2020-01-23 LAB
ANION GAP SERPL CALC-SCNC: 10 MMOL/L (ref 8–16)
BUN SERPL-MCNC: 32 MG/DL (ref 10–30)
CALCIUM SERPL-MCNC: 9.2 MG/DL (ref 8.7–10.5)
CHLORIDE SERPL-SCNC: 107 MMOL/L (ref 95–110)
CO2 SERPL-SCNC: 24 MMOL/L (ref 23–29)
CREAT SERPL-MCNC: 1.3 MG/DL (ref 0.5–1.4)
EST. GFR  (AFRICAN AMERICAN): 40.3 ML/MIN/1.73 M^2
EST. GFR  (NON AFRICAN AMERICAN): 35 ML/MIN/1.73 M^2
GLUCOSE SERPL-MCNC: 96 MG/DL (ref 70–110)
POTASSIUM SERPL-SCNC: 4.4 MMOL/L (ref 3.5–5.1)
SODIUM SERPL-SCNC: 141 MMOL/L (ref 136–145)

## 2020-01-23 PROCEDURE — 80048 BASIC METABOLIC PNL TOTAL CA: CPT | Mod: HCNC

## 2020-01-23 PROCEDURE — 83880 ASSAY OF NATRIURETIC PEPTIDE: CPT | Mod: HCNC

## 2020-01-23 PROCEDURE — 36415 COLL VENOUS BLD VENIPUNCTURE: CPT | Mod: HCNC

## 2020-01-24 ENCOUNTER — CLINICAL SUPPORT (OUTPATIENT)
Dept: CARDIOLOGY | Facility: CLINIC | Age: 85
End: 2020-01-24
Payer: MEDICARE

## 2020-01-24 ENCOUNTER — HOSPITAL ENCOUNTER (OUTPATIENT)
Dept: RADIOLOGY | Facility: HOSPITAL | Age: 85
Discharge: HOME | End: 2020-01-24
Attending: INTERNAL MEDICINE
Payer: MEDICARE

## 2020-01-24 ENCOUNTER — OFFICE VISIT (OUTPATIENT)
Dept: CARDIOLOGY | Facility: CLINIC | Age: 85
End: 2020-01-24
Payer: MEDICARE

## 2020-01-24 VITALS
HEIGHT: 63 IN | BODY MASS INDEX: 26.52 KG/M2 | SYSTOLIC BLOOD PRESSURE: 138 MMHG | DIASTOLIC BLOOD PRESSURE: 76 MMHG | HEART RATE: 74 BPM | WEIGHT: 149.69 LBS

## 2020-01-24 DIAGNOSIS — Z79.01 CHRONIC ANTICOAGULATION: ICD-10-CM

## 2020-01-24 DIAGNOSIS — E03.4 HYPOTHYROIDISM DUE TO ACQUIRED ATROPHY OF THYROID: Chronic | ICD-10-CM

## 2020-01-24 DIAGNOSIS — Z95.2 S/P MITRAL VALVE REPLACEMENT: Chronic | ICD-10-CM

## 2020-01-24 DIAGNOSIS — I25.10 CORONARY ARTERY DISEASE INVOLVING NATIVE CORONARY ARTERY OF NATIVE HEART WITHOUT ANGINA PECTORIS: Chronic | ICD-10-CM

## 2020-01-24 DIAGNOSIS — I25.10 CORONARY ARTERY DISEASE INVOLVING NATIVE CORONARY ARTERY OF NATIVE HEART WITHOUT ANGINA PECTORIS: ICD-10-CM

## 2020-01-24 DIAGNOSIS — I95.1 ORTHOSTATIC HYPOTENSION: ICD-10-CM

## 2020-01-24 DIAGNOSIS — I44.2 THIRD DEGREE AV BLOCK: Chronic | ICD-10-CM

## 2020-01-24 DIAGNOSIS — I48.0 PAROXYSMAL ATRIAL FIBRILLATION: Chronic | ICD-10-CM

## 2020-01-24 DIAGNOSIS — I48.0 PAROXYSMAL ATRIAL FIBRILLATION: Primary | ICD-10-CM

## 2020-01-24 DIAGNOSIS — I51.89 LEFT VENTRICULAR DIASTOLIC DYSFUNCTION WITH PRESERVED SYSTOLIC FUNCTION: ICD-10-CM

## 2020-01-24 DIAGNOSIS — R06.02 SHORTNESS OF BREATH: ICD-10-CM

## 2020-01-24 DIAGNOSIS — N18.30 CKD (CHRONIC KIDNEY DISEASE) STAGE 3, GFR 30-59 ML/MIN: Chronic | ICD-10-CM

## 2020-01-24 DIAGNOSIS — I48.0 PAROXYSMAL ATRIAL FIBRILLATION: ICD-10-CM

## 2020-01-24 DIAGNOSIS — Z86.718 HISTORY OF DVT OF LOWER EXTREMITY: ICD-10-CM

## 2020-01-24 DIAGNOSIS — I51.89 LEFT VENTRICULAR DIASTOLIC DYSFUNCTION WITH PRESERVED SYSTOLIC FUNCTION: Chronic | ICD-10-CM

## 2020-01-24 DIAGNOSIS — E78.00 PURE HYPERCHOLESTEROLEMIA: Primary | Chronic | ICD-10-CM

## 2020-01-24 DIAGNOSIS — F32.0 DEPRESSION, MAJOR, SINGLE EPISODE, MILD: ICD-10-CM

## 2020-01-24 DIAGNOSIS — I65.23 BILATERAL CAROTID ARTERY STENOSIS: ICD-10-CM

## 2020-01-24 DIAGNOSIS — Z86.73 H/O: CVA (CEREBROVASCULAR ACCIDENT): Chronic | ICD-10-CM

## 2020-01-24 PROCEDURE — 71046 XR CHEST PA AND LATERAL: ICD-10-PCS | Mod: 26,HCNC,, | Performed by: RADIOLOGY

## 2020-01-24 PROCEDURE — 1159F PR MEDICATION LIST DOCUMENTED IN MEDICAL RECORD: ICD-10-PCS | Mod: HCNC,S$GLB,, | Performed by: INTERNAL MEDICINE

## 2020-01-24 PROCEDURE — 1126F PR PAIN SEVERITY QUANTIFIED, NO PAIN PRESENT: ICD-10-PCS | Mod: HCNC,S$GLB,, | Performed by: INTERNAL MEDICINE

## 2020-01-24 PROCEDURE — 71046 X-RAY EXAM CHEST 2 VIEWS: CPT | Mod: 26,HCNC,, | Performed by: RADIOLOGY

## 2020-01-24 PROCEDURE — 99499 UNLISTED E&M SERVICE: CPT | Mod: HCNC,S$GLB,, | Performed by: INTERNAL MEDICINE

## 2020-01-24 PROCEDURE — 1159F MED LIST DOCD IN RCRD: CPT | Mod: HCNC,S$GLB,, | Performed by: INTERNAL MEDICINE

## 2020-01-24 PROCEDURE — 1101F PR PT FALLS ASSESS DOC 0-1 FALLS W/OUT INJ PAST YR: ICD-10-PCS | Mod: HCNC,CPTII,S$GLB, | Performed by: INTERNAL MEDICINE

## 2020-01-24 PROCEDURE — 99999 PR PBB SHADOW E&M-EST. PATIENT-LVL III: CPT | Mod: PBBFAC,HCNC,, | Performed by: INTERNAL MEDICINE

## 2020-01-24 PROCEDURE — 99214 PR OFFICE/OUTPT VISIT, EST, LEVL IV, 30-39 MIN: ICD-10-PCS | Mod: HCNC,S$GLB,, | Performed by: INTERNAL MEDICINE

## 2020-01-24 PROCEDURE — 93005 EKG 12-LEAD: ICD-10-PCS | Mod: HCNC,S$GLB,, | Performed by: INTERNAL MEDICINE

## 2020-01-24 PROCEDURE — 93010 EKG 12-LEAD: ICD-10-PCS | Mod: HCNC,S$GLB,, | Performed by: INTERNAL MEDICINE

## 2020-01-24 PROCEDURE — 1101F PT FALLS ASSESS-DOCD LE1/YR: CPT | Mod: HCNC,CPTII,S$GLB, | Performed by: INTERNAL MEDICINE

## 2020-01-24 PROCEDURE — 93010 ELECTROCARDIOGRAM REPORT: CPT | Mod: HCNC,S$GLB,, | Performed by: INTERNAL MEDICINE

## 2020-01-24 PROCEDURE — 99999 PR PBB SHADOW E&M-EST. PATIENT-LVL III: ICD-10-PCS | Mod: PBBFAC,HCNC,, | Performed by: INTERNAL MEDICINE

## 2020-01-24 PROCEDURE — 93005 ELECTROCARDIOGRAM TRACING: CPT | Mod: HCNC,S$GLB,, | Performed by: INTERNAL MEDICINE

## 2020-01-24 PROCEDURE — 99499 RISK ADDL DX/OHS AUDIT: ICD-10-PCS | Mod: HCNC,S$GLB,, | Performed by: INTERNAL MEDICINE

## 2020-01-24 PROCEDURE — 1126F AMNT PAIN NOTED NONE PRSNT: CPT | Mod: HCNC,S$GLB,, | Performed by: INTERNAL MEDICINE

## 2020-01-24 PROCEDURE — 99214 OFFICE O/P EST MOD 30 MIN: CPT | Mod: HCNC,S$GLB,, | Performed by: INTERNAL MEDICINE

## 2020-01-24 PROCEDURE — 71046 X-RAY EXAM CHEST 2 VIEWS: CPT | Mod: TC,HCNC

## 2020-01-24 NOTE — PROGRESS NOTES
"Subjective:   Patient ID:  Josy Posey is a 89 y.o. female who presents for follow up of Shortness of Breath      HPI  A 89 yo yo female with chung to lad s/p mvr afib chf carotid disease s/p pacer  dvt oral anticoagulation is here for f/u. She has been having nocturnal  Cough has to be elevated has no chest pain has some  leg edema. She feels she is not getting enough has the urge to take deep breath.she ahs been compliant with diet and meds . No recent viral infection or flu like symptoms. 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 fuCT 1/3- stable,  CT's later - no residual    Lens replaced by other means 6/25/2014    Melena     Memory loss     PET scan consistent with Alzzheimers.   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    1 vessel LIMA to LAD    S/P MVR (mitral valve replacement) 9/27/2013    Skin ulcer     Squamous cell carcinoma     skin " cancer X 2    Stroke     Syncope and collapse     Unspecified essential hypertension 9/16/2013    Unspecified transient cerebral ischemia     Urinary incontinence     wears briefs       Past Surgical History:   Procedure Laterality Date    ADENOIDECTOMY      APPENDECTOMY      BREAST SURGERY  1950    right lumpectomy     CARDIAC VALVE SURGERY  10/04/2007    MVR    CATARACT EXTRACTION      CHOLECYSTECTOMY      CORONARY ARTERY BYPASS GRAFT      EYE SURGERY      cataracts bilateral    HYSTERECTOMY      for hydatiform mole    INSERT / REPLACE / REMOVE PACEMAKER  09/11/2001    TONSILLECTOMY         Social History     Tobacco Use    Smoking status: Never Smoker    Smokeless tobacco: Never Used   Substance Use Topics    Alcohol use: No     Alcohol/week: 0.0 standard drinks    Drug use: No       Family History   Problem Relation Age of Onset    Tuberculosis Mother     Tuberculosis Father     Stroke Sister     Hypertension Sister     Stroke Brother     Hypertension Daughter     COPD Brother        Current Outpatient Medications   Medication Sig    acetaminophen (TYLENOL) 500 MG tablet Take 500 mg by mouth as needed for Pain.    amoxicillin (AMOXIL) 500 MG capsule Take 1 capsule by mouth every 6 (six) hours.    ascorbic acid (VITAMIN C) 1000 MG tablet Take 1 tablet by mouth once daily once daily.    aspirin (ECOTRIN) 81 MG EC tablet Take 81 mg by mouth as needed for Pain.    clotrimazole-betamethasone 1-0.05% (LOTRISONE) cream Apply topically 2 (two) times daily. APPT NEEDED!    econazole nitrate 1 % cream Apply topically as needed.     escitalopram oxalate (LEXAPRO) 10 MG tablet Take 1 tablet (10 mg total) by mouth once daily.    escitalopram oxalate (LEXAPRO) 10 MG tablet TAKE 1 TABLET(10 MG) BY MOUTH EVERY DAY    fluticasone (FLONASE) 50 mcg/actuation nasal spray instill 2 sprays into each nostril once daily (Patient taking differently: instill 2 sprays into each nostril once daily as  needed.)    folic acid (FOLVITE) 1 MG tablet Take 1 tablet by mouth once daily once daily.    iron-vitamin C 100-250 mg, ICAR-C, (ICAR-C) 100-250 mg Tab Take 1 tablet by mouth once daily.    Lactoperoxi/Gluc Oxid/Pot Thio (BIOTENE DRY MOUTH MM) by Mucous Membrane route as needed.    levothyroxine (SYNTHROID) 50 MCG tablet 1 po qd m-f, 1.5 sat and sunday    levothyroxine (SYNTHROID) 50 MCG tablet TAKE 1 TABLET(50 MCG) BY MOUTH EVERY DAY    midodrine (PROAMATINE) 5 MG Tab Take 1 tablet (5 mg total) by mouth 4 (four) times daily. During waking hours    omega-3 fatty acids-vitamin E (FISH OIL) 1,000 mg Cap Take 1 capsule by mouth every other day.     pravastatin (PRAVACHOL) 20 MG tablet TAKE 1 TABLET(20 MG) BY MOUTH EVERY DAY    propafenone (RHTHYMOL) 150 MG Tab TAKE 1/2 TABLET BY MOUTH THREE TIMES A DAY    spironolactone (ALDACTONE) 25 MG tablet TAKE 1/2 TABLET BY MOUTH EVERY DAY    vitamin B12-folic acid 0.5-1 mg Tab Take 1 tablet by mouth once daily once daily.    warfarin (COUMADIN) 1 MG tablet Take 1/2 tablet every evening or as directed by the coumadin clinic.     No current facility-administered medications for this visit.      Current Outpatient Medications on File Prior to Visit   Medication Sig    acetaminophen (TYLENOL) 500 MG tablet Take 500 mg by mouth as needed for Pain.    amoxicillin (AMOXIL) 500 MG capsule Take 1 capsule by mouth every 6 (six) hours.    ascorbic acid (VITAMIN C) 1000 MG tablet Take 1 tablet by mouth once daily once daily.    aspirin (ECOTRIN) 81 MG EC tablet Take 81 mg by mouth as needed for Pain.    clotrimazole-betamethasone 1-0.05% (LOTRISONE) cream Apply topically 2 (two) times daily. APPT NEEDED!    econazole nitrate 1 % cream Apply topically as needed.     escitalopram oxalate (LEXAPRO) 10 MG tablet Take 1 tablet (10 mg total) by mouth once daily.    escitalopram oxalate (LEXAPRO) 10 MG tablet TAKE 1 TABLET(10 MG) BY MOUTH EVERY DAY    fluticasone (FLONASE) 50  mcg/actuation nasal spray instill 2 sprays into each nostril once daily (Patient taking differently: instill 2 sprays into each nostril once daily as needed.)    folic acid (FOLVITE) 1 MG tablet Take 1 tablet by mouth once daily once daily.    iron-vitamin C 100-250 mg, ICAR-C, (ICAR-C) 100-250 mg Tab Take 1 tablet by mouth once daily.    Lactoperoxi/Gluc Oxid/Pot Thio (BIOTENE DRY MOUTH MM) by Mucous Membrane route as needed.    levothyroxine (SYNTHROID) 50 MCG tablet 1 po qd m-f, 1.5 sat and sunday    levothyroxine (SYNTHROID) 50 MCG tablet TAKE 1 TABLET(50 MCG) BY MOUTH EVERY DAY    midodrine (PROAMATINE) 5 MG Tab Take 1 tablet (5 mg total) by mouth 4 (four) times daily. During waking hours    omega-3 fatty acids-vitamin E (FISH OIL) 1,000 mg Cap Take 1 capsule by mouth every other day.     pravastatin (PRAVACHOL) 20 MG tablet TAKE 1 TABLET(20 MG) BY MOUTH EVERY DAY    propafenone (RHTHYMOL) 150 MG Tab TAKE 1/2 TABLET BY MOUTH THREE TIMES A DAY    spironolactone (ALDACTONE) 25 MG tablet TAKE 1/2 TABLET BY MOUTH EVERY DAY    vitamin B12-folic acid 0.5-1 mg Tab Take 1 tablet by mouth once daily once daily.    warfarin (COUMADIN) 1 MG tablet Take 1/2 tablet every evening or as directed by the coumadin clinic.     No current facility-administered medications on file prior to visit.      Review of patient's allergies indicates:   Allergen Reactions    Sulfa (sulfonamide antibiotics) Anaphylaxis     Other reaction(s): Angioedema    Morphine      Other reaction(s): Unknown    Statins-hmg-coa reductase inhibitors Other (See Comments)     Elevated LFTs    Dronedarone Diarrhea, Nausea Only and Rash     DIZZINESS       Review of Systems   Constitution: Negative for diaphoresis, malaise/fatigue and weight gain.   HENT: Negative for hoarse voice.    Eyes: Negative for double vision and visual disturbance.   Cardiovascular: Positive for dyspnea on exertion and leg swelling. Negative for chest pain,  claudication, cyanosis, irregular heartbeat, near-syncope, orthopnea, palpitations, paroxysmal nocturnal dyspnea and syncope.   Respiratory: Positive for shortness of breath. Negative for cough, hemoptysis and snoring.    Hematologic/Lymphatic: Negative for bleeding problem. Does not bruise/bleed easily.   Skin: Negative for color change and poor wound healing.   Musculoskeletal: Negative for muscle cramps, muscle weakness and myalgias.   Gastrointestinal: Negative for bloating, abdominal pain, change in bowel habit, diarrhea, heartburn, hematemesis, hematochezia, melena and nausea.   Neurological: Negative for excessive daytime sleepiness, dizziness, headaches, light-headedness, loss of balance, numbness and weakness.   Psychiatric/Behavioral: Negative for memory loss. The patient does not have insomnia.    Allergic/Immunologic: Negative for hives.       Objective:   Physical Exam   Constitutional: She is oriented to person, place, and time. She appears well-developed and well-nourished. She does not appear ill. No distress.   HENT:   Head: Normocephalic and atraumatic.   Eyes: Pupils are equal, round, and reactive to light. EOM are normal. No scleral icterus.   Neck: Normal range of motion. Neck supple. Normal carotid pulses, no hepatojugular reflux and no JVD present. Carotid bruit is not present. No tracheal deviation present. No thyromegaly present.   Cardiovascular: Normal rate, regular rhythm, intact distal pulses and normal pulses. Exam reveals no gallop and no friction rub.   Murmur heard.  High-pitched blowing holosystolic murmur is present with a grade of 2/6 at the apex.  Pulses:       Carotid pulses are 2+ on the right side, and 2+ on the left side.       Radial pulses are 2+ on the right side, and 2+ on the left side.        Femoral pulses are 2+ on the right side, and 2+ on the left side.       Popliteal pulses are 2+ on the right side, and 2+ on the left side.        Dorsalis pedis pulses are 2+ on  "the right side, and 2+ on the left side.        Posterior tibial pulses are 2+ on the right side, and 2+ on the left side.   Pulmonary/Chest: Effort normal and breath sounds normal. No respiratory distress. She has no wheezes. She has no rhonchi. She has no rales. She exhibits no tenderness.   Scar cabg well healed   Pacer site well healed.   Abdominal: Soft. Normal appearance, normal aorta and bowel sounds are normal. She exhibits no distension, no abdominal bruit, no ascites and no pulsatile midline mass. There is no hepatomegaly. There is no tenderness.   Musculoskeletal: She exhibits edema (trace).        Right shoulder: She exhibits no deformity.   Neurological: She is alert and oriented to person, place, and time. She has normal strength. No cranial nerve deficit. Coordination normal.   Skin: Skin is warm and dry. No rash noted. She is not diaphoretic. No cyanosis or erythema. Nails show no clubbing.   Psychiatric: She has a normal mood and affect. Her speech is normal and behavior is normal.   Nursing note and vitals reviewed.    Vitals:    01/24/20 1245 01/24/20 1247   BP: (!) 142/80 138/76   BP Location: Left arm Right arm   Pulse: 74    Weight: 67.9 kg (149 lb 11.1 oz)    Height: 5' 3" (1.6 m)      Lab Results   Component Value Date    CHOL 185 10/22/2019    CHOL 181 07/29/2019    CHOL 187 04/23/2019     Lab Results   Component Value Date    HDL 43 10/22/2019    HDL 41 07/29/2019    HDL 40 04/23/2019     Lab Results   Component Value Date    LDLCALC 116.0 10/22/2019    LDLCALC 117.6 07/29/2019    LDLCALC 120.6 04/23/2019     Lab Results   Component Value Date    TRIG 130 10/22/2019    TRIG 112 07/29/2019    TRIG 132 04/23/2019     Lab Results   Component Value Date    CHOLHDL 23.2 10/22/2019    CHOLHDL 22.7 07/29/2019    CHOLHDL 21.4 04/23/2019       Chemistry        Component Value Date/Time     01/23/2020 1341    K 4.4 01/23/2020 1341     01/23/2020 1341    CO2 24 01/23/2020 1341    BUN 32 " (H) 01/23/2020 1341    CREATININE 1.3 01/23/2020 1341    GLU 96 01/23/2020 1341        Component Value Date/Time    CALCIUM 9.2 01/23/2020 1341    ALKPHOS 53 (L) 09/25/2019 1500    AST 19 10/22/2019 1050    ALT 11 10/22/2019 1050    BILITOT 0.6 09/25/2019 1500    ESTGFRAFRICA 40.3 (A) 01/23/2020 1341    EGFRNONAA 35.0 (A) 01/23/2020 1341          Lab Results   Component Value Date    TSH 1.998 12/11/2019     Lab Results   Component Value Date    INR 2.6 01/06/2020    INR 3.7 (A) 12/23/2019    INR 3.0 11/25/2019     Lab Results   Component Value Date    WBC 5.22 10/22/2019    HGB 12.1 10/22/2019    HCT 37.5 10/22/2019    MCV 98 10/22/2019     10/22/2019     BMP  Sodium   Date Value Ref Range Status   01/23/2020 141 136 - 145 mmol/L Final     Potassium   Date Value Ref Range Status   01/23/2020 4.4 3.5 - 5.1 mmol/L Final     Chloride   Date Value Ref Range Status   01/23/2020 107 95 - 110 mmol/L Final     CO2   Date Value Ref Range Status   01/23/2020 24 23 - 29 mmol/L Final     BUN, Bld   Date Value Ref Range Status   01/23/2020 32 (H) 10 - 30 mg/dL Final     Creatinine   Date Value Ref Range Status   01/23/2020 1.3 0.5 - 1.4 mg/dL Final     Calcium   Date Value Ref Range Status   01/23/2020 9.2 8.7 - 10.5 mg/dL Final     Anion Gap   Date Value Ref Range Status   01/23/2020 10 8 - 16 mmol/L Final     eGFR if    Date Value Ref Range Status   01/23/2020 40.3 (A) >60 mL/min/1.73 m^2 Final     eGFR if non    Date Value Ref Range Status   01/23/2020 35.0 (A) >60 mL/min/1.73 m^2 Final     Comment:     Calculation used to obtain the estimated glomerular filtration  rate (eGFR) is the CKD-EPI equation.        Estimated Creatinine Clearance: 23.9 mL/min (based on SCr of 1.3 mg/dL).    Assessment:     1. Pure hypercholesterolemia    2. Paroxysmal atrial fibrillation    3. CKD (chronic kidney disease) stage 3, GFR 30-59 ml/min    4. Coronary artery disease involving native coronary artery  of native heart without angina pectoris    5. Chronic diastolic HFpEF of 60% per echo 5/2017    6. Hypothyroidism due to acquired atrophy of thyroid    7. Third degree AV block s/p PPM    8. Severe MR with remote mitral valve replacement on chronic anticoagulation    9. H/O CVA (cerebrovascular accident)    10. Depression, major, single episode, mild    11. Orthostatic hypotension    12. Chronic anticoagulation    13. History of DVT of lower extremity right    14. Bilateral carotid artery stenosis    15. Shortness of breath      Has nocturnal cough subjective shortness of breath w/o any chf clinically with good o2 sat.  Plan:   cxr echo bnp   Phone review  If nop improvement go to er.

## 2020-01-25 LAB — NT-PROBNP SERPL-MCNC: 889 PG/ML

## 2020-01-27 ENCOUNTER — HOSPITAL ENCOUNTER (OUTPATIENT)
Facility: HOSPITAL | Age: 85
Discharge: HOME | End: 2020-01-29
Attending: EMERGENCY MEDICINE | Admitting: INTERNAL MEDICINE
Payer: MEDICARE

## 2020-01-27 DIAGNOSIS — R42 DIZZINESS: Primary | ICD-10-CM

## 2020-01-27 DIAGNOSIS — N30.01 ACUTE CYSTITIS WITH HEMATURIA: ICD-10-CM

## 2020-01-27 DIAGNOSIS — R55 SYNCOPE: ICD-10-CM

## 2020-01-27 PROBLEM — N30.00 ACUTE CYSTITIS WITHOUT HEMATURIA: Status: ACTIVE | Noted: 2020-01-27

## 2020-01-27 LAB
ALBUMIN SERPL BCP-MCNC: 3.7 G/DL (ref 3.5–5.2)
ALP SERPL-CCNC: 57 U/L (ref 55–135)
ALT SERPL W/O P-5'-P-CCNC: 10 U/L (ref 10–44)
ANION GAP SERPL CALC-SCNC: 11 MMOL/L (ref 8–16)
APTT BLDCRRT: 42.5 SEC (ref 21–32)
AST SERPL-CCNC: 18 U/L (ref 10–40)
BACTERIA #/AREA URNS HPF: ABNORMAL /HPF
BASOPHILS # BLD AUTO: 0.03 K/UL (ref 0–0.2)
BASOPHILS NFR BLD: 0.4 % (ref 0–1.9)
BILIRUB SERPL-MCNC: 0.7 MG/DL (ref 0.1–1)
BILIRUB UR QL STRIP: NEGATIVE
BNP SERPL-MCNC: 230 PG/ML (ref 0–99)
BUN SERPL-MCNC: 23 MG/DL (ref 10–30)
CALCIUM SERPL-MCNC: 8.9 MG/DL (ref 8.7–10.5)
CHLORIDE SERPL-SCNC: 104 MMOL/L (ref 95–110)
CK SERPL-CCNC: 46 U/L (ref 20–180)
CLARITY UR: CLEAR
CO2 SERPL-SCNC: 27 MMOL/L (ref 23–29)
COLOR UR: YELLOW
CREAT SERPL-MCNC: 1.3 MG/DL (ref 0.5–1.4)
DIFFERENTIAL METHOD: ABNORMAL
EOSINOPHIL # BLD AUTO: 0.2 K/UL (ref 0–0.5)
EOSINOPHIL NFR BLD: 2.6 % (ref 0–8)
ERYTHROCYTE [DISTWIDTH] IN BLOOD BY AUTOMATED COUNT: 13 % (ref 11.5–14.5)
EST. GFR  (AFRICAN AMERICAN): 40 ML/MIN/1.73 M^2
EST. GFR  (NON AFRICAN AMERICAN): 35 ML/MIN/1.73 M^2
GLUCOSE SERPL-MCNC: 98 MG/DL (ref 70–110)
GLUCOSE UR QL STRIP: NEGATIVE
HCT VFR BLD AUTO: 37.7 % (ref 37–48.5)
HGB BLD-MCNC: 12.2 G/DL (ref 12–16)
HGB UR QL STRIP: NEGATIVE
IMM GRANULOCYTES # BLD AUTO: 0.02 K/UL (ref 0–0.04)
IMM GRANULOCYTES NFR BLD AUTO: 0.2 % (ref 0–0.5)
INR PPP: 2.9 (ref 0.8–1.2)
KETONES UR QL STRIP: NEGATIVE
LEUKOCYTE ESTERASE UR QL STRIP: ABNORMAL
LYMPHOCYTES # BLD AUTO: 2 K/UL (ref 1–4.8)
LYMPHOCYTES NFR BLD: 24.5 % (ref 18–48)
MCH RBC QN AUTO: 31.7 PG (ref 27–31)
MCHC RBC AUTO-ENTMCNC: 32.4 G/DL (ref 32–36)
MCV RBC AUTO: 98 FL (ref 82–98)
MICROSCOPIC COMMENT: ABNORMAL
MONOCYTES # BLD AUTO: 0.7 K/UL (ref 0.3–1)
MONOCYTES NFR BLD: 8.3 % (ref 4–15)
NEUTROPHILS # BLD AUTO: 5.3 K/UL (ref 1.8–7.7)
NEUTROPHILS NFR BLD: 64 % (ref 38–73)
NITRITE UR QL STRIP: POSITIVE
NRBC BLD-RTO: 0 /100 WBC
PH UR STRIP: 6 [PH] (ref 5–8)
PLATELET # BLD AUTO: 191 K/UL (ref 150–350)
PMV BLD AUTO: 11.2 FL (ref 9.2–12.9)
POTASSIUM SERPL-SCNC: 4 MMOL/L (ref 3.5–5.1)
PROT SERPL-MCNC: 6.9 G/DL (ref 6–8.4)
PROT UR QL STRIP: NEGATIVE
PROTHROMBIN TIME: 29.2 SEC (ref 9–12.5)
RBC # BLD AUTO: 3.85 M/UL (ref 4–5.4)
SODIUM SERPL-SCNC: 142 MMOL/L (ref 136–145)
SP GR UR STRIP: 1.01 (ref 1–1.03)
TROPONIN I SERPL DL<=0.01 NG/ML-MCNC: 0.01 NG/ML (ref 0–0.03)
URN SPEC COLLECT METH UR: ABNORMAL
UROBILINOGEN UR STRIP-ACNC: NEGATIVE EU/DL
WBC # BLD AUTO: 8.21 K/UL (ref 3.9–12.7)
WBC #/AREA URNS HPF: 10 /HPF (ref 0–5)

## 2020-01-27 PROCEDURE — G0378 HOSPITAL OBSERVATION PER HR: HCPCS | Mod: HCNC

## 2020-01-27 PROCEDURE — 85025 COMPLETE CBC W/AUTO DIFF WBC: CPT | Mod: HCNC

## 2020-01-27 PROCEDURE — 85730 THROMBOPLASTIN TIME PARTIAL: CPT | Mod: HCNC

## 2020-01-27 PROCEDURE — 99285 EMERGENCY DEPT VISIT HI MDM: CPT | Mod: 25,HCNC

## 2020-01-27 PROCEDURE — 82550 ASSAY OF CK (CPK): CPT | Mod: HCNC

## 2020-01-27 PROCEDURE — 63600175 PHARM REV CODE 636 W HCPCS: Mod: HCNC | Performed by: NURSE PRACTITIONER

## 2020-01-27 PROCEDURE — 93005 ELECTROCARDIOGRAM TRACING: CPT | Mod: HCNC

## 2020-01-27 PROCEDURE — 93010 ELECTROCARDIOGRAM REPORT: CPT | Mod: HCNC,,, | Performed by: INTERNAL MEDICINE

## 2020-01-27 PROCEDURE — 96374 THER/PROPH/DIAG INJ IV PUSH: CPT | Mod: HCNC

## 2020-01-27 PROCEDURE — 93010 EKG 12-LEAD: ICD-10-PCS | Mod: HCNC,,, | Performed by: INTERNAL MEDICINE

## 2020-01-27 PROCEDURE — 25000003 PHARM REV CODE 250: Mod: HCNC | Performed by: INTERNAL MEDICINE

## 2020-01-27 PROCEDURE — 84484 ASSAY OF TROPONIN QUANT: CPT | Mod: HCNC

## 2020-01-27 PROCEDURE — 25000003 PHARM REV CODE 250: Mod: HCNC | Performed by: NURSE PRACTITIONER

## 2020-01-27 PROCEDURE — 81000 URINALYSIS NONAUTO W/SCOPE: CPT | Mod: HCNC

## 2020-01-27 PROCEDURE — 80053 COMPREHEN METABOLIC PANEL: CPT | Mod: HCNC

## 2020-01-27 PROCEDURE — 96375 TX/PRO/DX INJ NEW DRUG ADDON: CPT

## 2020-01-27 PROCEDURE — 25000003 PHARM REV CODE 250: Mod: HCNC | Performed by: EMERGENCY MEDICINE

## 2020-01-27 PROCEDURE — 83880 ASSAY OF NATRIURETIC PEPTIDE: CPT | Mod: HCNC

## 2020-01-27 PROCEDURE — 85610 PROTHROMBIN TIME: CPT | Mod: HCNC

## 2020-01-27 PROCEDURE — 63600175 PHARM REV CODE 636 W HCPCS: Mod: HCNC | Performed by: EMERGENCY MEDICINE

## 2020-01-27 RX ORDER — FUROSEMIDE 10 MG/ML
20 INJECTION INTRAMUSCULAR; INTRAVENOUS
Status: COMPLETED | OUTPATIENT
Start: 2020-01-27 | End: 2020-01-27

## 2020-01-27 RX ORDER — FOLIC ACID 1 MG/1
1000 TABLET ORAL DAILY
Status: DISCONTINUED | OUTPATIENT
Start: 2020-01-28 | End: 2020-01-29 | Stop reason: HOSPADM

## 2020-01-27 RX ORDER — PRAVASTATIN SODIUM 20 MG/1
20 TABLET ORAL NIGHTLY
Status: DISCONTINUED | OUTPATIENT
Start: 2020-01-27 | End: 2020-01-29 | Stop reason: HOSPADM

## 2020-01-27 RX ORDER — PROPAFENONE HYDROCHLORIDE 150 MG/1
75 TABLET, COATED ORAL EVERY 8 HOURS
Status: DISCONTINUED | OUTPATIENT
Start: 2020-01-27 | End: 2020-01-27 | Stop reason: SDUPTHER

## 2020-01-27 RX ORDER — LEVOTHYROXINE SODIUM 50 UG/1
50 TABLET ORAL
Status: DISCONTINUED | OUTPATIENT
Start: 2020-01-28 | End: 2020-01-29 | Stop reason: HOSPADM

## 2020-01-27 RX ORDER — ACETAMINOPHEN 325 MG/1
650 TABLET ORAL EVERY 8 HOURS PRN
Status: DISCONTINUED | OUTPATIENT
Start: 2020-01-27 | End: 2020-01-29 | Stop reason: HOSPADM

## 2020-01-27 RX ORDER — SODIUM CHLORIDE 0.9 % (FLUSH) 0.9 %
10 SYRINGE (ML) INJECTION
Status: DISCONTINUED | OUTPATIENT
Start: 2020-01-27 | End: 2020-01-29 | Stop reason: HOSPADM

## 2020-01-27 RX ORDER — CEFUROXIME AXETIL 250 MG/1
250 TABLET ORAL
Status: COMPLETED | OUTPATIENT
Start: 2020-01-27 | End: 2020-01-27

## 2020-01-27 RX ORDER — FUROSEMIDE 20 MG/1
20 TABLET ORAL DAILY
Qty: 30 TABLET | Refills: 11 | Status: SHIPPED | OUTPATIENT
Start: 2020-01-27 | End: 2020-11-16 | Stop reason: SDUPTHER

## 2020-01-27 RX ORDER — ONDANSETRON 2 MG/ML
4 INJECTION INTRAMUSCULAR; INTRAVENOUS EVERY 8 HOURS PRN
Status: DISCONTINUED | OUTPATIENT
Start: 2020-01-27 | End: 2020-01-29 | Stop reason: HOSPADM

## 2020-01-27 RX ORDER — ESCITALOPRAM OXALATE 10 MG/1
10 TABLET ORAL DAILY
Status: DISCONTINUED | OUTPATIENT
Start: 2020-01-28 | End: 2020-01-29 | Stop reason: HOSPADM

## 2020-01-27 RX ORDER — SODIUM CHLORIDE 9 MG/ML
INJECTION, SOLUTION INTRAVENOUS ONCE
Status: COMPLETED | OUTPATIENT
Start: 2020-01-27 | End: 2020-01-27

## 2020-01-27 RX ORDER — MECLIZINE HYDROCHLORIDE 25 MG/1
25 TABLET ORAL
Status: COMPLETED | OUTPATIENT
Start: 2020-01-27 | End: 2020-01-27

## 2020-01-27 RX ORDER — TALC
6 POWDER (GRAM) TOPICAL NIGHTLY PRN
Status: DISCONTINUED | OUTPATIENT
Start: 2020-01-27 | End: 2020-01-29 | Stop reason: HOSPADM

## 2020-01-27 RX ADMIN — SODIUM CHLORIDE: 0.9 INJECTION, SOLUTION INTRAVENOUS at 04:01

## 2020-01-27 RX ADMIN — MECLIZINE HYDROCHLORIDE 25 MG: 25 TABLET ORAL at 10:01

## 2020-01-27 RX ADMIN — WARFARIN SODIUM 0.5 MG: 1 TABLET ORAL at 05:01

## 2020-01-27 RX ADMIN — CEFUROXIME AXETIL 250 MG: 250 TABLET, FILM COATED ORAL at 10:01

## 2020-01-27 RX ADMIN — FUROSEMIDE 20 MG: 10 INJECTION, SOLUTION INTRAMUSCULAR; INTRAVENOUS at 10:01

## 2020-01-27 RX ADMIN — PROPAFENONE HYDROCHLORIDE 75 MG: 150 TABLET, FILM COATED ORAL at 09:01

## 2020-01-27 RX ADMIN — PRAVASTATIN SODIUM 20 MG: 20 TABLET ORAL at 09:01

## 2020-01-27 RX ADMIN — CEFTRIAXONE 1 G: 1 INJECTION, SOLUTION INTRAVENOUS at 04:01

## 2020-01-27 NOTE — ASSESSMENT & PLAN NOTE
- Appears compensated  - Last 2D ECHO 03/10/18 -- EF 55-60%  - Gentle hydration  - Monitor fluid status  - Strict I&Os  - Daily weights

## 2020-01-27 NOTE — HPI
Josy Posey is a 89 year old female with a PMHx of CVA, Hypothyroidism, S/p MVR, S/p CABG, HTN, HLD, DVT, CHF, and A-fib who presented to the Emergency Department with c/o dizziness. ED reports syncope. Pt denies syncope. Pt reports having dizziness and suffered ground level fall. Pt denies hitting head or LOC. Pt has no complaint of body pain at this time. ED workup showed no leukocytosis/leukopenia, stable Hgb/Hct, chronic renal insufficiency. UA (+) nitrites, 1+ leukos. CT head showed chronic ischemic changes but no acute findings. Pelvic xray with no acute fracture. CXR with no acute findings. Pt given Cefuroxime 250 mg PO x 1 in ED.

## 2020-01-27 NOTE — ED NOTES
Pt is alert and oriented x 3, reports intermittent weakness, today her legs are weak and during orthostatics reported dizziness.

## 2020-01-27 NOTE — ED NOTES
Pt awake, alert and oriented denies any complaints except occasionally her right eyelid shuts on its own. Pt states it is not happening right now. Pt eyes are symmetrical and has control over eye lid movement.

## 2020-01-27 NOTE — ASSESSMENT & PLAN NOTE
- Urine (+) nitrites, 1+ leukos  - Micro wbc 10, bacteria many  - Pt received Cefuroxime 250 mg PO x 1 in ED  - Start Rocephin 1 gm q12hrs  - Urine culture pending

## 2020-01-27 NOTE — ED PROVIDER NOTES
SCRIBE #1 NOTE: I, Wali Payne, am scribing for, and in the presence of, oRny Patel MD. I have scribed the entire note.       History     Chief Complaint   Patient presents with    Dizziness     Review of patient's allergies indicates:   Allergen Reactions    Sulfa (sulfonamide antibiotics) Anaphylaxis     Other reaction(s): Angioedema    Morphine      Other reaction(s): Unknown    Statins-hmg-coa reductase inhibitors Other (See Comments)     Elevated LFTs    Dronedarone Diarrhea, Nausea Only and Rash     DIZZINESS           History of Present Illness     HPI    1/27/2020, 7:56 AM  History obtained from the patient      History of Present Illness: Josy Posey is a 89 y.o. female patient presents to the Emergency Department for evaluation of dizziness and syncope which onset this morning while ambulating to bathroom from bed. Patient then had a ground level fall due to sx. Symptoms are constant and moderate in severity. No mitigating or exacerbating factors reported. No associated sxs reported. Patient denies any head injury, HA, neck/back pain, CP, SOB, abd pain, and all other sxs at this time. No prior Tx reported. No further complaints or concerns at this time.       Arrival mode: Personal transportation     PCP: TIKA Rosales Jr, MD      Past Medical History:  Past Medical History:   Diagnosis Date    *Atrial fibrillation     Anemia     Angina pectoris     Arthritis     Atrial fibrillation 9/27/2013    Atrioventricular block, complete     CAD (coronary artery disease) 5/6/2015    Cardiac pacemaker 9/27/2013    Carotid artery occlusion     CHF (congestive heart failure)     Coronary artery disease     Cystocele     prior pessary use    Depression     Disorder of kidney and ureter     DVT (deep venous thrombosis) 3/1/10 approx    s/p rt embolectomy    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 fuCT 1/3- stable,  CT's later - no residual    Lens replaced by other means 6/25/2014    Melena     Memory loss     PET scan consistent with Alzzheimers.   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    1 vessel LIMA to LAD    S/P MVR (mitral valve replacement) 9/27/2013    Skin ulcer     Squamous cell carcinoma     skin cancer X 2    Stroke     Syncope and collapse     Unspecified essential hypertension 9/16/2013    Unspecified transient cerebral ischemia     Urinary incontinence     wears briefs       Past Surgical History:  Past Surgical History:   Procedure Laterality Date    ADENOIDECTOMY      APPENDECTOMY      BREAST SURGERY  1950    right lumpectomy     CARDIAC VALVE SURGERY  10/04/2007    MVR    CATARACT EXTRACTION      CHOLECYSTECTOMY      CORONARY ARTERY BYPASS GRAFT      EYE SURGERY      cataracts bilateral    HYSTERECTOMY      for hydatiform mole    INSERT / REPLACE / REMOVE PACEMAKER  09/11/2001    TONSILLECTOMY           Family History:  Family History   Problem Relation Age of Onset    Tuberculosis Mother     Tuberculosis Father     Stroke Sister     Hypertension Sister     Stroke Brother     Hypertension Daughter     COPD Brother        Social History:   Social History     Tobacco Use    Smoking status: Never Smoker    Smokeless tobacco: Never Used   Substance and Sexual Activity    Alcohol use: No     Alcohol/week: 0.0 standard drinks    Drug use: No    Sexual activity: Never        Review of Systems     Review of Systems " "  Constitutional: Negative for fever.   HENT: Negative for sore throat.    Respiratory: Negative for shortness of breath.    Cardiovascular: Negative for chest pain.   Gastrointestinal: Negative for abdominal pain and nausea.   Genitourinary: Negative for dysuria.   Musculoskeletal: Negative for back pain and neck pain.   Skin: Negative for rash.   Neurological: Positive for dizziness and syncope. Negative for weakness and headaches.   Hematological: Does not bruise/bleed easily.   All other systems reviewed and are negative.       Physical Exam     Initial Vitals [01/27/20 0752]   BP Pulse Resp Temp SpO2   (!) 186/86 65 16 98.6 °F (37 °C) 96 %      MAP       --          Physical Exam  Nursing Notes and Vital Signs Reviewed.  Constitutional: Elderly and frail. NAD  Head: Atraumatic. Normocephalic.  Eyes: PERRL. EOM intact. Conjunctivae are not pale. No scleral icterus.  ENT: Mucous membranes are moist. Oropharynx is clear and symmetric.    Neck: Supple. Full ROM. No lymphadenopathy.  Cardiovascular: Regular rate. Regular rhythm. No murmurs, rubs, or gallops. Distal pulses are 2+ and symmetric.  Pulmonary/Chest: No respiratory distress. Clear to auscultation bilaterally. No wheezing or rales.  Abdominal: Soft and non-distended.  There is no tenderness.  No rebound, guarding, or rigidity. Good bowel sounds.  Genitourinary: No CVA tenderness  Musculoskeletal: Moves all extremities. No obvious deformities. No calf tenderness.  Skin: Warm and dry.  Neurological:  Alert, awake, and appropriate.  Normal speech.  No acute focal neurological deficits are appreciated.  Psychiatric: Normal affect. Good eye contact. Appropriate in content.     ED Course   Procedures  ED Vital Signs:  Vitals:    01/27/20 0752 01/27/20 0824 01/27/20 0827 01/27/20 0830   BP: (!) 186/86 (!) 175/79 (!) 164/87 (!) 156/76   Pulse: 65 75 75 75   Resp: 16      Temp: 98.6 °F (37 °C)      SpO2: 96%      Weight:       Height: 5' 3" (1.6 m)       01/27/20 " 1041 01/27/20 1043   BP: (!) 153/81    Pulse: 75    Resp: 19    Temp:     SpO2: 96%    Weight:  71.7 kg (158 lb 1.1 oz)   Height:         Abnormal Lab Results:  Labs Reviewed   CBC W/ AUTO DIFFERENTIAL - Abnormal; Notable for the following components:       Result Value    RBC 3.85 (*)     Mean Corpuscular Hemoglobin 31.7 (*)     All other components within normal limits   COMPREHENSIVE METABOLIC PANEL - Abnormal; Notable for the following components:    eGFR if  40 (*)     eGFR if non  35 (*)     All other components within normal limits   URINALYSIS, REFLEX TO URINE CULTURE - Abnormal; Notable for the following components:    Nitrite, UA Positive (*)     Leukocytes, UA 1+ (*)     All other components within normal limits    Narrative:     Preferred Collection Type->Urine, Clean Catch   B-TYPE NATRIURETIC PEPTIDE - Abnormal; Notable for the following components:     (*)     All other components within normal limits   APTT - Abnormal; Notable for the following components:    aPTT 42.5 (*)     All other components within normal limits   PROTIME-INR - Abnormal; Notable for the following components:    Prothrombin Time 29.2 (*)     INR 2.9 (*)     All other components within normal limits   URINALYSIS MICROSCOPIC - Abnormal; Notable for the following components:    WBC, UA 10 (*)     Bacteria Many (*)     All other components within normal limits    Narrative:     Preferred Collection Type->Urine, Clean Catch   CK   TROPONIN I        All Lab Results:  Results for orders placed or performed during the hospital encounter of 01/27/20   CBC auto differential   Result Value Ref Range    WBC 8.21 3.90 - 12.70 K/uL    RBC 3.85 (L) 4.00 - 5.40 M/uL    Hemoglobin 12.2 12.0 - 16.0 g/dL    Hematocrit 37.7 37.0 - 48.5 %    Mean Corpuscular Volume 98 82 - 98 fL    Mean Corpuscular Hemoglobin 31.7 (H) 27.0 - 31.0 pg    Mean Corpuscular Hemoglobin Conc 32.4 32.0 - 36.0 g/dL    RDW 13.0 11.5 -  14.5 %    Platelets 191 150 - 350 K/uL    MPV 11.2 9.2 - 12.9 fL    Immature Granulocytes 0.2 0.0 - 0.5 %    Gran # (ANC) 5.3 1.8 - 7.7 K/uL    Immature Grans (Abs) 0.02 0.00 - 0.04 K/uL    Lymph # 2.0 1.0 - 4.8 K/uL    Mono # 0.7 0.3 - 1.0 K/uL    Eos # 0.2 0.0 - 0.5 K/uL    Baso # 0.03 0.00 - 0.20 K/uL    nRBC 0 0 /100 WBC    Gran% 64.0 38.0 - 73.0 %    Lymph% 24.5 18.0 - 48.0 %    Mono% 8.3 4.0 - 15.0 %    Eosinophil% 2.6 0.0 - 8.0 %    Basophil% 0.4 0.0 - 1.9 %    Differential Method Automated    Comprehensive metabolic panel   Result Value Ref Range    Sodium 142 136 - 145 mmol/L    Potassium 4.0 3.5 - 5.1 mmol/L    Chloride 104 95 - 110 mmol/L    CO2 27 23 - 29 mmol/L    Glucose 98 70 - 110 mg/dL    BUN, Bld 23 10 - 30 mg/dL    Creatinine 1.3 0.5 - 1.4 mg/dL    Calcium 8.9 8.7 - 10.5 mg/dL    Total Protein 6.9 6.0 - 8.4 g/dL    Albumin 3.7 3.5 - 5.2 g/dL    Total Bilirubin 0.7 0.1 - 1.0 mg/dL    Alkaline Phosphatase 57 55 - 135 U/L    AST 18 10 - 40 U/L    ALT 10 10 - 44 U/L    Anion Gap 11 8 - 16 mmol/L    eGFR if African American 40 (A) >60 mL/min/1.73 m^2    eGFR if non African American 35 (A) >60 mL/min/1.73 m^2   Urinalysis, Reflex to Urine Culture Urine, Clean Catch   Result Value Ref Range    Specimen UA Urine, Clean Catch     Color, UA Yellow Yellow, Straw, Tanisha    Appearance, UA Clear Clear    pH, UA 6.0 5.0 - 8.0    Specific Gravity, UA 1.010 1.005 - 1.030    Protein, UA Negative Negative    Glucose, UA Negative Negative    Ketones, UA Negative Negative    Bilirubin (UA) Negative Negative    Occult Blood UA Negative Negative    Nitrite, UA Positive (A) Negative    Urobilinogen, UA Negative <2.0 EU/dL    Leukocytes, UA 1+ (A) Negative   Brain natriuretic peptide   Result Value Ref Range     (H) 0 - 99 pg/mL   CK   Result Value Ref Range    CPK 46 20 - 180 U/L   Troponin I   Result Value Ref Range    Troponin I 0.007 0.000 - 0.026 ng/mL   APTT   Result Value Ref Range    aPTT 42.5 (H) 21.0 -  32.0 sec   Protime-INR   Result Value Ref Range    Prothrombin Time 29.2 (H) 9.0 - 12.5 sec    INR 2.9 (H) 0.8 - 1.2   Urinalysis Microscopic   Result Value Ref Range    WBC, UA 10 (H) 0 - 5 /hpf    Bacteria Many (A) None-Occ /hpf    Microscopic Comment SEE COMMENT          Imaging Results          CT Head Without Contrast (Final result)  Result time 01/27/20 09:25:51    Final result by Geovany Mcneil MD (01/27/20 09:25:51)                 Impression:      Chronic microvascular ischemic changes.    All CT scans at this facility use dose modulation, iterative reconstruction, and/or weight based dosing when appropriate to reduce radiation dose to as low as reasonable achievable.      Electronically signed by: Geovany Mcneil MD  Date:    01/27/2020  Time:    09:25             Narrative:    EXAMINATION:  CT HEAD WITHOUT CONTRAST    CLINICAL HISTORY:  Syncope/fainting;    TECHNIQUE:  Low dose axial CT images obtained throughout the head without intravenous contrast. Sagittal and coronal reconstructions were performed.    COMPARISON:  09/25/2019    FINDINGS:  Intracranial compartment:    The brain parenchyma demonstrates areas of decreased attenuation with moderate periventricular white matter consistent with chronic microvascular ischemic changes..  No parenchymal mass, hemorrhage, edema or major vascular distribution infarct.  Vascular calcifications are noted.    Moderate prominence of the sulci and ventricles are consistent with age-related involutional changes.    No extra-axial blood or fluid collections.    Skull/extracranial contents (limited evaluation): No fracture. Mastoid air cells and paranasal sinuses are essentially clear.                               X-Ray Pelvis Routine AP (Final result)  Result time 01/27/20 08:53:51    Final result by JAIME Ochoa Sr., MD (01/27/20 08:53:51)                 Impression:      1. There are healed fractures in the right superior and inferior pubic rami.  There is no  acute fracture visualized.  2. There are mild degenerative changes in the visualized portion of the lumbar spine.      Electronically signed by: Lamonte Ochoa MD  Date:    01/27/2020  Time:    08:53             Narrative:    EXAMINATION:  XR PELVIS ROUTINE AP    CLINICAL HISTORY:  Dizziness/Vertigo (780.4);    COMPARISON:  02/24/2015    FINDINGS:  There are healed fractures in the right superior and inferior pubic rami.  There is no acute fracture visualized.  There is no dislocation.  There are mild degenerative changes in the visualized portion of the lumbar spine.  The visualized portion of the bowel gas pattern is normal in appearance.                               X-Ray Chest AP Portable (Final result)  Result time 01/27/20 08:54:57    Final result by Pete Rodrigues MD (01/27/20 08:54:57)                 Impression:      No acute findings.      Electronically signed by: Pete Rodrigues  Date:    01/27/2020  Time:    08:54             Narrative:    EXAMINATION:  XR CHEST AP PORTABLE    CLINICAL HISTORY:  syncope;    COMPARISON:  01/24/2020    FINDINGS:  The heart is normal in size.  Sternotomy wires.  Prosthetic heart valve.  Pacemaker on the left.  No acute pulmonary infiltrates identified.                                 The EKG was ordered, reviewed, and independently interpreted by the ED provider.  Interpretation time: 0816  Rate: 76 BPM  Rhythm: ventricular paced rhythm with occasional PVC  Interpretation: biventricular pacemaker detected. No STEMI.      The Emergency Provider reviewed the vital signs and test results, which are outlined above.     ED Discussion       11:44 AM: Discussed case with ANGEL Ladd (Hospital Medicine). Dr. Hinojosa agrees with current care and management of pt and accepts admission.   Admitting Service: Hospital Medicine  Admit to: obs / med tele    Re-evaluated pt. I have discussed test results, shared treatment plan, and the need for admission with patient and family at  bedside. Pt and family express understanding at this time and agree with all information. All questions answered. Pt and family have no further questions or concerns at this time. Pt is ready for admit.       MDM        Medical Decision Making:   Clinical Tests:   Lab Tests: Ordered and Reviewed  Radiological Study: Reviewed and Ordered  Medical Tests: Reviewed and Ordered           ED Medication(s):  Medications   furosemide injection 20 mg (20 mg Intravenous Given 1/27/20 1042)   cefUROXime tablet 250 mg (250 mg Oral Given 1/27/20 1040)   meclizine tablet 25 mg (25 mg Oral Given 1/27/20 1040)       New Prescriptions    FUROSEMIDE (LASIX) 20 MG TABLET    Take 1 tablet (20 mg total) by mouth once daily.               Scribe Attestation:   Scribe #1: I performed the above scribed service and the documentation accurately describes the services I performed. I attest to the accuracy of the note.     Attending:   Physician Attestation Statement for Scribe #1: I, Rony Patel MD, personally performed the services described in this documentation, as scribed by Wali Payne, in my presence, and it is both accurate and complete.           Clinical Impression       ICD-10-CM ICD-9-CM   1. Dizziness R42 780.4   2. Syncope R55 780.2   3. Acute cystitis with hematuria N30.01 595.0       Disposition:   Disposition: Placed in Observation  Condition: Fair         Rony Patel MD  01/27/20 5998

## 2020-01-27 NOTE — PROGRESS NOTES
Pharmacy Consult Note: Warfarin    Pharmacy is consulted to dose warfarin by request of MARIANNE Dias.    Indication: Hx of Afib, DVT  INR goal: 2.5 - 3.5    Today's INR: 2.9    Home dose:  0.5 mg every day    Today's labs:  Hgb = 12.2 g/dL  Plt = 191 K/uL    A dose of 0.5 mg will given today.    Pharmacy will continue to monitor daily PT/INR and make dosing adjustments as necessary.     Thank you for allowing us to participate in this patient's care.    Josep Mitchell, PharmD 1/27/2020 3:52 PM

## 2020-01-27 NOTE — SUBJECTIVE & OBJECTIVE
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 fuCT 1/3- stable,  CT's later - no residual    Lens replaced by other means 6/25/2014    Melena     Memory loss     PET scan consistent with Alzzheimers.   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    1 vessel LIMA to LAD    S/P MVR (mitral valve replacement) 9/27/2013    Skin ulcer     Squamous cell carcinoma     skin cancer X 2    Stroke     Syncope and collapse     Unspecified essential hypertension 9/16/2013    Unspecified transient cerebral ischemia     Urinary incontinence     wears briefs       Past Surgical History:   Procedure Laterality Date    ADENOIDECTOMY      APPENDECTOMY      BREAST SURGERY  1950    right lumpectomy     CARDIAC VALVE SURGERY  10/04/2007    MVR    CATARACT EXTRACTION      CHOLECYSTECTOMY      CORONARY ARTERY BYPASS GRAFT      EYE SURGERY      cataracts bilateral    HYSTERECTOMY      for hydatiform " mole    INSERT / REPLACE / REMOVE PACEMAKER  09/11/2001    TONSILLECTOMY         Review of patient's allergies indicates:   Allergen Reactions    Sulfa (sulfonamide antibiotics) Anaphylaxis     Other reaction(s): Angioedema    Morphine      Other reaction(s): Unknown    Statins-hmg-coa reductase inhibitors Other (See Comments)     Elevated LFTs    Dronedarone Diarrhea, Nausea Only and Rash     DIZZINESS         No current facility-administered medications on file prior to encounter.      Current Outpatient Medications on File Prior to Encounter   Medication Sig    ascorbic acid (VITAMIN C) 1000 MG tablet Take 1 tablet by mouth once daily once daily.    aspirin (ECOTRIN) 81 MG EC tablet Take 81 mg by mouth as needed for Pain.    escitalopram oxalate (LEXAPRO) 10 MG tablet Take 1 tablet (10 mg total) by mouth once daily.    fluticasone (FLONASE) 50 mcg/actuation nasal spray instill 2 sprays into each nostril once daily (Patient taking differently: instill 2 sprays into each nostril once daily as needed.)    folic acid (FOLVITE) 1 MG tablet Take 1 tablet by mouth once daily once daily.    Lactoperoxi/Gluc Oxid/Pot Thio (BIOTENE DRY MOUTH MM) by Mucous Membrane route as needed.    levothyroxine (SYNTHROID) 50 MCG tablet TAKE 1 TABLET(50 MCG) BY MOUTH EVERY DAY    midodrine (PROAMATINE) 5 MG Tab Take 1 tablet (5 mg total) by mouth 4 (four) times daily. During waking hours    omega-3 fatty acids-vitamin E (FISH OIL) 1,000 mg Cap Take 1 capsule by mouth every other day.     pravastatin (PRAVACHOL) 20 MG tablet TAKE 1 TABLET(20 MG) BY MOUTH EVERY DAY    propafenone (RHTHYMOL) 150 MG Tab TAKE 1/2 TABLET BY MOUTH THREE TIMES A DAY    vitamin B12-folic acid 0.5-1 mg Tab Take 1 tablet by mouth once daily once daily.    warfarin (COUMADIN) 1 MG tablet Take 1/2 tablet every evening or as directed by the coumadin clinic.    acetaminophen (TYLENOL) 500 MG tablet Take 500 mg by mouth as needed for Pain.     amoxicillin (AMOXIL) 500 MG capsule Take 1 capsule by mouth every 6 (six) hours.    clotrimazole-betamethasone 1-0.05% (LOTRISONE) cream Apply topically 2 (two) times daily. APPT NEEDED!    econazole nitrate 1 % cream Apply topically as needed.     escitalopram oxalate (LEXAPRO) 10 MG tablet TAKE 1 TABLET(10 MG) BY MOUTH EVERY DAY    iron-vitamin C 100-250 mg, ICAR-C, (ICAR-C) 100-250 mg Tab Take 1 tablet by mouth once daily.    levothyroxine (SYNTHROID) 50 MCG tablet 1 po qd m-f, 1.5 sat and sunday    spironolactone (ALDACTONE) 25 MG tablet TAKE 1/2 TABLET BY MOUTH EVERY DAY     Family History     Problem Relation (Age of Onset)    COPD Brother    Hypertension Sister, Daughter    Stroke Sister, Brother    Tuberculosis Mother, Father        Tobacco Use    Smoking status: Never Smoker    Smokeless tobacco: Never Used   Substance and Sexual Activity    Alcohol use: No     Alcohol/week: 0.0 standard drinks    Drug use: No    Sexual activity: Never     Review of Systems   Constitutional: Positive for activity change. Negative for appetite change, chills and fever.   HENT: Negative for trouble swallowing.    Eyes: Negative for visual disturbance.   Respiratory: Negative for apnea, cough, choking, chest tightness, shortness of breath, wheezing and stridor.    Cardiovascular: Negative for chest pain, palpitations and leg swelling.   Gastrointestinal: Negative for abdominal pain, constipation, diarrhea, nausea and vomiting.   Genitourinary: Positive for urgency. Negative for difficulty urinating, dysuria, flank pain and frequency.   Musculoskeletal: Negative for arthralgias, back pain, gait problem, joint swelling, myalgias, neck pain and neck stiffness.   Skin: Negative for color change.   Neurological: Positive for dizziness and weakness. Negative for tremors, seizures, syncope, facial asymmetry, speech difficulty, light-headedness, numbness and headaches.   Hematological: Negative.    Psychiatric/Behavioral:  Negative for agitation, behavioral problems and confusion.     Objective:     Vital Signs (Most Recent):  Temp: 97.6 °F (36.4 °C) (01/27/20 1444)  Pulse: 75 (01/27/20 1444)  Resp: 16 (01/27/20 1444)  BP: (!) 179/69 (01/27/20 1444)  SpO2: 96 % (01/27/20 1444) Vital Signs (24h Range):  Temp:  [97.6 °F (36.4 °C)-98.6 °F (37 °C)] 97.6 °F (36.4 °C)  Pulse:  [65-75] 75  Resp:  [12-22] 16  SpO2:  [95 %-99 %] 96 %  BP: (153-186)/(69-87) 179/69     Weight: 71.7 kg (158 lb 1.1 oz)  Body mass index is 28 kg/m².    Physical Exam   Constitutional: She is oriented to person, place, and time. She appears well-developed. She is cooperative. She is easily aroused. No distress.   HENT:   Head: Normocephalic and atraumatic.   Eyes: EOM are normal.   Neck: Normal range of motion. Neck supple.   Cardiovascular: Normal rate, regular rhythm and intact distal pulses.   Murmur heard.  Pulmonary/Chest: Effort normal and breath sounds normal. No stridor. No respiratory distress. She has no wheezes. She has no rales. She exhibits no tenderness.   Abdominal: Soft. Bowel sounds are normal. She exhibits no distension. There is no tenderness. There is no rebound and no guarding.   Genitourinary:   Genitourinary Comments: Deferred   Musculoskeletal: Normal range of motion. She exhibits no edema, tenderness or deformity.   Neurological: She is alert, oriented to person, place, and time and easily aroused. No sensory deficit.   Skin: Skin is warm and dry. Capillary refill takes less than 2 seconds.   Psychiatric: She has a normal mood and affect. Her behavior is normal. Thought content normal.   Nursing note and vitals reviewed.        CRANIAL NERVES     CN III, IV, VI   Extraocular motions are normal.        Significant Labs:   CBC:   Recent Labs   Lab 01/27/20  0840   WBC 8.21   HGB 12.2   HCT 37.7        CMP:   Recent Labs   Lab 01/27/20  0840      K 4.0      CO2 27   GLU 98   BUN 23   CREATININE 1.3   CALCIUM 8.9   PROT 6.9    ALBUMIN 3.7   BILITOT 0.7   ALKPHOS 57   AST 18   ALT 10   ANIONGAP 11   EGFRNONAA 35*     Coagulation:   Recent Labs   Lab 01/27/20  0840   INR 2.9*   APTT 42.5*     Troponin:   Recent Labs   Lab 01/27/20  0840   TROPONINI 0.007     Urine Studies:   Recent Labs   Lab 01/27/20  0824   COLORU Yellow   APPEARANCEUA Clear   PHUR 6.0   SPECGRAV 1.010   PROTEINUA Negative   GLUCUA Negative   KETONESU Negative   BILIRUBINUA Negative   OCCULTUA Negative   NITRITE Positive*   UROBILINOGEN Negative   LEUKOCYTESUR 1+*   WBCUA 10*   BACTERIA Many*       Significant Imaging:   Imaging Results          CT Head Without Contrast (Final result)  Result time 01/27/20 09:25:51    Final result by Geovany Mcneil MD (01/27/20 09:25:51)                 Impression:      Chronic microvascular ischemic changes.    All CT scans at this facility use dose modulation, iterative reconstruction, and/or weight based dosing when appropriate to reduce radiation dose to as low as reasonable achievable.      Electronically signed by: Geovany Mcneil MD  Date:    01/27/2020  Time:    09:25             Narrative:    EXAMINATION:  CT HEAD WITHOUT CONTRAST    CLINICAL HISTORY:  Syncope/fainting;    TECHNIQUE:  Low dose axial CT images obtained throughout the head without intravenous contrast. Sagittal and coronal reconstructions were performed.    COMPARISON:  09/25/2019    FINDINGS:  Intracranial compartment:    The brain parenchyma demonstrates areas of decreased attenuation with moderate periventricular white matter consistent with chronic microvascular ischemic changes..  No parenchymal mass, hemorrhage, edema or major vascular distribution infarct.  Vascular calcifications are noted.    Moderate prominence of the sulci and ventricles are consistent with age-related involutional changes.    No extra-axial blood or fluid collections.    Skull/extracranial contents (limited evaluation): No fracture. Mastoid air cells and paranasal sinuses are  essentially clear.                               X-Ray Pelvis Routine AP (Final result)  Result time 01/27/20 08:53:51    Final result by JAIME Ochoa Sr., MD (01/27/20 08:53:51)                 Impression:      1. There are healed fractures in the right superior and inferior pubic rami.  There is no acute fracture visualized.  2. There are mild degenerative changes in the visualized portion of the lumbar spine.      Electronically signed by: Lamonte Ochoa MD  Date:    01/27/2020  Time:    08:53             Narrative:    EXAMINATION:  XR PELVIS ROUTINE AP    CLINICAL HISTORY:  Dizziness/Vertigo (780.4);    COMPARISON:  02/24/2015    FINDINGS:  There are healed fractures in the right superior and inferior pubic rami.  There is no acute fracture visualized.  There is no dislocation.  There are mild degenerative changes in the visualized portion of the lumbar spine.  The visualized portion of the bowel gas pattern is normal in appearance.                               X-Ray Chest AP Portable (Final result)  Result time 01/27/20 08:54:57    Final result by Pete Rodrigues MD (01/27/20 08:54:57)                 Impression:      No acute findings.      Electronically signed by: Pete Rodrigues  Date:    01/27/2020  Time:    08:54             Narrative:    EXAMINATION:  XR CHEST AP PORTABLE    CLINICAL HISTORY:  syncope;    COMPARISON:  01/24/2020    FINDINGS:  The heart is normal in size.  Sternotomy wires.  Prosthetic heart valve.  Pacemaker on the left.  No acute pulmonary infiltrates identified.

## 2020-01-27 NOTE — H&P
"[facility]  History & Physical    Patient Name: Josy Posey  MRN: [id number]  Admission Date: 1/27/2020  Attending Physician: Avery Hinojosa MD   Primary Care Provider: TIKA Rosales Jr, MD         Patient information was obtained from patient and ER records.     Subjective:     Principal Problem:Dizziness    Chief Complaint:   Chief Complaint   Patient presents with    Dizziness        HPI: Josy Posey is a 89 year old female with a PMHx of CVA, Hypothyroidism, S/p MVR, S/p CABG, HTN, HLD, DVT, CHF, and A-fib who presented to the Emergency Department with c/o dizziness. ED reports syncope. Pt denies syncope. Pt reports having dizziness and suffered ground level fall. Pt denies hitting head or LOC. Pt has no complaint of body pain at this time. ED workup showed no leukocytosis/leukopenia, stable Hgb/Hct, chronic renal insufficiency. UA (+) nitrites, 1+ leukos. CT head showed chronic ischemic changes but no acute findings. Pelvic xray with no acute fracture. CXR with no acute findings. Pt given Cefuroxime 250 mg PO x 1 in ED.     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 fuCT 1/3- stable,  CT's later - no residual    Lens replaced by other means 6/25/2014    Melena     Memory loss     PET scan consistent with Alzzheimers.   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    1 vessel LIMA to LAD    S/P MVR (mitral valve replacement) 9/27/2013    Skin ulcer     Squamous cell carcinoma     skin cancer X 2    Stroke     Syncope and collapse     Unspecified essential hypertension 9/16/2013    Unspecified transient cerebral ischemia     Urinary incontinence     wears briefs       Past Surgical History:   Procedure Laterality Date    ADENOIDECTOMY      APPENDECTOMY      BREAST SURGERY  1950    right lumpectomy     CARDIAC VALVE SURGERY  10/04/2007    MVR    CATARACT EXTRACTION      CHOLECYSTECTOMY      CORONARY ARTERY BYPASS GRAFT      EYE SURGERY      cataracts bilateral    HYSTERECTOMY      for hydatiform mole    INSERT / REPLACE / REMOVE PACEMAKER  09/11/2001    TONSILLECTOMY         Review of patient's allergies indicates:   Allergen Reactions    Sulfa (sulfonamide antibiotics) Anaphylaxis     Other reaction(s): Angioedema    Morphine      Other reaction(s): Unknown    Statins-hmg-coa reductase inhibitors Other (See Comments)     Elevated LFTs    Dronedarone Diarrhea, Nausea Only and Rash     DIZZINESS         No current facility-administered medications on file prior to encounter.      Current Outpatient Medications on File Prior to Encounter   Medication Sig    ascorbic acid (VITAMIN C) 1000 MG tablet Take 1 tablet by mouth once daily once daily.    aspirin (ECOTRIN) 81 MG EC tablet Take 81 mg by mouth as needed for Pain.    escitalopram oxalate (LEXAPRO) 10 MG tablet Take 1 tablet (10 mg total) by mouth once daily.     fluticasone (FLONASE) 50 mcg/actuation nasal spray instill 2 sprays into each nostril once daily (Patient taking differently: instill 2 sprays into each nostril once daily as needed.)    folic acid (FOLVITE) 1 MG tablet Take 1 tablet by mouth once daily once daily.    Lactoperoxi/Gluc Oxid/Pot Thio (BIOTENE DRY MOUTH MM) by Mucous Membrane route as needed.    levothyroxine (SYNTHROID) 50 MCG tablet TAKE 1 TABLET(50 MCG) BY MOUTH EVERY DAY    midodrine (PROAMATINE) 5 MG Tab Take 1 tablet (5 mg total) by mouth 4 (four) times daily. During waking hours    omega-3 fatty acids-vitamin E (FISH OIL) 1,000 mg Cap Take 1 capsule by mouth every other day.     pravastatin (PRAVACHOL) 20 MG tablet TAKE 1 TABLET(20 MG) BY MOUTH EVERY DAY    propafenone (RHTHYMOL) 150 MG Tab TAKE 1/2 TABLET BY MOUTH THREE TIMES A DAY    vitamin B12-folic acid 0.5-1 mg Tab Take 1 tablet by mouth once daily once daily.    warfarin (COUMADIN) 1 MG tablet Take 1/2 tablet every evening or as directed by the coumadin clinic.    acetaminophen (TYLENOL) 500 MG tablet Take 500 mg by mouth as needed for Pain.    amoxicillin (AMOXIL) 500 MG capsule Take 1 capsule by mouth every 6 (six) hours.    clotrimazole-betamethasone 1-0.05% (LOTRISONE) cream Apply topically 2 (two) times daily. APPT NEEDED!    econazole nitrate 1 % cream Apply topically as needed.     escitalopram oxalate (LEXAPRO) 10 MG tablet TAKE 1 TABLET(10 MG) BY MOUTH EVERY DAY    iron-vitamin C 100-250 mg, ICAR-C, (ICAR-C) 100-250 mg Tab Take 1 tablet by mouth once daily.    levothyroxine (SYNTHROID) 50 MCG tablet 1 po qd m-f, 1.5 sat and sunday    spironolactone (ALDACTONE) 25 MG tablet TAKE 1/2 TABLET BY MOUTH EVERY DAY     Family History     Problem Relation (Age of Onset)    COPD Brother    Hypertension Sister, Daughter    Stroke Sister, Brother    Tuberculosis Mother, Father        Tobacco Use    Smoking status: Never Smoker    Smokeless tobacco: Never Used   Substance  and Sexual Activity    Alcohol use: No     Alcohol/week: 0.0 standard drinks    Drug use: No    Sexual activity: Never     Review of Systems   Constitutional: Positive for activity change. Negative for appetite change, chills and fever.   HENT: Negative for trouble swallowing.    Eyes: Negative for visual disturbance.   Respiratory: Negative for apnea, cough, choking, chest tightness, shortness of breath, wheezing and stridor.    Cardiovascular: Negative for chest pain, palpitations and leg swelling.   Gastrointestinal: Negative for abdominal pain, constipation, diarrhea, nausea and vomiting.   Genitourinary: Positive for urgency. Negative for difficulty urinating, dysuria, flank pain and frequency.   Musculoskeletal: Negative for arthralgias, back pain, gait problem, joint swelling, myalgias, neck pain and neck stiffness.   Skin: Negative for color change.   Neurological: Positive for dizziness and weakness. Negative for tremors, seizures, syncope, facial asymmetry, speech difficulty, light-headedness, numbness and headaches.   Hematological: Negative.    Psychiatric/Behavioral: Negative for agitation, behavioral problems and confusion.     Objective:     Vital Signs (Most Recent):  Temp: 97.6 °F (36.4 °C) (01/27/20 1444)  Pulse: 75 (01/27/20 1444)  Resp: 16 (01/27/20 1444)  BP: (!) 179/69 (01/27/20 1444)  SpO2: 96 % (01/27/20 1444) Vital Signs (24h Range):  Temp:  [97.6 °F (36.4 °C)-98.6 °F (37 °C)] 97.6 °F (36.4 °C)  Pulse:  [65-75] 75  Resp:  [12-22] 16  SpO2:  [95 %-99 %] 96 %  BP: (153-186)/(69-87) 179/69     Weight: 71.7 kg (158 lb 1.1 oz)  Body mass index is 28 kg/m².    Physical Exam   Constitutional: She is oriented to person, place, and time. She appears well-developed. She is cooperative. She is easily aroused. No distress.   HENT:   Head: Normocephalic and atraumatic.   Eyes: EOM are normal.   Neck: Normal range of motion. Neck supple.   Cardiovascular: Normal rate, regular rhythm and intact distal  pulses.   Murmur heard.  Pulmonary/Chest: Effort normal and breath sounds normal. No stridor. No respiratory distress. She has no wheezes. She has no rales. She exhibits no tenderness.   Abdominal: Soft. Bowel sounds are normal. She exhibits no distension. There is no tenderness. There is no rebound and no guarding.   Genitourinary:   Genitourinary Comments: Deferred   Musculoskeletal: Normal range of motion. She exhibits no edema, tenderness or deformity.   Neurological: She is alert, oriented to person, place, and time and easily aroused. No sensory deficit.   Skin: Skin is warm and dry. Capillary refill takes less than 2 seconds.   Psychiatric: She has a normal mood and affect. Her behavior is normal. Thought content normal.   Nursing note and vitals reviewed.        CRANIAL NERVES     CN III, IV, VI   Extraocular motions are normal.        Significant Labs:   CBC:   Recent Labs   Lab 01/27/20  0840   WBC 8.21   HGB 12.2   HCT 37.7        CMP:   Recent Labs   Lab 01/27/20  0840      K 4.0      CO2 27   GLU 98   BUN 23   CREATININE 1.3   CALCIUM 8.9   PROT 6.9   ALBUMIN 3.7   BILITOT 0.7   ALKPHOS 57   AST 18   ALT 10   ANIONGAP 11   EGFRNONAA 35*     Coagulation:   Recent Labs   Lab 01/27/20  0840   INR 2.9*   APTT 42.5*     Troponin:   Recent Labs   Lab 01/27/20  0840   TROPONINI 0.007     Urine Studies:   Recent Labs   Lab 01/27/20  0824   COLORU Yellow   APPEARANCEUA Clear   PHUR 6.0   SPECGRAV 1.010   PROTEINUA Negative   GLUCUA Negative   KETONESU Negative   BILIRUBINUA Negative   OCCULTUA Negative   NITRITE Positive*   UROBILINOGEN Negative   LEUKOCYTESUR 1+*   WBCUA 10*   BACTERIA Many*       Significant Imaging:   Imaging Results          CT Head Without Contrast (Final result)  Result time 01/27/20 09:25:51    Final result by Geovany Mcneil MD (01/27/20 09:25:51)                 Impression:      Chronic microvascular ischemic changes.    All CT scans at this facility use dose  modulation, iterative reconstruction, and/or weight based dosing when appropriate to reduce radiation dose to as low as reasonable achievable.      Electronically signed by: Geovany Mcneil MD  Date:    01/27/2020  Time:    09:25             Narrative:    EXAMINATION:  CT HEAD WITHOUT CONTRAST    CLINICAL HISTORY:  Syncope/fainting;    TECHNIQUE:  Low dose axial CT images obtained throughout the head without intravenous contrast. Sagittal and coronal reconstructions were performed.    COMPARISON:  09/25/2019    FINDINGS:  Intracranial compartment:    The brain parenchyma demonstrates areas of decreased attenuation with moderate periventricular white matter consistent with chronic microvascular ischemic changes..  No parenchymal mass, hemorrhage, edema or major vascular distribution infarct.  Vascular calcifications are noted.    Moderate prominence of the sulci and ventricles are consistent with age-related involutional changes.    No extra-axial blood or fluid collections.    Skull/extracranial contents (limited evaluation): No fracture. Mastoid air cells and paranasal sinuses are essentially clear.                               X-Ray Pelvis Routine AP (Final result)  Result time 01/27/20 08:53:51    Final result by JAIME Ochoa Sr., MD (01/27/20 08:53:51)                 Impression:      1. There are healed fractures in the right superior and inferior pubic rami.  There is no acute fracture visualized.  2. There are mild degenerative changes in the visualized portion of the lumbar spine.      Electronically signed by: Lamonte Ochoa MD  Date:    01/27/2020  Time:    08:53             Narrative:    EXAMINATION:  XR PELVIS ROUTINE AP    CLINICAL HISTORY:  Dizziness/Vertigo (780.4);    COMPARISON:  02/24/2015    FINDINGS:  There are healed fractures in the right superior and inferior pubic rami.  There is no acute fracture visualized.  There is no dislocation.  There are mild degenerative changes in the visualized  portion of the lumbar spine.  The visualized portion of the bowel gas pattern is normal in appearance.                               X-Ray Chest AP Portable (Final result)  Result time 01/27/20 08:54:57    Final result by Pete Rodrigues MD (01/27/20 08:54:57)                 Impression:      No acute findings.      Electronically signed by: Pete Rodrigues  Date:    01/27/2020  Time:    08:54             Narrative:    EXAMINATION:  XR CHEST AP PORTABLE    CLINICAL HISTORY:  syncope;    COMPARISON:  01/24/2020    FINDINGS:  The heart is normal in size.  Sternotomy wires.  Prosthetic heart valve.  Pacemaker on the left.  No acute pulmonary infiltrates identified.                              Assessment/Plan:     * Dizziness  - CT head showed chronic ischemic changes but no acute findings  - Pt received Meclizine in ED  - Pt currently denies dizziness at this time  - Orthostatic in ED  - Orthostatic q shift  - Gentle IV hydration      Acute cystitis without hematuria  - Urine (+) nitrites, 1+ leukos  - Micro wbc 10, bacteria many  - Pt received Cefuroxime 250 mg PO x 1 in ED  - Start Rocephin 1 gm q12hrs  - Urine culture pending    Severe MR with remote mitral valve replacement on chronic anticoagulation  - Continue Coumadin -- pharmacy to dose      Hypothyroidism  - Continue Levothyroxine      Chronic diastolic HFpEF of 60% per echo 5/2017  - Appears compensated  - Last 2D ECHO 03/10/18 -- EF 55-60%  - Gentle hydration  - Monitor fluid status  - Strict I&Os  - Daily weights      CKD (chronic kidney disease) stage 3, GFR 30-59 ml/min  - Stable  - Monitor      Paroxysmal atrial fibrillation  - Continue Propafenone      Pure hypercholesterolemia  - Continue Statin        VTE Risk Mitigation (From admission, onward)         Ordered     Place sequential compression device  Until discontinued      01/27/20 1514     IP VTE HIGH RISK PATIENT  Once      01/27/20 1514                   MARIANNE Benites  Department of Hospital  Medicine   [facility]

## 2020-01-27 NOTE — ED NOTES
Called report to nurse Mandy on med surg unit. Pt will be transported to room 508. Family at bedside.

## 2020-01-27 NOTE — ASSESSMENT & PLAN NOTE
- CT head showed chronic ischemic changes but no acute findings  - Pt received Meclizine in ED  - Pt currently denies dizziness at this time  - Orthostatic in ED  - Orthostatic q shift  - Gentle IV hydration

## 2020-01-28 LAB
ALBUMIN SERPL BCP-MCNC: 3.6 G/DL (ref 3.5–5.2)
ALP SERPL-CCNC: 57 U/L (ref 55–135)
ALT SERPL W/O P-5'-P-CCNC: 10 U/L (ref 10–44)
ANION GAP SERPL CALC-SCNC: 12 MMOL/L (ref 8–16)
AST SERPL-CCNC: 20 U/L (ref 10–40)
BASOPHILS # BLD AUTO: 0.03 K/UL (ref 0–0.2)
BASOPHILS NFR BLD: 0.5 % (ref 0–1.9)
BILIRUB SERPL-MCNC: 0.8 MG/DL (ref 0.1–1)
BUN SERPL-MCNC: 23 MG/DL (ref 10–30)
CALCIUM SERPL-MCNC: 8.9 MG/DL (ref 8.7–10.5)
CHLORIDE SERPL-SCNC: 103 MMOL/L (ref 95–110)
CO2 SERPL-SCNC: 24 MMOL/L (ref 23–29)
CREAT SERPL-MCNC: 1.2 MG/DL (ref 0.5–1.4)
DIFFERENTIAL METHOD: ABNORMAL
EOSINOPHIL # BLD AUTO: 0.2 K/UL (ref 0–0.5)
EOSINOPHIL NFR BLD: 2.5 % (ref 0–8)
ERYTHROCYTE [DISTWIDTH] IN BLOOD BY AUTOMATED COUNT: 13.1 % (ref 11.5–14.5)
EST. GFR  (AFRICAN AMERICAN): 44 ML/MIN/1.73 M^2
EST. GFR  (NON AFRICAN AMERICAN): 39 ML/MIN/1.73 M^2
GLUCOSE SERPL-MCNC: 95 MG/DL (ref 70–110)
HCT VFR BLD AUTO: 38.3 % (ref 37–48.5)
HGB BLD-MCNC: 12.2 G/DL (ref 12–16)
IMM GRANULOCYTES # BLD AUTO: 0.02 K/UL (ref 0–0.04)
IMM GRANULOCYTES NFR BLD AUTO: 0.3 % (ref 0–0.5)
LYMPHOCYTES # BLD AUTO: 1.7 K/UL (ref 1–4.8)
LYMPHOCYTES NFR BLD: 26.3 % (ref 18–48)
MCH RBC QN AUTO: 31.3 PG (ref 27–31)
MCHC RBC AUTO-ENTMCNC: 31.9 G/DL (ref 32–36)
MCV RBC AUTO: 98 FL (ref 82–98)
MONOCYTES # BLD AUTO: 0.7 K/UL (ref 0.3–1)
MONOCYTES NFR BLD: 10.5 % (ref 4–15)
NEUTROPHILS # BLD AUTO: 3.8 K/UL (ref 1.8–7.7)
NEUTROPHILS NFR BLD: 59.9 % (ref 38–73)
NRBC BLD-RTO: 0 /100 WBC
PLATELET # BLD AUTO: 182 K/UL (ref 150–350)
PMV BLD AUTO: 11.5 FL (ref 9.2–12.9)
POTASSIUM SERPL-SCNC: 3.8 MMOL/L (ref 3.5–5.1)
PROT SERPL-MCNC: 6.9 G/DL (ref 6–8.4)
RBC # BLD AUTO: 3.9 M/UL (ref 4–5.4)
SODIUM SERPL-SCNC: 139 MMOL/L (ref 136–145)
WBC # BLD AUTO: 6.38 K/UL (ref 3.9–12.7)

## 2020-01-28 PROCEDURE — 97530 THERAPEUTIC ACTIVITIES: CPT | Mod: HCNC

## 2020-01-28 PROCEDURE — 63600175 PHARM REV CODE 636 W HCPCS: Mod: HCNC | Performed by: NURSE PRACTITIONER

## 2020-01-28 PROCEDURE — 25000003 PHARM REV CODE 250: Mod: HCNC | Performed by: NURSE PRACTITIONER

## 2020-01-28 PROCEDURE — G0378 HOSPITAL OBSERVATION PER HR: HCPCS | Mod: HCNC

## 2020-01-28 PROCEDURE — 87086 URINE CULTURE/COLONY COUNT: CPT | Mod: HCNC

## 2020-01-28 PROCEDURE — 96376 TX/PRO/DX INJ SAME DRUG ADON: CPT

## 2020-01-28 PROCEDURE — 97802 MEDICAL NUTRITION INDIV IN: CPT | Mod: HCNC

## 2020-01-28 PROCEDURE — 80053 COMPREHEN METABOLIC PANEL: CPT | Mod: HCNC

## 2020-01-28 PROCEDURE — 85025 COMPLETE CBC W/AUTO DIFF WBC: CPT | Mod: HCNC

## 2020-01-28 PROCEDURE — 25000003 PHARM REV CODE 250: Mod: HCNC | Performed by: INTERNAL MEDICINE

## 2020-01-28 PROCEDURE — 97162 PT EVAL MOD COMPLEX 30 MIN: CPT | Mod: HCNC

## 2020-01-28 PROCEDURE — 97166 OT EVAL MOD COMPLEX 45 MIN: CPT | Mod: HCNC

## 2020-01-28 PROCEDURE — 36415 COLL VENOUS BLD VENIPUNCTURE: CPT | Mod: HCNC

## 2020-01-28 RX ORDER — MECLIZINE HYDROCHLORIDE 25 MG/1
25 TABLET ORAL ONCE
Status: COMPLETED | OUTPATIENT
Start: 2020-01-28 | End: 2020-01-28

## 2020-01-28 RX ORDER — SODIUM CHLORIDE 9 MG/ML
INJECTION, SOLUTION INTRAVENOUS CONTINUOUS
Status: DISCONTINUED | OUTPATIENT
Start: 2020-01-28 | End: 2020-01-29

## 2020-01-28 RX ADMIN — WARFARIN SODIUM 0.5 MG: 1 TABLET ORAL at 05:01

## 2020-01-28 RX ADMIN — ESCITALOPRAM OXALATE 10 MG: 10 TABLET ORAL at 08:01

## 2020-01-28 RX ADMIN — PRAVASTATIN SODIUM 20 MG: 20 TABLET ORAL at 09:01

## 2020-01-28 RX ADMIN — PROPAFENONE HYDROCHLORIDE 75 MG: 150 TABLET, FILM COATED ORAL at 09:01

## 2020-01-28 RX ADMIN — PROPAFENONE HYDROCHLORIDE 75 MG: 150 TABLET, FILM COATED ORAL at 01:01

## 2020-01-28 RX ADMIN — CEFTRIAXONE 1 G: 1 INJECTION, SOLUTION INTRAVENOUS at 05:01

## 2020-01-28 RX ADMIN — LEVOTHYROXINE SODIUM 50 MCG: 50 TABLET ORAL at 06:01

## 2020-01-28 RX ADMIN — SODIUM CHLORIDE: 0.9 INJECTION, SOLUTION INTRAVENOUS at 01:01

## 2020-01-28 RX ADMIN — FOLIC ACID 1000 MCG: 1 TABLET ORAL at 08:01

## 2020-01-28 RX ADMIN — MECLIZINE HYDROCLORIDE 25 MG: 25 TABLET ORAL at 01:01

## 2020-01-28 RX ADMIN — PROPAFENONE HYDROCHLORIDE 75 MG: 150 TABLET, FILM COATED ORAL at 06:01

## 2020-01-28 NOTE — PT/OT/SLP EVAL
Occupational Therapy   Evaluation    Name: Josy Posey  MRN: [id number]  Admitting Diagnosis:  Dizziness      Recommendations:     Discharge Recommendations: home health OT  Discharge Equipment Recommendations:  none  Barriers to discharge:       Assessment:     Josy Posey is a 89 y.o. female with a medical diagnosis of Dizziness.  She presents with debility and generalized weakness.. Performance deficits affecting function: weakness, impaired self care skills, impaired balance, impaired endurance, impaired functional mobilty, gait instability, decreased lower extremity function.      Rehab Prognosis: Fair; patient would benefit from acute skilled OT services to address these deficits and reach maximum level of function.       Plan:     Patient to be seen 3 x/week to address the above listed problems via self-care/home management, therapeutic exercises, therapeutic activities  · Plan of Care Expires:    · Plan of Care Reviewed with: patient    Subjective     Chief Complaint: debility and generalized weakness  Patient/Family Comments/goals:     Occupational Profile:  Living Environment: lives daughter in 1 story house with no steps  Previous level of function: mod (I) with functional mobility and (I) with adl's   Roles and Routines: occupational therapy  Equipment Used at Home:  walker, rolling  Assistance upon Discharge:     Pain/Comfort:  · Pain Rating 1: 3/10  · Location - Orientation 1: generalized  · Location 1: (all over)    Patients cultural, spiritual, Yazidi conflicts given the current situation:      Objective:     Communicated with: nurse Mandy and [facility] chart review prior to session.  Patient found HOB elevated with peripheral IV, PureWick upon OT entry to room.    General Precautions: Standard, fall   Orthopedic Precautions:N/A   Braces: N/A     Occupational Performance:    Bed Mobility:    · Patient completed Rolling/Turning to Right with minimum assistance  · Patient completed  Scooting/Bridging with contact guard assistance  · Patient completed Supine to Sit with minimum assistance    Functional Mobility/Transfers:  · Patient completed Sit <> Stand Transfer with stand by assistance  with  rolling walker   · Patient completed Bed <> Chair Transfer using Step Transfer technique with minimum assistance with rolling walker  · Functional Mobility: pt ambulated 20 feet with min a vc's for safety and posture    Activities of Daily Living:  · Upper Body Dressing: minimum assistance .  · Lower Body Dressing: minimum assistance .    Cognitive/Visual Perceptual:  Cognitive/Psychosocial Skills:     -       Oriented to: Person, Place, Time and Situation   -       Follows Commands/attention:Follows multistep  commands  -       Communication: clear/fluent  -       Memory: No Deficits noted  -       Safety awareness/insight to disability: impaired   Visual/Perceptual:      -Intact .    Physical Exam:  Upper Extremity Range of Motion:     -       Right Upper Extremity: WFL  -       Left Upper Extremity: WFL  Upper Extremity Strength:    -       Right Upper Extremity: mmt: 3+/5 grossly  -       Left Upper Extremity: mmt: 3+/5 grossly   Strength:    -       Right Upper Extremity: mmt: 3+/5 grossly  -       Left Upper Extremity: mmt: 3+/5 grossly    AMPAC 6 Click ADL:  AMPAC Total Score: 22    Treatment & Education:    Education:    Patient left up in chair with all lines intact, call button in reach, chair alarm on and nurse Mandy notified    GOALS:   Multidisciplinary Problems     Occupational Therapy Goals        Problem: Occupational Therapy Goal    Goal Priority Disciplines Outcome Interventions   Occupational Therapy Goal     OT, PT/OT     Description:  OT goals to be met by 2-4-20  Pt will tolerate 1 set x 15 reps b ue rom exercise  sba with ue dressing  sba with le dressing  sba with toilet t/f's                      History:     Past Medical History:   Diagnosis Date    *Atrial fibrillation   "   Anemia     Angina pectoris     Arthritis     Atrial fibrillation 9/27/2013    Atrioventricular block, complete     CAD (coronary artery disease) 5/6/2015    Cardiac pacemaker 9/27/2013    Carotid artery occlusion     CHF (congestive heart failure)     Coronary artery disease     Cystocele     prior pessary use    Depression     Disorder of kidney and ureter     DVT (deep venous thrombosis) 3/1/10 approx    s/p rt embolectomy    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 fuCT 1/3- stable,  CT's later - no residual    Lens replaced by other means 6/25/2014    Melena     Memory loss     PET scan consistent with Alzzheimers.   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    1 vessel LIMA to LAD    S/P MVR (mitral valve replacement) 9/27/2013    Skin ulcer     Squamous cell carcinoma     skin cancer X 2    Stroke     Syncope and collapse     Unspecified essential hypertension 9/16/2013    Unspecified transient cerebral ischemia     Urinary incontinence     wears briefs       Past Surgical History:   Procedure Laterality Date    ADENOIDECTOMY      APPENDECTOMY      BREAST SURGERY  1950    right lumpectomy     CARDIAC VALVE SURGERY  10/04/2007    MVR    CATARACT EXTRACTION      CHOLECYSTECTOMY      CORONARY ARTERY BYPASS GRAFT      EYE SURGERY      cataracts bilateral    HYSTERECTOMY      for hydatiform mole    INSERT / REPLACE / REMOVE PACEMAKER  09/11/2001    " TONSILLECTOMY         Time Tracking:     OT Date of Treatment: 01/28/20  OT Start Time: 0940  OT Stop Time: 1005  OT Total Time (min): 25 min    Billable Minutes:Evaluation 10 minutes  Therapeutic Activity 15 minutes    Yeny Oreilly OT  1/28/2020

## 2020-01-28 NOTE — PLAN OF CARE
SW met with the patient at bedside to discuss request.  Patient requested  services to assist her with wound care for her arm and assist her in returning back to her baseline.  GLADYS reviewed and signed the patient choice form.  Patient selected [facility].  Referrals sent.       01/28/20 7383   Post-Acute Status   Post-Acute Authorization Home Health/Hospice   Home Health/Hospice Status Referrals Sent   Patient choice form signed by patient/caregiver List with quality metrics by geographic area provided

## 2020-01-28 NOTE — PROGRESS NOTES
[facility] Hospital Medicine  Progress Note    Patient Name: Josy Posey  MRN: [id number]  Patient Class: OP- Observation   Admission Date: 1/27/2020  Length of Stay: 0 days  Attending Physician: Avery Hinojosa MD  Primary Care Provider: TIKA Rosales Jr, MD        Subjective:     Principal Problem:Dizziness        HPI:  Josy Posey is a 89 year old female with a PMHx of CVA, Hypothyroidism, S/p MVR, S/p CABG, HTN, HLD, DVT, CHF, and A-fib who presented to the Emergency Department with c/o dizziness. ED reports syncope. Pt denies syncope. Pt reports having dizziness and suffered ground level fall. Pt denies hitting head or LOC. Pt has no complaint of body pain at this time. ED workup showed no leukocytosis/leukopenia, stable Hgb/Hct, chronic renal insufficiency. UA (+) nitrites, 1+ leukos. CT head showed chronic ischemic changes but no acute findings. Pelvic xray with no acute fracture. CXR with no acute findings. Pt given Cefuroxime 250 mg PO x 1 in ED.     Overview/Hospital Course:  89 year old female admitted for dizziness and UTI. CT head negative for acute findings. +Orthostatics. Patient being treated with gentle IV hydration and IV rocephin. Urine culture pending.     Interval History: No acute events overnight. Will continue gentle IV hydration and IV rocephin. Urine cx pending.     Review of Systems   Constitutional: Positive for activity change. Negative for appetite change, chills and fever.   HENT: Negative for trouble swallowing.    Eyes: Negative for visual disturbance.   Respiratory: Negative for apnea, cough, choking, chest tightness, shortness of breath, wheezing and stridor.    Cardiovascular: Negative for chest pain, palpitations and leg swelling.   Gastrointestinal: Negative for abdominal pain, constipation, diarrhea, nausea and vomiting.   Genitourinary: Positive for urgency. Negative for difficulty urinating, dysuria, flank pain and frequency.   Musculoskeletal:  Negative for arthralgias, back pain, gait problem, joint swelling, myalgias, neck pain and neck stiffness.   Skin: Negative for color change.   Neurological: Positive for dizziness and weakness. Negative for tremors, seizures, syncope, facial asymmetry, speech difficulty, light-headedness, numbness and headaches.   Hematological: Negative.    Psychiatric/Behavioral: Negative for agitation, behavioral problems and confusion.     Objective:     Vital Signs (Most Recent):  Temp: 98.1 °F (36.7 °C) (01/28/20 0740)  Pulse: 67 (01/28/20 0740)  Resp: 15 (01/28/20 0740)  BP: (!) 147/67 (01/28/20 0740)  SpO2: (!) 94 % (01/28/20 0740) Vital Signs (24h Range):  Temp:  [97.5 °F (36.4 °C)-98.6 °F (37 °C)] 98.1 °F (36.7 °C)  Pulse:  [65-78] 67  Resp:  [12-22] 15  SpO2:  [94 %-97 %] 94 %  BP: (109-186)/() 147/67     Weight: 71.7 kg (158 lb 1.1 oz)  Body mass index is 28 kg/m².    Intake/Output Summary (Last 24 hours) at 1/28/2020 1121  Last data filed at 1/28/2020 0500  Gross per 24 hour   Intake 543.33 ml   Output 850 ml   Net -306.67 ml      Physical Exam   Constitutional: She is oriented to person, place, and time. She appears well-developed. She is cooperative. She is easily aroused. No distress.   Elderly    HENT:   Head: Normocephalic and atraumatic.   Eyes: EOM are normal.   Neck: Normal range of motion. Neck supple.   Cardiovascular: Normal rate, regular rhythm and intact distal pulses.   Murmur heard.  Pulmonary/Chest: Effort normal and breath sounds normal. No stridor. No respiratory distress. She has no wheezes. She has no rales. She exhibits no tenderness.   Abdominal: Soft. Bowel sounds are normal. She exhibits no distension. There is no tenderness. There is no rebound and no guarding.   Genitourinary:   Genitourinary Comments: Deferred   Musculoskeletal: Normal range of motion. She exhibits no edema, tenderness or deformity.   Neurological: She is alert, oriented to person, place, and time and easily aroused. No  sensory deficit.   Skin: Skin is warm and dry. Capillary refill takes less than 2 seconds.   Psychiatric: She has a normal mood and affect. Her behavior is normal. Thought content normal.   Nursing note and vitals reviewed.      Significant Labs:   BMP:   Recent Labs   Lab 01/28/20  0503   GLU 95      K 3.8      CO2 24   BUN 23   CREATININE 1.2   CALCIUM 8.9     CBC:   Recent Labs   Lab 01/27/20  0840 01/28/20  0503   WBC 8.21 6.38   HGB 12.2 12.2   HCT 37.7 38.3    182     CMP:   Recent Labs   Lab 01/27/20  0840 01/28/20  0503    139   K 4.0 3.8    103   CO2 27 24   GLU 98 95   BUN 23 23   CREATININE 1.3 1.2   CALCIUM 8.9 8.9   PROT 6.9 6.9   ALBUMIN 3.7 3.6   BILITOT 0.7 0.8   ALKPHOS 57 57   AST 18 20   ALT 10 10   ANIONGAP 11 12   EGFRNONAA 35* 39*     Urine Culture: No results for input(s): LABURIN in the last 48 hours.  Urine Studies:   Recent Labs   Lab 01/27/20  0824   COLORU Yellow   APPEARANCEUA Clear   PHUR 6.0   SPECGRAV 1.010   PROTEINUA Negative   GLUCUA Negative   KETONESU Negative   BILIRUBINUA Negative   OCCULTUA Negative   NITRITE Positive*   UROBILINOGEN Negative   LEUKOCYTESUR 1+*   WBCUA 10*   BACTERIA Many*       Significant Imaging:   Imaging Results          CT Head Without Contrast (Final result)  Result time 01/27/20 09:25:51    Final result by Geovany Mcneil MD (01/27/20 09:25:51)                 Impression:      Chronic microvascular ischemic changes.    All CT scans at this facility use dose modulation, iterative reconstruction, and/or weight based dosing when appropriate to reduce radiation dose to as low as reasonable achievable.      Electronically signed by: Geovany Mcneil MD  Date:    01/27/2020  Time:    09:25             Narrative:    EXAMINATION:  CT HEAD WITHOUT CONTRAST    CLINICAL HISTORY:  Syncope/fainting;    TECHNIQUE:  Low dose axial CT images obtained throughout the head without intravenous contrast. Sagittal and coronal reconstructions  were performed.    COMPARISON:  09/25/2019    FINDINGS:  Intracranial compartment:    The brain parenchyma demonstrates areas of decreased attenuation with moderate periventricular white matter consistent with chronic microvascular ischemic changes..  No parenchymal mass, hemorrhage, edema or major vascular distribution infarct.  Vascular calcifications are noted.    Moderate prominence of the sulci and ventricles are consistent with age-related involutional changes.    No extra-axial blood or fluid collections.    Skull/extracranial contents (limited evaluation): No fracture. Mastoid air cells and paranasal sinuses are essentially clear.                               X-Ray Pelvis Routine AP (Final result)  Result time 01/27/20 08:53:51    Final result by JAIME Ochoa Sr., MD (01/27/20 08:53:51)                 Impression:      1. There are healed fractures in the right superior and inferior pubic rami.  There is no acute fracture visualized.  2. There are mild degenerative changes in the visualized portion of the lumbar spine.      Electronically signed by: Lamonte Ochoa MD  Date:    01/27/2020  Time:    08:53             Narrative:    EXAMINATION:  XR PELVIS ROUTINE AP    CLINICAL HISTORY:  Dizziness/Vertigo (780.4);    COMPARISON:  02/24/2015    FINDINGS:  There are healed fractures in the right superior and inferior pubic rami.  There is no acute fracture visualized.  There is no dislocation.  There are mild degenerative changes in the visualized portion of the lumbar spine.  The visualized portion of the bowel gas pattern is normal in appearance.                               X-Ray Chest AP Portable (Final result)  Result time 01/27/20 08:54:57    Final result by Pete Rodrigues MD (01/27/20 08:54:57)                 Impression:      No acute findings.      Electronically signed by: Pete Rodrigues  Date:    01/27/2020  Time:    08:54             Narrative:    EXAMINATION:  XR CHEST AP PORTABLE    CLINICAL  HISTORY:  syncope;    COMPARISON:  01/24/2020    FINDINGS:  The heart is normal in size.  Sternotomy wires.  Prosthetic heart valve.  Pacemaker on the left.  No acute pulmonary infiltrates identified.                                Assessment/Plan:      * Dizziness  - CT head showed chronic ischemic changes but no acute findings  - Pt received Meclizine in ED  - Pt currently denies dizziness at this time  - Orthostatic in ED  - Orthostatic q shift  - Gentle IV hydration  1/28/20  -Denies dizziness  -Continue gentle IV hydration   -      Acute cystitis without hematuria  - Urine (+) nitrites, 1+ leukos  - Micro wbc 10, bacteria many  - Pt received Cefuroxime 250 mg PO x 1 in ED  - Start Rocephin 1 gm q12hrs  - Urine culture pending  1/28/20  -Urine cx pending   -Continue IV rocephin     Severe MR with remote mitral valve replacement on chronic anticoagulation  - Continue Coumadin -- pharmacy to dose      Hypothyroidism  - Continue Levothyroxine      Chronic diastolic HFpEF of 60% per echo 5/2017  - Appears compensated  - Last 2D ECHO 03/10/18 -- EF 55-60%  - Gentle hydration  - Monitor fluid status  - Strict I&Os  - Daily weights      CKD (chronic kidney disease) stage 3, GFR 30-59 ml/min  - Stable  - Monitor      Paroxysmal atrial fibrillation  - Continue Propafenone      Pure hypercholesterolemia  - Continue Statin        VTE Risk Mitigation (From admission, onward)         Ordered     warfarin (COUMADIN) split tablet 0.5 mg  Daily      01/27/20 1551     Place sequential compression device  Until discontinued      01/27/20 1514     IP VTE HIGH RISK PATIENT  Once      01/27/20 1514                      Lorena Lamar NP  Department of Hospital Medicine   [facility]

## 2020-01-28 NOTE — PLAN OF CARE
Discussed poc with pt and son at bs, cardiac monitoring in use pt in a paced rhythym, ivfs and iv abx given, NAD, vs wnl, remains injury free ortho vs q shift, chart check complete

## 2020-01-28 NOTE — PLAN OF CARE
Discussed poc and fam at bs, remains injury free fall precautions in place, vs wnlestrellita changed this shift, ua sent, ortho vs done, providers aware of results, no acute distress noted, chart check complete

## 2020-01-28 NOTE — PLAN OF CARE
SW met with patient at bedside to assess for discharge planning.  Patient was alert and oriented.  Patient reported utilizing sitter services, a rollator (mostly), a wheel chair, and a cane before coming to the hospital.  Patient identified her daughter and son-in-law, along with sitters as her help at home.  Patient stated that her daughter also manages her healthcare.  Patient stated that she may benefit from support with home health services upon discharge, but denied the need for any assistive equipment and all other community services at this time.  SW provided a transitional care folder, information on advanced directives, information on pharmacy bedside delivery, and discharge planning begins on admission with contact information for any needs/questions.     D/C Plan:  Home  PCP:  Dr. Geovany Rosales  Preferred Pharmacy:    [address] [address]  Phone: [phone] Fax: [phone]    [address] [address]  Phone: [phone] Fax: [phone]    [id number] [address] & [address] [address]  Phone: [phone] Fax: [phone]    Discharge transportation:  Family  [facility]:  Declined  Pharmacy Bedside Delivery: Yes       01/28/20 1034   Discharge Assessment   Assessment Type Discharge Planning Assessment   Confirmed/corrected address and phone number on facesheet? Yes   Assessment information obtained from? Patient   Expected Length of Stay (days)   (TBD)   Communicated expected length of stay with patient/caregiver yes   Prior to hospitilization cognitive status: Alert/Oriented   Prior to hospitalization functional status: Independent;Assistive Equipment   Current cognitive status: Alert/Oriented   Current  Functional Status: Independent   Facility Arrived From: Home   Lives With child(gemma), adult   Able to Return to Prior Arrangements yes   Is patient able to care for self after discharge? No   Who are your caregiver(s) and their phone number(s)? Ema  Ricobernice [phone]   Patient's perception of discharge disposition home health   Readmission Within the Last 30 Days no previous admission in last 30 days   Patient currently being followed by outpatient case management? No   Patient currently receives any other outside agency services? No   Equipment Currently Used at Home none   Do you have any problems affording any of your prescribed medications? No   Is the patient taking medications as prescribed? yes   Does the patient have transportation home? Yes   Transportation Anticipated family or friend will provide   Dialysis Name and Scheduled days N/A   Does the patient receive services at the [facility]mad[facility] Clinic? Yes  ([facility] - 2 times a month)   Discharge Plan B Home Health;Home with family   DME Needed Upon Discharge  none   Patient/Family in Agreement with Plan yes

## 2020-01-28 NOTE — PT/OT/SLP EVAL
Physical Therapy Evaluation    Patient Name:  Josy Posey   MRN:  [id number]    Recommendations:     Discharge Recommendations:  home health PT(24 HOUR [facility])   Discharge Equipment Recommendations: none   Barriers to discharge: None    Assessment:     Josy Posey is a 89 y.o. female admitted with a medical diagnosis of Dizziness.  She presents with the following impairments/functional limitations:  weakness, gait instability, impaired balance, impaired endurance, impaired functional mobilty, impaired self care skills, decreased lower extremity function, decreased ROM .    Rehab Prognosis: Good; patient would benefit from acute skilled PT services to address these deficits and reach maximum level of function.    Recent Surgery: * No surgery found *      Plan:     During this hospitalization, patient to be seen   to address the identified rehab impairments via gait training, therapeutic activities, therapeutic exercises and progress toward the following goals:    · Plan of Care Expires:  02/04/20    Subjective     Chief Complaint: WEAKNESS AND ACHE ALL OVER  Patient/Family Comments/goals: INC STRENGTH   Pain/Comfort:  · Pain Rating 1: 3/10  · Location 1: (ALL OVER)  · Pain Rating Post-Intervention 1: 3/10    Patients cultural, spiritual, Confucianist conflicts given the current situation:      Living Environment:  PT LIVES AT HOME WITH DAUGHTER AND HAS NO STEPS TO ENTER HOME.   Prior to admission, patients level of function was MOD I WITH RW HH DISTANCES.  Equipment used at home: walker, rolling.  DME owned (not currently used): none.  Upon discharge, patient will have assistance from DAUGHTER.    Objective:     Communicated with NURSE CHAYA AND [facility] CHART REVIEW  prior to session.  Patient found supine with peripheral IV, PureWick  upon PT entry to room.    General Precautions: Standard, fall   Orthopedic Precautions:N/A   Braces: N/A     Exams:  · Cognitive Exam:  Patient is oriented to Person, Place and  "Time  · RLE ROM: WFL  · RLE Strength: LIMITED  · LLE ROM: WFL  · LLE Strength: LIMITED    Functional Mobility:  PT  MET IN RM SUP.SIT EOB WITH MIN A AND SCOOTED WITH MIN A. PT STOOD WITH MOD A AND GT TRAINED X 20' WITH RW AND ASSIST WITH RW MANAGEMENT. PT RETURNED TO RM T/F TO CHAIR WITH MOD A AND EDUCATED ON SAFETY WITH T/F . PT EDUCATED ON ROLE OF P.T. AND LEFT SEATED IN CHAIR WITH CALL BELL IN REACH AND ALL NEEDS MET.     AM-PAC 6 CLICK MOBILITY  Total Score:15     Patient left up in chair with call button in reach, chair alarm on and NURSE NOTIFIED.  notified.    GOALS:   Multidisciplinary Problems     Physical Therapy Goals        Problem: Physical Therapy Goal    Goal Priority Disciplines Outcome Goal Variances Interventions   Physical Therapy Goal     PT, PT/OT      Description:  PT WILL BE SEEN FOR P.T. FOR A MIN OF 5 OUT OF 7 DAYS A WEEK  LT20  1. PT WILL COMPLETE BED MOBILITY WITH SBA  2. PT WILL STAND T/F TO CHAIR WITH RW AND CGA  3. PT WILL GT TRAIN X 100' WITH RW AND CGA  4. PT WILL COMPLETE B LE TE X 20 REPS FOR STRENGTHENING.                     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 fuCT 1/3- stable,  CT's later - no residual    Lens replaced by other means 6/25/2014    Melena     Memory loss     PET scan consistent with Alzzheimers.   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    1 vessel LIMA to LAD    S/P MVR (mitral valve replacement) 9/27/2013    Skin ulcer     Squamous cell carcinoma     skin cancer X 2    Stroke     Syncope and collapse     Unspecified essential hypertension 9/16/2013    Unspecified transient cerebral ischemia     Urinary incontinence     wears briefs       Past Surgical History:   Procedure Laterality Date    ADENOIDECTOMY      APPENDECTOMY      BREAST SURGERY  1950    right lumpectomy     CARDIAC VALVE SURGERY  10/04/2007    MVR    CATARACT EXTRACTION      CHOLECYSTECTOMY      CORONARY ARTERY BYPASS GRAFT      EYE SURGERY      cataracts bilateral    HYSTERECTOMY      for hydatiform mole    INSERT / REPLACE / REMOVE PACEMAKER  09/11/2001    TONSILLECTOMY         Time Tracking:     PT Received On: 01/28/20  PT Start Time: 0957     PT Stop Time: 1020  PT Total Time (min): 23 min     Billable Minutes: Evaluation 13 and Gait Training 10      Brii Quispe, PT  01/28/2020

## 2020-01-28 NOTE — PROGRESS NOTES
Pharmacy Brief Progress Note:    Patient educated on warfarin indication, side effects, and drug interactions.     Discussed importance of medication compliance and INR monitoring and reviewed signs of abnormal bleeding.     Patient given warfarin educational handout.    Patient expressed understanding and had no further questions.    Thank you for allowing us to participate in this patient's care.     Giana Castillo [facility] 1/28/2020 2:21 PM

## 2020-01-28 NOTE — ASSESSMENT & PLAN NOTE
- CT head showed chronic ischemic changes but no acute findings  - Pt received Meclizine in ED  - Pt currently denies dizziness at this time  - Orthostatic in ED  - Orthostatic q shift  - Gentle IV hydration  1/28/20  -Denies dizziness  -Continue gentle IV hydration   -

## 2020-01-28 NOTE — CONSULTS
Food & Nutrition  Education    Diet Education: coumadin diet   Time Spent: 15 min   Learners: pt       Nutrition Education provided with handouts: yes      Comments: RD provide coumadin diet education w/ handouts from the nutrition care manual. All questions and concerns answered. Dietitian's contact information provided.  PT w/ good intake PTA, and no wt loss. NFPE not performed d/t pt w/ no s/s of malnutrition.     Please Re-consult as needed        Thanks!

## 2020-01-28 NOTE — ASSESSMENT & PLAN NOTE
- Urine (+) nitrites, 1+ leukos  - Micro wbc 10, bacteria many  - Pt received Cefuroxime 250 mg PO x 1 in ED  - Start Rocephin 1 gm q12hrs  - Urine culture pending  1/28/20  -Urine cx pending   -Continue IV rocephin

## 2020-01-28 NOTE — SUBJECTIVE & OBJECTIVE
Interval History: No acute events overnight. Will continue gentle IV hydration and IV rocephin. Urine cx pending.     Review of Systems   Constitutional: Positive for activity change. Negative for appetite change, chills and fever.   HENT: Negative for trouble swallowing.    Eyes: Negative for visual disturbance.   Respiratory: Negative for apnea, cough, choking, chest tightness, shortness of breath, wheezing and stridor.    Cardiovascular: Negative for chest pain, palpitations and leg swelling.   Gastrointestinal: Negative for abdominal pain, constipation, diarrhea, nausea and vomiting.   Genitourinary: Positive for urgency. Negative for difficulty urinating, dysuria, flank pain and frequency.   Musculoskeletal: Negative for arthralgias, back pain, gait problem, joint swelling, myalgias, neck pain and neck stiffness.   Skin: Negative for color change.   Neurological: Positive for dizziness and weakness. Negative for tremors, seizures, syncope, facial asymmetry, speech difficulty, light-headedness, numbness and headaches.   Hematological: Negative.    Psychiatric/Behavioral: Negative for agitation, behavioral problems and confusion.     Objective:     Vital Signs (Most Recent):  Temp: 98.1 °F (36.7 °C) (01/28/20 0740)  Pulse: 67 (01/28/20 0740)  Resp: 15 (01/28/20 0740)  BP: (!) 147/67 (01/28/20 0740)  SpO2: (!) 94 % (01/28/20 0740) Vital Signs (24h Range):  Temp:  [97.5 °F (36.4 °C)-98.6 °F (37 °C)] 98.1 °F (36.7 °C)  Pulse:  [65-78] 67  Resp:  [12-22] 15  SpO2:  [94 %-97 %] 94 %  BP: (109-186)/() 147/67     Weight: 71.7 kg (158 lb 1.1 oz)  Body mass index is 28 kg/m².    Intake/Output Summary (Last 24 hours) at 1/28/2020 1121  Last data filed at 1/28/2020 0500  Gross per 24 hour   Intake 543.33 ml   Output 850 ml   Net -306.67 ml      Physical Exam   Constitutional: She is oriented to person, place, and time. She appears well-developed. She is cooperative. She is easily aroused. No distress.   Elderly     HENT:   Head: Normocephalic and atraumatic.   Eyes: EOM are normal.   Neck: Normal range of motion. Neck supple.   Cardiovascular: Normal rate, regular rhythm and intact distal pulses.   Murmur heard.  Pulmonary/Chest: Effort normal and breath sounds normal. No stridor. No respiratory distress. She has no wheezes. She has no rales. She exhibits no tenderness.   Abdominal: Soft. Bowel sounds are normal. She exhibits no distension. There is no tenderness. There is no rebound and no guarding.   Genitourinary:   Genitourinary Comments: Deferred   Musculoskeletal: Normal range of motion. She exhibits no edema, tenderness or deformity.   Neurological: She is alert, oriented to person, place, and time and easily aroused. No sensory deficit.   Skin: Skin is warm and dry. Capillary refill takes less than 2 seconds.   Psychiatric: She has a normal mood and affect. Her behavior is normal. Thought content normal.   Nursing note and vitals reviewed.      Significant Labs:   BMP:   Recent Labs   Lab 01/28/20  0503   GLU 95      K 3.8      CO2 24   BUN 23   CREATININE 1.2   CALCIUM 8.9     CBC:   Recent Labs   Lab 01/27/20  0840 01/28/20  0503   WBC 8.21 6.38   HGB 12.2 12.2   HCT 37.7 38.3    182     CMP:   Recent Labs   Lab 01/27/20  0840 01/28/20  0503    139   K 4.0 3.8    103   CO2 27 24   GLU 98 95   BUN 23 23   CREATININE 1.3 1.2   CALCIUM 8.9 8.9   PROT 6.9 6.9   ALBUMIN 3.7 3.6   BILITOT 0.7 0.8   ALKPHOS 57 57   AST 18 20   ALT 10 10   ANIONGAP 11 12   EGFRNONAA 35* 39*     Urine Culture: No results for input(s): LABURIN in the last 48 hours.  Urine Studies:   Recent Labs   Lab 01/27/20  0824   COLORU Yellow   APPEARANCEUA Clear   PHUR 6.0   SPECGRAV 1.010   PROTEINUA Negative   GLUCUA Negative   KETONESU Negative   BILIRUBINUA Negative   OCCULTUA Negative   NITRITE Positive*   UROBILINOGEN Negative   LEUKOCYTESUR 1+*   WBCUA 10*   BACTERIA Many*       Significant Imaging:   Imaging  Results          CT Head Without Contrast (Final result)  Result time 01/27/20 09:25:51    Final result by Geovany Mcneil MD (01/27/20 09:25:51)                 Impression:      Chronic microvascular ischemic changes.    All CT scans at this facility use dose modulation, iterative reconstruction, and/or weight based dosing when appropriate to reduce radiation dose to as low as reasonable achievable.      Electronically signed by: Geovany Mcneil MD  Date:    01/27/2020  Time:    09:25             Narrative:    EXAMINATION:  CT HEAD WITHOUT CONTRAST    CLINICAL HISTORY:  Syncope/fainting;    TECHNIQUE:  Low dose axial CT images obtained throughout the head without intravenous contrast. Sagittal and coronal reconstructions were performed.    COMPARISON:  09/25/2019    FINDINGS:  Intracranial compartment:    The brain parenchyma demonstrates areas of decreased attenuation with moderate periventricular white matter consistent with chronic microvascular ischemic changes..  No parenchymal mass, hemorrhage, edema or major vascular distribution infarct.  Vascular calcifications are noted.    Moderate prominence of the sulci and ventricles are consistent with age-related involutional changes.    No extra-axial blood or fluid collections.    Skull/extracranial contents (limited evaluation): No fracture. Mastoid air cells and paranasal sinuses are essentially clear.                               X-Ray Pelvis Routine AP (Final result)  Result time 01/27/20 08:53:51    Final result by JAIME Ochoa Sr., MD (01/27/20 08:53:51)                 Impression:      1. There are healed fractures in the right superior and inferior pubic rami.  There is no acute fracture visualized.  2. There are mild degenerative changes in the visualized portion of the lumbar spine.      Electronically signed by: Lamonte Ochoa MD  Date:    01/27/2020  Time:    08:53             Narrative:    EXAMINATION:  XR PELVIS ROUTINE AP    CLINICAL  HISTORY:  Dizziness/Vertigo (780.4);    COMPARISON:  02/24/2015    FINDINGS:  There are healed fractures in the right superior and inferior pubic rami.  There is no acute fracture visualized.  There is no dislocation.  There are mild degenerative changes in the visualized portion of the lumbar spine.  The visualized portion of the bowel gas pattern is normal in appearance.                               X-Ray Chest AP Portable (Final result)  Result time 01/27/20 08:54:57    Final result by Pete Rodrigues MD (01/27/20 08:54:57)                 Impression:      No acute findings.      Electronically signed by: Pete Rodrigues  Date:    01/27/2020  Time:    08:54             Narrative:    EXAMINATION:  XR CHEST AP PORTABLE    CLINICAL HISTORY:  syncope;    COMPARISON:  01/24/2020    FINDINGS:  The heart is normal in size.  Sternotomy wires.  Prosthetic heart valve.  Pacemaker on the left.  No acute pulmonary infiltrates identified.

## 2020-01-28 NOTE — PROGRESS NOTES
Coumadin Progress Notes:    Indication: Hx of Afib, DVT  INR goal: 2.5 - 3.5  (Per coumadin clinic *neurologist recommends INR range 2.5-3.5--mild stroke 9/4)  INR = 2.9 appropriate increase from 2.6 yesterday  Home dosing confirmed as 0.5 mg po daily  Continue home dosing /Giana Castillo [facility] 1/28/2020 2:21 PM

## 2020-01-29 VITALS
DIASTOLIC BLOOD PRESSURE: 74 MMHG | BODY MASS INDEX: 28 KG/M2 | OXYGEN SATURATION: 95 % | HEIGHT: 63 IN | RESPIRATION RATE: 18 BRPM | TEMPERATURE: 98 F | HEART RATE: 76 BPM | SYSTOLIC BLOOD PRESSURE: 154 MMHG | WEIGHT: 158.06 LBS

## 2020-01-29 PROBLEM — R42 DIZZINESS: Status: RESOLVED | Noted: 2020-01-27 | Resolved: 2020-01-29

## 2020-01-29 LAB
INR PPP: 2.5 (ref 0.8–1.2)
PROTHROMBIN TIME: 25.2 SEC (ref 9–12.5)

## 2020-01-29 PROCEDURE — 25000003 PHARM REV CODE 250: Mod: HCNC | Performed by: NURSE PRACTITIONER

## 2020-01-29 PROCEDURE — 97116 GAIT TRAINING THERAPY: CPT | Mod: HCNC

## 2020-01-29 PROCEDURE — G0378 HOSPITAL OBSERVATION PER HR: HCPCS | Mod: HCNC

## 2020-01-29 PROCEDURE — 25000003 PHARM REV CODE 250: Mod: HCNC | Performed by: INTERNAL MEDICINE

## 2020-01-29 PROCEDURE — 36415 COLL VENOUS BLD VENIPUNCTURE: CPT | Mod: HCNC

## 2020-01-29 PROCEDURE — 85610 PROTHROMBIN TIME: CPT | Mod: HCNC

## 2020-01-29 PROCEDURE — 97530 THERAPEUTIC ACTIVITIES: CPT | Mod: HCNC

## 2020-01-29 RX ORDER — MECLIZINE HYDROCHLORIDE 25 MG/1
25 TABLET ORAL 3 TIMES DAILY PRN
Qty: 10 TABLET | Refills: 0 | Status: SHIPPED | OUTPATIENT
Start: 2020-01-29

## 2020-01-29 RX ORDER — AMOXICILLIN AND CLAVULANATE POTASSIUM 500; 125 MG/1; MG/1
1 TABLET, FILM COATED ORAL 2 TIMES DAILY
Qty: 10 TABLET | Refills: 0 | Status: SHIPPED | OUTPATIENT
Start: 2020-01-29 | End: 2020-02-03

## 2020-01-29 RX ADMIN — FOLIC ACID 1000 MCG: 1 TABLET ORAL at 09:01

## 2020-01-29 RX ADMIN — ESCITALOPRAM OXALATE 10 MG: 10 TABLET ORAL at 09:01

## 2020-01-29 RX ADMIN — LEVOTHYROXINE SODIUM 50 MCG: 50 TABLET ORAL at 05:01

## 2020-01-29 RX ADMIN — PROPAFENONE HYDROCHLORIDE 75 MG: 150 TABLET, FILM COATED ORAL at 05:01

## 2020-01-29 NOTE — DISCHARGE SUMMARY
[facility] Hospital Medicine  Discharge Summary      Patient Name: Josy Posey  MRN: [id number]  Admission Date: 1/27/2020  Hospital Length of Stay: 0 days  Discharge Date and Time:  01/29/2020 11:13 AM  Attending Physician: Avery Hinojosa MD   Discharging Provider: Lorena Lamar NP  Primary Care Provider: TIKA Rosales Jr, MD      HPI:   Josy Posey is a 89 year old female with a PMHx of CVA, Hypothyroidism, S/p MVR, S/p CABG, HTN, HLD, DVT, CHF, and A-fib who presented to the Emergency Department with c/o dizziness. ED reports syncope. Pt denies syncope. Pt reports having dizziness and suffered ground level fall. Pt denies hitting head or LOC. Pt has no complaint of body pain at this time. ED workup showed no leukocytosis/leukopenia, stable Hgb/Hct, chronic renal insufficiency. UA (+) nitrites, 1+ leukos. CT head showed chronic ischemic changes but no acute findings. Pelvic xray with no acute fracture. CXR with no acute findings. Pt given Cefuroxime 250 mg PO x 1 in ED.     * No surgery found *      Hospital Course:   89 year old female admitted for dizziness and UTI. CT head negative for acute findings. +Orthostatics. Patient being treated with gentle IV hydration and IV rocephin. Patient reports dizziness is associated with head movement and loss of balance. Meclizine was given and symptoms resolved. Urine culture pending. As of 1/29, symptoms have resolved. Patient reports feeling better and requesting to be discharged. Social work consult to arrange home health (SN/ PT/OT). Patient ready for discharge. Will follow-up on urine cx. Home meds reconciled. New prescription given for augmentin and meclizine. Patient to follow-up with PCP in 3 days for hospital follow-up of chronic UTI and vertigo. Patient to follow-up with ENT outpatient for further evaluation of vertigo. Patient seen and examined on the date of discharge and found suitable for discharge.      Consults:   Consults  (From admission, onward)        Status Ordering Provider     IP consult to case management  Once     Provider:  (Not yet assigned)    Completed PEARL BERNARDO     Nutrition Services Referral  Once     Provider:  (Not yet assigned)    Completed YAYA JOHNSON     Pharmacy to dose Warfarin consult  Once     Provider:  (Not yet assigned)    Acknowledged YAYA JOHNSON          No new Assessment & Plan notes have been filed under this hospital service since the last note was generated.  Service: Hospital Medicine    Final Active Diagnoses:    Diagnosis Date Noted POA    Acute cystitis without hematuria [N30.00] 01/27/2020 Yes    Hypothyroidism [E03.9] 07/02/2015 Yes     Chronic    Severe MR with remote mitral valve replacement on chronic anticoagulation [Z95.2] 07/02/2015 Not Applicable     Chronic    Chronic diastolic HFpEF of 60% per echo 5/2017 [I51.9] 07/01/2015 Yes     Chronic    CKD (chronic kidney disease) stage 3, GFR 30-59 ml/min [N18.3] 09/03/2014 Yes     Chronic    Paroxysmal atrial fibrillation [I48.0] 09/27/2013 Yes     Chronic    Pure hypercholesterolemia [E78.00] 09/16/2013 Yes     Chronic      Problems Resolved During this Admission:    Diagnosis Date Noted Date Resolved POA    PRINCIPAL PROBLEM:  Dizziness [R42] 01/27/2020 01/29/2020 Yes       Discharged Condition: stable    Disposition: Home or Self Care    Follow Up:  Follow-up Information     E Geovany Rosales Jr, MD In 3 days.    Specialties:  Internal Medicine, Pediatrics  Why:  for hospital follow-up of UTI and vertigo   Contact information:  [id number] [facility]  [place] [address]  [phone]             Pete Martinez MD In 1 week.    Specialty:  Otolaryngology  Why:  for hospital follow-up vertigo   Contact information:  [address] [facility]  [place] [address]  [phone]             Sachin Alvarado MD In 2 weeks.    Specialty:  Cardiology  Why:  for hospital follow-up   Contact information:  [address] [facility]  [place] [address]  [phone]                 Patient Instructions:      Notify your health care provider if you experience any of the following:  severe persistent headache     Notify your health care provider if you experience any of the following:  persistent dizziness, light-headedness, or visual disturbances     Notify your health care provider if you experience any of the following:  increased confusion or weakness     Activity as tolerated       Significant Diagnostic Studies: Labs:   BMP:   Recent Labs   Lab 01/28/20  0503   GLU 95      K 3.8      CO2 24   BUN 23   CREATININE 1.2   CALCIUM 8.9   , CMP   Recent Labs   Lab 01/28/20  0503      K 3.8      CO2 24   GLU 95   BUN 23   CREATININE 1.2   CALCIUM 8.9   PROT 6.9   ALBUMIN 3.6   BILITOT 0.8   ALKPHOS 57   AST 20   ALT 10   ANIONGAP 12   ESTGFRAFRICA 44*   EGFRNONAA 39*   , CBC   Recent Labs   Lab 01/28/20  0503   WBC 6.38   HGB 12.2   HCT 38.3       and All labs within the past 24 hours have been reviewed    Pending Diagnostic Studies:     Procedure Component Value Units Date/Time    Protime-INR [[id number]] Collected:  01/29/20 1023    Order Status:  Sent Lab Status:  In process Updated:  01/29/20 1100    Specimen:  Blood          Medications:  Reconciled Home Medications:      Medication List      START taking these medications    amoxicillin-clavulanate 500-125mg 500-125 mg Tab  Commonly known as:  AUGMENTIN  Take 1 tablet (500 mg total) by mouth 2 (two) times daily. for 5 days     furosemide 20 MG tablet  Commonly known as:  LASIX  Take 1 tablet (20 mg total) by mouth once daily.     meclizine 25 mg tablet  Commonly known as:  ANTIVERT  Take 1 tablet (25 mg total) by mouth 3 (three) times daily as needed for Dizziness.        CHANGE how you take these medications    escitalopram oxalate 10 MG tablet  Commonly known as:  LEXAPRO  Take 1 tablet (10 mg total) by mouth once daily.  What changed:  Another medication with the  same name was removed. Continue taking this medication, and follow the directions you see here.     fluticasone propionate 50 mcg/actuation nasal spray  Commonly known as:  FLONASE  instill 2 sprays into each nostril once daily  What changed:    · how much to take  · how to take this  · when to take this     levothyroxine 50 MCG tablet  Commonly known as:  SYNTHROID  1 po qd m-f, 1.5 sat and sunday  What changed:  Another medication with the same name was removed. Continue taking this medication, and follow the directions you see here.        CONTINUE taking these medications    acetaminophen 500 MG tablet  Commonly known as:  TYLENOL  Take 500 mg by mouth as needed for Pain.     aspirin 81 MG EC tablet  Commonly known as:  ECOTRIN  Take 81 mg by mouth as needed for Pain.     BIOTENE DRY MOUTH MM  by Mucous Membrane route as needed.     clotrimazole-betamethasone 1-0.05% cream  Commonly known as:  LOTRISONE  Apply topically 2 (two) times daily. APPT NEEDED!     econazole nitrate 1 % cream  Apply topically as needed.     Fish Oil 1,000 mg Cap  Generic drug:  omega-3 fatty acids-vitamin E  Take 1 capsule by mouth every other day.     folic acid 1 MG tablet  Commonly known as:  FOLVITE  Take 1 tablet by mouth once daily once daily.     iron-vitamin C 100-250 mg (ICAR-C) 100-250 mg Tab  Commonly known as:  Icar-C  Take 1 tablet by mouth once daily.     midodrine 5 MG Tab  Commonly known as:  PROAMATINE  Take 1 tablet (5 mg total) by mouth 4 (four) times daily. During waking hours     pravastatin 20 MG tablet  Commonly known as:  PRAVACHOL  TAKE 1 TABLET(20 MG) BY MOUTH EVERY DAY     propafenone 150 MG Tab  Commonly known as:  RHTHYMOL  TAKE 1/2 TABLET BY MOUTH THREE TIMES A DAY     spironolactone 25 MG tablet  Commonly known as:  ALDACTONE  TAKE 1/2 TABLET BY MOUTH EVERY DAY     vitamin B12-folic acid 0.5-1 mg Tab  Take 1 tablet by mouth once daily once daily.     Vitamin C 1000 MG tablet  Generic drug:  ascorbic acid  (vitamin C)  Take 1 tablet by mouth once daily once daily.     warfarin 1 MG tablet  Commonly known as:  COUMADIN  Take 1/2 tablet every evening or as directed by the coumadin clinic.        STOP taking these medications    amoxicillin 500 MG capsule  Commonly known as:  AMOXIL            Indwelling Lines/Drains at time of discharge:   Lines/Drains/Airways     Drain            Female External Urinary Catheter 01/27/20 1600 1 day                Time spent on the discharge of patient: 49 minutes  Patient was seen and examined on the date of discharge and determined to be suitable for discharge.         Lorena Lamar NP  Department of Hospital Medicine  [facility]

## 2020-01-29 NOTE — PLAN OF CARE
Fall precautions implemented. Pt remained free from falls/injuries.  IVF infusing per orders. Turning, coughing and deep breathing encouraged.  Ambulation encouraged. Active ROM performed. Paced rhythm on monitor. Purewick in place. Orthostatic vitals taken no c/o dizziness. Safety precautions implemented. Chart reviewed. Will continue to monitor.

## 2020-01-29 NOTE — PT/OT/SLP PROGRESS
Physical Therapy  Treatment    Josy Posey   MRN: [id number]   Admitting Diagnosis: Dizziness    PT Received On: 01/29/20  PT Start Time: 0850     PT Stop Time: 0915    PT Total Time (min): 25 min       Billable Minutes:  Gait Training 15 and Therapeutic Activity 10    Treatment Type: Treatment  PT/PTA: PT     PTA Visit Number: 0       General Precautions: Standard, fall  Orthopedic Precautions: N/A   Braces: N/A         Subjective:  Communicated with NURSE SOLIS AND Epic CHART REVIEW  prior to session.   PT AGREED TO TX     Pain/Comfort  Pain Rating 1: 0/10  Pain Rating Post-Intervention 1: 0/10    Objective:   Patient found with: peripheral IV, PureWick    Functional Mobility:  PT MET IN RM SUP>SIT EOB WITH SBA. PT SCOOTED TO EOB WITH SBA. PT STOOD WITH CGA AND GT TRAINED X 100' WITH SLOW PACE. PT RETURNED TO RM T/F TO COMMODE WITH CGA. PT STOOD FROM COMMODE AND REQUIRED INC ASSIST FOR CLEANING. PT RETURNED TO RM T/F TO CHAIR WITH CGA. PT LEFT SEATED WITH ALL NEEDS MET AND CHAIR ALARM ON.     AM-PAC 6 CLICK MOBILITY  How much help from another person does this patient currently need?   1 = Unable, Total/Dependent Assistance  2 = A lot, Maximum/Moderate Assistance  3 = A little, Minimum/Contact Guard/Supervision  4 = None, Modified [place]/Independent    Turning over in bed (including adjusting bedclothes, sheets and blankets)?: 4  Sitting down on and standing up from a chair with arms (e.g., wheelchair, bedside commode, etc.): 3  Moving from lying on back to sitting on the side of the bed?: 4  Moving to and from a bed to a chair (including a wheelchair)?: 3  Need to walk in hospital room?: 3  Climbing 3-5 steps with a railing?: 1  Basic Mobility Total Score: 18    AM-PAC Raw Score CMS G-Code Modifier Level of Impairment Assistance   6 % Total / Unable   7 - 9 CM 80 - 100% Maximal Assist   10 - 14 CL 60 - 80% Moderate Assist   15 - 19 CK 40 - 60% Moderate Assist   20 - 22 CJ 20 - 40% Minimal Assist    23 CI 1-20% SBA / CGA   24 CH 0% Independent/ Mod I     Patient left up in chair with call button in reach and chair alarm on.    Assessment:  PT PROGRESSING WITH GT TRAINING.     Rehab identified problem list/impairments: Rehab identified problem list/impairments: weakness, gait instability, impaired balance, impaired endurance, impaired functional mobilty, decreased ROM, impaired self care skills, decreased lower extremity function    Rehab potential is good.    Activity tolerance: Good    Discharge recommendations: Discharge Facility/Level of Care Needs: home health PT     Barriers to discharge:      Equipment recommendations: Equipment Needed After Discharge: none     GOALS:   Multidisciplinary Problems     Physical Therapy Goals        Problem: Physical Therapy Goal    Goal Priority Disciplines Outcome Goal Variances Interventions   Physical Therapy Goal     PT, PT/OT Ongoing, Progressing     Description:  PT WILL BE SEEN FOR P.T. FOR A MIN OF 5 OUT OF 7 DAYS A WEEK  LT20  1. PT WILL COMPLETE BED MOBILITY WITH SBA  2. PT WILL STAND T/F TO CHAIR WITH RW AND CGA  3. PT WILL GT TRAIN X 100' WITH RW AND CGA  4. PT WILL COMPLETE B LE TE X 20 REPS FOR STRENGTHENING.                     PLAN:    Patient to be seen    to address the above listed problems via gait training, therapeutic activities, therapeutic exercises  Plan of Care expires: 20  Plan of Care reviewed with: patient         Brii Quispe, PT  2020

## 2020-01-30 LAB
BACTERIA UR CULT: NORMAL
BACTERIA UR CULT: NORMAL

## 2020-02-04 ENCOUNTER — TELEPHONE (OUTPATIENT)
Dept: HOME HEALTH SERVICES | Facility: HOSPITAL | Age: 85
End: 2020-02-04

## 2020-02-04 ENCOUNTER — ANTI-COAG VISIT (OUTPATIENT)
Dept: CARDIOLOGY | Facility: CLINIC | Age: 85
End: 2020-02-04
Payer: MEDICARE

## 2020-02-04 DIAGNOSIS — Z86.718 HISTORY OF DVT OF LOWER EXTREMITY: ICD-10-CM

## 2020-02-04 DIAGNOSIS — Z79.01 LONG TERM (CURRENT) USE OF ANTICOAGULANTS: ICD-10-CM

## 2020-02-04 DIAGNOSIS — I48.0 PAROXYSMAL ATRIAL FIBRILLATION: Chronic | ICD-10-CM

## 2020-02-04 DIAGNOSIS — Z86.73 H/O: CVA (CEREBROVASCULAR ACCIDENT): Chronic | ICD-10-CM

## 2020-02-04 LAB — INR PPP: 2.3

## 2020-02-04 PROCEDURE — 93793 PR ANTICOAGULANT MGMT FOR PT TAKING WARFARIN: ICD-10-PCS | Mod: S$GLB,,,

## 2020-02-04 PROCEDURE — 93793 ANTICOAG MGMT PT WARFARIN: CPT | Mod: S$GLB,,,

## 2020-02-04 NOTE — PROGRESS NOTES
Mrs. Posey was admitted to the hospital 1/27-1/29 for dizziness and an UTI.  IV antibiotics administered inpatient and patient discharged on Augmentin 500mg BID x 5 days - completed.  Warfarin 0.5mg continued while admitted.  Patient currently enrolled with [facility].    Verbal results taken from Radha at [facility].  PT/INR  28.2 / 2.3.  Date drawn 2/4/2020.  PT/INR verified with field nurse.   No missed doses or dietary changes reported.  Orders given: Take warfarin 1mg on today; then resume current dose of warfarin 0.5mg every evening.  Recheck INR on Friday, 2/7/2020.

## 2020-02-06 ENCOUNTER — EXTERNAL HOME HEALTH (OUTPATIENT)
Dept: HOME HEALTH SERVICES | Facility: HOSPITAL | Age: 85
End: 2020-02-06
Payer: MEDICARE

## 2020-02-06 ENCOUNTER — TELEPHONE (OUTPATIENT)
Dept: INTERNAL MEDICINE | Facility: CLINIC | Age: 85
End: 2020-02-06

## 2020-02-06 NOTE — TELEPHONE ENCOUNTER
Returned call to Jose w/ [facility], no answer, lvm advising her Dr. Rosales not in clinic on Fri afternoons but pt can be sched w/ his assistant IGOR Epperson or other available provider, requested her to return call to clinic.

## 2020-02-06 NOTE — TELEPHONE ENCOUNTER
----- Message from Venita Zamudio sent at 2/6/2020  1:45 PM CST -----  Contact: Becky crump/[facility] physical therapy  .Type:  Sooner Apoointment Request    Caller is requesting a sooner appointment.  Caller declined first available appointment listed below.  Caller will not accept being placed on the waitlist and is requesting a message be sent to doctor.  Name of Caller: Becky   When is the first available appointment? 2/7 8am (pt wants a later time)  Symptoms: hospital f/u   Would the patient rather a call back or a response via [facility]?  Call back   Best Call Back Number:  [phone]   Additional Information:

## 2020-02-07 ENCOUNTER — OFFICE VISIT (OUTPATIENT)
Dept: INTERNAL MEDICINE | Facility: CLINIC | Age: 85
End: 2020-02-07
Payer: MEDICARE

## 2020-02-07 ENCOUNTER — ANTI-COAG VISIT (OUTPATIENT)
Dept: CARDIOLOGY | Facility: CLINIC | Age: 85
End: 2020-02-07
Payer: MEDICARE

## 2020-02-07 VITALS
OXYGEN SATURATION: 97 % | BODY MASS INDEX: 28.13 KG/M2 | WEIGHT: 158.75 LBS | SYSTOLIC BLOOD PRESSURE: 109 MMHG | DIASTOLIC BLOOD PRESSURE: 70 MMHG | TEMPERATURE: 99 F | HEIGHT: 63 IN | HEART RATE: 75 BPM

## 2020-02-07 DIAGNOSIS — E03.4 HYPOTHYROIDISM DUE TO ACQUIRED ATROPHY OF THYROID: Chronic | ICD-10-CM

## 2020-02-07 DIAGNOSIS — Z95.0 CARDIAC PACEMAKER: ICD-10-CM

## 2020-02-07 DIAGNOSIS — Z79.01 CHRONIC ANTICOAGULATION: ICD-10-CM

## 2020-02-07 DIAGNOSIS — Z86.718 HISTORY OF DVT OF LOWER EXTREMITY: ICD-10-CM

## 2020-02-07 DIAGNOSIS — I65.23 BILATERAL CAROTID ARTERY STENOSIS: ICD-10-CM

## 2020-02-07 DIAGNOSIS — I48.0 PAROXYSMAL ATRIAL FIBRILLATION: Chronic | ICD-10-CM

## 2020-02-07 DIAGNOSIS — J06.9 ACUTE URI: ICD-10-CM

## 2020-02-07 DIAGNOSIS — I70.0 ATHEROSCLEROSIS OF AORTA: ICD-10-CM

## 2020-02-07 DIAGNOSIS — Z09 FOLLOW UP: Primary | ICD-10-CM

## 2020-02-07 DIAGNOSIS — Z79.01 LONG TERM (CURRENT) USE OF ANTICOAGULANTS: ICD-10-CM

## 2020-02-07 DIAGNOSIS — I44.2 THIRD DEGREE AV BLOCK: Chronic | ICD-10-CM

## 2020-02-07 DIAGNOSIS — Z86.73 H/O: CVA (CEREBROVASCULAR ACCIDENT): Chronic | ICD-10-CM

## 2020-02-07 DIAGNOSIS — N30.00 ACUTE CYSTITIS WITHOUT HEMATURIA: ICD-10-CM

## 2020-02-07 DIAGNOSIS — I51.89 LEFT VENTRICULAR DIASTOLIC DYSFUNCTION WITH PRESERVED SYSTOLIC FUNCTION: Chronic | ICD-10-CM

## 2020-02-07 DIAGNOSIS — Z95.2 S/P MITRAL VALVE REPLACEMENT: Chronic | ICD-10-CM

## 2020-02-07 DIAGNOSIS — I25.10 CORONARY ARTERY DISEASE INVOLVING NATIVE CORONARY ARTERY OF NATIVE HEART WITHOUT ANGINA PECTORIS: Chronic | ICD-10-CM

## 2020-02-07 DIAGNOSIS — E78.00 PURE HYPERCHOLESTEROLEMIA: Chronic | ICD-10-CM

## 2020-02-07 DIAGNOSIS — R42 VERTIGO: ICD-10-CM

## 2020-02-07 LAB
INFLUENZA A, MOLECULAR: NEGATIVE
INFLUENZA B, MOLECULAR: NEGATIVE
INR PPP: 2.9
SPECIMEN SOURCE: NORMAL

## 2020-02-07 PROCEDURE — 1101F PR PT FALLS ASSESS DOC 0-1 FALLS W/OUT INJ PAST YR: ICD-10-PCS | Mod: HCNC,CPTII,S$GLB, | Performed by: NURSE PRACTITIONER

## 2020-02-07 PROCEDURE — 1159F PR MEDICATION LIST DOCUMENTED IN MEDICAL RECORD: ICD-10-PCS | Mod: HCNC,S$GLB,, | Performed by: NURSE PRACTITIONER

## 2020-02-07 PROCEDURE — 99499 UNLISTED E&M SERVICE: CPT | Mod: HCNC,S$GLB,, | Performed by: NURSE PRACTITIONER

## 2020-02-07 PROCEDURE — 1101F PT FALLS ASSESS-DOCD LE1/YR: CPT | Mod: HCNC,CPTII,S$GLB, | Performed by: NURSE PRACTITIONER

## 2020-02-07 PROCEDURE — 1126F AMNT PAIN NOTED NONE PRSNT: CPT | Mod: HCNC,S$GLB,, | Performed by: NURSE PRACTITIONER

## 2020-02-07 PROCEDURE — 99999 PR PBB SHADOW E&M-EST. PATIENT-LVL V: CPT | Mod: PBBFAC,HCNC,, | Performed by: NURSE PRACTITIONER

## 2020-02-07 PROCEDURE — 93793 PR ANTICOAGULANT MGMT FOR PT TAKING WARFARIN: ICD-10-PCS | Mod: S$GLB,,,

## 2020-02-07 PROCEDURE — 99214 PR OFFICE/OUTPT VISIT, EST, LEVL IV, 30-39 MIN: ICD-10-PCS | Mod: HCNC,S$GLB,, | Performed by: NURSE PRACTITIONER

## 2020-02-07 PROCEDURE — 99999 PR PBB SHADOW E&M-EST. PATIENT-LVL V: ICD-10-PCS | Mod: PBBFAC,HCNC,, | Performed by: NURSE PRACTITIONER

## 2020-02-07 PROCEDURE — 93793 ANTICOAG MGMT PT WARFARIN: CPT | Mod: S$GLB,,,

## 2020-02-07 PROCEDURE — 1159F MED LIST DOCD IN RCRD: CPT | Mod: HCNC,S$GLB,, | Performed by: NURSE PRACTITIONER

## 2020-02-07 PROCEDURE — 99499 RISK ADDL DX/OHS AUDIT: ICD-10-PCS | Mod: HCNC,S$GLB,, | Performed by: NURSE PRACTITIONER

## 2020-02-07 PROCEDURE — 1126F PR PAIN SEVERITY QUANTIFIED, NO PAIN PRESENT: ICD-10-PCS | Mod: HCNC,S$GLB,, | Performed by: NURSE PRACTITIONER

## 2020-02-07 PROCEDURE — 87502 INFLUENZA DNA AMP PROBE: CPT | Mod: HCNC

## 2020-02-07 PROCEDURE — 99214 OFFICE O/P EST MOD 30 MIN: CPT | Mod: HCNC,S$GLB,, | Performed by: NURSE PRACTITIONER

## 2020-02-07 NOTE — PROGRESS NOTES
Verbal results received from Radha at [facility] at [phone].  PT: 34.9 // INR: 2.9.  Date tested: 2/07/2020. Previous instructions were followed.  No other changes reported.  Orders given:  Patient to resume current dose of Warfarin 0.5 mg every evening.  Recheck on Tuesday, 2/11/2020.

## 2020-02-07 NOTE — PROGRESS NOTES
Subjective:       Patient ID: Josy Posey is a 89 y.o. female.    Chief Complaint: Follow-up (Hosp f/u)    HPI     Pt here for hospital follow up    Hospital Course:   89 year old female admitted for dizziness and UTI. CT head negative for acute findings. +Orthostatics. Patient being treated with gentle IV hydration and IV rocephin. Patient reports dizziness is associated with head movement and loss of balance. Meclizine was given and symptoms resolved. Urine culture pending. As of 1/29, symptoms have resolved. Patient reports feeling better and requesting to be discharged. Social work consult to arrange home health (SN/ PT/OT). Patient ready for discharge. Will follow-up on urine cx. Home meds reconciled. New prescription given for augmentin and meclizine. Patient to follow-up with PCP in 3 days for hospital follow-up of chronic UTI and vertigo. Patient to follow-up with ENT outpatient for further evaluation of vertigo. Patient seen and examined on the date of discharge and found suitable for discharge.        Pt was discharged with meclizine and augmentin. She has completed augmentin, no further urinary s/s.  Urine culture negative. No fever. Does want to be tested for flu due to positive mild, dry cough, congestion x 2 days. No SOB, CP. Reports she has been watching BP lately due to lows 100/70s-80s. Taking midodrine as ordered. Still has dizziness off and on, mostly at night. Reports following fluid precautions. In PT 3 x a week with HH. Pt has MEHUL scheduled on 2/13            CT Head Without Contrast  Narrative: EXAMINATION:  CT HEAD WITHOUT CONTRAST    CLINICAL HISTORY:  Syncope/fainting;    TECHNIQUE:  Low dose axial CT images obtained throughout the head without intravenous contrast. Sagittal and coronal reconstructions were performed.    COMPARISON:  09/25/2019    FINDINGS:  Intracranial compartment:    The brain parenchyma demonstrates areas of decreased attenuation with moderate periventricular white  matter consistent with chronic microvascular ischemic changes..  No parenchymal mass, hemorrhage, edema or major vascular distribution infarct.  Vascular calcifications are noted.    Moderate prominence of the sulci and ventricles are consistent with age-related involutional changes.    No extra-axial blood or fluid collections.    Skull/extracranial contents (limited evaluation): No fracture. Mastoid air cells and paranasal sinuses are essentially clear.  Impression: Chronic microvascular ischemic changes.    All CT scans at this facility use dose modulation, iterative reconstruction, and/or weight based dosing when appropriate to reduce radiation dose to as low as reasonable achievable.    Electronically signed by: Geovany Mcneil MD  Date:    01/27/2020  Time:    09:25  X-Ray Chest AP Portable  Narrative: EXAMINATION:  XR CHEST AP PORTABLE    CLINICAL HISTORY:  syncope;    COMPARISON:  01/24/2020    FINDINGS:  The heart is normal in size.  Sternotomy wires.  Prosthetic heart valve.  Pacemaker on the left.  No acute pulmonary infiltrates identified.  Impression: No acute findings.    Electronically signed by: Pete Rodrigues  Date:    01/27/2020  Time:    08:54  X-Ray Pelvis Routine AP  Narrative: EXAMINATION:  XR PELVIS ROUTINE AP    CLINICAL HISTORY:  Dizziness/Vertigo (780.4);    COMPARISON:  02/24/2015    FINDINGS:  There are healed fractures in the right superior and inferior pubic rami.  There is no acute fracture visualized.  There is no dislocation.  There are mild degenerative changes in the visualized portion of the lumbar spine.  The visualized portion of the bowel gas pattern is normal in appearance.  Impression: 1. There are healed fractures in the right superior and inferior pubic rami.  There is no acute fracture visualized.  2. There are mild degenerative changes in the visualized portion of the lumbar spine.    Electronically signed by: Lamonte Ochoa  MD  Date:    01/27/2020  Time:    08:53        Review of Systems   Constitutional: Negative for activity change, appetite change, chills, diaphoresis, fatigue, fever and unexpected weight change.   HENT: Positive for postnasal drip. Negative for congestion, ear pain, rhinorrhea, sinus pressure, sinus pain, sneezing, sore throat, tinnitus, trouble swallowing and voice change.    Eyes: Negative for photophobia, pain and visual disturbance.   Respiratory: Positive for cough. Negative for chest tightness, shortness of breath and wheezing.    Cardiovascular: Negative for chest pain, palpitations and leg swelling.   Gastrointestinal: Negative for abdominal distention, abdominal pain, constipation, diarrhea, nausea and vomiting.   Genitourinary: Negative for decreased urine volume, difficulty urinating, dysuria, flank pain, frequency, hematuria and urgency.   Musculoskeletal: Negative for arthralgias, back pain, joint swelling, neck pain and neck stiffness.   Allergic/Immunologic: Negative for immunocompromised state.   Neurological: Positive for dizziness. Negative for tremors, seizures, syncope, facial asymmetry, speech difficulty, weakness, light-headedness, numbness and headaches.   Hematological: Negative for adenopathy. Does not bruise/bleed easily.   Psychiatric/Behavioral: Negative for confusion and sleep disturbance.       Objective:      Physical Exam   Constitutional: She is oriented to person, place, and time.   HENT:   Nose: Rhinorrhea present.   Mouth/Throat: Uvula is midline, oropharynx is clear and moist and mucous membranes are normal.   Cardiovascular: Normal rate, regular rhythm and normal heart sounds.   Pulmonary/Chest: Effort normal and breath sounds normal.   Abdominal: Soft. Bowel sounds are normal.   Musculoskeletal:   Weak; ambulates with walker   Neurological: She is alert and oriented to person, place, and time.   Skin: Skin is warm and dry.       Assessment:      Vitals:    02/07/20 1352   BP:  109/70   Pulse: 75   Temp: 98.9 °F (37.2 °C)         1. Follow up    2. Acute cystitis without hematuria    3. Vertigo    4. Acute URI    5. Chronic diastolic HFpEF of 60% per echo 5/2017    6. Cardiac pacemaker    7. Severe MR with remote mitral valve replacement on chronic anticoagulation    8. Third degree AV block s/p PPM    9. Paroxysmal atrial fibrillation    10. Pure hypercholesterolemia    11. Hypothyroidism due to acquired atrophy of thyroid    12. Chronic anticoagulation    13. History of DVT of lower extremity right    14. Bilateral carotid artery stenosis    15. Atherosclerosis of aorta    16. Coronary artery disease involving native coronary artery of native heart without angina pectoris    17. H/O CVA (cerebrovascular accident)        Plan:   Follow up    Acute cystitis without hematuria    Vertigo  -     Ambulatory referral/consult to ENT; Future; Expected date: 02/14/2020    Acute URI  -     Influenza A & B by Molecular    Chronic diastolic HFpEF of 60% per echo 5/2017    Cardiac pacemaker    Severe MR with remote mitral valve replacement on chronic anticoagulation    Third degree AV block s/p PPM    Paroxysmal atrial fibrillation    Pure hypercholesterolemia    Hypothyroidism due to acquired atrophy of thyroid    Chronic anticoagulation    History of DVT of lower extremity right    Bilateral carotid artery stenosis    Atherosclerosis of aorta    Coronary artery disease involving native coronary artery of native heart without angina pectoris    H/O CVA (cerebrovascular accident)        Transitional Care Note    Family and/or Caretaker present at visit?  Yes.  Diagnostic tests reviewed/disposition: I have reviewed all completed as well as pending diagnostic tests at the time of discharge.  Disease/illness education: Yes  Home health/community services discussion/referrals: Patient has home health established at [facility].   Establishment or re-establishment of referral orders for community resources:  No other necessary community resources.   Discussion with other health care providers: No discussion with other health care providers necessary.       Refer to ENT- meclizine PRN as ordered  Monitor BP, slow position changes discussed. Continue midodrine as ordered. Follow fluid and NA precautions. Compression stockings/elevate feet in regard to hypotension  Maintain specialty care  Return to PCP as scheduled.  Coricidin HBP, plain mucinex for URI s/s

## 2020-02-08 DIAGNOSIS — I48.0 PAF (PAROXYSMAL ATRIAL FIBRILLATION): ICD-10-CM

## 2020-02-08 DIAGNOSIS — F32.89 OTHER DEPRESSION: ICD-10-CM

## 2020-02-08 DIAGNOSIS — F41.9 ANXIETY: ICD-10-CM

## 2020-02-09 DIAGNOSIS — I44.2 THIRD DEGREE AV BLOCK: ICD-10-CM

## 2020-02-09 DIAGNOSIS — Z95.0 CARDIAC PACEMAKER IN SITU: Primary | ICD-10-CM

## 2020-02-09 DIAGNOSIS — I44.2 CHB (COMPLETE HEART BLOCK): ICD-10-CM

## 2020-02-10 RX ORDER — PROPAFENONE HYDROCHLORIDE 150 MG/1
TABLET, COATED ORAL
Qty: 45 TABLET | Refills: 11 | Status: SHIPPED | OUTPATIENT
Start: 2020-02-10 | End: 2021-03-15

## 2020-02-10 RX ORDER — ESCITALOPRAM OXALATE 10 MG/1
TABLET ORAL
Qty: 90 TABLET | Refills: 3 | Status: SHIPPED | OUTPATIENT
Start: 2020-02-10 | End: 2021-03-01 | Stop reason: SDUPTHER

## 2020-02-11 ENCOUNTER — ANTI-COAG VISIT (OUTPATIENT)
Dept: CARDIOLOGY | Facility: CLINIC | Age: 85
End: 2020-02-11
Payer: MEDICARE

## 2020-02-11 DIAGNOSIS — I48.0 PAROXYSMAL ATRIAL FIBRILLATION: Chronic | ICD-10-CM

## 2020-02-11 DIAGNOSIS — Z79.01 LONG TERM (CURRENT) USE OF ANTICOAGULANTS: ICD-10-CM

## 2020-02-11 DIAGNOSIS — Z86.718 HISTORY OF DVT OF LOWER EXTREMITY: ICD-10-CM

## 2020-02-11 LAB — INR PPP: 3

## 2020-02-11 PROCEDURE — 93793 PR ANTICOAGULANT MGMT FOR PT TAKING WARFARIN: ICD-10-PCS | Mod: S$GLB,,,

## 2020-02-11 PROCEDURE — 93793 ANTICOAG MGMT PT WARFARIN: CPT | Mod: S$GLB,,,

## 2020-02-11 NOTE — PROGRESS NOTES
Verbal results taken from Radha at [facility].  PT/INR  36.4 / 3.0.  Date drawn 2/11/2020.  No changes noted.  Orders given: Resume current dose of warfarin 0.5mg every evening.  Recheck INR on 2/18/2020.

## 2020-02-12 ENCOUNTER — CLINICAL SUPPORT (OUTPATIENT)
Dept: AUDIOLOGY | Facility: CLINIC | Age: 85
End: 2020-02-12
Payer: MEDICARE

## 2020-02-12 ENCOUNTER — OFFICE VISIT (OUTPATIENT)
Dept: OTOLARYNGOLOGY | Facility: CLINIC | Age: 85
End: 2020-02-12
Payer: MEDICARE

## 2020-02-12 VITALS
DIASTOLIC BLOOD PRESSURE: 80 MMHG | SYSTOLIC BLOOD PRESSURE: 118 MMHG | BODY MASS INDEX: 28.12 KG/M2 | WEIGHT: 158.75 LBS | RESPIRATION RATE: 18 BRPM | HEART RATE: 72 BPM | TEMPERATURE: 99 F | OXYGEN SATURATION: 95 %

## 2020-02-12 DIAGNOSIS — I44.2 THIRD DEGREE AV BLOCK: Chronic | ICD-10-CM

## 2020-02-12 DIAGNOSIS — R42 VERTIGO: ICD-10-CM

## 2020-02-12 DIAGNOSIS — H90.3 SENSORINEURAL HEARING LOSS, BILATERAL: ICD-10-CM

## 2020-02-12 DIAGNOSIS — R42 DIZZINESS: Primary | ICD-10-CM

## 2020-02-12 DIAGNOSIS — I95.1 ORTHOSTATIC HYPOTENSION: ICD-10-CM

## 2020-02-12 DIAGNOSIS — H90.3 SENSORINEURAL HEARING LOSS (SNHL) OF BOTH EARS: ICD-10-CM

## 2020-02-12 PROCEDURE — 99999 PR PBB SHADOW E&M-EST. PATIENT-LVL IV: CPT | Mod: PBBFAC,HCNC,, | Performed by: ORTHOPAEDIC SURGERY

## 2020-02-12 PROCEDURE — 1159F PR MEDICATION LIST DOCUMENTED IN MEDICAL RECORD: ICD-10-PCS | Mod: HCNC,S$GLB,, | Performed by: ORTHOPAEDIC SURGERY

## 2020-02-12 PROCEDURE — 92567 PR TYMPA2METRY: ICD-10-PCS | Mod: HCNC,S$GLB,, | Performed by: AUDIOLOGIST-HEARING AID FITTER

## 2020-02-12 PROCEDURE — 99999 PR PBB SHADOW E&M-EST. PATIENT-LVL I: CPT | Mod: PBBFAC,HCNC,, | Performed by: AUDIOLOGIST-HEARING AID FITTER

## 2020-02-12 PROCEDURE — 92557 COMPREHENSIVE HEARING TEST: CPT | Mod: HCNC,S$GLB,, | Performed by: AUDIOLOGIST-HEARING AID FITTER

## 2020-02-12 PROCEDURE — 99999 PR PBB SHADOW E&M-EST. PATIENT-LVL IV: ICD-10-PCS | Mod: PBBFAC,HCNC,, | Performed by: ORTHOPAEDIC SURGERY

## 2020-02-12 PROCEDURE — 92557 PR COMPREHENSIVE HEARING TEST: ICD-10-PCS | Mod: HCNC,S$GLB,, | Performed by: AUDIOLOGIST-HEARING AID FITTER

## 2020-02-12 PROCEDURE — 1126F AMNT PAIN NOTED NONE PRSNT: CPT | Mod: HCNC,S$GLB,, | Performed by: ORTHOPAEDIC SURGERY

## 2020-02-12 PROCEDURE — 99214 PR OFFICE/OUTPT VISIT, EST, LEVL IV, 30-39 MIN: ICD-10-PCS | Mod: HCNC,S$GLB,, | Performed by: ORTHOPAEDIC SURGERY

## 2020-02-12 PROCEDURE — 1126F PR PAIN SEVERITY QUANTIFIED, NO PAIN PRESENT: ICD-10-PCS | Mod: HCNC,S$GLB,, | Performed by: ORTHOPAEDIC SURGERY

## 2020-02-12 PROCEDURE — 92567 TYMPANOMETRY: CPT | Mod: HCNC,S$GLB,, | Performed by: AUDIOLOGIST-HEARING AID FITTER

## 2020-02-12 PROCEDURE — 1101F PT FALLS ASSESS-DOCD LE1/YR: CPT | Mod: HCNC,CPTII,S$GLB, | Performed by: ORTHOPAEDIC SURGERY

## 2020-02-12 PROCEDURE — 99214 OFFICE O/P EST MOD 30 MIN: CPT | Mod: HCNC,S$GLB,, | Performed by: ORTHOPAEDIC SURGERY

## 2020-02-12 PROCEDURE — 1159F MED LIST DOCD IN RCRD: CPT | Mod: HCNC,S$GLB,, | Performed by: ORTHOPAEDIC SURGERY

## 2020-02-12 PROCEDURE — 99999 PR PBB SHADOW E&M-EST. PATIENT-LVL I: ICD-10-PCS | Mod: PBBFAC,HCNC,, | Performed by: AUDIOLOGIST-HEARING AID FITTER

## 2020-02-12 PROCEDURE — 1101F PR PT FALLS ASSESS DOC 0-1 FALLS W/OUT INJ PAST YR: ICD-10-PCS | Mod: HCNC,CPTII,S$GLB, | Performed by: ORTHOPAEDIC SURGERY

## 2020-02-12 NOTE — PROGRESS NOTES
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 She reports that the symptoms have been present for the last several months.  She describes the dizziness as a sensation of unsteadiness and says that it lasts seconds.  She has noted that standing and moving quickly acts as a trigger.  It makes her feel as if she is going to pass out, her daughter has called EMS several times due to concern she was having a stroke.  She denies aural pressure, otalgia and otorrhea.  She has hearing loss.  She has not had any recent dietary changes.  She has a pacemaker, and has an appointment with cardiology tomorrow.  She has stopped some of her BP medications, previously was very low. She was prescribed Meclizine recently but discontinued use two days ago. Patient has a pace maker    Results reveal a moderate-to-severe sensorineural hearing loss 250-8000 Hz for the right ear, and a moderate-to-severe sensorineural hearing loss 250-8000 Hz for the left ear.   Speech Reception Thresholds were  45 dBHL for the right ear and 50 dBHL for the left ear.   Word recognition scores were excellent for the right ear and excellent for the left ear.   Tympanograms were Type A, normal for the right ear and Type A, normal for the left ear.    Patient was counseled on the above findings.    Recommendations:  1. ENT review.  2. Annual Audiograms.  3. Binaural hearing aids were discussed but the patient is not interested at this time.

## 2020-02-12 NOTE — LETTER
February 12, 2020      Mary Epperson, IGOR  [address]  [place] [address]           [facility]  [address]  [place] [address]  Phone: [phone]  Fax: [phone]          Patient: Josy Posey   MR Number: [id number]   YOB: 1924   Date of Visit: 2/12/2020       Dear Mary Epperson:    Thank you for referring Josy Posey to me for evaluation. Attached you will find relevant portions of my assessment and plan of care.    If you have questions, please do not hesitate to call me. I look forward to following Josy Posey along with you.    Sincerely,    Lita Clarke MD    Enclosure  CC:  No Recipients    If you would like to receive this communication electronically, please contact [email or web address] or [phone] to request more information on [employer] access.    For providers and/or their staff who would like to refer a patient to [facility], please contact us through our one-stop-shop provider referral line, [facility], at [phone].    If you feel you have received this communication in error or would no longer like to receive these types of communications, please e-mail [email or web address]

## 2020-02-12 NOTE — PROGRESS NOTES
Subjective:       Patient ID: Josy Posey is a 89 y.o. female.    Chief Complaint: Dizziness    Patient is a very pleasant 89 y.o. female here to see me today for the first time for evaluation of dizziness.   She reports that the symptoms have been present for the last several months.  She describes the dizziness as a sensation of unsteadiness and says that it lasts seconds.  She has noted that standing and moving quickly acts as a trigger.  It makes her feel as if she is going to pass out, her daughter has called EMS several times due to concern she was having a stroke.  She denies aural pressure, otalgia and otorrhea.  She has hearing loss.  She has not had any recent dietary changes.  She has a pacemaker, and has an appointment with cardiology tomorrow.  She has stopped some of her BP medications, previously was very low.      Review of Systems   Constitutional: Negative for chills, fatigue, fever and unexpected weight change.   HENT: Negative for congestion, dental problem, ear discharge, ear pain, facial swelling, hearing loss, nosebleeds, postnasal drip, rhinorrhea, sinus pressure, sneezing, sore throat, tinnitus, trouble swallowing and voice change.    Eyes: Negative for redness, itching and visual disturbance.   Respiratory: Negative for cough, choking, shortness of breath and wheezing.    Cardiovascular: Negative for chest pain and palpitations.   Gastrointestinal: Negative for abdominal pain.        No reflux.   Musculoskeletal: Negative for gait problem.   Skin: Negative for rash.   Neurological: Positive for dizziness, syncope, weakness and light-headedness. Negative for headaches.       Objective:      Physical Exam   Constitutional: She is oriented to person, place, and time. She appears well-developed and well-nourished. No distress.   HENT:   Head: Normocephalic and atraumatic.   Right Ear: Tympanic membrane, external ear and ear canal normal. Decreased hearing is noted.   Left Ear: Tympanic  membrane, external ear and ear canal normal. Decreased hearing is noted.   Nose: Nose normal. No mucosal edema, rhinorrhea, nasal deformity or septal deviation. No epistaxis. Right sinus exhibits no maxillary sinus tenderness and no frontal sinus tenderness. Left sinus exhibits no maxillary sinus tenderness and no frontal sinus tenderness.   Mouth/Throat: Uvula is midline, oropharynx is clear and moist and mucous membranes are normal. Mucous membranes are not pale and not dry. No dental caries. No oropharyngeal exudate or posterior oropharyngeal erythema.   Eyes: Pupils are equal, round, and reactive to light. Conjunctivae, EOM and lids are normal. Right eye exhibits no chemosis. Left eye exhibits no chemosis. Right conjunctiva is not injected. Left conjunctiva is not injected. No scleral icterus. Right eye exhibits normal extraocular motion and no nystagmus. Left eye exhibits normal extraocular motion and no nystagmus.   Neck: Trachea normal and phonation normal. No tracheal tenderness present. No tracheal deviation present. No thyroid mass and no thyromegaly present.   Cardiovascular: Intact distal pulses.   Pulmonary/Chest: Effort normal. No stridor. No respiratory distress.   Abdominal: She exhibits no distension.   Lymphadenopathy:        Head (right side): No submental, no submandibular, no preauricular, no posterior auricular and no occipital adenopathy present.        Head (left side): No submental, no submandibular, no preauricular, no posterior auricular and no occipital adenopathy present.     She has no cervical adenopathy.   Neurological: She is alert and oriented to person, place, and time. No cranial nerve deficit.   Skin: Skin is warm and dry. No rash noted. No erythema.   Psychiatric: She has a normal mood and affect. Her behavior is normal.       AUDIOGRAM:  Results reveal a moderate-to-severe sensorineural hearing loss 250-8000 Hz for the right ear, and a moderate-to-severe sensorineural hearing  loss 250-8000 Hz for the left ear.   Speech Reception Thresholds were  45 dBHL for the right ear and 50 dBHL for the left ear.   Word recognition scores were excellent for the right ear and excellent for the left ear.   Tympanograms were Type A, normal for the right ear and Type A, normal for the left ear.      Assessment:       1. Dizziness    2. Sensorineural hearing loss (SNHL) of both ears    3. Orthostatic hypotension    4. Third degree AV block s/p PPM        Plan:       1.  Dizziness:  Her description of her dizziness as a lightheadedness as well as syncope with exertion and standing is not consistent with a peripheral vestibulopathy or an otologic source of her symptoms.  Her symptoms are most consistent with either blood pressure fluctuations, likely hypotension, as well as possible heart rate variations secondary to her extensive cardiac history as well as pacemaker placement.  At this time, I do not think that additional vestibular testing would be beneficial, however certainly could order in the future if needed.  I would recommend that she stop taking her meclizine, as overall that will weaken her vestibular system.  2.  Sensorineural hearing loss:  We reviewed the patient's recent audiogram and hearing loss in detail.  We also discussed that she is a good candidate for hearing aids, if and when she the patient is motivated.  She was given handouts with information and pricing of hearing aids, and will contact audiology when ready to proceed.  We also discussed the use hearing protection when exposed to loud noise, including lawn equipment.

## 2020-02-13 ENCOUNTER — HOSPITAL ENCOUNTER (OUTPATIENT)
Dept: CARDIOLOGY | Facility: HOSPITAL | Age: 85
Discharge: HOME | End: 2020-02-13
Attending: INTERNAL MEDICINE
Payer: MEDICARE

## 2020-02-13 DIAGNOSIS — I25.10 CORONARY ARTERY DISEASE INVOLVING NATIVE CORONARY ARTERY OF NATIVE HEART WITHOUT ANGINA PECTORIS: ICD-10-CM

## 2020-02-13 DIAGNOSIS — I51.89 LEFT VENTRICULAR DIASTOLIC DYSFUNCTION WITH PRESERVED SYSTOLIC FUNCTION: ICD-10-CM

## 2020-02-13 DIAGNOSIS — I51.89 LEFT VENTRICULAR DIASTOLIC DYSFUNCTION WITH PRESERVED SYSTOLIC FUNCTION: Chronic | ICD-10-CM

## 2020-02-13 LAB
AORTIC ROOT ANNULUS: 2.88 CM
ASCENDING AORTA: 2.93 CM
AV INDEX (PROSTH): 0.38
AV MEAN GRADIENT: 8 MMHG
AV PEAK GRADIENT: 16 MMHG
AV VALVE AREA: 1.23 CM2
AV VELOCITY RATIO: 0.38
CV ECHO LV RWT: 0.35 CM
DOP CALC AO PEAK VEL: 1.99 M/S
DOP CALC AO VTI: 37.54 CM
DOP CALC LVOT AREA: 3.3 CM2
DOP CALC LVOT DIAMETER: 2.04 CM
DOP CALC LVOT PEAK VEL: 0.75 M/S
DOP CALC LVOT STROKE VOLUME: 46 CM3
DOP CALCLVOT PEAK VEL VTI: 14.08 CM
E WAVE DECELERATION TIME: 294.03 MSEC
E/A RATIO: 2.94
E/E' RATIO: 16.59 M/S
ECHO LV POSTERIOR WALL: 0.77 CM (ref 0.6–1.1)
FRACTIONAL SHORTENING: 29 % (ref 28–44)
INTERVENTRICULAR SEPTUM: 0.82 CM (ref 0.6–1.1)
IVRT: 0.1 MSEC
LA MAJOR: 5.31 CM
LA MINOR: 5.93 CM
LA WIDTH: 4.62 CM
LEFT ATRIUM SIZE: 4.74 CM
LEFT ATRIUM VOLUME: 104.29 CM3
LEFT INTERNAL DIMENSION IN SYSTOLE: 3.14 CM (ref 2.1–4)
LEFT VENTRICLE DIASTOLIC VOLUME: 88.03 ML
LEFT VENTRICLE SYSTOLIC VOLUME: 39.14 ML
LEFT VENTRICULAR INTERNAL DIMENSION IN DIASTOLE: 4.41 CM (ref 3.5–6)
LEFT VENTRICULAR MASS: 108.95 G
LV LATERAL E/E' RATIO: 15.67 M/S
LV SEPTAL E/E' RATIO: 17.63 M/S
MV PEAK A VEL: 0.48 M/S
MV PEAK E VEL: 1.41 M/S
PISA TR MAX VEL: 2.95 M/S
PV PEAK VELOCITY: 0.63 CM/S
RA MAJOR: 4.62 CM
RA PRESSURE: 3 MMHG
RA WIDTH: 4.26 CM
RIGHT VENTRICULAR END-DIASTOLIC DIMENSION: 3.68 CM
SINUS: 2.72 CM
STJ: 2.25 CM
TDI LATERAL: 0.09 M/S
TDI SEPTAL: 0.08 M/S
TDI: 0.09 M/S
TR MAX PG: 35 MMHG
TRICUSPID ANNULAR PLANE SYSTOLIC EXCURSION: 1.6 CM
TV REST PULMONARY ARTERY PRESSURE: 38 MMHG

## 2020-02-13 PROCEDURE — 93306 TTE W/DOPPLER COMPLETE: CPT | Mod: 26,HCNC,, | Performed by: INTERNAL MEDICINE

## 2020-02-13 PROCEDURE — 93306 TTE W/DOPPLER COMPLETE: CPT | Mod: HCNC

## 2020-02-13 PROCEDURE — 93306 ECHO (CUPID ONLY): ICD-10-PCS | Mod: 26,HCNC,, | Performed by: INTERNAL MEDICINE

## 2020-02-14 ENCOUNTER — TELEPHONE (OUTPATIENT)
Dept: CARDIOLOGY | Facility: CLINIC | Age: 85
End: 2020-02-14

## 2020-02-14 NOTE — TELEPHONE ENCOUNTER
Telephoned results of Echo and received well but asked results of x-ray and lab work.    ----- Message from Leslie Alvarado MD sent at 2/13/2020  9:06 PM CST -----  No change in heart function since 2017

## 2020-02-18 ENCOUNTER — ANTI-COAG VISIT (OUTPATIENT)
Dept: CARDIOLOGY | Facility: CLINIC | Age: 85
End: 2020-02-18
Payer: MEDICARE

## 2020-02-18 DIAGNOSIS — Z86.718 HISTORY OF DVT OF LOWER EXTREMITY: ICD-10-CM

## 2020-02-18 DIAGNOSIS — Z79.01 LONG TERM (CURRENT) USE OF ANTICOAGULANTS: ICD-10-CM

## 2020-02-18 DIAGNOSIS — I48.0 PAROXYSMAL ATRIAL FIBRILLATION: Chronic | ICD-10-CM

## 2020-02-18 LAB — INR PPP: 3.3

## 2020-02-18 PROCEDURE — 93793 ANTICOAG MGMT PT WARFARIN: CPT | Mod: S$GLB,,,

## 2020-02-18 PROCEDURE — 93793 PR ANTICOAGULANT MGMT FOR PT TAKING WARFARIN: ICD-10-PCS | Mod: S$GLB,,,

## 2020-02-18 NOTE — PROGRESS NOTES
Verbal results taken from Radha at [facility].  PT/INR  40.2 / 3.3.  Date drawn 2/18/2020.  No changes noted.  Orders given: Resume current dose of warfarin 0.5mg every evening.  Recheck INR on 3/3/2020.

## 2020-02-24 NOTE — TELEPHONE ENCOUNTER
"Telephoned patient to discuss keeping a b/p diary.    Telephoned Vanessa Physical Therapist regarding message   Stated b/p increased with Midodrine but patient continues to say "she feels bad and is tired"  Will call Mrs. Posey and her daughter to assess but was seen on 1/24/20 and passed all studies CXR, Echo, labs.  Spoke with daughter Abrahan re: b/p issues now and stressful situation in home now.  Reviewed all medications and since her morning blood pressure has been 200/100 lately  Stated she'd stopped her half dose of Spironolactone advised might need to restart taking at bedtime.            ----- Message from Wai Marks sent at 2/24/2020  2:46 PM CST -----  Contact: Vanessa( [facility]   Pt blood pressure upon arrival was 86/46 pt was lightheaded and feeling faint.     [phone]     "

## 2020-03-03 ENCOUNTER — ANTI-COAG VISIT (OUTPATIENT)
Dept: CARDIOLOGY | Facility: CLINIC | Age: 85
End: 2020-03-03
Payer: MEDICARE

## 2020-03-03 DIAGNOSIS — Z86.718 HISTORY OF DVT OF LOWER EXTREMITY: ICD-10-CM

## 2020-03-03 DIAGNOSIS — I48.0 PAROXYSMAL ATRIAL FIBRILLATION: Chronic | ICD-10-CM

## 2020-03-03 DIAGNOSIS — Z79.01 LONG TERM (CURRENT) USE OF ANTICOAGULANTS: ICD-10-CM

## 2020-03-03 LAB — INR PPP: 4

## 2020-03-03 PROCEDURE — 93793 ANTICOAG MGMT PT WARFARIN: CPT | Mod: S$GLB,,,

## 2020-03-03 PROCEDURE — 93793 PR ANTICOAGULANT MGMT FOR PT TAKING WARFARIN: ICD-10-PCS | Mod: S$GLB,,,

## 2020-03-10 ENCOUNTER — ANTI-COAG VISIT (OUTPATIENT)
Dept: CARDIOLOGY | Facility: CLINIC | Age: 85
End: 2020-03-10
Payer: MEDICARE

## 2020-03-10 ENCOUNTER — TELEPHONE (OUTPATIENT)
Dept: CARDIOLOGY | Facility: CLINIC | Age: 85
End: 2020-03-10

## 2020-03-10 DIAGNOSIS — Z86.718 HISTORY OF DVT OF LOWER EXTREMITY: ICD-10-CM

## 2020-03-10 DIAGNOSIS — I48.0 PAROXYSMAL ATRIAL FIBRILLATION: Chronic | ICD-10-CM

## 2020-03-10 DIAGNOSIS — Z79.01 LONG TERM (CURRENT) USE OF ANTICOAGULANTS: ICD-10-CM

## 2020-03-10 LAB — INR PPP: 3.8

## 2020-03-10 PROCEDURE — 93793 ANTICOAG MGMT PT WARFARIN: CPT | Mod: S$GLB,,,

## 2020-03-10 PROCEDURE — 93793 PR ANTICOAGULANT MGMT FOR PT TAKING WARFARIN: ICD-10-PCS | Mod: S$GLB,,,

## 2020-03-10 NOTE — TELEPHONE ENCOUNTER
Spoke with Angie--informed her that we will contact patient to schedule office appointment as Dr. Alvarado says symptoms are not unusual for patient--Angie verbalizes understanding of all information and asks that we contact patient's daughter to schedule appointment

## 2020-03-10 NOTE — TELEPHONE ENCOUNTER
Spoke with Angie with [facility]--states went out to see patient today--states patient's blood pressure was 82/60 pulse 74--states patient becomes dizzy with position change but is stable when sitting---patient takes midodrine 5 mg tablet one tablet four times a day--patient had taken one midodrine 5 mg tablet prior to blood pressure check--patient has not been seen in office since hospital visit on 1/27/20--will call patient to schedule follow up--please advise-

## 2020-03-10 NOTE — TELEPHONE ENCOUNTER
----- Message from Melina Mayfield sent at 3/10/2020  1:06 PM CDT -----  Contact: Ericka[facility] [phone]  Would like to report patient's low blood pressure reading of 82/60.  Please call back at [phone].  Md Kan

## 2020-03-10 NOTE — PROGRESS NOTES
Verbal results taken from Radha at [facility].  PT/INR  45.7 / 3.8.  Date drawn 3/10/2020.  INR has improved but remains supra-therapeutic.  No significant changes or signs of bleeding noted.  Orders given: Hold dose of warfarin on today and tomorrow; then resume current dose of warfarin 0.5mg every evening.  Recheck INR on Friday, 3/13/2020.

## 2020-03-11 ENCOUNTER — TELEPHONE (OUTPATIENT)
Dept: CARDIOLOGY | Facility: CLINIC | Age: 85
End: 2020-03-11

## 2020-03-11 NOTE — TELEPHONE ENCOUNTER
"Spoke with patient's daughter Abrahan--informed that Dr. Alvarado states patient needs to schedule appointment this week with a mid-level provider to follow up with symptoms of low blood pressure--Abrahan states patient went to [facility] on 3/10/20 due to vomiting several times last night--states, "I do not think it's a good idea for patient to come to clinic around sick people for fear of the coronavirus. We will call back at a later time to schedule".    "

## 2020-03-11 NOTE — TELEPHONE ENCOUNTER
----- Message from Nigel Rivers LPN sent at 3/10/2020  5:27 PM CDT -----  Call pt's daughter to schedule appointment with mid-level provider

## 2020-03-13 ENCOUNTER — ANTI-COAG VISIT (OUTPATIENT)
Dept: CARDIOLOGY | Facility: CLINIC | Age: 85
End: 2020-03-13
Payer: MEDICARE

## 2020-03-13 DIAGNOSIS — Z86.718 HISTORY OF DVT OF LOWER EXTREMITY: ICD-10-CM

## 2020-03-13 DIAGNOSIS — Z79.01 LONG TERM (CURRENT) USE OF ANTICOAGULANTS: ICD-10-CM

## 2020-03-13 DIAGNOSIS — I48.0 PAROXYSMAL ATRIAL FIBRILLATION: Chronic | ICD-10-CM

## 2020-03-13 LAB — INR PPP: 2.9

## 2020-03-13 PROCEDURE — 93793 ANTICOAG MGMT PT WARFARIN: CPT | Mod: S$GLB,,,

## 2020-03-13 PROCEDURE — 93793 PR ANTICOAGULANT MGMT FOR PT TAKING WARFARIN: ICD-10-PCS | Mod: S$GLB,,,

## 2020-03-13 NOTE — PROGRESS NOTES
Verbal results taken from Radha at [facility].  PT/INR  35.0 / 2.9.  Date drawn 3/13/2020.  INR therapeutic after 2 days of warfarin being held.    Orders given:  Lower dose of warfarin to 0.5mg every evening except on Tuesdays and Thursdays. Patient will not take any warfarin on Tuesdays and Thursdays.   Recheck INR on Wednesday, 3/18/2020.     Message left for caregiver, Ms. Diez at [phone].  Please contact coumadin clinic [phone] - to discuss home monitor.

## 2020-03-18 ENCOUNTER — ANTI-COAG VISIT (OUTPATIENT)
Dept: CARDIOLOGY | Facility: CLINIC | Age: 85
End: 2020-03-18
Payer: MEDICARE

## 2020-03-18 DIAGNOSIS — I48.0 PAROXYSMAL ATRIAL FIBRILLATION: Chronic | ICD-10-CM

## 2020-03-18 DIAGNOSIS — Z79.01 LONG TERM (CURRENT) USE OF ANTICOAGULANTS: ICD-10-CM

## 2020-03-18 DIAGNOSIS — Z86.718 HISTORY OF DVT OF LOWER EXTREMITY: ICD-10-CM

## 2020-03-18 DIAGNOSIS — Z86.73 H/O: CVA (CEREBROVASCULAR ACCIDENT): Chronic | ICD-10-CM

## 2020-03-18 LAB — INR PPP: 2.5

## 2020-03-18 PROCEDURE — 93793 ANTICOAG MGMT PT WARFARIN: CPT | Mod: S$GLB,,,

## 2020-03-18 PROCEDURE — 93793 PR ANTICOAGULANT MGMT FOR PT TAKING WARFARIN: ICD-10-PCS | Mod: S$GLB,,,

## 2020-03-18 NOTE — PROGRESS NOTES
Verbal results taken from Radha at [facility].  PT/INR:  29.5 / 2.5 (therapeutic).  Date drawn:  3/18/2020.  Previous instructions were followed.  No other changes reported.  Awaiting approval for home meter.  Orders given:  Maintain dose of warfarin 0.5mg every evening except on Tuesdays and Thursdays.  Patient will not take any warfarin on Tuesdays and Thursdays.  Recheck INR on Wednesday, 3/25/2020.

## 2020-03-25 ENCOUNTER — ANTI-COAG VISIT (OUTPATIENT)
Dept: CARDIOLOGY | Facility: CLINIC | Age: 85
End: 2020-03-25
Payer: MEDICARE

## 2020-03-25 DIAGNOSIS — Z86.718 HISTORY OF DVT OF LOWER EXTREMITY: ICD-10-CM

## 2020-03-25 DIAGNOSIS — I48.0 PAROXYSMAL ATRIAL FIBRILLATION: Chronic | ICD-10-CM

## 2020-03-25 DIAGNOSIS — Z79.01 LONG TERM (CURRENT) USE OF ANTICOAGULANTS: ICD-10-CM

## 2020-03-25 DIAGNOSIS — Z95.2 S/P MITRAL VALVE REPLACEMENT: Chronic | ICD-10-CM

## 2020-03-25 LAB — INR PPP: 2.5

## 2020-03-25 PROCEDURE — 93793 PR ANTICOAGULANT MGMT FOR PT TAKING WARFARIN: ICD-10-PCS | Mod: S$GLB,,,

## 2020-03-25 PROCEDURE — 93793 ANTICOAG MGMT PT WARFARIN: CPT | Mod: S$GLB,,,

## 2020-03-25 NOTE — PROGRESS NOTES
Verbal results taken from Radha at [facility].  PT/INR  30.5 / 2.5.  Date drawn 3/25/2020.  INR remains therapeutic.    Orders given:  Continue current dose of warfarin 0.5mg every evening except on Tuesdays and Thursdays. Patient will not take any warfarin on Tuesdays and Thursdays.   Recheck INR on Wednesday, 4/1/2020.

## 2020-03-30 ENCOUNTER — EXTERNAL HOME HEALTH (OUTPATIENT)
Dept: HOME HEALTH SERVICES | Facility: HOSPITAL | Age: 85
End: 2020-03-30
Payer: MEDICARE

## 2020-03-30 PROCEDURE — G0179 PR HOME HEALTH MD RECERTIFICATION: ICD-10-PCS | Mod: ,,, | Performed by: PEDIATRICS

## 2020-03-30 PROCEDURE — G0179 MD RECERTIFICATION HHA PT: HCPCS | Mod: ,,, | Performed by: PEDIATRICS

## 2020-04-01 ENCOUNTER — ANTI-COAG VISIT (OUTPATIENT)
Dept: CARDIOLOGY | Facility: CLINIC | Age: 85
End: 2020-04-01
Payer: MEDICARE

## 2020-04-01 DIAGNOSIS — Z79.01 LONG TERM (CURRENT) USE OF ANTICOAGULANTS: ICD-10-CM

## 2020-04-01 DIAGNOSIS — Z86.718 HISTORY OF DVT OF LOWER EXTREMITY: ICD-10-CM

## 2020-04-01 DIAGNOSIS — I48.0 PAROXYSMAL ATRIAL FIBRILLATION: Chronic | ICD-10-CM

## 2020-04-01 DIAGNOSIS — Z86.73 H/O: CVA (CEREBROVASCULAR ACCIDENT): Chronic | ICD-10-CM

## 2020-04-01 LAB — INR PPP: 2.9

## 2020-04-01 PROCEDURE — 93793 PR ANTICOAGULANT MGMT FOR PT TAKING WARFARIN: ICD-10-PCS | Mod: S$GLB,,,

## 2020-04-01 PROCEDURE — 93793 ANTICOAG MGMT PT WARFARIN: CPT | Mod: S$GLB,,,

## 2020-04-06 DIAGNOSIS — Z79.01 LONG TERM (CURRENT) USE OF ANTICOAGULANTS: ICD-10-CM

## 2020-04-06 DIAGNOSIS — I95.1 ORTHOSTATIC HYPOTENSION: ICD-10-CM

## 2020-04-06 RX ORDER — MIDODRINE HYDROCHLORIDE 5 MG/1
5 TABLET ORAL 4 TIMES DAILY
Qty: 120 TABLET | Refills: 6 | Status: SHIPPED | OUTPATIENT
Start: 2020-04-06 | End: 2020-05-29 | Stop reason: SDUPTHER

## 2020-04-08 ENCOUNTER — TELEPHONE (OUTPATIENT)
Dept: CARDIOLOGY | Facility: CLINIC | Age: 85
End: 2020-04-08

## 2020-04-08 ENCOUNTER — DOCUMENT SCAN (OUTPATIENT)
Dept: HOME HEALTH SERVICES | Facility: HOSPITAL | Age: 85
End: 2020-04-08
Payer: MEDICARE

## 2020-04-08 ENCOUNTER — LAB VISIT (OUTPATIENT)
Dept: LAB | Facility: HOSPITAL | Age: 85
End: 2020-04-08
Attending: PEDIATRICS
Payer: MEDICARE

## 2020-04-08 DIAGNOSIS — N39.0 URINARY TRACT INFECTION, SITE NOT SPECIFIED: Primary | ICD-10-CM

## 2020-04-08 LAB
BACTERIA #/AREA URNS HPF: ABNORMAL /HPF
BILIRUB UR QL STRIP: NEGATIVE
CLARITY UR: CLEAR
COLOR UR: YELLOW
GLUCOSE UR QL STRIP: NEGATIVE
HGB UR QL STRIP: NEGATIVE
KETONES UR QL STRIP: NEGATIVE
LEUKOCYTE ESTERASE UR QL STRIP: ABNORMAL
MICROSCOPIC COMMENT: ABNORMAL
NITRITE UR QL STRIP: NEGATIVE
PH UR STRIP: 6 [PH] (ref 5–8)
PROT UR QL STRIP: NEGATIVE
RBC #/AREA URNS HPF: 0 /HPF (ref 0–4)
SP GR UR STRIP: 1.01 (ref 1–1.03)
SQUAMOUS #/AREA URNS HPF: 0 /HPF
URN SPEC COLLECT METH UR: ABNORMAL
UROBILINOGEN UR STRIP-ACNC: NEGATIVE EU/DL
WBC #/AREA URNS HPF: >100 /HPF (ref 0–5)

## 2020-04-08 PROCEDURE — 87077 CULTURE AEROBIC IDENTIFY: CPT

## 2020-04-08 PROCEDURE — 81000 URINALYSIS NONAUTO W/SCOPE: CPT

## 2020-04-08 PROCEDURE — 87186 SC STD MICRODIL/AGAR DIL: CPT

## 2020-04-08 PROCEDURE — 87086 URINE CULTURE/COLONY COUNT: CPT

## 2020-04-08 PROCEDURE — 87088 URINE BACTERIA CULTURE: CPT

## 2020-04-08 NOTE — TELEPHONE ENCOUNTER
S/w [facility] Nurse Barbara and she confirmed they rec'vd urine orders and collected pt's sample for analysis and will CB PRN.

## 2020-04-08 NOTE — TELEPHONE ENCOUNTER
Received call from Abrahan stating Mrs. Posey is having problems getting out of bed not related to her b/p 101/60 but thinks it maybe a urinary infection since this has happened before.  Requesting if she can give Home Health Nurse a specimen to check today advised that should be all right and that would notify Dr. Rosales and check with Liu for contact number of [facility] Nurse Barbara [phone]    Yordan Rosales and Christiano- please advise

## 2020-04-09 ENCOUNTER — TELEPHONE (OUTPATIENT)
Dept: INTERNAL MEDICINE | Facility: CLINIC | Age: 85
End: 2020-04-09

## 2020-04-09 RX ORDER — CEPHALEXIN 500 MG/1
500 CAPSULE ORAL EVERY 8 HOURS
Qty: 21 CAPSULE | Refills: 0 | Status: SHIPPED | OUTPATIENT
Start: 2020-04-09 | End: 2020-04-16

## 2020-04-09 NOTE — TELEPHONE ENCOUNTER
Pt confirmed she has taken first dose of keflex abx rx sent in, and will notify HH nurse if s/s persist after completing rx so we can order another urine test.

## 2020-04-09 NOTE — TELEPHONE ENCOUNTER
Please see UA results in chart 04/08/20, cx in process, rc'vd from  nurse collection. Please advise?    [facility] Nurse Barbara WHITNEY#[phone]

## 2020-04-10 LAB — BACTERIA UR CULT: ABNORMAL

## 2020-04-15 ENCOUNTER — ANTI-COAG VISIT (OUTPATIENT)
Dept: CARDIOLOGY | Facility: CLINIC | Age: 85
End: 2020-04-15
Payer: MEDICARE

## 2020-04-15 ENCOUNTER — TELEPHONE (OUTPATIENT)
Dept: INTERNAL MEDICINE | Facility: CLINIC | Age: 85
End: 2020-04-15

## 2020-04-15 DIAGNOSIS — Z79.01 LONG TERM (CURRENT) USE OF ANTICOAGULANTS: ICD-10-CM

## 2020-04-15 DIAGNOSIS — Z86.718 HISTORY OF DVT OF LOWER EXTREMITY: ICD-10-CM

## 2020-04-15 DIAGNOSIS — I48.0 PAROXYSMAL ATRIAL FIBRILLATION: Chronic | ICD-10-CM

## 2020-04-15 LAB — INR PPP: 2

## 2020-04-15 PROCEDURE — 93793 PR ANTICOAGULANT MGMT FOR PT TAKING WARFARIN: ICD-10-PCS | Mod: S$GLB,,,

## 2020-04-15 PROCEDURE — 93793 ANTICOAG MGMT PT WARFARIN: CPT | Mod: S$GLB,,,

## 2020-04-15 NOTE — TELEPHONE ENCOUNTER
Returned call to Madhavi regarding pt. She states she went to see pt today and she lives with daughter and mother in law.  nurse states that daughter's mother in law  last week and this may be how the pt is grieving. She states pt does not want to do anything but lie in the bed. She states that pt states that she is weak and is not able to keep down any food. Pt states that she passed out last night. She denies pain and diarrhea. Pt is able to stand but is unsteady and was not able to come to a full standing position. She states bp is fine, as well as other vitals, including weight.  nurse would like advice on what should be done or is this an okay sign of the pt grieving due to her not being able to see her grandchildren since the COVID-19 pandemic after a statement pt made. I advised her that I will give the message to Dr. Rosales's nurse practitioner and return call with response.  nurse verbalized understanding.

## 2020-04-15 NOTE — TELEPHONE ENCOUNTER
----- Message from Kassidy Sylvester sent at 4/15/2020  2:36 PM CDT -----  Contact: [facility]-Madhavi Arriaga is requesting call back in regards to pt complaining of weakness, can't keep food down only can intake water, and pt is also needing assistance walking          Pls call back at [phone]

## 2020-04-15 NOTE — PROGRESS NOTES
Verbal results received from Radha with [facility].  PT: 30.0 // INR: 2.0.  Reports patient has been eating broccoli daily.   On 4/09/2020, patient started cephALEXin (KEFLEX) 500 MG capsule, 1 capsule every 8hr for 7 days - completion tomorrow, 4/16/2020.  No other changes reported.  Orders given:  Advised to decrease broccoli in diet - resume current diet.  Warfarin dose will be increase to 1 mg today - only, then have patient to resume regular maintenance dose of NONE (0 mg) every Tuesday and Thursday; and 0.5 mg on all other days per week.  Recheck on 4/29/2020.

## 2020-04-28 ENCOUNTER — HOSPITAL ENCOUNTER (OUTPATIENT)
Dept: CARDIOLOGY | Facility: HOSPITAL | Age: 85
Discharge: HOME | End: 2020-04-28
Attending: INTERNAL MEDICINE
Payer: MEDICARE

## 2020-04-28 DIAGNOSIS — I44.2 THIRD DEGREE AV BLOCK: ICD-10-CM

## 2020-04-28 DIAGNOSIS — I44.2 CHB (COMPLETE HEART BLOCK): ICD-10-CM

## 2020-04-28 DIAGNOSIS — Z95.0 CARDIAC PACEMAKER IN SITU: ICD-10-CM

## 2020-04-28 PROCEDURE — 93294 CARDIAC DEVICE CHECK - REMOTE: ICD-10-PCS | Mod: S$GLB,,, | Performed by: INTERNAL MEDICINE

## 2020-04-28 PROCEDURE — 93296 REM INTERROG EVL PM/IDS: CPT

## 2020-04-28 PROCEDURE — 93294 REM INTERROG EVL PM/LDLS PM: CPT | Mod: S$GLB,,, | Performed by: INTERNAL MEDICINE

## 2020-04-29 ENCOUNTER — ANTI-COAG VISIT (OUTPATIENT)
Dept: CARDIOLOGY | Facility: CLINIC | Age: 85
End: 2020-04-29
Payer: MEDICARE

## 2020-04-29 DIAGNOSIS — Z86.73 H/O: CVA (CEREBROVASCULAR ACCIDENT): Chronic | ICD-10-CM

## 2020-04-29 DIAGNOSIS — I48.0 PAROXYSMAL ATRIAL FIBRILLATION: Chronic | ICD-10-CM

## 2020-04-29 DIAGNOSIS — Z79.01 LONG TERM (CURRENT) USE OF ANTICOAGULANTS: ICD-10-CM

## 2020-04-29 DIAGNOSIS — Z86.718 HISTORY OF DVT OF LOWER EXTREMITY: ICD-10-CM

## 2020-04-29 LAB — INR PPP: 3.1

## 2020-04-29 PROCEDURE — 93793 ANTICOAG MGMT PT WARFARIN: CPT | Mod: S$GLB,,,

## 2020-04-29 PROCEDURE — 93793 PR ANTICOAGULANT MGMT FOR PT TAKING WARFARIN: ICD-10-PCS | Mod: S$GLB,,,

## 2020-04-29 NOTE — PROGRESS NOTES
Verbal results received from Radha with [facility].  PT: 37.7 // INR: 3.1 (therapeutic).  Previous instructions were followed.  No other changes reported.  Orders given:  Maintain current maintenance dose of NONE (0 mg) every Tuesday and Thursday; and 0.5 mg on all other days per week.  Recheck on 5/13/2020.

## 2020-05-05 LAB
AV DELAY - FIXED: 180 MSEC
IMPEDANCE RA LEAD (DONOR): 936 OHMS
IMPEDANCE RA LEAD (NATIVE): 566 OHMS
IMPEDANCE RA LEAD: 468 OHMS
OHS CV DC PP MS1: 0.4 MS
OHS CV DC PP MS2: 0.3 MS
OHS CV DC PP MS3: 0.3 MS
OHS CV DC PP V1: 2 V
OHS CV DC PP V2: 3 V
OHS CV DC PP V3: 2.4 V
P/R-WAVE RA LEAD (DONOR): 15 MV
P/R-WAVE RA LEAD (NATIVE): 9.6 MV
P/R-WAVE RA LEAD: 1.7 MV

## 2020-05-13 ENCOUNTER — TELEPHONE (OUTPATIENT)
Dept: CARDIOLOGY | Facility: CLINIC | Age: 85
End: 2020-05-13

## 2020-05-13 ENCOUNTER — ANTI-COAG VISIT (OUTPATIENT)
Dept: CARDIOLOGY | Facility: CLINIC | Age: 85
End: 2020-05-13
Payer: MEDICARE

## 2020-05-13 DIAGNOSIS — Z95.2 S/P MITRAL VALVE REPLACEMENT: Chronic | ICD-10-CM

## 2020-05-13 DIAGNOSIS — Z79.01 LONG TERM (CURRENT) USE OF ANTICOAGULANTS: ICD-10-CM

## 2020-05-13 DIAGNOSIS — Z86.73 H/O: CVA (CEREBROVASCULAR ACCIDENT): Chronic | ICD-10-CM

## 2020-05-13 DIAGNOSIS — Z86.718 HISTORY OF DVT OF LOWER EXTREMITY: ICD-10-CM

## 2020-05-13 LAB — INR PPP: 1.7

## 2020-05-13 PROCEDURE — 93793 PR ANTICOAGULANT MGMT FOR PT TAKING WARFARIN: ICD-10-PCS | Mod: S$GLB,,,

## 2020-05-13 PROCEDURE — 93793 ANTICOAG MGMT PT WARFARIN: CPT | Mod: S$GLB,,,

## 2020-05-13 NOTE — PROGRESS NOTES
Verbal results received from Radha with [facility].  PT: 19.9 // INR:1.7 (subtherapeutic).  Possible a missed dose.  No other changes reported.  Orders given:  Will boost today's (Wednesday) warfarin dose to 1 mg - only, then have patient to resume current maintenance dose of NONE (0 mg) every Tuesday and Thursday; and 0.5 mg on all other days per week.  Recheck on 5/20/2020.

## 2020-05-13 NOTE — PROGRESS NOTES
Patient's INR is therapeutic at 2.6.  Reports no recent changes.  Instructed to maintain current dose of Warfarin 0.5 mg every evening.  Recheck in 1 month.   - c/w atorvastatin 20 mg daily

## 2020-05-13 NOTE — TELEPHONE ENCOUNTER
Received call from patient requesting if she could start Home Monitoring with her own [employer] machine.  She no longer has her live in assistant and wants her Daughter Chary to do the checks versus Home Health  Advised would have to request from [facility] and get Dr. Alvarado's approval on form.  Will send to [employer] [phone]

## 2020-05-20 ENCOUNTER — ANTI-COAG VISIT (OUTPATIENT)
Dept: CARDIOLOGY | Facility: CLINIC | Age: 85
End: 2020-05-20
Payer: MEDICARE

## 2020-05-20 DIAGNOSIS — Z79.01 LONG TERM (CURRENT) USE OF ANTICOAGULANTS: ICD-10-CM

## 2020-05-20 DIAGNOSIS — Z86.718 HISTORY OF DVT OF LOWER EXTREMITY: ICD-10-CM

## 2020-05-20 DIAGNOSIS — Z86.73 H/O: CVA (CEREBROVASCULAR ACCIDENT): Chronic | ICD-10-CM

## 2020-05-20 DIAGNOSIS — I48.0 PAROXYSMAL ATRIAL FIBRILLATION: Chronic | ICD-10-CM

## 2020-05-20 DIAGNOSIS — Z95.2 S/P MITRAL VALVE REPLACEMENT: Chronic | ICD-10-CM

## 2020-05-20 LAB — INR PPP: 1.8

## 2020-05-20 PROCEDURE — 93793 PR ANTICOAGULANT MGMT FOR PT TAKING WARFARIN: ICD-10-PCS | Mod: S$GLB,,,

## 2020-05-20 PROCEDURE — 93793 ANTICOAG MGMT PT WARFARIN: CPT | Mod: S$GLB,,,

## 2020-05-20 NOTE — PROGRESS NOTES
LPN notes reviewed. Will boost again rather than increase weekly dose since suspicion of missed doses. See calendar for plan.

## 2020-05-20 NOTE — PROGRESS NOTES
Verbal results received from Radha with [facility].  PT:  21.0 // INR: 1.8 (subtherapeutic).  Previous instructions reported followed.  Suspicion of missed doses.  Sent to PharmD for further review:  See telephone note on 5/13/2020.

## 2020-05-26 ENCOUNTER — ANTI-COAG VISIT (OUTPATIENT)
Dept: CARDIOLOGY | Facility: CLINIC | Age: 85
End: 2020-05-26
Payer: MEDICARE

## 2020-05-26 DIAGNOSIS — Z79.01 LONG TERM (CURRENT) USE OF ANTICOAGULANTS: ICD-10-CM

## 2020-05-26 DIAGNOSIS — Z95.2 S/P MITRAL VALVE REPLACEMENT: Chronic | ICD-10-CM

## 2020-05-26 DIAGNOSIS — Z86.718 HISTORY OF DVT OF LOWER EXTREMITY: ICD-10-CM

## 2020-05-26 DIAGNOSIS — I48.0 PAROXYSMAL ATRIAL FIBRILLATION: Chronic | ICD-10-CM

## 2020-05-26 LAB — INR PPP: 2.2

## 2020-05-26 PROCEDURE — 93793 PR ANTICOAGULANT MGMT FOR PT TAKING WARFARIN: ICD-10-PCS | Mod: S$GLB,,,

## 2020-05-26 PROCEDURE — 93793 ANTICOAG MGMT PT WARFARIN: CPT | Mod: S$GLB,,,

## 2020-05-26 NOTE — PROGRESS NOTES
Verbal results received from Todd with [facility].  PT: 26.3 // INR: 2.2 (subtherapeutic).  Date tested:  5/26/2020.  Previous instructions were followed.  No other changes.  Patient will be discharged from [facility] today.  Orders given:  Warfarin 0 mg (none) on Thursdays and 0.5 mg on all other days per week.  Patient will be recheck in the [facility]in Clinic ([place]) on 6/03/2020 at 1130a.  If, patient have any questions or concerns, please have patient to contact the Coumadin Clinic at [phone].

## 2020-05-29 DIAGNOSIS — I95.1 ORTHOSTATIC HYPOTENSION: ICD-10-CM

## 2020-05-29 DIAGNOSIS — Z79.01 LONG TERM (CURRENT) USE OF ANTICOAGULANTS: ICD-10-CM

## 2020-05-29 RX ORDER — MIDODRINE HYDROCHLORIDE 5 MG/1
5 TABLET ORAL 4 TIMES DAILY
Qty: 120 TABLET | Refills: 6 | Status: SHIPPED | OUTPATIENT
Start: 2020-05-29 | End: 2020-11-20 | Stop reason: SDUPTHER

## 2020-06-03 ENCOUNTER — ANTI-COAG VISIT (OUTPATIENT)
Dept: CARDIOLOGY | Facility: CLINIC | Age: 85
End: 2020-06-03
Payer: MEDICARE

## 2020-06-03 DIAGNOSIS — Z86.718 HISTORY OF DVT OF LOWER EXTREMITY: ICD-10-CM

## 2020-06-03 DIAGNOSIS — Z79.01 LONG TERM (CURRENT) USE OF ANTICOAGULANTS: Primary | ICD-10-CM

## 2020-06-03 DIAGNOSIS — I48.0 PAROXYSMAL ATRIAL FIBRILLATION: Chronic | ICD-10-CM

## 2020-06-03 DIAGNOSIS — Z86.73 H/O: CVA (CEREBROVASCULAR ACCIDENT): Chronic | ICD-10-CM

## 2020-06-03 LAB — INR PPP: 2.4 (ref 2.5–3.5)

## 2020-06-03 PROCEDURE — 85610 POCT INR: ICD-10-PCS | Mod: QW,HCNC,S$GLB, | Performed by: NUCLEAR MEDICINE

## 2020-06-03 PROCEDURE — 93793 PR ANTICOAGULANT MGMT FOR PT TAKING WARFARIN: ICD-10-PCS | Mod: HCNC,S$GLB,,

## 2020-06-03 PROCEDURE — 85610 PROTHROMBIN TIME: CPT | Mod: QW,HCNC,S$GLB, | Performed by: NUCLEAR MEDICINE

## 2020-06-03 PROCEDURE — 93793 ANTICOAG MGMT PT WARFARIN: CPT | Mod: HCNC,S$GLB,,

## 2020-06-03 NOTE — PROGRESS NOTES
Patient's INR is slightly subtherapeutic at 2.4.  Previous instructions were followed.  No other changes reported.  Instructions given:  Will increase warfarin dose to 0.5 mg every evening to reach target goal.  Recheck in 2 weeks.  Dose calendar given and reviewed with patient.  Patient verbalized understanding.

## 2020-06-24 ENCOUNTER — TELEPHONE (OUTPATIENT)
Dept: CARDIOLOGY | Facility: CLINIC | Age: 85
End: 2020-06-24

## 2020-06-24 NOTE — TELEPHONE ENCOUNTER
Called to with patient to reschedule Coumadin Clinic appointment from 6/22/20-patient states she has home monitor and has been monitoring from home for about three weeks--patient states she will ask her daughter to give our office ac call to give name of home monitoring company

## 2020-06-24 NOTE — TELEPHONE ENCOUNTER
----- Message from Chastity Chu sent at 6/24/2020  4:03 PM CDT -----  Pt is requesting a call back from  from Coumadin clinic. Please call back at [phone]

## 2020-06-24 NOTE — TELEPHONE ENCOUNTER
Spoke with Turner @[employer]-- patient's initial INR was called in on 6/17/20 and the second received today (6/24/20) --states results were faxed to 's office at [phone] on 6/17/20-[facility] received an updated fax number for 's office on today [phone] fax [phone] phone-advised Turner patient is monitored by our Coumadin Clinic and can fax results to [phone] phone [phone]--Turner states he will update information in patient's account

## 2020-06-30 ENCOUNTER — ANTI-COAG VISIT (OUTPATIENT)
Dept: CARDIOLOGY | Facility: CLINIC | Age: 85
End: 2020-06-30
Payer: MEDICARE

## 2020-06-30 DIAGNOSIS — I48.0 PAROXYSMAL ATRIAL FIBRILLATION: Chronic | ICD-10-CM

## 2020-06-30 DIAGNOSIS — Z79.01 LONG TERM (CURRENT) USE OF ANTICOAGULANTS: ICD-10-CM

## 2020-06-30 DIAGNOSIS — Z86.718 HISTORY OF DVT OF LOWER EXTREMITY: ICD-10-CM

## 2020-06-30 DIAGNOSIS — Z86.73 H/O: CVA (CEREBROVASCULAR ACCIDENT): Chronic | ICD-10-CM

## 2020-06-30 LAB — INR PPP: 4.5

## 2020-06-30 PROCEDURE — 93793 PR ANTICOAGULANT MGMT FOR PT TAKING WARFARIN: ICD-10-PCS | Mod: S$GLB,,,

## 2020-06-30 PROCEDURE — 93793 ANTICOAG MGMT PT WARFARIN: CPT | Mod: S$GLB,,,

## 2020-06-30 NOTE — PROGRESS NOTES
Fax results received from [employer].  INR: 4.5.  Test date: 6/30/2020.  Patient contacted.  INR is supratherapeutic.  Reports no recent changes.  Previous instructions were followed.  No bleeding issues reported.  Instructions given:  Hold warfarin dose today - only, then will decrease warfarin dose to 0.5 mg every evening, and none (0 mg) on Thursdays.  Recheck INR in 1 week.  Patient reports instructions written and will give to daughter.  Advised to seek medical attention (ED) for any signs of abnormal bleeding, if needed.  Instructions repeated back and patient verbalized understanding.

## 2020-07-08 ENCOUNTER — ANTI-COAG VISIT (OUTPATIENT)
Dept: CARDIOLOGY | Facility: CLINIC | Age: 85
End: 2020-07-08
Payer: MEDICARE

## 2020-07-08 DIAGNOSIS — Z79.01 LONG TERM (CURRENT) USE OF ANTICOAGULANTS: Primary | ICD-10-CM

## 2020-07-08 DIAGNOSIS — I48.0 PAROXYSMAL ATRIAL FIBRILLATION: Chronic | ICD-10-CM

## 2020-07-08 DIAGNOSIS — Z86.718 HISTORY OF DVT OF LOWER EXTREMITY: ICD-10-CM

## 2020-07-08 LAB — INR PPP: 2.5 (ref 2.5–3.5)

## 2020-07-08 PROCEDURE — 85610 POCT INR: ICD-10-PCS | Mod: QW,HCNC,S$GLB, | Performed by: INTERNAL MEDICINE

## 2020-07-08 PROCEDURE — 85610 PROTHROMBIN TIME: CPT | Mod: QW,HCNC,S$GLB, | Performed by: INTERNAL MEDICINE

## 2020-07-08 PROCEDURE — 93793 ANTICOAG MGMT PT WARFARIN: CPT | Mod: HCNC,S$GLB,,

## 2020-07-08 PROCEDURE — 93793 PR ANTICOAGULANT MGMT FOR PT TAKING WARFARIN: ICD-10-PCS | Mod: HCNC,S$GLB,,

## 2020-07-08 NOTE — PROGRESS NOTES
Patient's INR is therapeutic at 2.5. Patient is accompanied by her daughter today. Patient followed previous instructions. Patient reports no changes.  Instructed to continue warfarin 0 mg on Thursdays; and 0.5 mg all other days.  Patient has home meter and will resume checking INR at home. Recheck on 7/15/20. Patient verbalizes understanding.

## 2020-07-15 ENCOUNTER — ANTI-COAG VISIT (OUTPATIENT)
Dept: CARDIOLOGY | Facility: CLINIC | Age: 85
End: 2020-07-15
Payer: MEDICARE

## 2020-07-15 DIAGNOSIS — Z95.2 S/P MITRAL VALVE REPLACEMENT: Chronic | ICD-10-CM

## 2020-07-15 DIAGNOSIS — Z79.01 LONG TERM (CURRENT) USE OF ANTICOAGULANTS: Primary | ICD-10-CM

## 2020-07-15 DIAGNOSIS — Z86.718 HISTORY OF DVT OF LOWER EXTREMITY: ICD-10-CM

## 2020-07-15 DIAGNOSIS — I48.0 PAROXYSMAL ATRIAL FIBRILLATION: Chronic | ICD-10-CM

## 2020-07-15 LAB — INR PPP: 2.2 (ref 2.5–3.5)

## 2020-07-15 PROCEDURE — 85610 POCT INR: ICD-10-PCS | Mod: QW,HCNC,S$GLB, | Performed by: INTERNAL MEDICINE

## 2020-07-15 PROCEDURE — 85610 PROTHROMBIN TIME: CPT | Mod: QW,HCNC,S$GLB, | Performed by: INTERNAL MEDICINE

## 2020-07-15 PROCEDURE — 93793 ANTICOAG MGMT PT WARFARIN: CPT | Mod: HCNC,S$GLB,,

## 2020-07-15 PROCEDURE — 93793 PR ANTICOAGULANT MGMT FOR PT TAKING WARFARIN: ICD-10-PCS | Mod: HCNC,S$GLB,,

## 2020-07-15 NOTE — PROGRESS NOTES
Accompanied by daughter.  Patient's INR is subtherapeutic at 2.2.  Daughter reports giving patient kale smoothies and patient have been eating broccoli.  Vitamin K interactions - discussed/education given.  Instructions given:  Will boost today's (Wednesay) warfarin dose to 1 mg - only, then rechallenge current dose of warfarin 0 mg (none) on Thursday and 0.5 mg on all other days per week.  Advised to keep diet consistent.  Decrease vitamin K in diet.  Patient has a home meter - will recheck on Wednesday, 7/22/2020.

## 2020-07-27 LAB — INR PPP: 2.7

## 2020-07-28 ENCOUNTER — ANTI-COAG VISIT (OUTPATIENT)
Dept: CARDIOLOGY | Facility: CLINIC | Age: 85
End: 2020-07-28
Payer: MEDICARE

## 2020-07-28 PROCEDURE — 93793 ANTICOAG MGMT PT WARFARIN: CPT | Mod: S$GLB,,,

## 2020-07-28 PROCEDURE — 93793 PR ANTICOAGULANT MGMT FOR PT TAKING WARFARIN: ICD-10-PCS | Mod: S$GLB,,,

## 2020-07-28 NOTE — PROGRESS NOTES
INR at goal. Medications and chart reviewed. No changes noted to necessitate adjustment of warfarin or follow-up plan. See calendar.  LVM with Mrs Posey to continue her current warfarin dose and to retest next week.  We will call her periodically to check in or if she is out of range.

## 2020-07-31 LAB — INR PPP: 3.3

## 2020-08-03 ENCOUNTER — ANTI-COAG VISIT (OUTPATIENT)
Dept: CARDIOLOGY | Facility: CLINIC | Age: 85
End: 2020-08-03
Payer: MEDICARE

## 2020-08-03 DIAGNOSIS — I48.0 PAROXYSMAL ATRIAL FIBRILLATION: Chronic | ICD-10-CM

## 2020-08-03 DIAGNOSIS — Z86.718 HISTORY OF DVT OF LOWER EXTREMITY: ICD-10-CM

## 2020-08-03 DIAGNOSIS — Z95.2 S/P MITRAL VALVE REPLACEMENT: Chronic | ICD-10-CM

## 2020-08-03 PROCEDURE — 93793 PR ANTICOAGULANT MGMT FOR PT TAKING WARFARIN: ICD-10-PCS | Mod: S$GLB,,,

## 2020-08-03 PROCEDURE — 93793 ANTICOAG MGMT PT WARFARIN: CPT | Mod: S$GLB,,,

## 2020-08-03 NOTE — PROGRESS NOTES
Fax results received from [employer].  INR: 3.3.  Test date: 7/31/2020.  INR is therapeutic.  Patient contacted.  Advised patient to check INR weekly.  Patient agrees to check on Wednesday's.  No changes in dose - maintain current dose of warfarin 0 mg every Thursday and 0.5 mg on all other days per week.  INR to be recheck on Wednesday, 8/05/2020.

## 2020-08-05 ENCOUNTER — ANTI-COAG VISIT (OUTPATIENT)
Dept: CARDIOLOGY | Facility: CLINIC | Age: 85
End: 2020-08-05
Payer: MEDICARE

## 2020-08-05 DIAGNOSIS — Z95.2 S/P MITRAL VALVE REPLACEMENT: ICD-10-CM

## 2020-08-05 DIAGNOSIS — I48.91 ATRIAL FIBRILLATION, UNSPECIFIED TYPE: ICD-10-CM

## 2020-08-05 DIAGNOSIS — Z95.2 S/P MVR (MITRAL VALVE REPLACEMENT): ICD-10-CM

## 2020-08-05 DIAGNOSIS — Z79.01 LONG TERM (CURRENT) USE OF ANTICOAGULANTS: ICD-10-CM

## 2020-08-05 DIAGNOSIS — I48.0 PAROXYSMAL ATRIAL FIBRILLATION: ICD-10-CM

## 2020-08-05 DIAGNOSIS — Z86.718 HISTORY OF DVT OF LOWER EXTREMITY: ICD-10-CM

## 2020-08-05 LAB — INR PPP: 2.7

## 2020-08-05 PROCEDURE — 93793 ANTICOAG MGMT PT WARFARIN: CPT | Mod: S$GLB,,, | Performed by: PHARMACIST

## 2020-08-05 PROCEDURE — 93793 PR ANTICOAGULANT MGMT FOR PT TAKING WARFARIN: ICD-10-PCS | Mod: S$GLB,,, | Performed by: PHARMACIST

## 2020-08-05 NOTE — PROGRESS NOTES
Fax results received from [employer].  INR: 2.7.  Test date: 8/5/2020.  INR is therapeutic.  Patient contacted.  No changes in dose - maintain current dose of warfarin 0 mg every Thursday and 0.5 mg on all other days per week.  INR to be recheck on Wednesday, 8/12/2020.

## 2020-08-05 NOTE — PROGRESS NOTES
Care plan made by Nigel SANZ. I have reviewed the chart and agree with her assessment and plan.

## 2020-08-12 LAB — INR PPP: 3

## 2020-08-13 ENCOUNTER — ANTI-COAG VISIT (OUTPATIENT)
Dept: CARDIOLOGY | Facility: CLINIC | Age: 85
End: 2020-08-13
Payer: MEDICARE

## 2020-08-13 DIAGNOSIS — Z86.718 HISTORY OF DVT OF LOWER EXTREMITY: ICD-10-CM

## 2020-08-13 DIAGNOSIS — Z79.01 LONG TERM (CURRENT) USE OF ANTICOAGULANTS: ICD-10-CM

## 2020-08-13 DIAGNOSIS — Z86.73 H/O: CVA (CEREBROVASCULAR ACCIDENT): Chronic | ICD-10-CM

## 2020-08-13 DIAGNOSIS — Z95.2 S/P MITRAL VALVE REPLACEMENT: Chronic | ICD-10-CM

## 2020-08-13 DIAGNOSIS — I48.0 PAROXYSMAL ATRIAL FIBRILLATION: Chronic | ICD-10-CM

## 2020-08-13 PROCEDURE — 93793 ANTICOAG MGMT PT WARFARIN: CPT | Mod: S$GLB,,,

## 2020-08-13 PROCEDURE — 93793 PR ANTICOAGULANT MGMT FOR PT TAKING WARFARIN: ICD-10-PCS | Mod: S$GLB,,,

## 2020-08-13 NOTE — PROGRESS NOTES
Fax results received from [employer].  INR: 3.  Test date: 8/13/2020.  INR is therapeutic.  Attempted to contact patient. Left voice message to call Coumadin Clinic at [phone] for dosing/instructions. No changes in dose - maintain current dose of warfarin 0 mg every Thursday and 0.5 mg on all other days per week.  INR to be rechecked on Wednesday, 8/19/2020.

## 2020-08-17 ENCOUNTER — HOSPITAL ENCOUNTER (OUTPATIENT)
Dept: CARDIOLOGY | Facility: HOSPITAL | Age: 85
Discharge: HOME | End: 2020-08-17
Attending: INTERNAL MEDICINE
Payer: MEDICARE

## 2020-08-17 DIAGNOSIS — I44.2 CHB (COMPLETE HEART BLOCK): ICD-10-CM

## 2020-08-17 DIAGNOSIS — I44.2 THIRD DEGREE AV BLOCK: ICD-10-CM

## 2020-08-17 DIAGNOSIS — Z95.0 CARDIAC PACEMAKER IN SITU: ICD-10-CM

## 2020-08-17 PROCEDURE — 93281 PM DEVICE PROGR EVAL MULTI: CPT | Mod: 26,HCNC,, | Performed by: INTERNAL MEDICINE

## 2020-08-17 PROCEDURE — 93281 CARDIAC DEVICE CHECK - IN CLINIC & HOSPITAL: ICD-10-PCS | Mod: 26,HCNC,, | Performed by: INTERNAL MEDICINE

## 2020-08-17 PROCEDURE — 93281 PM DEVICE PROGR EVAL MULTI: CPT | Mod: HCNC

## 2020-08-20 LAB — INR PPP: 2.9

## 2020-08-21 ENCOUNTER — ANTI-COAG VISIT (OUTPATIENT)
Dept: CARDIOLOGY | Facility: CLINIC | Age: 85
End: 2020-08-21
Payer: MEDICARE

## 2020-08-21 DIAGNOSIS — Z79.01 LONG TERM (CURRENT) USE OF ANTICOAGULANTS: ICD-10-CM

## 2020-08-21 DIAGNOSIS — Z95.2 S/P MITRAL VALVE REPLACEMENT: Chronic | ICD-10-CM

## 2020-08-21 DIAGNOSIS — I48.0 PAROXYSMAL ATRIAL FIBRILLATION: Chronic | ICD-10-CM

## 2020-08-21 DIAGNOSIS — Z86.718 HISTORY OF DVT OF LOWER EXTREMITY: ICD-10-CM

## 2020-08-21 PROCEDURE — 93793 PR ANTICOAGULANT MGMT FOR PT TAKING WARFARIN: ICD-10-PCS | Mod: S$GLB,,,

## 2020-08-21 PROCEDURE — 93793 ANTICOAG MGMT PT WARFARIN: CPT | Mod: S$GLB,,,

## 2020-08-21 NOTE — PROGRESS NOTES
Fax results received from [facility].  INR: 2.9.  Test date: 8/20/2020.  INR is therapeutic.  No changes in dose - maintain current dose of warfarin 0 mg every Thursday and 0.5 mg on all other days per week.  INR to be rechecked in 1 week.

## 2020-08-26 LAB
AV DELAY - LONGEST: 180 MSEC
IMPEDANCE RA LEAD (DONOR): 994 OHMS
IMPEDANCE RA LEAD (NATIVE): 585 OHMS
IMPEDANCE RA LEAD: 487 OHMS
OHS CV DC PP MS1: 0.4 MS
OHS CV DC PP MS2: 0.3 MS
OHS CV DC PP MS3: 0.3 MS
OHS CV DC PP V1: 2 V
OHS CV DC PP V2: 3 V
OHS CV DC PP V3: 2.4 V
P/R-WAVE RA LEAD (DONOR): 17.7 MV
P/R-WAVE RA LEAD (NATIVE): 9.8 MV
P/R-WAVE RA LEAD: 2.1 MV
THRESHOLD MS RA LEAD (DONOR): 0.3 MS
THRESHOLD MS RA LEAD (NATIVE): 0.3 MS
THRESHOLD V RA LEAD (DONOR): 1.4 V
THRESHOLD V RA LEAD (NATIVE): 1.2 V
THRESHOLD V RA LEAD: NORMAL V

## 2020-08-28 LAB — INR PPP: 3.4

## 2020-08-31 ENCOUNTER — ANTI-COAG VISIT (OUTPATIENT)
Dept: CARDIOLOGY | Facility: CLINIC | Age: 85
End: 2020-08-31
Payer: MEDICARE

## 2020-08-31 DIAGNOSIS — Z95.2 S/P MITRAL VALVE REPLACEMENT: Chronic | ICD-10-CM

## 2020-08-31 DIAGNOSIS — Z79.01 LONG TERM (CURRENT) USE OF ANTICOAGULANTS: ICD-10-CM

## 2020-08-31 DIAGNOSIS — Z86.718 HISTORY OF DVT OF LOWER EXTREMITY: ICD-10-CM

## 2020-08-31 DIAGNOSIS — I48.0 PAROXYSMAL ATRIAL FIBRILLATION: Chronic | ICD-10-CM

## 2020-08-31 PROCEDURE — G0250 MD INR TEST REVIE INTER MGMT: HCPCS | Mod: S$GLB,,, | Performed by: INTERNAL MEDICINE

## 2020-08-31 PROCEDURE — G0250 PR MD REVIEW INTERPRET OF TEST: ICD-10-PCS | Mod: S$GLB,,, | Performed by: INTERNAL MEDICINE

## 2020-08-31 NOTE — PROGRESS NOTES
Fax results received from [employer].  INR: 3.4.  Test date: 8/28/2020 at 5:16pm.  INR is therapeutic.  No changes in dose - patient to maintain current dose of warfarin 0 mg every Thursday and 0.5 mg on all other days per week.  INR to be rechecked in 1 week.

## 2020-09-03 ENCOUNTER — ANTI-COAG VISIT (OUTPATIENT)
Dept: CARDIOLOGY | Facility: CLINIC | Age: 85
End: 2020-09-03
Payer: MEDICARE

## 2020-09-03 LAB — INR PPP: 4.4

## 2020-09-03 PROCEDURE — 93793 ANTICOAG MGMT PT WARFARIN: CPT | Mod: S$GLB,,,

## 2020-09-03 PROCEDURE — 93793 PR ANTICOAGULANT MGMT FOR PT TAKING WARFARIN: ICD-10-PCS | Mod: S$GLB,,,

## 2020-09-03 NOTE — PROGRESS NOTES
INR not at goal. Medications, chart, and patient findings reviewed. See calendar for adjustments to dose and follow up plan.  Hold tomorrow, she is already scheduled for 0 mg today - continue consistent greens

## 2020-09-04 NOTE — PROGRESS NOTES
Confirmed with patient that she has received our message from yesterday.  She ate broccoli yesterday 9/3 and will hold her dose today 9/4.   She denies any bleeding.  She will recheck next Thursday.

## 2020-09-11 ENCOUNTER — TELEPHONE (OUTPATIENT)
Dept: CARDIOLOGY | Facility: CLINIC | Age: 85
End: 2020-09-11

## 2020-09-11 NOTE — TELEPHONE ENCOUNTER
Pt reported nosebleed to us on 9/4/20 after calling her for coumadin dosing.  She was advised to report to the ED if lasting longer than 5 minutes. At that time she refused ED, so we asked her to also hold her dose on 9/5 as well.  She has now been admitted for severe nosebleed.  We will follow up with her as soon as she is discharged.  We will keep her goal closer to 2.5 if it has not changed altogether post discharge.

## 2020-09-11 NOTE — PROGRESS NOTES
"9/4 - Mrs Posey called back to report a nose bleed.  She reports "drips" of blood after she blew her nose.  She was advised to report to the ED if NB last longer than 5 minutes.  Pt refused needing ED.  We also advised to skip her coumadin dose on Saturday, 9/2/20 as well.    "

## 2020-09-11 NOTE — TELEPHONE ENCOUNTER
Patient's daughter called and reports patient is currently in the hospital at [facility] due to experiencing nose bleeds. Patient's daughter reports that patient has been in the hospital since 9/5/20 and  may be discharged this weekend.  Patient's daughter will give our offie a call to notify our clinic of patient's discharge.

## 2020-09-13 LAB — INR PPP: 1.9

## 2020-09-14 ENCOUNTER — TELEPHONE (OUTPATIENT)
Dept: INTERNAL MEDICINE | Facility: CLINIC | Age: 85
End: 2020-09-14

## 2020-09-14 ENCOUNTER — ANTI-COAG VISIT (OUTPATIENT)
Dept: CARDIOLOGY | Facility: CLINIC | Age: 85
End: 2020-09-14
Payer: MEDICARE

## 2020-09-14 ENCOUNTER — TELEPHONE (OUTPATIENT)
Dept: CARDIOLOGY | Facility: CLINIC | Age: 85
End: 2020-09-14

## 2020-09-14 DIAGNOSIS — Z79.01 LONG TERM (CURRENT) USE OF ANTICOAGULANTS: ICD-10-CM

## 2020-09-14 DIAGNOSIS — Z86.73 H/O: CVA (CEREBROVASCULAR ACCIDENT): Chronic | ICD-10-CM

## 2020-09-14 DIAGNOSIS — I48.0 PAROXYSMAL ATRIAL FIBRILLATION: Chronic | ICD-10-CM

## 2020-09-14 DIAGNOSIS — Z95.2 S/P MITRAL VALVE REPLACEMENT: Chronic | ICD-10-CM

## 2020-09-14 PROCEDURE — 93793 PR ANTICOAGULANT MGMT FOR PT TAKING WARFARIN: ICD-10-PCS | Mod: S$GLB,,,

## 2020-09-14 PROCEDURE — 93793 ANTICOAG MGMT PT WARFARIN: CPT | Mod: S$GLB,,,

## 2020-09-14 NOTE — TELEPHONE ENCOUNTER
S/w Giana,  Nurse w/ [facility] , there at pt's home for visit, vitals: OxSat 98% /60 P 86 Afebrile, such observed weakness pt unable to ambulate,  nurse confirms pt's family/son lifting pt to move/complete ADLs, denies in other distress at this time. Requesting order for PT eval. D/t s/s and recent hosp, gave VO for PT eval.  nurse confirmed VORB, and to CB PRN.

## 2020-09-14 NOTE — PROGRESS NOTES
Recent hospitalization for severe nosebleed.  We will let her INR drift up.  We will ask her to test again Thursday and try to keep her goal near 2.5.

## 2020-09-14 NOTE — TELEPHONE ENCOUNTER
Called patient to give instructions/dosing for warfarin and follow up--patient asked me to call her daughter Abrahan with information

## 2020-09-14 NOTE — PROGRESS NOTES
Fax received from [employer]. INR is 1.9--subtherapeutic.  Test Date; 9/13/20.  Patient contacted.  Patient reports she was discharge from [facility] on 9/11/20 (records in Care Everywhere). Patient reports and Discharge notes verified that patient is taking warfarin 0.5 mg every day except on Thursdays patient is taking 0 mg. Patient reports she has not had any bleeding issues since discharge.  Patient reports she is unable to walk since bleeding episode occurred on 9/5/20 which has been addressed while in the hospital. Sent to PharmD for dosing/instructions.

## 2020-09-18 ENCOUNTER — ANTI-COAG VISIT (OUTPATIENT)
Dept: CARDIOLOGY | Facility: CLINIC | Age: 85
End: 2020-09-18
Payer: MEDICARE

## 2020-09-18 DIAGNOSIS — Z86.73 H/O: CVA (CEREBROVASCULAR ACCIDENT): Chronic | ICD-10-CM

## 2020-09-18 DIAGNOSIS — Z95.2 S/P MITRAL VALVE REPLACEMENT: Chronic | ICD-10-CM

## 2020-09-18 DIAGNOSIS — Z86.718 HISTORY OF DVT OF LOWER EXTREMITY: ICD-10-CM

## 2020-09-18 DIAGNOSIS — Z79.01 LONG TERM (CURRENT) USE OF ANTICOAGULANTS: ICD-10-CM

## 2020-09-18 DIAGNOSIS — I48.0 PAROXYSMAL ATRIAL FIBRILLATION: Chronic | ICD-10-CM

## 2020-09-18 LAB — INR PPP: 2.3

## 2020-09-18 PROCEDURE — 93793 ANTICOAG MGMT PT WARFARIN: CPT | Mod: S$GLB,,,

## 2020-09-18 PROCEDURE — 93793 PR ANTICOAGULANT MGMT FOR PT TAKING WARFARIN: ICD-10-PCS | Mod: S$GLB,,,

## 2020-09-18 NOTE — PROGRESS NOTES
Patient contacted: Patient's INR is subtherapeutic at 2.3--trending upward. Patient reports followed previous instructions. Patient reports no changes. Sent to PharmD for dosing/instructions.

## 2020-09-18 NOTE — PROGRESS NOTES
INR improving nicely on her own, near goal.  We will not boost given her age, bleeding risk and very recent extended admission for epistaxis.  Recheck Mid-week next week, if not at goal then, will consider a boosted dose.

## 2020-09-24 LAB — INR PPP: 2.3

## 2020-09-25 ENCOUNTER — ANTI-COAG VISIT (OUTPATIENT)
Dept: CARDIOLOGY | Facility: CLINIC | Age: 85
End: 2020-09-25
Payer: MEDICARE

## 2020-09-25 DIAGNOSIS — Z79.01 LONG TERM (CURRENT) USE OF ANTICOAGULANTS: ICD-10-CM

## 2020-09-25 DIAGNOSIS — I48.0 PAROXYSMAL ATRIAL FIBRILLATION: Chronic | ICD-10-CM

## 2020-09-25 DIAGNOSIS — Z86.73 H/O: CVA (CEREBROVASCULAR ACCIDENT): Chronic | ICD-10-CM

## 2020-09-25 DIAGNOSIS — Z86.718 HISTORY OF DVT OF LOWER EXTREMITY: ICD-10-CM

## 2020-09-25 DIAGNOSIS — Z95.2 S/P MITRAL VALVE REPLACEMENT: Chronic | ICD-10-CM

## 2020-09-25 PROCEDURE — 93793 PR ANTICOAGULANT MGMT FOR PT TAKING WARFARIN: ICD-10-PCS | Mod: S$GLB,,,

## 2020-09-25 PROCEDURE — 93793 ANTICOAG MGMT PT WARFARIN: CPT | Mod: S$GLB,,,

## 2020-09-25 NOTE — PROGRESS NOTES
INR remains just below goal, we will boost dose only as pt was recently admitted for severe nosebleed.    Recheck in 1 week.

## 2020-09-25 NOTE — PROGRESS NOTES
"Fax results received from [employer].  INR: 2.3.  Test date: 9/24/2020.  Patient contacted.  INR remains subtherapeutic.  Reports no missed doses - stated, "daughter (Abrahan Coronel) fixes my medicine pill box".  Current warfarin regimen verified.  No signs or symptoms reported.  Sent to PharmD for review/dosing.    "

## 2020-10-01 LAB — INR PPP: 3.9

## 2020-10-02 ENCOUNTER — ANTI-COAG VISIT (OUTPATIENT)
Dept: CARDIOLOGY | Facility: CLINIC | Age: 85
End: 2020-10-02
Payer: MEDICARE

## 2020-10-02 DIAGNOSIS — Z95.2 S/P MITRAL VALVE REPLACEMENT: Chronic | ICD-10-CM

## 2020-10-02 DIAGNOSIS — Z86.73 H/O: CVA (CEREBROVASCULAR ACCIDENT): Chronic | ICD-10-CM

## 2020-10-02 DIAGNOSIS — Z79.01 LONG TERM (CURRENT) USE OF ANTICOAGULANTS: ICD-10-CM

## 2020-10-02 DIAGNOSIS — Z86.718 HISTORY OF DVT OF LOWER EXTREMITY: ICD-10-CM

## 2020-10-02 PROCEDURE — 93793 PR ANTICOAGULANT MGMT FOR PT TAKING WARFARIN: ICD-10-PCS | Mod: S$GLB,,,

## 2020-10-02 PROCEDURE — 93793 ANTICOAG MGMT PT WARFARIN: CPT | Mod: S$GLB,,,

## 2020-10-02 NOTE — PROGRESS NOTES
Fax results received from [employer].  Test date: 10/01/2020 at 9:27PM. Patient's INR is supratherapeutic at 3.9.  Patient has a history of nosebleed.  Patient contacted:  Patient did not take her warfarin last night (Thursday) as directed.  No nosebleed reported.  Advised to go to the ED, if needed for recurrent nosebleed.  Patient verbalized understanding.  Sent to PharmD for review.

## 2020-10-02 NOTE — PROGRESS NOTES
INR not at goal. Medications, chart, and patient findings reviewed. See calendar for adjustments to dose and follow up plan.  Will hold dose out of precaution again today due to very recent admission for nosebleed.  Will request a recheck earlier in the week to verify that her INR is responding.

## 2020-10-02 NOTE — PROGRESS NOTES
Patient contacted:  Advised to hold warfarin dose today (Friday, 10/02) and recheck INR on Tuesday, 10/06/2020.  Patient verbalizes understanding.

## 2020-10-06 ENCOUNTER — ANTI-COAG VISIT (OUTPATIENT)
Dept: CARDIOLOGY | Facility: CLINIC | Age: 85
End: 2020-10-06
Payer: MEDICARE

## 2020-10-06 DIAGNOSIS — Z79.01 LONG TERM (CURRENT) USE OF ANTICOAGULANTS: ICD-10-CM

## 2020-10-06 DIAGNOSIS — I48.0 PAROXYSMAL ATRIAL FIBRILLATION: Chronic | ICD-10-CM

## 2020-10-06 DIAGNOSIS — Z86.718 HISTORY OF DVT OF LOWER EXTREMITY: ICD-10-CM

## 2020-10-06 DIAGNOSIS — Z86.73 H/O: CVA (CEREBROVASCULAR ACCIDENT): Chronic | ICD-10-CM

## 2020-10-06 LAB — INR PPP: 3.4

## 2020-10-06 PROCEDURE — 93793 PR ANTICOAGULANT MGMT FOR PT TAKING WARFARIN: ICD-10-PCS | Mod: S$GLB,,,

## 2020-10-06 PROCEDURE — 93793 ANTICOAG MGMT PT WARFARIN: CPT | Mod: S$GLB,,,

## 2020-10-06 NOTE — PROGRESS NOTES
Patient contacted: Fax received from [employer]. INR 3.4--therapeutic. Instructions: Continue warfarin 0 mg on Thursdays; and 0.5 mg all other days. Recheck in one week. Patient repeated back correctly and verbalizes understanding.

## 2020-10-08 ENCOUNTER — DOCUMENT SCAN (OUTPATIENT)
Dept: HOME HEALTH SERVICES | Facility: HOSPITAL | Age: 85
End: 2020-10-08
Payer: MEDICARE

## 2020-10-15 ENCOUNTER — ANTI-COAG VISIT (OUTPATIENT)
Dept: CARDIOLOGY | Facility: CLINIC | Age: 85
End: 2020-10-15
Payer: MEDICARE

## 2020-10-15 DIAGNOSIS — Z86.718 HISTORY OF DVT OF LOWER EXTREMITY: ICD-10-CM

## 2020-10-15 DIAGNOSIS — Z79.01 LONG TERM (CURRENT) USE OF ANTICOAGULANTS: ICD-10-CM

## 2020-10-15 DIAGNOSIS — Z95.2 S/P MITRAL VALVE REPLACEMENT: Chronic | ICD-10-CM

## 2020-10-15 DIAGNOSIS — I48.0 PAROXYSMAL ATRIAL FIBRILLATION: Chronic | ICD-10-CM

## 2020-10-15 LAB — INR PPP: 3

## 2020-10-15 PROCEDURE — 93793 ANTICOAG MGMT PT WARFARIN: CPT | Mod: S$GLB,,,

## 2020-10-15 PROCEDURE — 93793 PR ANTICOAGULANT MGMT FOR PT TAKING WARFARIN: ICD-10-PCS | Mod: S$GLB,,,

## 2020-10-15 NOTE — PROGRESS NOTES
Fax results received from [employer].  INR: 3.0.  Test date:  10/15/2020.  INR is therapeutic.  Medication list - reviewed.  No change in dose - patient to maintain scheduled dose of warfarin 0 mg every Thursday and 0.5 mg on all other days per week.  INR to be rechecked in 1 week.

## 2020-10-21 LAB — INR PPP: 3.5

## 2020-10-22 ENCOUNTER — ANTI-COAG VISIT (OUTPATIENT)
Dept: CARDIOLOGY | Facility: CLINIC | Age: 85
End: 2020-10-22
Payer: MEDICARE

## 2020-10-22 DIAGNOSIS — Z95.2 S/P MITRAL VALVE REPLACEMENT: Chronic | ICD-10-CM

## 2020-10-22 DIAGNOSIS — Z86.718 HISTORY OF DVT OF LOWER EXTREMITY: ICD-10-CM

## 2020-10-22 DIAGNOSIS — Z79.01 LONG TERM (CURRENT) USE OF ANTICOAGULANTS: ICD-10-CM

## 2020-10-22 DIAGNOSIS — I48.0 PAROXYSMAL ATRIAL FIBRILLATION: Chronic | ICD-10-CM

## 2020-10-22 PROCEDURE — 93793 ANTICOAG MGMT PT WARFARIN: CPT | Mod: S$GLB,,,

## 2020-10-22 PROCEDURE — 93793 PR ANTICOAGULANT MGMT FOR PT TAKING WARFARIN: ICD-10-PCS | Mod: S$GLB,,,

## 2020-10-22 NOTE — PROGRESS NOTES
Fax results received from [employer].  INR: 3.5. Test date: 10/21/2020.  INR remains therapeutic.  Patient contacted:  Reports no N/B.  Medication list/chart - reviewed.  No change in dose - patient to maintain scheduled dose of warfarin 0 mg (none) every Thursday and 0.5 mg on all other days per week.  INR to be rechecked in 1 week.  Patient verbalized understanding.

## 2020-10-28 LAB — INR PPP: 3

## 2020-10-29 ENCOUNTER — ANTI-COAG VISIT (OUTPATIENT)
Dept: CARDIOLOGY | Facility: CLINIC | Age: 85
End: 2020-10-29
Payer: MEDICARE

## 2020-10-29 DIAGNOSIS — Z79.01 LONG TERM (CURRENT) USE OF ANTICOAGULANTS: ICD-10-CM

## 2020-10-29 DIAGNOSIS — Z86.73 H/O: CVA (CEREBROVASCULAR ACCIDENT): Chronic | ICD-10-CM

## 2020-10-29 DIAGNOSIS — Z95.2 S/P MITRAL VALVE REPLACEMENT: Chronic | ICD-10-CM

## 2020-10-29 DIAGNOSIS — Z86.718 HISTORY OF DVT OF LOWER EXTREMITY: ICD-10-CM

## 2020-10-29 PROCEDURE — G0250 PR MD REVIEW INTERPRET OF TEST: ICD-10-PCS | Mod: S$GLB,,, | Performed by: INTERNAL MEDICINE

## 2020-10-29 PROCEDURE — G0250 MD INR TEST REVIE INTER MGMT: HCPCS | Mod: S$GLB,,, | Performed by: INTERNAL MEDICINE

## 2020-10-29 NOTE — PROGRESS NOTES
Fax results received from [employer].  INR: 3.0. Test date: 10/28/2020.  INR remains therapeutic.  Medication list/chart - reviewed.  No change in dose - patient to maintain scheduled dose of warfarin 0 mg (none) every Thursday and 0.5 mg on all other days per week.  INR to be rechecked in 1 week.

## 2020-11-03 ENCOUNTER — ANTI-COAG VISIT (OUTPATIENT)
Dept: CARDIOLOGY | Facility: CLINIC | Age: 85
End: 2020-11-03
Payer: MEDICARE

## 2020-11-03 LAB — INR PPP: 3.2

## 2020-11-03 PROCEDURE — 93793 ANTICOAG MGMT PT WARFARIN: CPT | Mod: S$GLB,,,

## 2020-11-03 PROCEDURE — 93793 PR ANTICOAGULANT MGMT FOR PT TAKING WARFARIN: ICD-10-PCS | Mod: S$GLB,,,

## 2020-11-04 DIAGNOSIS — I25.10 CORONARY ARTERY DISEASE INVOLVING NATIVE CORONARY ARTERY OF NATIVE HEART WITHOUT ANGINA PECTORIS: ICD-10-CM

## 2020-11-04 RX ORDER — PRAVASTATIN SODIUM 20 MG/1
20 TABLET ORAL DAILY
Qty: 90 TABLET | Refills: 0 | Status: SHIPPED | OUTPATIENT
Start: 2020-11-04 | End: 2021-02-12 | Stop reason: SDUPTHER

## 2020-11-04 RX ORDER — OMEPRAZOLE 20 MG/1
20 TABLET, DELAYED RELEASE ORAL
COMMUNITY

## 2020-11-04 NOTE — TELEPHONE ENCOUNTER
Refill request sent to Dr. Rosales for auth [Pravastatin].    LV 02/07/20  NV past due - pt confirmed fasting lab appt sched 11/10/20 and will have dtr CB to sched appt with Dr. Rosales.

## 2020-11-04 NOTE — TELEPHONE ENCOUNTER
----- Message from Anabella Baltazar sent at 11/4/2020 12:21 PM CST -----  Regarding: Refill Request  Please refill the medication(s) listed below :    Medication # 1 :pravastatin (PRAVACHOL) 20 MG tablet    Please call the patient when the prescription is sent to pharmacy :    Can the clinic reply in [facility] :No     Preferred Pharmacy : [id number] [address] & [facility] [phone] (Phone)  [phone] (Fax)

## 2020-11-11 LAB — INR PPP: 2.4

## 2020-11-12 ENCOUNTER — ANTI-COAG VISIT (OUTPATIENT)
Dept: CARDIOLOGY | Facility: CLINIC | Age: 85
End: 2020-11-12
Payer: MEDICARE

## 2020-11-12 DIAGNOSIS — Z79.01 LONG TERM (CURRENT) USE OF ANTICOAGULANTS: ICD-10-CM

## 2020-11-12 DIAGNOSIS — Z86.718 HISTORY OF DVT OF LOWER EXTREMITY: ICD-10-CM

## 2020-11-12 DIAGNOSIS — I48.0 PAROXYSMAL ATRIAL FIBRILLATION: Chronic | ICD-10-CM

## 2020-11-12 DIAGNOSIS — Z95.2 S/P MITRAL VALVE REPLACEMENT: Chronic | ICD-10-CM

## 2020-11-12 PROCEDURE — 85610 PROTHROMBIN TIME: CPT | Mod: QW,S$GLB,, | Performed by: INTERNAL MEDICINE

## 2020-11-12 PROCEDURE — 85610 CHG PROTHROMBIN TIME: ICD-10-PCS | Mod: QW,S$GLB,, | Performed by: INTERNAL MEDICINE

## 2020-11-12 NOTE — PROGRESS NOTES
Fax results received from [employer].  INR: 2.4. Test date: 11/11/2020.  Patient contacted.  INR is slightly subtherapeutic. Reports no missed doses or diet changes.  Instructions given to maintain scheduled dose of warfarin 0 mg (none) every Thursday and 0.5 mg on all other days per week.  INR to be rechecked in 1 week.

## 2020-11-16 ENCOUNTER — CLINICAL SUPPORT (OUTPATIENT)
Dept: CARDIOLOGY | Facility: HOSPITAL | Age: 85
End: 2020-11-16
Payer: MEDICARE

## 2020-11-16 DIAGNOSIS — Z95.0 PRESENCE OF CARDIAC PACEMAKER: ICD-10-CM

## 2020-11-16 PROCEDURE — 93294 CARDIAC DEVICE CHECK - REMOTE: ICD-10-PCS | Mod: HCNC,S$GLB,, | Performed by: INTERNAL MEDICINE

## 2020-11-16 PROCEDURE — 93296 REM INTERROG EVL PM/IDS: CPT | Mod: HCNC | Performed by: INTERNAL MEDICINE

## 2020-11-16 PROCEDURE — 93294 REM INTERROG EVL PM/LDLS PM: CPT | Mod: HCNC,S$GLB,, | Performed by: INTERNAL MEDICINE

## 2020-11-16 RX ORDER — FUROSEMIDE 20 MG/1
20 TABLET ORAL DAILY
Qty: 30 TABLET | Refills: 11 | Status: SHIPPED | OUTPATIENT
Start: 2020-11-16 | End: 2022-01-04

## 2020-11-17 LAB — INR PPP: 2.8

## 2020-11-18 ENCOUNTER — ANTI-COAG VISIT (OUTPATIENT)
Dept: CARDIOLOGY | Facility: CLINIC | Age: 85
End: 2020-11-18
Payer: MEDICARE

## 2020-11-18 DIAGNOSIS — Z86.718 HISTORY OF DVT OF LOWER EXTREMITY: ICD-10-CM

## 2020-11-18 DIAGNOSIS — Z79.01 LONG TERM (CURRENT) USE OF ANTICOAGULANTS: ICD-10-CM

## 2020-11-18 DIAGNOSIS — Z95.2 S/P MITRAL VALVE REPLACEMENT: Chronic | ICD-10-CM

## 2020-11-18 DIAGNOSIS — I48.0 PAROXYSMAL ATRIAL FIBRILLATION: Chronic | ICD-10-CM

## 2020-11-18 PROCEDURE — 93793 PR ANTICOAGULANT MGMT FOR PT TAKING WARFARIN: ICD-10-PCS | Mod: S$GLB,,,

## 2020-11-18 PROCEDURE — 93793 ANTICOAG MGMT PT WARFARIN: CPT | Mod: S$GLB,,,

## 2020-11-18 NOTE — PROGRESS NOTES
Fax results received from [employer].  INR: 2.8. Test date: 11/17/2020. INR back therapeutic. No change in dose - patient to maintain scheduled dose of warfarin 0 mg (none) every Thursday and 0.5 mg on all other days per week.  INR to be rechecked in 1 week.

## 2020-11-20 DIAGNOSIS — I95.1 ORTHOSTATIC HYPOTENSION: ICD-10-CM

## 2020-11-20 DIAGNOSIS — Z79.01 LONG TERM (CURRENT) USE OF ANTICOAGULANTS: ICD-10-CM

## 2020-11-20 RX ORDER — MIDODRINE HYDROCHLORIDE 5 MG/1
5 TABLET ORAL 4 TIMES DAILY
Qty: 120 TABLET | Refills: 6 | Status: SHIPPED | OUTPATIENT
Start: 2020-11-20 | End: 2020-12-08 | Stop reason: SDUPTHER

## 2020-11-24 LAB — INR PPP: 2.4

## 2020-11-25 ENCOUNTER — ANTI-COAG VISIT (OUTPATIENT)
Dept: CARDIOLOGY | Facility: CLINIC | Age: 85
End: 2020-11-25
Payer: MEDICARE

## 2020-11-25 PROCEDURE — G0248 PR DEMONSTRATE USE HOME INR MON: ICD-10-PCS | Mod: S$GLB,,, | Performed by: INTERNAL MEDICINE

## 2020-11-25 PROCEDURE — G0248 DEMONSTRATE USE HOME INR MON: HCPCS | Mod: S$GLB,,, | Performed by: INTERNAL MEDICINE

## 2020-11-25 NOTE — PROGRESS NOTES
INR not at goal. Medications, chart, and patient findings reviewed. See calendar for adjustments to dose and follow up plan.  INR just under goal as reported from meter company.  We will rechallenge this dose rather than boost as she has had bleeding issues in the past.  Patient confirmed dose, no changes or missed continue.  We will continue her current dose which we reviewed today.  She agrees to recheck in 1 week.

## 2020-12-01 ENCOUNTER — OFFICE VISIT (OUTPATIENT)
Dept: INTERNAL MEDICINE | Facility: CLINIC | Age: 85
End: 2020-12-01
Payer: MEDICARE

## 2020-12-01 VITALS
OXYGEN SATURATION: 96 % | HEART RATE: 76 BPM | DIASTOLIC BLOOD PRESSURE: 66 MMHG | TEMPERATURE: 98 F | SYSTOLIC BLOOD PRESSURE: 122 MMHG | WEIGHT: 138.88 LBS | HEIGHT: 63 IN | BODY MASS INDEX: 24.61 KG/M2

## 2020-12-01 DIAGNOSIS — R26.9 GAIT DISTURBANCE: ICD-10-CM

## 2020-12-01 DIAGNOSIS — R32 URINARY INCONTINENCE, UNSPECIFIED TYPE: ICD-10-CM

## 2020-12-01 DIAGNOSIS — M75.40 SHOULDER IMPINGEMENT SYNDROME, UNSPECIFIED LATERALITY: Primary | ICD-10-CM

## 2020-12-01 LAB — INR PPP: 4.7

## 2020-12-01 PROCEDURE — 1159F PR MEDICATION LIST DOCUMENTED IN MEDICAL RECORD: ICD-10-PCS | Mod: HCNC,S$GLB,, | Performed by: PEDIATRICS

## 2020-12-01 PROCEDURE — 3288F PR FALLS RISK ASSESSMENT DOCUMENTED: ICD-10-PCS | Mod: HCNC,CPTII,S$GLB, | Performed by: PEDIATRICS

## 2020-12-01 PROCEDURE — 99214 OFFICE O/P EST MOD 30 MIN: CPT | Mod: HCNC,S$GLB,, | Performed by: PEDIATRICS

## 2020-12-01 PROCEDURE — 1125F PR PAIN SEVERITY QUANTIFIED, PAIN PRESENT: ICD-10-PCS | Mod: HCNC,S$GLB,, | Performed by: PEDIATRICS

## 2020-12-01 PROCEDURE — 3288F FALL RISK ASSESSMENT DOCD: CPT | Mod: HCNC,CPTII,S$GLB, | Performed by: PEDIATRICS

## 2020-12-01 PROCEDURE — 1100F PR PT FALLS ASSESS DOC 2+ FALLS/FALL W/INJURY/YR: ICD-10-PCS | Mod: HCNC,CPTII,S$GLB, | Performed by: PEDIATRICS

## 2020-12-01 PROCEDURE — 99999 PR PBB SHADOW E&M-EST. PATIENT-LVL V: ICD-10-PCS | Mod: PBBFAC,HCNC,, | Performed by: PEDIATRICS

## 2020-12-01 PROCEDURE — 1159F MED LIST DOCD IN RCRD: CPT | Mod: HCNC,S$GLB,, | Performed by: PEDIATRICS

## 2020-12-01 PROCEDURE — 1100F PTFALLS ASSESS-DOCD GE2>/YR: CPT | Mod: HCNC,CPTII,S$GLB, | Performed by: PEDIATRICS

## 2020-12-01 PROCEDURE — 99214 PR OFFICE/OUTPT VISIT, EST, LEVL IV, 30-39 MIN: ICD-10-PCS | Mod: HCNC,S$GLB,, | Performed by: PEDIATRICS

## 2020-12-01 PROCEDURE — 1125F AMNT PAIN NOTED PAIN PRSNT: CPT | Mod: HCNC,S$GLB,, | Performed by: PEDIATRICS

## 2020-12-01 PROCEDURE — 99999 PR PBB SHADOW E&M-EST. PATIENT-LVL V: CPT | Mod: PBBFAC,HCNC,, | Performed by: PEDIATRICS

## 2020-12-01 RX ORDER — OXYBUTYNIN CHLORIDE 5 MG/1
5 TABLET, EXTENDED RELEASE ORAL DAILY
Qty: 30 TABLET | Refills: 0 | Status: SHIPPED | OUTPATIENT
Start: 2020-12-01 | End: 2021-02-12 | Stop reason: SDUPTHER

## 2020-12-01 NOTE — PROGRESS NOTES
Subjective:       Patient ID: Josy Posey is a 89 y.o. female.    Chief Complaint: Shoulder Pain (bilateral) and Urinary Frequency    1)has had chronic urinary incontinence but now worsening, generally nearly always wet, has trouble with control voluntarily also. No dysuria. Leaks with stress also. Becoming more problematic as her mobility and gait worsening.   2)due to mobility and falls, she is having to have family and caretenders help her get up from floor with fall, up from chairs, out of showers. She has tenderness and decreased rom of both shoulder from. No direct trauma.    Review of Systems   Constitutional: Negative for fever and unexpected weight change.   HENT: Negative for congestion and rhinorrhea.    Eyes: Negative for discharge and redness.   Respiratory: Negative for cough and wheezing.    Cardiovascular: Negative for chest pain, palpitations and leg swelling.   Gastrointestinal: Negative for constipation, diarrhea and vomiting.   Endocrine: Negative for polydipsia, polyphagia and polyuria.   Genitourinary: Positive for decreased urine volume, difficulty urinating, frequency and urgency. Negative for dysuria, flank pain, hematuria, menstrual problem, vaginal discharge and vaginal pain.   Musculoskeletal: Positive for arthralgias and gait problem. Negative for joint swelling.   Skin: Negative for rash and wound.   Neurological: Negative for syncope and headaches.   Psychiatric/Behavioral: Negative for behavioral problems and sleep disturbance.       Objective:      Physical Exam  Constitutional:       General: She is not in acute distress (thin elderly chronically ill, using push chair.).     Appearance: She is not ill-appearing or toxic-appearing.   Cardiovascular:      Rate and Rhythm: Normal rate and regular rhythm.   Pulmonary:      Effort: Pulmonary effort is normal. No respiratory distress.      Breath sounds: No wheezing or rales.   Abdominal:      General: There is no distension.       Palpations: There is no mass.      Tenderness: There is no abdominal tenderness. There is no guarding or rebound.   Musculoskeletal:      Comments: Both shoulders but L>R with impingement and decreased external/internal rotation. Unable to abduct left >120 degrees, right 150.   Neurological:      Mental Status: She is alert.   Psychiatric:         Mood and Affect: Mood normal.         Behavior: Behavior normal.         Thought Content: Thought content normal.         Judgment: Judgment normal.         Assessment:       1. Shoulder impingement syndrome, unspecified laterality    2. Gait disturbance    3. Urinary incontinence, unspecified type        Plan:       Shoulder impingement syndrome, unspecified laterality  -     Ambulatory referral/consult to Physical/Occupational Therapy; Future; Expected date: 12/08/2020    Gait disturbance  -     Ambulatory referral/consult to Physical/Occupational Therapy; Future; Expected date: 12/08/2020    Urinary incontinence, unspecified type  -     Urine culture; Future; Expected date: 12/01/2020  -     Urinalysis; Future; Expected date: 12/01/2020  -     oxybutynin (DITROPAN-XL) 5 MG TR24; Take 1 tablet (5 mg total) by mouth once daily.  Dispense: 30 tablet; Refill: 0  -     Ambulatory referral/consult to Urology; Future; Expected date: 12/08/2020    SE and precautions of ditropan to try until seen by urology, suspect mixed causes. Await PT results, consider ortho for shoulder injections.

## 2020-12-02 ENCOUNTER — TELEPHONE (OUTPATIENT)
Dept: INTERNAL MEDICINE | Facility: CLINIC | Age: 85
End: 2020-12-02

## 2020-12-02 ENCOUNTER — LAB VISIT (OUTPATIENT)
Dept: LAB | Facility: HOSPITAL | Age: 85
End: 2020-12-02
Attending: PEDIATRICS
Payer: MEDICARE

## 2020-12-02 ENCOUNTER — ANTI-COAG VISIT (OUTPATIENT)
Dept: CARDIOLOGY | Facility: CLINIC | Age: 85
End: 2020-12-02
Payer: MEDICARE

## 2020-12-02 DIAGNOSIS — R32 URINARY INCONTINENCE, UNSPECIFIED TYPE: ICD-10-CM

## 2020-12-02 DIAGNOSIS — N30.00 ACUTE CYSTITIS WITHOUT HEMATURIA: Primary | ICD-10-CM

## 2020-12-02 LAB
BACTERIA #/AREA URNS HPF: ABNORMAL /HPF
BILIRUB UR QL STRIP: NEGATIVE
CLARITY UR: ABNORMAL
COLOR UR: YELLOW
GLUCOSE UR QL STRIP: NEGATIVE
HGB UR QL STRIP: NEGATIVE
KETONES UR QL STRIP: NEGATIVE
LEUKOCYTE ESTERASE UR QL STRIP: ABNORMAL
MICROSCOPIC COMMENT: ABNORMAL
NITRITE UR QL STRIP: NEGATIVE
PH UR STRIP: 6 [PH] (ref 5–8)
PROT UR QL STRIP: NEGATIVE
SP GR UR STRIP: 1.02 (ref 1–1.03)
URN SPEC COLLECT METH UR: ABNORMAL
WBC #/AREA URNS HPF: >100 /HPF (ref 0–5)

## 2020-12-02 PROCEDURE — 93793 ANTICOAG MGMT PT WARFARIN: CPT | Mod: S$GLB,,,

## 2020-12-02 PROCEDURE — 81000 URINALYSIS NONAUTO W/SCOPE: CPT | Mod: HCNC

## 2020-12-02 PROCEDURE — 87086 URINE CULTURE/COLONY COUNT: CPT | Mod: HCNC

## 2020-12-02 PROCEDURE — 93793 PR ANTICOAGULANT MGMT FOR PT TAKING WARFARIN: ICD-10-PCS | Mod: S$GLB,,,

## 2020-12-02 RX ORDER — CIPROFLOXACIN 500 MG/1
500 TABLET ORAL 2 TIMES DAILY
Qty: 20 TABLET | Refills: 0 | Status: SHIPPED | OUTPATIENT
Start: 2020-12-02 | End: 2020-12-12

## 2020-12-02 NOTE — PROGRESS NOTES
Fax results received from [employer].  INR: 4.7 Test date: 12/1/2020 to [facility] at 9:02 pm, and to our fax at 11:02 pm . INR supratherapeutic. Patient reports that she may have had juice with cranberry.  Hold dose x 1 + the 0 mg on Thursday  + brocolli and spinach today and tomorrow since Ms Gauthier confirms that her mother DID take PM dose on 12/1/20 .   Patient to maintain scheduled dose of warfarin 0 mg (none) every Thursday and 0.5 mg on all other days per week.  INR to be rechecked in 1 week.  Pt needs to test in the AM if possible to get the results to us during working hours. Denies any s/s of bleeding.    No LH, DZ , HA, nose bleeds or weakness, explained to report to the ED should any of these occur.  Discussed her fall risk and bleeding as well.

## 2020-12-03 LAB
BACTERIA UR CULT: NORMAL
BACTERIA UR CULT: NORMAL

## 2020-12-04 ENCOUNTER — TELEPHONE (OUTPATIENT)
Dept: INTERNAL MEDICINE | Facility: CLINIC | Age: 85
End: 2020-12-04

## 2020-12-04 NOTE — TELEPHONE ENCOUNTER
----- Message from FAM Rosales Jr., MD sent at 12/4/2020  7:05 AM CST -----  Notify patient normal results.

## 2020-12-08 DIAGNOSIS — I95.1 ORTHOSTATIC HYPOTENSION: ICD-10-CM

## 2020-12-08 DIAGNOSIS — Z79.01 LONG TERM (CURRENT) USE OF ANTICOAGULANTS: ICD-10-CM

## 2020-12-08 LAB — INR PPP: 3.1

## 2020-12-08 RX ORDER — MIDODRINE HYDROCHLORIDE 5 MG/1
5 TABLET ORAL 4 TIMES DAILY
Qty: 120 TABLET | Refills: 6 | Status: SHIPPED | OUTPATIENT
Start: 2020-12-08 | End: 2021-04-22

## 2020-12-09 ENCOUNTER — ANTI-COAG VISIT (OUTPATIENT)
Dept: CARDIOLOGY | Facility: CLINIC | Age: 85
End: 2020-12-09
Payer: MEDICARE

## 2020-12-09 DIAGNOSIS — Z95.2 S/P MITRAL VALVE REPLACEMENT: Chronic | ICD-10-CM

## 2020-12-09 DIAGNOSIS — I48.0 PAROXYSMAL ATRIAL FIBRILLATION: Chronic | ICD-10-CM

## 2020-12-09 DIAGNOSIS — Z86.718 HISTORY OF DVT OF LOWER EXTREMITY: ICD-10-CM

## 2020-12-09 DIAGNOSIS — Z79.01 LONG TERM (CURRENT) USE OF ANTICOAGULANTS: ICD-10-CM

## 2020-12-09 PROCEDURE — 93793 ANTICOAG MGMT PT WARFARIN: CPT | Mod: S$GLB,,,

## 2020-12-09 PROCEDURE — 93793 PR ANTICOAGULANT MGMT FOR PT TAKING WARFARIN: ICD-10-PCS | Mod: S$GLB,,,

## 2020-12-09 NOTE — PROGRESS NOTES
Fax results received from [employer].  INR: 3.1. Test date: 12/08/2020.  Patient contacted:  INR back therapeutic. Reports not taking Cipro and Dr. Rosales has been informed. Patient to maintain scheduled dose of warfarin 0 mg (none) every Thursday and 0.5 mg on all other days per week.  INR to be recheck in 1 week.  Patient repeated back instructions correctly and verbalized understanding.  Stated, information will be given to her daughter, Ms. Gauthier.

## 2020-12-16 ENCOUNTER — ANTI-COAG VISIT (OUTPATIENT)
Dept: CARDIOLOGY | Facility: CLINIC | Age: 85
End: 2020-12-16
Payer: MEDICARE

## 2020-12-16 ENCOUNTER — TELEPHONE (OUTPATIENT)
Dept: CARDIOLOGY | Facility: CLINIC | Age: 85
End: 2020-12-16

## 2020-12-16 DIAGNOSIS — Z79.01 LONG TERM (CURRENT) USE OF ANTICOAGULANTS: ICD-10-CM

## 2020-12-16 DIAGNOSIS — Z86.718 HISTORY OF DVT OF LOWER EXTREMITY: ICD-10-CM

## 2020-12-16 LAB — INR PPP: 4.4

## 2020-12-16 PROCEDURE — 93793 ANTICOAG MGMT PT WARFARIN: CPT | Mod: S$GLB,,,

## 2020-12-16 PROCEDURE — 93793 PR ANTICOAGULANT MGMT FOR PT TAKING WARFARIN: ICD-10-PCS | Mod: S$GLB,,,

## 2020-12-16 NOTE — PROGRESS NOTES
Fax received form [facility] on 12/16/20.  INR is supra-therapeutic at 4.4.  Test Date 12/15/20.  Resulted 12/16/20. Patient has been advised to test early in the day.  Patient reports no recent falls and no changes in medications/diet/health.  Patient reports no bleeding issues.  Advised patient to go to ED if any signs/symptoms of bleeding.   Sent to PharmD for dosing/instructions.

## 2020-12-16 NOTE — TELEPHONE ENCOUNTER
Attempted to contact patient's daughter Abrahan. Left warfarin dosing/instructions on voice mail asking to give our office a call to confirm received.

## 2020-12-16 NOTE — PROGRESS NOTES
INR not at goal. Medications, chart, and patient findings reviewed. See calendar for adjustments to dose and follow up plan.  INR continues to rebound above goal.  We will hold today and lower overall dose.

## 2020-12-16 NOTE — TELEPHONE ENCOUNTER
Patient has been advised of warfarin dosing/instructions per encounter 12/16/20.  Patient repeated back correctly and verbalizes understanding.

## 2020-12-22 ENCOUNTER — ANTI-COAG VISIT (OUTPATIENT)
Dept: CARDIOLOGY | Facility: CLINIC | Age: 85
End: 2020-12-22
Payer: MEDICARE

## 2020-12-22 LAB — INR PPP: 3.2

## 2020-12-22 PROCEDURE — 93793 ANTICOAG MGMT PT WARFARIN: CPT | Mod: S$GLB,,,

## 2020-12-22 PROCEDURE — 93793 PR ANTICOAGULANT MGMT FOR PT TAKING WARFARIN: ICD-10-PCS | Mod: S$GLB,,,

## 2020-12-22 NOTE — PROGRESS NOTES
INR at goal. Medications and chart reviewed. No changes noted to necessitate adjustment of warfarin or follow-up plan. See calendar.  Reviewed dose with patient, she will skip warfarin on Tuesdays and Thursdays and take 0.5 mg all other days.  Retest next week with meter.

## 2020-12-29 LAB — INR PPP: 3.3

## 2020-12-30 ENCOUNTER — ANTI-COAG VISIT (OUTPATIENT)
Dept: CARDIOLOGY | Facility: CLINIC | Age: 85
End: 2020-12-30
Payer: MEDICARE

## 2020-12-30 DIAGNOSIS — I48.0 PAROXYSMAL ATRIAL FIBRILLATION: Chronic | ICD-10-CM

## 2020-12-30 DIAGNOSIS — Z86.718 HISTORY OF DVT OF LOWER EXTREMITY: ICD-10-CM

## 2020-12-30 DIAGNOSIS — Z95.2 S/P MITRAL VALVE REPLACEMENT: Chronic | ICD-10-CM

## 2020-12-30 DIAGNOSIS — Z79.01 LONG TERM (CURRENT) USE OF ANTICOAGULANTS: ICD-10-CM

## 2020-12-30 PROCEDURE — G0250 MD INR TEST REVIE INTER MGMT: HCPCS | Mod: S$GLB,,, | Performed by: INTERNAL MEDICINE

## 2020-12-30 PROCEDURE — G0250 PR MD REVIEW INTERPRET OF TEST: ICD-10-PCS | Mod: S$GLB,,, | Performed by: INTERNAL MEDICINE

## 2020-12-30 NOTE — PROGRESS NOTES
Fax results received from [employer].  INR: 3.3. Test date: 12/29/2020. INR is therapeutic. No change in dose - patient to maintain scheduled dose of warfarin 0 mg (none) every Tuesday, Thursday and 0.5 mg on all other days per week.  INR to be rechecked in 1 week.

## 2021-01-05 ENCOUNTER — ANTI-COAG VISIT (OUTPATIENT)
Dept: CARDIOLOGY | Facility: CLINIC | Age: 86
End: 2021-01-05
Payer: MEDICARE

## 2021-01-05 DIAGNOSIS — Z79.01 CHRONIC ANTICOAGULATION: ICD-10-CM

## 2021-01-05 LAB — INR PPP: 2.6

## 2021-01-05 PROCEDURE — 93793 PR ANTICOAGULANT MGMT FOR PT TAKING WARFARIN: ICD-10-PCS | Mod: S$GLB,,,

## 2021-01-05 PROCEDURE — 93793 ANTICOAG MGMT PT WARFARIN: CPT | Mod: S$GLB,,,

## 2021-01-10 ENCOUNTER — IMMUNIZATION (OUTPATIENT)
Dept: INTERNAL MEDICINE | Facility: CLINIC | Age: 86
End: 2021-01-10
Payer: MEDICARE

## 2021-01-10 DIAGNOSIS — Z23 NEED FOR VACCINATION: ICD-10-CM

## 2021-01-10 PROCEDURE — 91300 COVID-19, MRNA, LNP-S, PF, 30 MCG/0.3 ML DOSE VACCINE: CPT | Mod: PBBFAC | Performed by: FAMILY MEDICINE

## 2021-01-12 ENCOUNTER — ANTI-COAG VISIT (OUTPATIENT)
Dept: CARDIOLOGY | Facility: CLINIC | Age: 86
End: 2021-01-12
Payer: MEDICARE

## 2021-01-12 DIAGNOSIS — Z79.01 CHRONIC ANTICOAGULATION: ICD-10-CM

## 2021-01-12 LAB — INR PPP: 2.2

## 2021-01-12 PROCEDURE — 93793 PR ANTICOAGULANT MGMT FOR PT TAKING WARFARIN: ICD-10-PCS | Mod: S$GLB,,,

## 2021-01-12 PROCEDURE — 93793 ANTICOAG MGMT PT WARFARIN: CPT | Mod: S$GLB,,,

## 2021-01-13 DIAGNOSIS — E03.8 OTHER SPECIFIED HYPOTHYROIDISM: ICD-10-CM

## 2021-01-13 RX ORDER — LEVOTHYROXINE SODIUM 50 UG/1
TABLET ORAL
Qty: 135 TABLET | Refills: 0 | Status: SHIPPED | OUTPATIENT
Start: 2021-01-13 | End: 2021-04-12

## 2021-01-19 ENCOUNTER — ANTI-COAG VISIT (OUTPATIENT)
Dept: CARDIOLOGY | Facility: CLINIC | Age: 86
End: 2021-01-19
Payer: MEDICARE

## 2021-01-19 DIAGNOSIS — Z79.01 LONG TERM (CURRENT) USE OF ANTICOAGULANTS: ICD-10-CM

## 2021-01-19 LAB — INR PPP: 2.1

## 2021-01-19 PROCEDURE — 93793 PR ANTICOAGULANT MGMT FOR PT TAKING WARFARIN: ICD-10-PCS | Mod: S$GLB,,,

## 2021-01-19 PROCEDURE — 93793 ANTICOAG MGMT PT WARFARIN: CPT | Mod: S$GLB,,,

## 2021-01-26 ENCOUNTER — ANTI-COAG VISIT (OUTPATIENT)
Dept: CARDIOLOGY | Facility: CLINIC | Age: 86
End: 2021-01-26
Payer: MEDICARE

## 2021-01-26 DIAGNOSIS — Z86.718 HISTORY OF DVT OF LOWER EXTREMITY: ICD-10-CM

## 2021-01-26 DIAGNOSIS — Z79.01 LONG TERM (CURRENT) USE OF ANTICOAGULANTS: ICD-10-CM

## 2021-01-26 LAB — INR PPP: 2.5

## 2021-01-26 PROCEDURE — 93793 ANTICOAG MGMT PT WARFARIN: CPT | Mod: S$GLB,,,

## 2021-01-26 PROCEDURE — 93793 PR ANTICOAGULANT MGMT FOR PT TAKING WARFARIN: ICD-10-PCS | Mod: S$GLB,,,

## 2021-01-29 ENCOUNTER — HOSPITAL ENCOUNTER (EMERGENCY)
Facility: HOSPITAL | Age: 86
Discharge: HOME | End: 2021-01-29
Attending: EMERGENCY MEDICINE
Payer: MEDICARE

## 2021-01-29 VITALS
SYSTOLIC BLOOD PRESSURE: 95 MMHG | TEMPERATURE: 98 F | RESPIRATION RATE: 17 BRPM | DIASTOLIC BLOOD PRESSURE: 59 MMHG | HEART RATE: 75 BPM | OXYGEN SATURATION: 94 %
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
## 2021-01-29 DIAGNOSIS — N39.0 URINARY TRACT INFECTION WITHOUT HEMATURIA, SITE UNSPECIFIED: ICD-10-CM

## 2021-01-29 DIAGNOSIS — R55 SYNCOPE: ICD-10-CM

## 2021-01-29 DIAGNOSIS — S20.221A CONTUSION OF RIGHT SIDE OF BACK, INITIAL ENCOUNTER: ICD-10-CM

## 2021-01-29 LAB
ALBUMIN SERPL BCP-MCNC: 3.7 G/DL (ref 3.5–5.2)
ALP SERPL-CCNC: 52 U/L (ref 55–135)
ALT SERPL W/O P-5'-P-CCNC: 9 U/L (ref 10–44)
ANION GAP SERPL CALC-SCNC: 8 MMOL/L (ref 8–16)
AST SERPL-CCNC: 16 U/L (ref 10–40)
BACTERIA #/AREA URNS HPF: ABNORMAL /HPF
BASOPHILS # BLD AUTO: 0.03 K/UL (ref 0–0.2)
BASOPHILS NFR BLD: 0.4 % (ref 0–1.9)
BILIRUB SERPL-MCNC: 0.7 MG/DL (ref 0.1–1)
BILIRUB UR QL STRIP: NEGATIVE
BUN SERPL-MCNC: 25 MG/DL (ref 10–30)
CALCIUM SERPL-MCNC: 8.6 MG/DL (ref 8.7–10.5)
CHLORIDE SERPL-SCNC: 105 MMOL/L (ref 95–110)
CLARITY UR: CLEAR
CO2 SERPL-SCNC: 25 MMOL/L (ref 23–29)
COLOR UR: YELLOW
CREAT SERPL-MCNC: 1.2 MG/DL (ref 0.5–1.4)
CTP QC/QA: YES
DIFFERENTIAL METHOD: ABNORMAL
EOSINOPHIL # BLD AUTO: 0.2 K/UL (ref 0–0.5)
EOSINOPHIL NFR BLD: 2.3 % (ref 0–8)
ERYTHROCYTE [DISTWIDTH] IN BLOOD BY AUTOMATED COUNT: 17.6 % (ref 11.5–14.5)
EST. GFR  (AFRICAN AMERICAN): 44 ML/MIN/1.73 M^2
EST. GFR  (NON AFRICAN AMERICAN): 38 ML/MIN/1.73 M^2
GLUCOSE SERPL-MCNC: 99 MG/DL (ref 70–110)
GLUCOSE UR QL STRIP: NEGATIVE
HCT VFR BLD AUTO: 29.4 % (ref 37–48.5)
HGB BLD-MCNC: 9.2 G/DL (ref 12–16)
HGB UR QL STRIP: NEGATIVE
IMM GRANULOCYTES # BLD AUTO: 0.03 K/UL (ref 0–0.04)
IMM GRANULOCYTES NFR BLD AUTO: 0.4 % (ref 0–0.5)
KETONES UR QL STRIP: NEGATIVE
LEUKOCYTE ESTERASE UR QL STRIP: NEGATIVE
LIPASE SERPL-CCNC: 37 U/L (ref 4–60)
LYMPHOCYTES # BLD AUTO: 2.2 K/UL (ref 1–4.8)
LYMPHOCYTES NFR BLD: 28.7 % (ref 18–48)
MCH RBC QN AUTO: 25.6 PG (ref 27–31)
MCHC RBC AUTO-ENTMCNC: 31.3 G/DL (ref 32–36)
MCV RBC AUTO: 82 FL (ref 82–98)
MICROSCOPIC COMMENT: ABNORMAL
MONOCYTES # BLD AUTO: 0.6 K/UL (ref 0.3–1)
MONOCYTES NFR BLD: 8.4 % (ref 4–15)
NEUTROPHILS # BLD AUTO: 4.5 K/UL (ref 1.8–7.7)
NEUTROPHILS NFR BLD: 59.8 % (ref 38–73)
NITRITE UR QL STRIP: POSITIVE
NRBC BLD-RTO: 0 /100 WBC
PH UR STRIP: 6 [PH] (ref 5–8)
PLATELET # BLD AUTO: 221 K/UL (ref 150–350)
PMV BLD AUTO: 10.6 FL (ref 9.2–12.9)
POTASSIUM SERPL-SCNC: 4 MMOL/L (ref 3.5–5.1)
PROT SERPL-MCNC: 6.7 G/DL (ref 6–8.4)
PROT UR QL STRIP: NEGATIVE
RBC # BLD AUTO: 3.59 M/UL (ref 4–5.4)
SARS-COV-2 RDRP RESP QL NAA+PROBE: NEGATIVE
SODIUM SERPL-SCNC: 138 MMOL/L (ref 136–145)
SP GR UR STRIP: 1.02 (ref 1–1.03)
URN SPEC COLLECT METH UR: ABNORMAL
UROBILINOGEN UR STRIP-ACNC: NEGATIVE EU/DL
WBC # BLD AUTO: 7.5 K/UL (ref 3.9–12.7)

## 2021-01-29 PROCEDURE — 80053 COMPREHEN METABOLIC PANEL: CPT

## 2021-01-29 PROCEDURE — 93010 ELECTROCARDIOGRAM REPORT: CPT | Mod: ,,, | Performed by: INTERNAL MEDICINE

## 2021-01-29 PROCEDURE — 99285 EMERGENCY DEPT VISIT HI MDM: CPT | Mod: 25

## 2021-01-29 PROCEDURE — 93010 EKG 12-LEAD: ICD-10-PCS | Mod: ,,, | Performed by: INTERNAL MEDICINE

## 2021-01-29 PROCEDURE — 63600175 PHARM REV CODE 636 W HCPCS: Performed by: EMERGENCY MEDICINE

## 2021-01-29 PROCEDURE — U0002 COVID-19 LAB TEST NON-CDC: HCPCS | Performed by: EMERGENCY MEDICINE

## 2021-01-29 PROCEDURE — 81000 URINALYSIS NONAUTO W/SCOPE: CPT

## 2021-01-29 PROCEDURE — P9612 CATHETERIZE FOR URINE SPEC: HCPCS

## 2021-01-29 PROCEDURE — 93005 ELECTROCARDIOGRAM TRACING: CPT

## 2021-01-29 PROCEDURE — 96365 THER/PROPH/DIAG IV INF INIT: CPT

## 2021-01-29 PROCEDURE — 96375 TX/PRO/DX INJ NEW DRUG ADDON: CPT

## 2021-01-29 PROCEDURE — 83690 ASSAY OF LIPASE: CPT

## 2021-01-29 PROCEDURE — 85025 COMPLETE CBC W/AUTO DIFF WBC: CPT

## 2021-01-29 RX ORDER — ONDANSETRON 2 MG/ML
4 INJECTION INTRAMUSCULAR; INTRAVENOUS ONCE
Status: COMPLETED | OUTPATIENT
Start: 2021-01-29 | End: 2021-01-29

## 2021-01-29 RX ORDER — CEFDINIR 300 MG/1
300 CAPSULE ORAL 2 TIMES DAILY
Qty: 14 CAPSULE | Refills: 0 | Status: SHIPPED | OUTPATIENT
Start: 2021-01-29 | End: 2021-02-05

## 2021-01-29 RX ORDER — HYDROMORPHONE HYDROCHLORIDE 1 MG/ML
0.5 INJECTION, SOLUTION INTRAMUSCULAR; INTRAVENOUS; SUBCUTANEOUS
Status: COMPLETED | OUTPATIENT
Start: 2021-01-29 | End: 2021-01-29

## 2021-01-29 RX ORDER — ONDANSETRON 4 MG/1
4 TABLET, FILM COATED ORAL EVERY 6 HOURS
Qty: 30 TABLET | Refills: 0 | Status: SHIPPED | OUTPATIENT
Start: 2021-01-29

## 2021-01-29 RX ORDER — HYDROCODONE BITARTRATE AND ACETAMINOPHEN 5; 325 MG/1; MG/1
1 TABLET ORAL EVERY 6 HOURS PRN
Qty: 18 TABLET | Refills: 0 | Status: SHIPPED | OUTPATIENT
Start: 2021-01-29 | End: 2021-05-25

## 2021-01-29 RX ADMIN — CEFTRIAXONE 1 G: 1 INJECTION, SOLUTION INTRAVENOUS at 03:01

## 2021-01-29 RX ADMIN — ONDANSETRON 4 MG: 2 INJECTION INTRAMUSCULAR; INTRAVENOUS at 11:01

## 2021-01-29 RX ADMIN — HYDROMORPHONE HYDROCHLORIDE 0.5 MG: 1 INJECTION, SOLUTION INTRAMUSCULAR; INTRAVENOUS; SUBCUTANEOUS at 11:01

## 2021-01-31 ENCOUNTER — IMMUNIZATION (OUTPATIENT)
Dept: INTERNAL MEDICINE | Facility: CLINIC | Age: 86
End: 2021-01-31
Payer: MEDICARE

## 2021-01-31 DIAGNOSIS — Z23 NEED FOR VACCINATION: Primary | ICD-10-CM

## 2021-01-31 PROCEDURE — 0002A COVID-19, MRNA, LNP-S, PF, 30 MCG/0.3 ML DOSE VACCINE: CPT | Mod: PBBFAC | Performed by: FAMILY MEDICINE

## 2021-01-31 PROCEDURE — 91300 COVID-19, MRNA, LNP-S, PF, 30 MCG/0.3 ML DOSE VACCINE: CPT | Mod: PBBFAC | Performed by: FAMILY MEDICINE

## 2021-02-03 ENCOUNTER — PES CALL (OUTPATIENT)
Dept: ADMINISTRATIVE | Facility: CLINIC | Age: 86
End: 2021-02-03

## 2021-02-03 ENCOUNTER — ANTI-COAG VISIT (OUTPATIENT)
Dept: CARDIOLOGY | Facility: CLINIC | Age: 86
End: 2021-02-03
Payer: MEDICARE

## 2021-02-03 ENCOUNTER — TELEPHONE (OUTPATIENT)
Dept: CARDIOLOGY | Facility: HOSPITAL | Age: 86
End: 2021-02-03

## 2021-02-03 ENCOUNTER — TELEPHONE (OUTPATIENT)
Dept: CARDIOLOGY | Facility: CLINIC | Age: 86
End: 2021-02-03

## 2021-02-03 DIAGNOSIS — Z95.2 S/P MITRAL VALVE REPLACEMENT: Chronic | ICD-10-CM

## 2021-02-03 DIAGNOSIS — Z79.01 LONG TERM (CURRENT) USE OF ANTICOAGULANTS: ICD-10-CM

## 2021-02-03 DIAGNOSIS — I48.0 PAROXYSMAL ATRIAL FIBRILLATION: Chronic | ICD-10-CM

## 2021-02-03 DIAGNOSIS — Z86.718 HISTORY OF DVT OF LOWER EXTREMITY: ICD-10-CM

## 2021-02-03 LAB — INR PPP: 2.2

## 2021-02-03 PROCEDURE — 93793 ANTICOAG MGMT PT WARFARIN: CPT | Mod: S$GLB,,,

## 2021-02-03 PROCEDURE — 93793 PR ANTICOAGULANT MGMT FOR PT TAKING WARFARIN: ICD-10-PCS | Mod: S$GLB,,,

## 2021-02-04 ENCOUNTER — TELEPHONE (OUTPATIENT)
Dept: CARDIOLOGY | Facility: CLINIC | Age: 86
End: 2021-02-04

## 2021-02-04 ENCOUNTER — TELEPHONE (OUTPATIENT)
Dept: INTERNAL MEDICINE | Facility: CLINIC | Age: 86
End: 2021-02-04

## 2021-02-05 ENCOUNTER — TELEPHONE (OUTPATIENT)
Dept: INTERNAL MEDICINE | Facility: CLINIC | Age: 86
End: 2021-02-05

## 2021-02-10 ENCOUNTER — ANTI-COAG VISIT (OUTPATIENT)
Dept: CARDIOLOGY | Facility: CLINIC | Age: 86
End: 2021-02-10
Payer: MEDICARE

## 2021-02-10 DIAGNOSIS — Z95.2 S/P MITRAL VALVE REPLACEMENT: Chronic | ICD-10-CM

## 2021-02-10 DIAGNOSIS — Z79.01 LONG TERM (CURRENT) USE OF ANTICOAGULANTS: ICD-10-CM

## 2021-02-10 DIAGNOSIS — I48.0 PAROXYSMAL ATRIAL FIBRILLATION: Chronic | ICD-10-CM

## 2021-02-10 DIAGNOSIS — Z86.718 HISTORY OF DVT OF LOWER EXTREMITY: ICD-10-CM

## 2021-02-10 LAB — INR PPP: 2.9

## 2021-02-10 PROCEDURE — 93793 ANTICOAG MGMT PT WARFARIN: CPT | Mod: S$GLB,,,

## 2021-02-10 PROCEDURE — 93793 PR ANTICOAGULANT MGMT FOR PT TAKING WARFARIN: ICD-10-PCS | Mod: S$GLB,,,

## 2021-02-11 ENCOUNTER — TELEPHONE (OUTPATIENT)
Dept: INTERNAL MEDICINE | Facility: CLINIC | Age: 86
End: 2021-02-11

## 2021-02-12 DIAGNOSIS — I27.9 PULMONARY HEART DISEASE: ICD-10-CM

## 2021-02-12 DIAGNOSIS — R32 URINARY INCONTINENCE, UNSPECIFIED TYPE: ICD-10-CM

## 2021-02-12 DIAGNOSIS — I25.10 CORONARY ARTERY DISEASE INVOLVING NATIVE CORONARY ARTERY OF NATIVE HEART WITHOUT ANGINA PECTORIS: ICD-10-CM

## 2021-02-12 DIAGNOSIS — E03.4 HYPOTHYROIDISM DUE TO ACQUIRED ATROPHY OF THYROID: Chronic | ICD-10-CM

## 2021-02-12 DIAGNOSIS — E78.00 PURE HYPERCHOLESTEROLEMIA: Primary | Chronic | ICD-10-CM

## 2021-02-14 ENCOUNTER — CLINICAL SUPPORT (OUTPATIENT)
Dept: CARDIOLOGY | Facility: HOSPITAL | Age: 86
End: 2021-02-14
Payer: MEDICARE

## 2021-02-14 DIAGNOSIS — Z95.0 PRESENCE OF CARDIAC PACEMAKER: ICD-10-CM

## 2021-02-14 PROCEDURE — 93294 CARDIAC DEVICE CHECK - REMOTE: ICD-10-PCS | Mod: S$GLB,,, | Performed by: INTERNAL MEDICINE

## 2021-02-14 PROCEDURE — 93294 REM INTERROG EVL PM/LDLS PM: CPT | Mod: S$GLB,,, | Performed by: INTERNAL MEDICINE

## 2021-02-14 PROCEDURE — 93296 REM INTERROG EVL PM/IDS: CPT | Performed by: INTERNAL MEDICINE

## 2021-02-17 ENCOUNTER — ANTI-COAG VISIT (OUTPATIENT)
Dept: CARDIOLOGY | Facility: CLINIC | Age: 86
End: 2021-02-17
Payer: MEDICARE

## 2021-02-17 DIAGNOSIS — Z95.2 S/P MITRAL VALVE REPLACEMENT: Chronic | ICD-10-CM

## 2021-02-17 DIAGNOSIS — Z86.718 HISTORY OF DVT OF LOWER EXTREMITY: ICD-10-CM

## 2021-02-17 DIAGNOSIS — Z79.01 LONG TERM (CURRENT) USE OF ANTICOAGULANTS: ICD-10-CM

## 2021-02-17 DIAGNOSIS — I48.0 PAROXYSMAL ATRIAL FIBRILLATION: Chronic | ICD-10-CM

## 2021-02-17 LAB — INR PPP: 2.6

## 2021-02-17 PROCEDURE — 93793 PR ANTICOAGULANT MGMT FOR PT TAKING WARFARIN: ICD-10-PCS | Mod: S$GLB,,,

## 2021-02-17 PROCEDURE — 93793 ANTICOAG MGMT PT WARFARIN: CPT | Mod: S$GLB,,,

## 2021-02-17 RX ORDER — OXYBUTYNIN CHLORIDE 5 MG/1
5 TABLET, EXTENDED RELEASE ORAL DAILY
Qty: 90 TABLET | Refills: 0 | Status: SHIPPED | OUTPATIENT
Start: 2021-02-17 | End: 2021-05-25

## 2021-02-17 RX ORDER — PRAVASTATIN SODIUM 20 MG/1
20 TABLET ORAL DAILY
Qty: 90 TABLET | Refills: 0 | Status: SHIPPED | OUTPATIENT
Start: 2021-02-17 | End: 2021-05-10

## 2021-02-17 RX ORDER — SPIRONOLACTONE 25 MG/1
12.5 TABLET ORAL DAILY
Qty: 45 TABLET | Refills: 0 | Status: SHIPPED | OUTPATIENT
Start: 2021-02-17 | End: 2021-05-25

## 2021-02-24 ENCOUNTER — TELEPHONE (OUTPATIENT)
Dept: CARDIOLOGY | Facility: CLINIC | Age: 86
End: 2021-02-24

## 2021-02-24 ENCOUNTER — ANTI-COAG VISIT (OUTPATIENT)
Dept: CARDIOLOGY | Facility: CLINIC | Age: 86
End: 2021-02-24
Payer: MEDICARE

## 2021-02-24 DIAGNOSIS — I48.0 PAROXYSMAL ATRIAL FIBRILLATION: Chronic | ICD-10-CM

## 2021-02-24 DIAGNOSIS — Z79.01 LONG TERM (CURRENT) USE OF ANTICOAGULANTS: ICD-10-CM

## 2021-02-24 DIAGNOSIS — Z95.2 S/P MITRAL VALVE REPLACEMENT: Chronic | ICD-10-CM

## 2021-02-24 DIAGNOSIS — Z86.718 HISTORY OF DVT OF LOWER EXTREMITY: ICD-10-CM

## 2021-02-24 LAB — INR PPP: 2.2

## 2021-02-24 PROCEDURE — 93793 PR ANTICOAGULANT MGMT FOR PT TAKING WARFARIN: ICD-10-PCS | Mod: S$GLB,,,

## 2021-02-24 PROCEDURE — 93793 ANTICOAG MGMT PT WARFARIN: CPT | Mod: S$GLB,,,

## 2021-02-25 ENCOUNTER — TELEPHONE (OUTPATIENT)
Dept: ADMINISTRATIVE | Facility: HOSPITAL | Age: 86
End: 2021-02-25

## 2021-03-01 DIAGNOSIS — F32.89 OTHER DEPRESSION: ICD-10-CM

## 2021-03-01 DIAGNOSIS — F41.9 ANXIETY: ICD-10-CM

## 2021-03-02 ENCOUNTER — ANTI-COAG VISIT (OUTPATIENT)
Dept: CARDIOLOGY | Facility: CLINIC | Age: 86
End: 2021-03-02
Payer: MEDICARE

## 2021-03-02 DIAGNOSIS — Z79.01 LONG TERM (CURRENT) USE OF ANTICOAGULANTS: ICD-10-CM

## 2021-03-02 DIAGNOSIS — Z95.2 S/P MITRAL VALVE REPLACEMENT: Chronic | ICD-10-CM

## 2021-03-02 LAB — INR PPP: 3.5

## 2021-03-02 PROCEDURE — G0250 MD INR TEST REVIE INTER MGMT: HCPCS | Mod: S$GLB,,, | Performed by: INTERNAL MEDICINE

## 2021-03-02 PROCEDURE — G0250 PR MD REVIEW INTERPRET OF TEST: ICD-10-PCS | Mod: S$GLB,,, | Performed by: INTERNAL MEDICINE

## 2021-03-02 RX ORDER — ESCITALOPRAM OXALATE 10 MG/1
10 TABLET ORAL DAILY
Qty: 90 TABLET | Refills: 3 | Status: SHIPPED | OUTPATIENT
Start: 2021-03-02 | End: 2022-01-06

## 2021-03-09 ENCOUNTER — TELEPHONE (OUTPATIENT)
Dept: INTERNAL MEDICINE | Facility: CLINIC | Age: 86
End: 2021-03-09

## 2021-03-09 ENCOUNTER — DOCUMENTATION ONLY (OUTPATIENT)
Dept: INTERNAL MEDICINE | Facility: CLINIC | Age: 86
End: 2021-03-09

## 2021-03-09 ENCOUNTER — ANTI-COAG VISIT (OUTPATIENT)
Dept: CARDIOLOGY | Facility: CLINIC | Age: 86
End: 2021-03-09
Payer: MEDICARE

## 2021-03-09 DIAGNOSIS — R26.9 GAIT DISTURBANCE: Primary | ICD-10-CM

## 2021-03-09 DIAGNOSIS — Z65.8 INADEQUATE SOCIAL SUPPORT: ICD-10-CM

## 2021-03-09 DIAGNOSIS — I51.89 LEFT VENTRICULAR DIASTOLIC DYSFUNCTION WITH PRESERVED SYSTOLIC FUNCTION: ICD-10-CM

## 2021-03-09 DIAGNOSIS — Z86.73 H/O: CVA (CEREBROVASCULAR ACCIDENT): Chronic | ICD-10-CM

## 2021-03-09 DIAGNOSIS — Z86.718 HISTORY OF DVT OF LOWER EXTREMITY: ICD-10-CM

## 2021-03-09 DIAGNOSIS — Z79.01 LONG TERM (CURRENT) USE OF ANTICOAGULANTS: ICD-10-CM

## 2021-03-09 LAB — INR PPP: 3.1

## 2021-03-09 PROCEDURE — 93793 ANTICOAG MGMT PT WARFARIN: CPT | Mod: S$GLB,,,

## 2021-03-09 PROCEDURE — 93793 PR ANTICOAGULANT MGMT FOR PT TAKING WARFARIN: ICD-10-PCS | Mod: S$GLB,,,

## 2021-03-16 LAB — INR PPP: 2.9

## 2021-03-17 ENCOUNTER — ANTI-COAG VISIT (OUTPATIENT)
Dept: CARDIOLOGY | Facility: CLINIC | Age: 86
End: 2021-03-17
Payer: MEDICARE

## 2021-03-17 ENCOUNTER — CARE AT HOME (OUTPATIENT)
Dept: HOME HEALTH SERVICES | Facility: CLINIC | Age: 86
End: 2021-03-17
Payer: MEDICARE

## 2021-03-17 VITALS
TEMPERATURE: 98 F | HEART RATE: 76 BPM | RESPIRATION RATE: 16 BRPM | DIASTOLIC BLOOD PRESSURE: 70 MMHG | SYSTOLIC BLOOD PRESSURE: 131 MMHG | OXYGEN SATURATION: 95 %

## 2021-03-17 DIAGNOSIS — Z95.2 S/P MITRAL VALVE REPLACEMENT: Chronic | ICD-10-CM

## 2021-03-17 DIAGNOSIS — Z79.01 LONG TERM (CURRENT) USE OF ANTICOAGULANTS: ICD-10-CM

## 2021-03-17 DIAGNOSIS — R54 FRAILTY: Primary | ICD-10-CM

## 2021-03-17 DIAGNOSIS — Z65.8 INADEQUATE SOCIAL SUPPORT: ICD-10-CM

## 2021-03-17 DIAGNOSIS — R26.9 GAIT DISTURBANCE: ICD-10-CM

## 2021-03-17 DIAGNOSIS — I51.89 LEFT VENTRICULAR DIASTOLIC DYSFUNCTION WITH PRESERVED SYSTOLIC FUNCTION: ICD-10-CM

## 2021-03-17 DIAGNOSIS — I48.0 PAROXYSMAL ATRIAL FIBRILLATION: Chronic | ICD-10-CM

## 2021-03-17 DIAGNOSIS — Z86.718 HISTORY OF DVT OF LOWER EXTREMITY: ICD-10-CM

## 2021-03-17 PROCEDURE — 1159F PR MEDICATION LIST DOCUMENTED IN MEDICAL RECORD: ICD-10-PCS | Mod: S$GLB,,, | Performed by: NURSE PRACTITIONER

## 2021-03-17 PROCEDURE — 1159F MED LIST DOCD IN RCRD: CPT | Mod: S$GLB,,, | Performed by: NURSE PRACTITIONER

## 2021-03-17 PROCEDURE — 93793 PR ANTICOAGULANT MGMT FOR PT TAKING WARFARIN: ICD-10-PCS | Mod: S$GLB,,,

## 2021-03-17 PROCEDURE — 99350 PR HOME VISIT,ESTAB PATIENT,LEVEL IV: ICD-10-PCS | Mod: ,,, | Performed by: NURSE PRACTITIONER

## 2021-03-17 PROCEDURE — 93793 ANTICOAG MGMT PT WARFARIN: CPT | Mod: S$GLB,,,

## 2021-03-17 PROCEDURE — 99350 HOME/RES VST EST HIGH MDM 60: CPT | Mod: ,,, | Performed by: NURSE PRACTITIONER

## 2021-03-24 LAB — INR PPP: 3.1

## 2021-03-25 ENCOUNTER — ANTI-COAG VISIT (OUTPATIENT)
Dept: CARDIOLOGY | Facility: CLINIC | Age: 86
End: 2021-03-25
Payer: MEDICARE

## 2021-03-25 DIAGNOSIS — Z79.01 LONG TERM (CURRENT) USE OF ANTICOAGULANTS: ICD-10-CM

## 2021-03-25 DIAGNOSIS — I48.0 PAROXYSMAL ATRIAL FIBRILLATION: Chronic | ICD-10-CM

## 2021-03-25 DIAGNOSIS — Z86.73 H/O: CVA (CEREBROVASCULAR ACCIDENT): Chronic | ICD-10-CM

## 2021-03-25 DIAGNOSIS — Z95.2 S/P MITRAL VALVE REPLACEMENT: Chronic | ICD-10-CM

## 2021-03-25 DIAGNOSIS — Z86.718 HISTORY OF DVT OF LOWER EXTREMITY: ICD-10-CM

## 2021-03-25 PROCEDURE — 93793 PR ANTICOAGULANT MGMT FOR PT TAKING WARFARIN: ICD-10-PCS | Mod: S$GLB,,,

## 2021-03-25 PROCEDURE — 93793 ANTICOAG MGMT PT WARFARIN: CPT | Mod: S$GLB,,,

## 2021-03-31 ENCOUNTER — ANTI-COAG VISIT (OUTPATIENT)
Dept: CARDIOLOGY | Facility: CLINIC | Age: 86
End: 2021-03-31
Payer: MEDICARE

## 2021-03-31 DIAGNOSIS — Z79.01 CHRONIC ANTICOAGULATION: ICD-10-CM

## 2021-03-31 LAB — INR PPP: 2

## 2021-03-31 PROCEDURE — 93793 ANTICOAG MGMT PT WARFARIN: CPT | Mod: S$GLB,,,

## 2021-03-31 PROCEDURE — 93793 PR ANTICOAGULANT MGMT FOR PT TAKING WARFARIN: ICD-10-PCS | Mod: S$GLB,,,

## 2021-04-07 LAB — INR PPP: 3

## 2021-04-08 ENCOUNTER — ANTI-COAG VISIT (OUTPATIENT)
Dept: CARDIOLOGY | Facility: CLINIC | Age: 86
End: 2021-04-08
Payer: MEDICARE

## 2021-04-08 DIAGNOSIS — Z86.718 HISTORY OF DVT OF LOWER EXTREMITY: ICD-10-CM

## 2021-04-08 DIAGNOSIS — Z79.01 LONG TERM (CURRENT) USE OF ANTICOAGULANTS: ICD-10-CM

## 2021-04-08 DIAGNOSIS — Z95.2 S/P MITRAL VALVE REPLACEMENT: Chronic | ICD-10-CM

## 2021-04-08 DIAGNOSIS — I48.0 PAROXYSMAL ATRIAL FIBRILLATION: Chronic | ICD-10-CM

## 2021-04-08 PROCEDURE — 93793 ANTICOAG MGMT PT WARFARIN: CPT | Mod: S$GLB,,,

## 2021-04-08 PROCEDURE — 93793 PR ANTICOAGULANT MGMT FOR PT TAKING WARFARIN: ICD-10-PCS | Mod: S$GLB,,,

## 2021-04-09 DIAGNOSIS — E03.8 OTHER SPECIFIED HYPOTHYROIDISM: Primary | ICD-10-CM

## 2021-04-12 RX ORDER — LEVOTHYROXINE SODIUM 50 UG/1
TABLET ORAL
Qty: 135 TABLET | Refills: 1 | Status: SHIPPED | OUTPATIENT
Start: 2021-04-12 | End: 2022-05-10

## 2021-04-20 ENCOUNTER — CARE AT HOME (OUTPATIENT)
Dept: HOME HEALTH SERVICES | Facility: CLINIC | Age: 86
End: 2021-04-20
Payer: MEDICARE

## 2021-04-20 VITALS
HEART RATE: 75 BPM | RESPIRATION RATE: 16 BRPM | OXYGEN SATURATION: 94 % | DIASTOLIC BLOOD PRESSURE: 82 MMHG | TEMPERATURE: 98 F | SYSTOLIC BLOOD PRESSURE: 176 MMHG

## 2021-04-20 DIAGNOSIS — R09.89 LABILE BLOOD PRESSURE: ICD-10-CM

## 2021-04-20 DIAGNOSIS — Z86.73 H/O: CVA (CEREBROVASCULAR ACCIDENT): ICD-10-CM

## 2021-04-20 DIAGNOSIS — G62.9 POLYNEUROPATHY: ICD-10-CM

## 2021-04-20 DIAGNOSIS — T82.837S: ICD-10-CM

## 2021-04-20 DIAGNOSIS — Z95.0 CARDIAC PACEMAKER: ICD-10-CM

## 2021-04-20 DIAGNOSIS — R79.1 SUPRATHERAPEUTIC INR: ICD-10-CM

## 2021-04-20 DIAGNOSIS — Z96.1 PSEUDOPHAKIA: ICD-10-CM

## 2021-04-20 DIAGNOSIS — G57.11 MERALGIA PARESTHETICA OF RIGHT SIDE: ICD-10-CM

## 2021-04-20 DIAGNOSIS — E83.42 HYPOMAGNESEMIA: ICD-10-CM

## 2021-04-20 DIAGNOSIS — Z45.018 PACEMAKER END OF LIFE: ICD-10-CM

## 2021-04-20 DIAGNOSIS — N30.00 ACUTE CYSTITIS WITHOUT HEMATURIA: ICD-10-CM

## 2021-04-20 DIAGNOSIS — Z09 FOLLOW UP: Primary | ICD-10-CM

## 2021-04-20 DIAGNOSIS — I95.1 ORTHOSTATIC HYPOTENSION: ICD-10-CM

## 2021-04-20 DIAGNOSIS — R55 SYNCOPE, UNSPECIFIED SYNCOPE TYPE: ICD-10-CM

## 2021-04-20 DIAGNOSIS — I27.9 PULMONARY HEART DISEASE: ICD-10-CM

## 2021-04-20 DIAGNOSIS — Z79.01 LONG TERM (CURRENT) USE OF ANTICOAGULANTS: ICD-10-CM

## 2021-04-20 PROCEDURE — 99499 RISK ADDL DX/OHS AUDIT: ICD-10-PCS | Mod: S$GLB,,, | Performed by: NURSE PRACTITIONER

## 2021-04-20 PROCEDURE — 99349 PR HOME VISIT,ESTAB PATIENT,LEVEL III: ICD-10-PCS | Mod: S$GLB,,, | Performed by: NURSE PRACTITIONER

## 2021-04-20 PROCEDURE — 1159F PR MEDICATION LIST DOCUMENTED IN MEDICAL RECORD: ICD-10-PCS | Mod: S$GLB,,, | Performed by: NURSE PRACTITIONER

## 2021-04-20 PROCEDURE — 1159F MED LIST DOCD IN RCRD: CPT | Mod: S$GLB,,, | Performed by: NURSE PRACTITIONER

## 2021-04-20 PROCEDURE — 99349 HOME/RES VST EST MOD MDM 40: CPT | Mod: S$GLB,,, | Performed by: NURSE PRACTITIONER

## 2021-04-20 PROCEDURE — 99499 UNLISTED E&M SERVICE: CPT | Mod: S$GLB,,, | Performed by: NURSE PRACTITIONER

## 2021-04-21 ENCOUNTER — ANTI-COAG VISIT (OUTPATIENT)
Dept: CARDIOLOGY | Facility: CLINIC | Age: 86
End: 2021-04-21
Payer: MEDICARE

## 2021-04-21 DIAGNOSIS — I48.0 PAROXYSMAL ATRIAL FIBRILLATION: Chronic | ICD-10-CM

## 2021-04-21 DIAGNOSIS — Z86.73 H/O: CVA (CEREBROVASCULAR ACCIDENT): Chronic | ICD-10-CM

## 2021-04-21 DIAGNOSIS — Z86.718 HISTORY OF DVT OF LOWER EXTREMITY: ICD-10-CM

## 2021-04-21 DIAGNOSIS — Z79.01 LONG TERM (CURRENT) USE OF ANTICOAGULANTS: ICD-10-CM

## 2021-04-21 LAB — INR PPP: 5

## 2021-04-21 PROCEDURE — 93793 PR ANTICOAGULANT MGMT FOR PT TAKING WARFARIN: ICD-10-PCS | Mod: S$GLB,,,

## 2021-04-21 PROCEDURE — 93793 ANTICOAG MGMT PT WARFARIN: CPT | Mod: S$GLB,,,

## 2021-04-22 ENCOUNTER — PATIENT MESSAGE (OUTPATIENT)
Dept: CARDIOLOGY | Facility: CLINIC | Age: 86
End: 2021-04-22

## 2021-04-22 LAB — INR PPP: 4.6

## 2021-04-22 RX ORDER — MIDODRINE HYDROCHLORIDE 5 MG/1
5 TABLET ORAL 2 TIMES DAILY WITH MEALS
Qty: 120 TABLET | Refills: 6 | Status: SHIPPED | OUTPATIENT
Start: 2021-04-22 | End: 2021-06-11 | Stop reason: SDUPTHER

## 2021-04-23 ENCOUNTER — ANTI-COAG VISIT (OUTPATIENT)
Dept: CARDIOLOGY | Facility: CLINIC | Age: 86
End: 2021-04-23
Payer: MEDICARE

## 2021-04-23 DIAGNOSIS — Z79.01 LONG TERM (CURRENT) USE OF ANTICOAGULANTS: ICD-10-CM

## 2021-04-23 PROCEDURE — 93793 PR ANTICOAGULANT MGMT FOR PT TAKING WARFARIN: ICD-10-PCS | Mod: S$GLB,,,

## 2021-04-23 PROCEDURE — 93793 ANTICOAG MGMT PT WARFARIN: CPT | Mod: S$GLB,,,

## 2021-04-26 LAB — INR PPP: 2.7

## 2021-04-27 ENCOUNTER — ANTI-COAG VISIT (OUTPATIENT)
Dept: CARDIOLOGY | Facility: CLINIC | Age: 86
End: 2021-04-27
Payer: MEDICARE

## 2021-04-27 DIAGNOSIS — Z79.01 LONG TERM (CURRENT) USE OF ANTICOAGULANTS: ICD-10-CM

## 2021-04-27 PROCEDURE — 93793 ANTICOAG MGMT PT WARFARIN: CPT | Mod: S$GLB,,,

## 2021-04-27 PROCEDURE — 93793 PR ANTICOAGULANT MGMT FOR PT TAKING WARFARIN: ICD-10-PCS | Mod: S$GLB,,,

## 2021-04-29 ENCOUNTER — ANTI-COAG VISIT (OUTPATIENT)
Dept: CARDIOLOGY | Facility: CLINIC | Age: 86
End: 2021-04-29
Payer: MEDICARE

## 2021-04-29 DIAGNOSIS — Z86.73 H/O: CVA (CEREBROVASCULAR ACCIDENT): Chronic | ICD-10-CM

## 2021-04-29 DIAGNOSIS — Z86.718 HISTORY OF DVT OF LOWER EXTREMITY: ICD-10-CM

## 2021-04-29 DIAGNOSIS — Z79.01 LONG TERM (CURRENT) USE OF ANTICOAGULANTS: ICD-10-CM

## 2021-04-29 DIAGNOSIS — Z95.2 S/P MITRAL VALVE REPLACEMENT: Chronic | ICD-10-CM

## 2021-04-29 LAB — INR PPP: 2.7

## 2021-04-29 PROCEDURE — 93793 ANTICOAG MGMT PT WARFARIN: CPT | Mod: S$GLB,,,

## 2021-04-29 PROCEDURE — 93793 PR ANTICOAGULANT MGMT FOR PT TAKING WARFARIN: ICD-10-PCS | Mod: S$GLB,,,

## 2021-05-03 ENCOUNTER — TELEPHONE (OUTPATIENT)
Dept: ADMINISTRATIVE | Facility: HOSPITAL | Age: 86
End: 2021-05-03

## 2021-05-04 LAB — INR PPP: 2.8

## 2021-05-05 ENCOUNTER — ANTI-COAG VISIT (OUTPATIENT)
Dept: CARDIOLOGY | Facility: CLINIC | Age: 86
End: 2021-05-05
Payer: MEDICARE

## 2021-05-05 DIAGNOSIS — Z79.01 LONG TERM (CURRENT) USE OF ANTICOAGULANTS: ICD-10-CM

## 2021-05-05 DIAGNOSIS — Z86.718 HISTORY OF DVT OF LOWER EXTREMITY: ICD-10-CM

## 2021-05-05 DIAGNOSIS — I48.0 PAROXYSMAL ATRIAL FIBRILLATION: Chronic | ICD-10-CM

## 2021-05-05 DIAGNOSIS — Z95.2 S/P MITRAL VALVE REPLACEMENT: Chronic | ICD-10-CM

## 2021-05-05 PROCEDURE — 93793 ANTICOAG MGMT PT WARFARIN: CPT | Mod: S$GLB,,,

## 2021-05-05 PROCEDURE — 93793 PR ANTICOAGULANT MGMT FOR PT TAKING WARFARIN: ICD-10-PCS | Mod: S$GLB,,,

## 2021-05-13 ENCOUNTER — ANTI-COAG VISIT (OUTPATIENT)
Dept: CARDIOLOGY | Facility: CLINIC | Age: 86
End: 2021-05-13
Payer: MEDICARE

## 2021-05-13 DIAGNOSIS — Z95.2 S/P MITRAL VALVE REPLACEMENT: Chronic | ICD-10-CM

## 2021-05-13 DIAGNOSIS — Z86.73 H/O: CVA (CEREBROVASCULAR ACCIDENT): Chronic | ICD-10-CM

## 2021-05-13 DIAGNOSIS — Z86.718 HISTORY OF DVT OF LOWER EXTREMITY: ICD-10-CM

## 2021-05-13 DIAGNOSIS — Z79.01 LONG TERM (CURRENT) USE OF ANTICOAGULANTS: ICD-10-CM

## 2021-05-13 LAB — INR PPP: 2.8

## 2021-05-13 PROCEDURE — 93793 PR ANTICOAGULANT MGMT FOR PT TAKING WARFARIN: ICD-10-PCS | Mod: S$GLB,,,

## 2021-05-13 PROCEDURE — 93793 ANTICOAG MGMT PT WARFARIN: CPT | Mod: S$GLB,,,

## 2021-05-15 ENCOUNTER — CLINICAL SUPPORT (OUTPATIENT)
Dept: CARDIOLOGY | Facility: HOSPITAL | Age: 86
End: 2021-05-15
Payer: MEDICARE

## 2021-05-15 DIAGNOSIS — Z95.0 PRESENCE OF CARDIAC PACEMAKER: ICD-10-CM

## 2021-05-15 PROCEDURE — 93296 REM INTERROG EVL PM/IDS: CPT | Performed by: INTERNAL MEDICINE

## 2021-05-15 PROCEDURE — 93294 REM INTERROG EVL PM/LDLS PM: CPT | Mod: S$GLB,,, | Performed by: INTERNAL MEDICINE

## 2021-05-15 PROCEDURE — 93294 CARDIAC DEVICE CHECK - REMOTE: ICD-10-PCS | Mod: S$GLB,,, | Performed by: INTERNAL MEDICINE

## 2021-05-24 ENCOUNTER — ANTI-COAG VISIT (OUTPATIENT)
Dept: CARDIOLOGY | Facility: CLINIC | Age: 86
End: 2021-05-24
Payer: MEDICARE

## 2021-05-24 DIAGNOSIS — Z86.718 HISTORY OF DVT OF LOWER EXTREMITY: ICD-10-CM

## 2021-05-24 DIAGNOSIS — Z79.01 LONG TERM (CURRENT) USE OF ANTICOAGULANTS: ICD-10-CM

## 2021-05-24 LAB — INR PPP: 5.7

## 2021-05-24 PROCEDURE — 93793 ANTICOAG MGMT PT WARFARIN: CPT | Mod: S$GLB,,,

## 2021-05-24 PROCEDURE — 93793 PR ANTICOAGULANT MGMT FOR PT TAKING WARFARIN: ICD-10-PCS | Mod: S$GLB,,,

## 2021-05-25 ENCOUNTER — CARE AT HOME (OUTPATIENT)
Dept: HOME HEALTH SERVICES | Facility: CLINIC | Age: 86
End: 2021-05-25
Payer: MEDICARE

## 2021-05-25 VITALS
RESPIRATION RATE: 16 BRPM | SYSTOLIC BLOOD PRESSURE: 103 MMHG | OXYGEN SATURATION: 98 % | DIASTOLIC BLOOD PRESSURE: 55 MMHG | TEMPERATURE: 98 F | HEART RATE: 77 BPM

## 2021-05-25 DIAGNOSIS — R79.1 SUPRATHERAPEUTIC INR: ICD-10-CM

## 2021-05-25 DIAGNOSIS — Z09 FOLLOW UP: Primary | ICD-10-CM

## 2021-05-25 PROCEDURE — 1159F PR MEDICATION LIST DOCUMENTED IN MEDICAL RECORD: ICD-10-PCS | Mod: S$GLB,,, | Performed by: NURSE PRACTITIONER

## 2021-05-25 PROCEDURE — 99350 HOME/RES VST EST HIGH MDM 60: CPT | Mod: ,,, | Performed by: NURSE PRACTITIONER

## 2021-05-25 PROCEDURE — 1159F MED LIST DOCD IN RCRD: CPT | Mod: S$GLB,,, | Performed by: NURSE PRACTITIONER

## 2021-05-25 PROCEDURE — 99350 PR HOME VISIT,ESTAB PATIENT,LEVEL IV: ICD-10-PCS | Mod: ,,, | Performed by: NURSE PRACTITIONER

## 2021-05-26 ENCOUNTER — PATIENT MESSAGE (OUTPATIENT)
Dept: CARDIOLOGY | Facility: CLINIC | Age: 86
End: 2021-05-26

## 2021-05-27 ENCOUNTER — ANTI-COAG VISIT (OUTPATIENT)
Dept: CARDIOLOGY | Facility: CLINIC | Age: 86
End: 2021-05-27
Payer: MEDICARE

## 2021-05-27 DIAGNOSIS — Z79.01 LONG TERM (CURRENT) USE OF ANTICOAGULANTS: ICD-10-CM

## 2021-05-27 LAB — INR PPP: 4.8

## 2021-05-27 PROCEDURE — 93793 PR ANTICOAGULANT MGMT FOR PT TAKING WARFARIN: ICD-10-PCS | Mod: S$GLB,,,

## 2021-05-27 PROCEDURE — 93793 ANTICOAG MGMT PT WARFARIN: CPT | Mod: S$GLB,,,

## 2021-05-31 ENCOUNTER — ANTI-COAG VISIT (OUTPATIENT)
Dept: CARDIOLOGY | Facility: CLINIC | Age: 86
End: 2021-05-31
Payer: MEDICARE

## 2021-05-31 DIAGNOSIS — Z79.01 LONG TERM (CURRENT) USE OF ANTICOAGULANTS: ICD-10-CM

## 2021-05-31 LAB — INR PPP: 2.1

## 2021-05-31 PROCEDURE — 93793 PR ANTICOAGULANT MGMT FOR PT TAKING WARFARIN: ICD-10-PCS | Mod: S$GLB,,,

## 2021-05-31 PROCEDURE — 93793 ANTICOAG MGMT PT WARFARIN: CPT | Mod: S$GLB,,,

## 2021-06-03 ENCOUNTER — ANTI-COAG VISIT (OUTPATIENT)
Dept: CARDIOLOGY | Facility: CLINIC | Age: 86
End: 2021-06-03
Payer: MEDICARE

## 2021-06-03 DIAGNOSIS — Z79.01 LONG TERM (CURRENT) USE OF ANTICOAGULANTS: ICD-10-CM

## 2021-06-03 DIAGNOSIS — Z86.718 HISTORY OF DVT OF LOWER EXTREMITY: ICD-10-CM

## 2021-06-03 LAB — INR PPP: 2.1

## 2021-06-03 PROCEDURE — 93793 ANTICOAG MGMT PT WARFARIN: CPT | Mod: S$GLB,,,

## 2021-06-03 PROCEDURE — 93793 PR ANTICOAGULANT MGMT FOR PT TAKING WARFARIN: ICD-10-PCS | Mod: S$GLB,,,

## 2021-06-09 ENCOUNTER — ANTI-COAG VISIT (OUTPATIENT)
Dept: CARDIOLOGY | Facility: CLINIC | Age: 86
End: 2021-06-09
Payer: MEDICARE

## 2021-06-09 DIAGNOSIS — Z79.01 LONG TERM (CURRENT) USE OF ANTICOAGULANTS: ICD-10-CM

## 2021-06-09 LAB — INR PPP: 2.6

## 2021-06-09 PROCEDURE — 93793 ANTICOAG MGMT PT WARFARIN: CPT | Mod: S$GLB,,,

## 2021-06-09 PROCEDURE — 93793 PR ANTICOAGULANT MGMT FOR PT TAKING WARFARIN: ICD-10-PCS | Mod: S$GLB,,,

## 2021-06-11 DIAGNOSIS — Z79.01 LONG TERM (CURRENT) USE OF ANTICOAGULANTS: ICD-10-CM

## 2021-06-11 DIAGNOSIS — I95.1 ORTHOSTATIC HYPOTENSION: ICD-10-CM

## 2021-06-11 RX ORDER — MIDODRINE HYDROCHLORIDE 5 MG/1
5 TABLET ORAL 2 TIMES DAILY WITH MEALS
Qty: 120 TABLET | Refills: 6 | Status: SHIPPED | OUTPATIENT
Start: 2021-06-11 | End: 2022-06-11

## 2021-06-15 ENCOUNTER — ANTI-COAG VISIT (OUTPATIENT)
Dept: CARDIOLOGY | Facility: CLINIC | Age: 86
End: 2021-06-15
Payer: MEDICARE

## 2021-06-15 LAB — INR PPP: 2.1

## 2021-06-15 PROCEDURE — G0250 PR MD REVIEW INTERPRET OF TEST: ICD-10-PCS | Mod: S$GLB,,,

## 2021-06-15 PROCEDURE — G0250 MD INR TEST REVIE INTER MGMT: HCPCS | Mod: S$GLB,,,

## 2021-06-22 ENCOUNTER — TELEPHONE (OUTPATIENT)
Dept: CARDIOLOGY | Facility: CLINIC | Age: 86
End: 2021-06-22

## 2021-06-22 ENCOUNTER — ANTI-COAG VISIT (OUTPATIENT)
Dept: CARDIOLOGY | Facility: CLINIC | Age: 86
End: 2021-06-22
Payer: MEDICARE

## 2021-06-22 DIAGNOSIS — Z79.01 LONG TERM (CURRENT) USE OF ANTICOAGULANTS: ICD-10-CM

## 2021-06-22 DIAGNOSIS — Z86.718 HISTORY OF DVT OF LOWER EXTREMITY: ICD-10-CM

## 2021-06-22 DIAGNOSIS — Z95.2 S/P MITRAL VALVE REPLACEMENT: Primary | ICD-10-CM

## 2021-06-22 DIAGNOSIS — I48.0 PAROXYSMAL ATRIAL FIBRILLATION: Chronic | ICD-10-CM

## 2021-06-22 LAB — INR PPP: 2.2

## 2021-06-22 PROCEDURE — 93793 PR ANTICOAGULANT MGMT FOR PT TAKING WARFARIN: ICD-10-PCS | Mod: S$GLB,,,

## 2021-06-22 PROCEDURE — 93793 ANTICOAG MGMT PT WARFARIN: CPT | Mod: S$GLB,,,

## 2021-06-30 ENCOUNTER — CARE AT HOME (OUTPATIENT)
Dept: HOME HEALTH SERVICES | Facility: CLINIC | Age: 86
End: 2021-06-30
Payer: MEDICARE

## 2021-06-30 ENCOUNTER — ANTI-COAG VISIT (OUTPATIENT)
Dept: CARDIOLOGY | Facility: CLINIC | Age: 86
End: 2021-06-30
Payer: MEDICARE

## 2021-06-30 VITALS
RESPIRATION RATE: 16 BRPM | TEMPERATURE: 98 F | SYSTOLIC BLOOD PRESSURE: 115 MMHG | OXYGEN SATURATION: 97 % | HEART RATE: 75 BPM | DIASTOLIC BLOOD PRESSURE: 62 MMHG

## 2021-06-30 DIAGNOSIS — Z09 FOLLOW UP: Primary | ICD-10-CM

## 2021-06-30 DIAGNOSIS — Z79.01 LONG TERM (CURRENT) USE OF ANTICOAGULANTS: ICD-10-CM

## 2021-06-30 LAB — INR PPP: 2

## 2021-06-30 PROCEDURE — 93793 PR ANTICOAGULANT MGMT FOR PT TAKING WARFARIN: ICD-10-PCS | Mod: S$GLB,,,

## 2021-06-30 PROCEDURE — 99350 HOME/RES VST EST HIGH MDM 60: CPT | Mod: 25,,, | Performed by: NURSE PRACTITIONER

## 2021-06-30 PROCEDURE — 1159F MED LIST DOCD IN RCRD: CPT | Mod: S$GLB,,, | Performed by: NURSE PRACTITIONER

## 2021-06-30 PROCEDURE — 99350 PR HOME VISIT,ESTAB PATIENT,LEVEL IV: ICD-10-PCS | Mod: 25,,, | Performed by: NURSE PRACTITIONER

## 2021-06-30 PROCEDURE — 1159F PR MEDICATION LIST DOCUMENTED IN MEDICAL RECORD: ICD-10-PCS | Mod: S$GLB,,, | Performed by: NURSE PRACTITIONER

## 2021-06-30 PROCEDURE — 93793 ANTICOAG MGMT PT WARFARIN: CPT | Mod: S$GLB,,,

## 2021-07-07 LAB — INR PPP: 2.3

## 2021-07-09 ENCOUNTER — ANTI-COAG VISIT (OUTPATIENT)
Dept: CARDIOLOGY | Facility: CLINIC | Age: 86
End: 2021-07-09
Payer: MEDICARE

## 2021-07-09 DIAGNOSIS — Z79.01 LONG TERM (CURRENT) USE OF ANTICOAGULANTS: ICD-10-CM

## 2021-07-09 PROCEDURE — 99499 NO LOS: ICD-10-PCS | Mod: S$GLB,,,

## 2021-07-09 PROCEDURE — 99499 UNLISTED E&M SERVICE: CPT | Mod: S$GLB,,,

## 2021-07-14 LAB — INR PPP: 2.7

## 2021-07-15 ENCOUNTER — ANTI-COAG VISIT (OUTPATIENT)
Dept: CARDIOLOGY | Facility: CLINIC | Age: 86
End: 2021-07-15
Payer: MEDICARE

## 2021-07-15 DIAGNOSIS — Z79.01 LONG TERM (CURRENT) USE OF ANTICOAGULANTS: ICD-10-CM

## 2021-07-15 PROCEDURE — 93793 ANTICOAG MGMT PT WARFARIN: CPT | Mod: S$GLB,,,

## 2021-07-15 PROCEDURE — 93793 PR ANTICOAGULANT MGMT FOR PT TAKING WARFARIN: ICD-10-PCS | Mod: S$GLB,,,

## 2021-07-20 LAB — INR PPP: 2.9

## 2021-07-21 ENCOUNTER — ANTI-COAG VISIT (OUTPATIENT)
Dept: CARDIOLOGY | Facility: CLINIC | Age: 86
End: 2021-07-21
Payer: MEDICARE

## 2021-07-21 DIAGNOSIS — Z79.01 LONG TERM (CURRENT) USE OF ANTICOAGULANTS: ICD-10-CM

## 2021-07-21 PROCEDURE — G0250 MD INR TEST REVIE INTER MGMT: HCPCS | Mod: S$GLB,,,

## 2021-07-21 PROCEDURE — G0250 PR MD REVIEW INTERPRET OF TEST: ICD-10-PCS | Mod: S$GLB,,,

## 2021-07-27 LAB — INR PPP: 2.5

## 2021-07-28 ENCOUNTER — ANTI-COAG VISIT (OUTPATIENT)
Dept: CARDIOLOGY | Facility: CLINIC | Age: 86
End: 2021-07-28
Payer: MEDICARE

## 2021-07-28 DIAGNOSIS — Z79.01 LONG TERM (CURRENT) USE OF ANTICOAGULANTS: ICD-10-CM

## 2021-07-28 DIAGNOSIS — Z86.73 H/O: CVA (CEREBROVASCULAR ACCIDENT): Chronic | ICD-10-CM

## 2021-07-28 DIAGNOSIS — Z95.2 S/P MITRAL VALVE REPLACEMENT: Chronic | ICD-10-CM

## 2021-07-28 DIAGNOSIS — Z86.718 HISTORY OF DVT OF LOWER EXTREMITY: ICD-10-CM

## 2021-07-28 PROCEDURE — 99499 NO LOS: ICD-10-PCS | Mod: S$GLB,,,

## 2021-07-28 PROCEDURE — 99499 UNLISTED E&M SERVICE: CPT | Mod: S$GLB,,,

## 2021-08-03 ENCOUNTER — ANTI-COAG VISIT (OUTPATIENT)
Dept: CARDIOLOGY | Facility: CLINIC | Age: 86
End: 2021-08-03
Payer: MEDICARE

## 2021-08-03 ENCOUNTER — CARE AT HOME (OUTPATIENT)
Dept: HOME HEALTH SERVICES | Facility: CLINIC | Age: 86
End: 2021-08-03
Payer: MEDICARE

## 2021-08-03 DIAGNOSIS — Z09 FOLLOW UP: Primary | ICD-10-CM

## 2021-08-03 DIAGNOSIS — Z79.01 LONG TERM (CURRENT) USE OF ANTICOAGULANTS: ICD-10-CM

## 2021-08-03 LAB — INR PPP: 1.5

## 2021-08-03 PROCEDURE — 99443 PR PHYSICIAN TELEPHONE EVALUATION 21-30 MIN: ICD-10-PCS | Mod: 95,,, | Performed by: NURSE PRACTITIONER

## 2021-08-03 PROCEDURE — 99499 UNLISTED E&M SERVICE: CPT | Mod: S$GLB,,, | Performed by: INTERNAL MEDICINE

## 2021-08-03 PROCEDURE — 99443 PR PHYSICIAN TELEPHONE EVALUATION 21-30 MIN: CPT | Mod: 95,,, | Performed by: NURSE PRACTITIONER

## 2021-08-03 PROCEDURE — 99499 NO LOS: ICD-10-PCS | Mod: S$GLB,,, | Performed by: INTERNAL MEDICINE

## 2021-08-10 ENCOUNTER — ANTI-COAG VISIT (OUTPATIENT)
Dept: CARDIOLOGY | Facility: CLINIC | Age: 86
End: 2021-08-10
Payer: MEDICARE

## 2021-08-10 DIAGNOSIS — Z79.01 LONG TERM (CURRENT) USE OF ANTICOAGULANTS: ICD-10-CM

## 2021-08-10 LAB — INR PPP: 1.9

## 2021-08-10 PROCEDURE — 99499 UNLISTED E&M SERVICE: CPT | Mod: S$GLB,,, | Performed by: INTERNAL MEDICINE

## 2021-08-10 PROCEDURE — 99499 NO LOS: ICD-10-PCS | Mod: S$GLB,,, | Performed by: INTERNAL MEDICINE

## 2021-08-13 ENCOUNTER — ANTI-COAG VISIT (OUTPATIENT)
Dept: CARDIOLOGY | Facility: CLINIC | Age: 86
End: 2021-08-13
Payer: MEDICARE

## 2021-08-13 ENCOUNTER — CLINICAL SUPPORT (OUTPATIENT)
Dept: CARDIOLOGY | Facility: HOSPITAL | Age: 86
End: 2021-08-13
Payer: MEDICARE

## 2021-08-13 DIAGNOSIS — Z95.0 PRESENCE OF CARDIAC PACEMAKER: ICD-10-CM

## 2021-08-13 DIAGNOSIS — Z79.01 LONG TERM (CURRENT) USE OF ANTICOAGULANTS: ICD-10-CM

## 2021-08-13 LAB — INR PPP: 2.1

## 2021-08-13 PROCEDURE — 99499 NO LOS: ICD-10-PCS | Mod: S$GLB,,, | Performed by: INTERNAL MEDICINE

## 2021-08-13 PROCEDURE — 93294 REM INTERROG EVL PM/LDLS PM: CPT | Mod: S$GLB,,, | Performed by: INTERNAL MEDICINE

## 2021-08-13 PROCEDURE — 99499 UNLISTED E&M SERVICE: CPT | Mod: S$GLB,,, | Performed by: INTERNAL MEDICINE

## 2021-08-13 PROCEDURE — 93294 CARDIAC DEVICE CHECK - REMOTE: ICD-10-PCS | Mod: S$GLB,,, | Performed by: INTERNAL MEDICINE

## 2021-08-13 PROCEDURE — 93296 REM INTERROG EVL PM/IDS: CPT | Performed by: INTERNAL MEDICINE

## 2021-08-17 ENCOUNTER — ANTI-COAG VISIT (OUTPATIENT)
Dept: CARDIOLOGY | Facility: CLINIC | Age: 86
End: 2021-08-17
Payer: MEDICARE

## 2021-08-17 DIAGNOSIS — Z79.01 LONG TERM (CURRENT) USE OF ANTICOAGULANTS: ICD-10-CM

## 2021-08-17 LAB — INR PPP: 2.4

## 2021-08-17 PROCEDURE — 99499 UNLISTED E&M SERVICE: CPT | Mod: S$GLB,,,

## 2021-08-17 PROCEDURE — 99499 NO LOS: ICD-10-PCS | Mod: S$GLB,,,

## 2021-08-24 ENCOUNTER — ANTI-COAG VISIT (OUTPATIENT)
Dept: CARDIOLOGY | Facility: CLINIC | Age: 86
End: 2021-08-24
Payer: MEDICARE

## 2021-08-24 DIAGNOSIS — Z79.01 LONG TERM (CURRENT) USE OF ANTICOAGULANTS: ICD-10-CM

## 2021-08-24 LAB — INR PPP: 2.3

## 2021-08-24 PROCEDURE — 93793 ANTICOAG MGMT PT WARFARIN: CPT | Mod: S$GLB,,,

## 2021-08-24 PROCEDURE — 93793 PR ANTICOAGULANT MGMT FOR PT TAKING WARFARIN: ICD-10-PCS | Mod: S$GLB,,,

## 2021-09-02 ENCOUNTER — ANTI-COAG VISIT (OUTPATIENT)
Dept: CARDIOLOGY | Facility: CLINIC | Age: 86
End: 2021-09-02
Payer: MEDICARE

## 2021-09-02 DIAGNOSIS — Z79.01 LONG TERM (CURRENT) USE OF ANTICOAGULANTS: ICD-10-CM

## 2021-09-02 DIAGNOSIS — Z95.2 S/P MITRAL VALVE REPLACEMENT: Chronic | ICD-10-CM

## 2021-09-02 DIAGNOSIS — Z86.718 HISTORY OF DVT OF LOWER EXTREMITY: ICD-10-CM

## 2021-09-02 DIAGNOSIS — I48.0 PAROXYSMAL ATRIAL FIBRILLATION: Chronic | ICD-10-CM

## 2021-09-02 LAB — INR PPP: 2.3

## 2021-09-02 PROCEDURE — G0250 MD INR TEST REVIE INTER MGMT: HCPCS | Mod: S$GLB,,,

## 2021-09-02 PROCEDURE — G0250 PR MD REVIEW INTERPRET OF TEST: ICD-10-PCS | Mod: S$GLB,,,

## 2021-09-07 ENCOUNTER — CARE AT HOME (OUTPATIENT)
Dept: HOME HEALTH SERVICES | Facility: CLINIC | Age: 86
End: 2021-09-07
Payer: MEDICARE

## 2021-09-07 DIAGNOSIS — Z09 FOLLOW UP: Primary | ICD-10-CM

## 2021-09-07 PROCEDURE — 99443 PR PHYSICIAN TELEPHONE EVALUATION 21-30 MIN: ICD-10-PCS | Mod: 95,,, | Performed by: NURSE PRACTITIONER

## 2021-09-07 PROCEDURE — 99443 PR PHYSICIAN TELEPHONE EVALUATION 21-30 MIN: CPT | Mod: 95,,, | Performed by: NURSE PRACTITIONER

## 2021-09-08 ENCOUNTER — ANTI-COAG VISIT (OUTPATIENT)
Dept: CARDIOLOGY | Facility: CLINIC | Age: 86
End: 2021-09-08
Payer: MEDICARE

## 2021-09-08 DIAGNOSIS — Z79.01 LONG TERM (CURRENT) USE OF ANTICOAGULANTS: ICD-10-CM

## 2021-09-08 LAB — INR PPP: 2.4

## 2021-09-13 LAB — INR PPP: 2.9

## 2021-09-14 ENCOUNTER — ANTI-COAG VISIT (OUTPATIENT)
Dept: CARDIOLOGY | Facility: CLINIC | Age: 86
End: 2021-09-14
Payer: MEDICARE

## 2021-09-14 DIAGNOSIS — Z79.01 LONG TERM (CURRENT) USE OF ANTICOAGULANTS: ICD-10-CM

## 2021-09-14 PROCEDURE — 99499 UNLISTED E&M SERVICE: CPT | Mod: S$GLB,,,

## 2021-09-14 PROCEDURE — 99499 NO LOS: ICD-10-PCS | Mod: S$GLB,,,

## 2021-09-20 ENCOUNTER — ANTI-COAG VISIT (OUTPATIENT)
Dept: CARDIOLOGY | Facility: CLINIC | Age: 86
End: 2021-09-20
Payer: MEDICARE

## 2021-09-20 DIAGNOSIS — Z79.01 LONG TERM (CURRENT) USE OF ANTICOAGULANTS: ICD-10-CM

## 2021-09-20 LAB — INR PPP: 4

## 2021-09-20 PROCEDURE — 93793 PR ANTICOAGULANT MGMT FOR PT TAKING WARFARIN: ICD-10-PCS | Mod: S$GLB,,,

## 2021-09-20 PROCEDURE — 93793 ANTICOAG MGMT PT WARFARIN: CPT | Mod: S$GLB,,,

## 2021-09-23 ENCOUNTER — TELEPHONE (OUTPATIENT)
Dept: CARDIOLOGY | Facility: HOSPITAL | Age: 86
End: 2021-09-23

## 2021-09-27 LAB — INR PPP: 2.4

## 2021-09-28 ENCOUNTER — ANTI-COAG VISIT (OUTPATIENT)
Dept: CARDIOLOGY | Facility: CLINIC | Age: 86
End: 2021-09-28
Payer: MEDICARE

## 2021-09-28 DIAGNOSIS — Z79.01 LONG TERM (CURRENT) USE OF ANTICOAGULANTS: ICD-10-CM

## 2021-09-28 PROCEDURE — 93793 ANTICOAG MGMT PT WARFARIN: CPT | Mod: S$GLB,,,

## 2021-09-28 PROCEDURE — 93793 PR ANTICOAGULANT MGMT FOR PT TAKING WARFARIN: ICD-10-PCS | Mod: S$GLB,,,

## 2021-10-04 LAB — INR PPP: 2.6

## 2021-10-05 ENCOUNTER — ANTI-COAG VISIT (OUTPATIENT)
Dept: CARDIOLOGY | Facility: CLINIC | Age: 86
End: 2021-10-05
Payer: MEDICARE

## 2021-10-05 DIAGNOSIS — Z79.01 LONG TERM (CURRENT) USE OF ANTICOAGULANTS: ICD-10-CM

## 2021-10-05 PROCEDURE — 93793 PR ANTICOAGULANT MGMT FOR PT TAKING WARFARIN: ICD-10-PCS | Mod: S$GLB,,,

## 2021-10-05 PROCEDURE — 93793 ANTICOAG MGMT PT WARFARIN: CPT | Mod: S$GLB,,,

## 2021-10-11 ENCOUNTER — ANTI-COAG VISIT (OUTPATIENT)
Dept: CARDIOLOGY | Facility: CLINIC | Age: 86
End: 2021-10-11
Payer: MEDICARE

## 2021-10-11 DIAGNOSIS — Z79.01 LONG TERM (CURRENT) USE OF ANTICOAGULANTS: ICD-10-CM

## 2021-10-11 LAB — INR PPP: 2.8

## 2021-10-11 PROCEDURE — 93793 ANTICOAG MGMT PT WARFARIN: CPT | Mod: S$GLB,,,

## 2021-10-11 PROCEDURE — 93793 PR ANTICOAGULANT MGMT FOR PT TAKING WARFARIN: ICD-10-PCS | Mod: S$GLB,,,

## 2021-10-13 ENCOUNTER — CARE AT HOME (OUTPATIENT)
Dept: HOME HEALTH SERVICES | Facility: CLINIC | Age: 86
End: 2021-10-13
Payer: MEDICARE

## 2021-10-13 VITALS
HEART RATE: 76 BPM | DIASTOLIC BLOOD PRESSURE: 87 MMHG | TEMPERATURE: 99 F | SYSTOLIC BLOOD PRESSURE: 144 MMHG | OXYGEN SATURATION: 98 % | RESPIRATION RATE: 18 BRPM

## 2021-10-13 DIAGNOSIS — Z09 FOLLOW UP: Primary | ICD-10-CM

## 2021-10-13 PROCEDURE — 99350 HOME/RES VST EST HIGH MDM 60: CPT | Mod: ,,, | Performed by: NURSE PRACTITIONER

## 2021-10-13 PROCEDURE — 99350 PR HOME VISIT,ESTAB PATIENT,LEVEL IV: ICD-10-PCS | Mod: ,,, | Performed by: NURSE PRACTITIONER

## 2021-10-18 ENCOUNTER — ANTI-COAG VISIT (OUTPATIENT)
Dept: CARDIOLOGY | Facility: CLINIC | Age: 86
End: 2021-10-18
Payer: MEDICARE

## 2021-10-18 DIAGNOSIS — Z79.01 LONG TERM (CURRENT) USE OF ANTICOAGULANTS: ICD-10-CM

## 2021-10-18 LAB — INR PPP: 2.8

## 2021-10-18 PROCEDURE — 93793 ANTICOAG MGMT PT WARFARIN: CPT | Mod: S$GLB,,,

## 2021-10-18 PROCEDURE — 93793 PR ANTICOAGULANT MGMT FOR PT TAKING WARFARIN: ICD-10-PCS | Mod: S$GLB,,,

## 2021-10-21 DIAGNOSIS — I48.0 PAROXYSMAL ATRIAL FIBRILLATION: ICD-10-CM

## 2021-10-21 DIAGNOSIS — Z79.01 LONG TERM (CURRENT) USE OF ANTICOAGULANTS: Primary | ICD-10-CM

## 2021-10-21 DIAGNOSIS — Z95.0 PRESENCE OF CARDIAC PACEMAKER: ICD-10-CM

## 2021-10-21 DIAGNOSIS — I44.2 THIRD DEGREE AV BLOCK: ICD-10-CM

## 2021-10-25 ENCOUNTER — TELEPHONE (OUTPATIENT)
Dept: CARDIOLOGY | Facility: HOSPITAL | Age: 86
End: 2021-10-25
Payer: MEDICARE

## 2021-10-25 LAB — INR PPP: 2

## 2021-10-26 ENCOUNTER — ANTI-COAG VISIT (OUTPATIENT)
Dept: CARDIOLOGY | Facility: CLINIC | Age: 86
End: 2021-10-26
Payer: MEDICARE

## 2021-10-26 DIAGNOSIS — Z79.01 LONG TERM (CURRENT) USE OF ANTICOAGULANTS: ICD-10-CM

## 2021-10-26 PROCEDURE — G0250 PR MD REVIEW INTERPRET OF TEST: ICD-10-PCS | Mod: S$GLB,,, | Performed by: INTERNAL MEDICINE

## 2021-10-26 PROCEDURE — G0250 MD INR TEST REVIE INTER MGMT: HCPCS | Mod: S$GLB,,, | Performed by: INTERNAL MEDICINE

## 2021-11-01 ENCOUNTER — ANTI-COAG VISIT (OUTPATIENT)
Dept: CARDIOLOGY | Facility: CLINIC | Age: 86
End: 2021-11-01
Payer: MEDICARE

## 2021-11-01 DIAGNOSIS — Z79.01 LONG TERM (CURRENT) USE OF ANTICOAGULANTS: ICD-10-CM

## 2021-11-01 LAB — INR PPP: 2.7

## 2021-11-01 PROCEDURE — 99499 NO LOS: ICD-10-PCS | Mod: S$GLB,,,

## 2021-11-01 PROCEDURE — 99499 UNLISTED E&M SERVICE: CPT | Mod: S$GLB,,,

## 2021-11-08 LAB — INR PPP: 3.5

## 2021-11-09 ENCOUNTER — ANTI-COAG VISIT (OUTPATIENT)
Dept: CARDIOLOGY | Facility: CLINIC | Age: 86
End: 2021-11-09
Payer: MEDICARE

## 2021-11-09 DIAGNOSIS — Z79.01 LONG TERM (CURRENT) USE OF ANTICOAGULANTS: ICD-10-CM

## 2021-11-09 PROCEDURE — 99499 UNLISTED E&M SERVICE: CPT | Mod: S$GLB,,,

## 2021-11-09 PROCEDURE — 99499 NO LOS: ICD-10-PCS | Mod: S$GLB,,,

## 2021-11-11 ENCOUNTER — CLINICAL SUPPORT (OUTPATIENT)
Dept: CARDIOLOGY | Facility: HOSPITAL | Age: 86
End: 2021-11-11
Payer: MEDICARE

## 2021-11-11 DIAGNOSIS — Z95.0 PRESENCE OF CARDIAC PACEMAKER: ICD-10-CM

## 2021-11-11 PROCEDURE — 93296 REM INTERROG EVL PM/IDS: CPT | Mod: HCNC | Performed by: INTERNAL MEDICINE

## 2021-11-11 PROCEDURE — 93294 REM INTERROG EVL PM/LDLS PM: CPT | Mod: HCNC,S$GLB,, | Performed by: INTERNAL MEDICINE

## 2021-11-11 PROCEDURE — 93294 CARDIAC DEVICE CHECK - REMOTE: ICD-10-PCS | Mod: HCNC,S$GLB,, | Performed by: INTERNAL MEDICINE

## 2021-11-12 LAB — INR PPP: 3.3

## 2021-11-15 ENCOUNTER — ANTI-COAG VISIT (OUTPATIENT)
Dept: CARDIOLOGY | Facility: CLINIC | Age: 86
End: 2021-11-15
Payer: MEDICARE

## 2021-11-15 DIAGNOSIS — Z79.01 LONG TERM (CURRENT) USE OF ANTICOAGULANTS: ICD-10-CM

## 2021-11-15 LAB — INR PPP: 2.5

## 2021-11-15 PROCEDURE — 93793 PR ANTICOAGULANT MGMT FOR PT TAKING WARFARIN: ICD-10-PCS | Mod: S$GLB,,,

## 2021-11-15 PROCEDURE — 93793 ANTICOAG MGMT PT WARFARIN: CPT | Mod: S$GLB,,,

## 2021-11-17 ENCOUNTER — ANTI-COAG VISIT (OUTPATIENT)
Dept: CARDIOLOGY | Facility: CLINIC | Age: 86
End: 2021-11-17
Payer: MEDICARE

## 2021-11-17 DIAGNOSIS — Z95.2 S/P MITRAL VALVE REPLACEMENT: Chronic | ICD-10-CM

## 2021-11-17 DIAGNOSIS — Z79.01 LONG TERM (CURRENT) USE OF ANTICOAGULANTS: ICD-10-CM

## 2021-11-17 DIAGNOSIS — I48.0 PAROXYSMAL ATRIAL FIBRILLATION: Chronic | ICD-10-CM

## 2021-11-17 DIAGNOSIS — Z86.718 HISTORY OF DVT OF LOWER EXTREMITY: ICD-10-CM

## 2021-11-17 PROCEDURE — 99499 UNLISTED E&M SERVICE: CPT | Mod: S$GLB,,,

## 2021-11-17 PROCEDURE — 99499 NO LOS: ICD-10-PCS | Mod: S$GLB,,,

## 2021-11-22 LAB — INR PPP: 2.4

## 2021-11-23 ENCOUNTER — ANTI-COAG VISIT (OUTPATIENT)
Dept: CARDIOLOGY | Facility: CLINIC | Age: 86
End: 2021-11-23
Payer: MEDICARE

## 2021-11-23 DIAGNOSIS — Z79.01 LONG TERM (CURRENT) USE OF ANTICOAGULANTS: ICD-10-CM

## 2021-11-23 PROCEDURE — 99499 UNLISTED E&M SERVICE: CPT | Mod: S$GLB,,,

## 2021-11-23 PROCEDURE — 99499 NO LOS: ICD-10-PCS | Mod: S$GLB,,,

## 2021-11-24 ENCOUNTER — TELEPHONE (OUTPATIENT)
Dept: CARDIOLOGY | Facility: HOSPITAL | Age: 86
End: 2021-11-24
Payer: MEDICARE

## 2021-11-24 ENCOUNTER — CARE AT HOME (OUTPATIENT)
Dept: HOME HEALTH SERVICES | Facility: CLINIC | Age: 86
End: 2021-11-24
Payer: MEDICARE

## 2021-11-24 VITALS
OXYGEN SATURATION: 98 % | SYSTOLIC BLOOD PRESSURE: 121 MMHG | HEART RATE: 75 BPM | TEMPERATURE: 97 F | RESPIRATION RATE: 16 BRPM | DIASTOLIC BLOOD PRESSURE: 62 MMHG

## 2021-11-24 DIAGNOSIS — Z09 FOLLOW UP: Primary | ICD-10-CM

## 2021-11-24 PROCEDURE — 99350 PR HOME VISIT,ESTAB PATIENT,LEVEL IV: ICD-10-PCS | Mod: ,,, | Performed by: NURSE PRACTITIONER

## 2021-11-24 PROCEDURE — 99350 HOME/RES VST EST HIGH MDM 60: CPT | Mod: ,,, | Performed by: NURSE PRACTITIONER

## 2021-11-29 LAB — INR PPP: 2.5

## 2021-11-30 ENCOUNTER — ANTI-COAG VISIT (OUTPATIENT)
Dept: CARDIOLOGY | Facility: CLINIC | Age: 86
End: 2021-11-30
Payer: MEDICARE

## 2021-11-30 DIAGNOSIS — Z79.01 LONG TERM (CURRENT) USE OF ANTICOAGULANTS: ICD-10-CM

## 2021-11-30 PROCEDURE — G0250 MD INR TEST REVIE INTER MGMT: HCPCS | Mod: S$GLB,,,

## 2021-11-30 PROCEDURE — G0250 PR MD REVIEW INTERPRET OF TEST: ICD-10-PCS | Mod: S$GLB,,,

## 2021-12-07 ENCOUNTER — ANTI-COAG VISIT (OUTPATIENT)
Dept: CARDIOLOGY | Facility: CLINIC | Age: 86
End: 2021-12-07
Payer: MEDICARE

## 2021-12-07 DIAGNOSIS — Z79.01 LONG TERM (CURRENT) USE OF ANTICOAGULANTS: ICD-10-CM

## 2021-12-07 LAB — INR PPP: 2.6

## 2021-12-07 PROCEDURE — 99499 UNLISTED E&M SERVICE: CPT | Mod: S$GLB,,,

## 2021-12-07 PROCEDURE — 99499 NO LOS: ICD-10-PCS | Mod: S$GLB,,,

## 2021-12-14 LAB — INR PPP: 2.8

## 2021-12-15 ENCOUNTER — ANTI-COAG VISIT (OUTPATIENT)
Dept: CARDIOLOGY | Facility: CLINIC | Age: 86
End: 2021-12-15
Payer: MEDICARE

## 2021-12-15 DIAGNOSIS — Z86.718 HISTORY OF DVT OF LOWER EXTREMITY: ICD-10-CM

## 2021-12-15 DIAGNOSIS — I48.0 PAROXYSMAL ATRIAL FIBRILLATION: Chronic | ICD-10-CM

## 2021-12-15 DIAGNOSIS — Z95.2 S/P MITRAL VALVE REPLACEMENT: Chronic | ICD-10-CM

## 2021-12-15 DIAGNOSIS — Z79.01 LONG TERM (CURRENT) USE OF ANTICOAGULANTS: ICD-10-CM

## 2021-12-15 PROCEDURE — 99499 NO LOS: ICD-10-PCS | Mod: S$GLB,,,

## 2021-12-15 PROCEDURE — 99499 UNLISTED E&M SERVICE: CPT | Mod: S$GLB,,,

## 2021-12-21 ENCOUNTER — ANTI-COAG VISIT (OUTPATIENT)
Dept: CARDIOLOGY | Facility: CLINIC | Age: 86
End: 2021-12-21
Payer: MEDICARE

## 2021-12-21 DIAGNOSIS — Z79.01 LONG TERM (CURRENT) USE OF ANTICOAGULANTS: ICD-10-CM

## 2021-12-21 LAB — INR PPP: 3.8

## 2021-12-21 PROCEDURE — 99499 NO LOS: ICD-10-PCS | Mod: S$GLB,,,

## 2021-12-21 PROCEDURE — 99499 UNLISTED E&M SERVICE: CPT | Mod: S$GLB,,,

## 2021-12-28 LAB — INR PPP: 4

## 2021-12-29 ENCOUNTER — ANTI-COAG VISIT (OUTPATIENT)
Dept: CARDIOLOGY | Facility: CLINIC | Age: 86
End: 2021-12-29
Payer: MEDICARE

## 2021-12-29 DIAGNOSIS — Z79.01 LONG TERM (CURRENT) USE OF ANTICOAGULANTS: ICD-10-CM

## 2021-12-29 PROCEDURE — G0250 PR MD REVIEW INTERPRET OF TEST: ICD-10-PCS | Mod: S$GLB,,, | Performed by: INTERNAL MEDICINE

## 2021-12-29 PROCEDURE — G0250 MD INR TEST REVIE INTER MGMT: HCPCS | Mod: S$GLB,,, | Performed by: INTERNAL MEDICINE

## 2022-01-04 LAB — INR PPP: 3.3

## 2022-01-05 ENCOUNTER — ANTI-COAG VISIT (OUTPATIENT)
Dept: CARDIOLOGY | Facility: CLINIC | Age: 87
End: 2022-01-05
Payer: MEDICARE

## 2022-01-05 DIAGNOSIS — Z79.01 LONG TERM (CURRENT) USE OF ANTICOAGULANTS: ICD-10-CM

## 2022-01-05 PROCEDURE — 99499 NO LOS: ICD-10-PCS | Mod: S$GLB,,, | Performed by: INTERNAL MEDICINE

## 2022-01-05 PROCEDURE — 99499 UNLISTED E&M SERVICE: CPT | Mod: S$GLB,,, | Performed by: INTERNAL MEDICINE

## 2022-01-05 NOTE — PROGRESS NOTES
Abrahan reports an INR of 3.3 - test date 11/4/22 - in range.  We will make an adjustment to try and maintain range.  New dose will be 0.5 mg daily x 0 mg on Wednesdays. Continue current greens intake.  Abrahan confirms understanding.   Repeat INR on Tuesday.  Will await [employer] fax and attach.

## 2022-01-06 DIAGNOSIS — F41.9 ANXIETY: ICD-10-CM

## 2022-01-06 DIAGNOSIS — F32.89 OTHER DEPRESSION: ICD-10-CM

## 2022-01-06 RX ORDER — ESCITALOPRAM OXALATE 10 MG/1
TABLET ORAL
Qty: 90 TABLET | Refills: 3 | Status: SHIPPED | OUTPATIENT
Start: 2022-01-06 | End: 2022-03-04

## 2022-01-06 NOTE — TELEPHONE ENCOUNTER
Encounter details require adjustment(s)/ updating by ORC Staff  As of this time CDM: did not populate or display   Adjustment(s) made: Department  CDM should display. Medication(s) delegated by the OR.  Will resend refill request encounter to P Centralized Refill Staff Pool.   [facility]   Note composed:4:44 PM 01/06/2022

## 2022-01-11 ENCOUNTER — ANTI-COAG VISIT (OUTPATIENT)
Dept: CARDIOLOGY | Facility: CLINIC | Age: 87
End: 2022-01-11
Payer: MEDICARE

## 2022-01-11 DIAGNOSIS — Z86.718 HISTORY OF DVT OF LOWER EXTREMITY: ICD-10-CM

## 2022-01-11 DIAGNOSIS — Z79.01 LONG TERM (CURRENT) USE OF ANTICOAGULANTS: ICD-10-CM

## 2022-01-11 DIAGNOSIS — I48.0 PAROXYSMAL ATRIAL FIBRILLATION: Chronic | ICD-10-CM

## 2022-01-11 LAB — INR PPP: 2.7

## 2022-01-11 NOTE — PROGRESS NOTES
Fax received from "[employer].  INR is therapeutic at 2.7.  Test Date: 1/11/22.  Attempted to contact patient's daughter Chary.  Left voice message to continue warfarin 0 mg on Wednesdays; and 0.5 mg all other days. Recheck in one week.  Left call back number [phone] to call and confirm receipt.

## 2022-01-18 ENCOUNTER — ANTI-COAG VISIT (OUTPATIENT)
Dept: CARDIOLOGY | Facility: CLINIC | Age: 87
End: 2022-01-18
Payer: MEDICARE

## 2022-01-18 ENCOUNTER — CARE AT HOME (OUTPATIENT)
Dept: HOME HEALTH SERVICES | Facility: CLINIC | Age: 87
End: 2022-01-18
Payer: MEDICARE

## 2022-01-18 VITALS
DIASTOLIC BLOOD PRESSURE: 70 MMHG | TEMPERATURE: 97 F | SYSTOLIC BLOOD PRESSURE: 128 MMHG | RESPIRATION RATE: 18 BRPM | HEART RATE: 73 BPM | OXYGEN SATURATION: 97 %

## 2022-01-18 DIAGNOSIS — Z79.01 LONG TERM (CURRENT) USE OF ANTICOAGULANTS: ICD-10-CM

## 2022-01-18 DIAGNOSIS — R68.89 SUSPECTED DEEP TISSUE INJURY: ICD-10-CM

## 2022-01-18 DIAGNOSIS — R21: Primary | ICD-10-CM

## 2022-01-18 DIAGNOSIS — Z09 FOLLOW UP: ICD-10-CM

## 2022-01-18 LAB — INR PPP: 2.1

## 2022-01-18 PROCEDURE — 99499 UNLISTED E&M SERVICE: CPT | Mod: S$GLB,,,

## 2022-01-18 PROCEDURE — 99350 PR HOME VISIT,ESTAB PATIENT,LEVEL IV: ICD-10-PCS | Mod: ,,, | Performed by: NURSE PRACTITIONER

## 2022-01-18 PROCEDURE — 99350 HOME/RES VST EST HIGH MDM 60: CPT | Mod: ,,, | Performed by: NURSE PRACTITIONER

## 2022-01-18 PROCEDURE — 99499 NO LOS: ICD-10-PCS | Mod: S$GLB,,,

## 2022-01-18 NOTE — PROGRESS NOTES
Message received from Abrahan - INR 2.1.  Decreased from last week.  I have advised her to keep the same amount of greens, but we will increase dose tomorrow with a 0.5 mg dose instead on 0 mg.  Repeat INR in 1 week.

## 2022-01-18 NOTE — PROGRESS NOTES
"[facility]  Medical Home Visit    Visit Date: 1/18/2022  Encounter Provider: Felton PERRIN  PCP:  Geoff Kaur NP    Subjective:      Patient ID: Josy Posey is a 89 y.o. female.    Consult Requested By:  No ref. provider found  Reason for Consult:  Established Patient    Josy is being seen at home due to physical debility that presents a taxing effort to leave the home, to mitigate high risk of hospital readmission and/or due to the limited availability of reliable or safe options for transportation to the point of access to the provider. Prior to treatment on this visit the chart was reviewed and patient verbal consent was obtained.      Chief Complaint:Follow-up for chronic medical conditions/medication review     Mrs Posey is a 89 year old female being seen today for follow up for her chronic medical conditions and medication review. Upon entering patient's home patient was lying in bed finishing her breakfast and watching TV. Patient's caregiver was present for the visit and was able to report patients progress since last NP visit. Patient's daughter has been checking patient's INR every week and adjusting medication as directed by Dr Alvarado. Caregiver reports no issues with blood pressure since last visit. Caregiver does report that patient has a rash to her "privates" and a red spot on her right heel that has improved. Caregiver reports patient compliance to all medications.     Review of Systems   Constitutional: Positive for fatigue. Negative for appetite change.   HENT: Negative for congestion.    Respiratory: Negative for chest tightness and shortness of breath.    Cardiovascular: Negative for chest pain and leg swelling.   Gastrointestinal: Negative for abdominal pain and nausea.   Genitourinary: Positive for enuresis.   Musculoskeletal: Positive for gait problem.   Skin: Positive for rash.   Neurological: Positive for weakness. Negative for dizziness and syncope. "   Psychiatric/Behavioral: Negative for agitation and confusion.       Assessments:  · Environmental: home is clean, well lit and a comfortable temperature  · Functional Status: patient is bedbound and needs assistance with all of her ADLs  · Safety: Fall Risk  · Nutritional: Adequate food in the home and patient is offered food and water at regular intervals during the day  · Home Health/DME/Supplies: No home health at this time, patient has a wheelchair, walker x 2    Objective:   Physical Exam  Constitutional:       General: She is not in acute distress.  HENT:      Head: Normocephalic and atraumatic.      Nose: No congestion.   Cardiovascular:      Rate and Rhythm: Normal rate and regular rhythm.      Heart sounds: Normal heart sounds.   Pulmonary:      Effort: Pulmonary effort is normal.      Breath sounds: Normal breath sounds.   Abdominal:      General: Abdomen is flat. There is no distension.      Palpations: Abdomen is soft.      Tenderness: There is no abdominal tenderness.   Musculoskeletal:      Right lower leg: No edema.      Left lower leg: No edema.   Skin:     Findings: Erythema and rash present.      Comments: Erythema to right heel, rash to genital area   Neurological:      Mental Status: She is alert. Mental status is at baseline.   Psychiatric:         Mood and Affect: Mood normal.         Behavior: Behavior normal.         There were no vitals filed for this visit.  There is no height or weight on file to calculate BMI.    Assessment:     1. Rash due to dribbling of urine    2. Suspected deep tissue injury    3. Follow up        Plan:     Ethical / Legal: Advance Care Planning   · Surrogate decision maker:  Emily Coronel, Relationship: daughter  · Code Status:  full  · [place]:  none  · Other advance directive:  none, Capacity to make medical decisions:  yes, Conflict none       Diagnoses and all orders for this visit:    Rash due to dribbling of urine    Suspected deep tissue  injury    Follow up    Keep perineal area clean and dry  Apply protective barrier cream to area daily and at every diaper change  Apply barrier cream to heels, float heels at all times  Call Primary Care Physician or Nurse Practitioner for worsening or new symptoms  [facility] NP to schedule follow up visit with patient in 4 weeks or sooner if needed  Continue all medications, treatments and therapies as ordered  Follow instructions and recommendations as discussed  Maintain safety precautions at all times  For emergencies call 911 or go to nearest Emergency Department    Were controlled substances prescribed?  No    Follow Up Appointments:   Future Appointments   Date Time Provider Department Center   1/21/2022 10:40 AM PACEMAKER CLINIC [facility] [place]   2/9/2022 10:00 AM HOME MONITOR DEVICE CHECK [facility] [place]       Signature:

## 2022-01-21 ENCOUNTER — TELEPHONE (OUTPATIENT)
Dept: CARDIOLOGY | Facility: HOSPITAL | Age: 87
End: 2022-01-21
Payer: MEDICARE

## 2022-01-21 NOTE — TELEPHONE ENCOUNTER
Called pt and Abrahan on both numbers listed. No answer, Left VM to verify if pt was coming in for PPM check on 1/21/22 or needed to reschedule.

## 2022-01-25 ENCOUNTER — ANTI-COAG VISIT (OUTPATIENT)
Dept: CARDIOLOGY | Facility: CLINIC | Age: 87
End: 2022-01-25
Payer: MEDICARE

## 2022-01-25 DIAGNOSIS — Z79.01 LONG TERM (CURRENT) USE OF ANTICOAGULANTS: ICD-10-CM

## 2022-01-25 DIAGNOSIS — Z86.718 HISTORY OF DVT OF LOWER EXTREMITY: ICD-10-CM

## 2022-01-25 LAB — INR PPP: 2.2

## 2022-01-25 PROCEDURE — 99499 NO LOS: ICD-10-PCS | Mod: S$GLB,,,

## 2022-01-25 PROCEDURE — 99499 UNLISTED E&M SERVICE: CPT | Mod: S$GLB,,,

## 2022-01-25 NOTE — PROGRESS NOTES
Chart reviewed, agree with LPN plan.  Agree with plan, may need to return to 0.5 mg QD next week.

## 2022-01-25 NOTE — PROGRESS NOTES
Fax received from [employer].  INR 2.2--subtherapeutic.   No changes noted.   Patient contacted.  Reports followed previous instructions.  Reports no changes.  Advised of signs/symptoms of clotting and need to go to ED if experiences any.  Reports no signs/symptoms of clotting.   Instructions given:  Will give boosted dose of warfarin 1 mg today 1/25/22; then resume warfarin 0 mg on Wednesdays; and 0.5 mg all other days.  Recheck in one week.  Patient repeated back and verbalizes understanding. Left voice message with instructions on patient's daughter Chary's voice mail.

## 2022-02-01 LAB — INR PPP: 1.9

## 2022-02-02 ENCOUNTER — ANTI-COAG VISIT (OUTPATIENT)
Dept: CARDIOLOGY | Facility: CLINIC | Age: 87
End: 2022-02-02
Payer: MEDICARE

## 2022-02-02 DIAGNOSIS — Z79.01 LONG TERM (CURRENT) USE OF ANTICOAGULANTS: ICD-10-CM

## 2022-02-02 DIAGNOSIS — I48.0 PAROXYSMAL ATRIAL FIBRILLATION: Chronic | ICD-10-CM

## 2022-02-02 DIAGNOSIS — Z95.2 S/P MITRAL VALVE REPLACEMENT: Chronic | ICD-10-CM

## 2022-02-02 DIAGNOSIS — Z86.718 HISTORY OF DVT OF LOWER EXTREMITY: ICD-10-CM

## 2022-02-02 PROCEDURE — G0250 MD INR TEST REVIE INTER MGMT: HCPCS | Mod: S$GLB,,, | Performed by: INTERNAL MEDICINE

## 2022-02-02 PROCEDURE — G0250 PR MD REVIEW INTERPRET OF TEST: ICD-10-PCS | Mod: S$GLB,,, | Performed by: INTERNAL MEDICINE

## 2022-02-02 NOTE — PROGRESS NOTES
INR not at goal. Medications, chart, and patient findings reviewed. See calendar for adjustments to dose and follow up plan.  Abrahan reports no changes.  She has only had 1/2 of an avocado all week for her greens.  We will repeat INR in week, no greens this week, we will return to those next week after INR improves.

## 2022-02-03 ENCOUNTER — TELEPHONE (OUTPATIENT)
Dept: INTERNAL MEDICINE | Facility: CLINIC | Age: 87
End: 2022-02-03
Payer: MEDICARE

## 2022-02-03 NOTE — TELEPHONE ENCOUNTER
----- Message from Christiana Baker sent at 2/2/2022 12:03 PM CST -----  States she would like to get the name of th NP that comes to her home. States she has a bad cold and sore throat. States she is concerned because of her age. Please call Abrahan Gray [phone]. Thank you

## 2022-02-08 ENCOUNTER — ANTI-COAG VISIT (OUTPATIENT)
Dept: CARDIOLOGY | Facility: CLINIC | Age: 87
End: 2022-02-08
Payer: MEDICARE

## 2022-02-08 DIAGNOSIS — Z79.01 LONG TERM (CURRENT) USE OF ANTICOAGULANTS: ICD-10-CM

## 2022-02-08 LAB — INR PPP: 5.4

## 2022-02-08 PROCEDURE — 99499 NO LOS: ICD-10-PCS | Mod: S$GLB,,,

## 2022-02-08 PROCEDURE — 99499 UNLISTED E&M SERVICE: CPT | Mod: S$GLB,,,

## 2022-02-08 NOTE — PROGRESS NOTES
Mrs Gauthier reports INR at 5.4 today.  Not reports of bleeding.  She says that her mother is a little weak but in good sprits.  No medication changes.  She will add back a serving of greens tonight and hold x 2 doses.  Bleeding precautions in place.  We will ask them to repeat INR Monday next week to check INR sooner than normal.  Will await and attach [employer] fax.

## 2022-02-09 ENCOUNTER — CLINICAL SUPPORT (OUTPATIENT)
Dept: CARDIOLOGY | Facility: HOSPITAL | Age: 87
End: 2022-02-09
Payer: MEDICARE

## 2022-02-09 ENCOUNTER — TELEPHONE (OUTPATIENT)
Dept: INTERNAL MEDICINE | Facility: CLINIC | Age: 87
End: 2022-02-09
Payer: MEDICARE

## 2022-02-09 DIAGNOSIS — Z95.0 PRESENCE OF CARDIAC PACEMAKER: ICD-10-CM

## 2022-02-09 PROCEDURE — 93296 REM INTERROG EVL PM/IDS: CPT | Mod: HCNC | Performed by: INTERNAL MEDICINE

## 2022-02-09 NOTE — TELEPHONE ENCOUNTER
----- Message from Kailee Martinez sent at 2/9/2022 12:32 PM CST -----  .Type:  Needs Medical Advice    Who Called:  Chary (daughter) @ [phone]  Symptoms (please be specific):   How long has patient had these symptoms:   Pharmacy name and phone #:   Would the patient rather a call back or a response via [facility]?   Best Call Back Number:  Additional Information:  Pt daughter is requesting a call from office regarding mom pain is having pain in back on head. Please call

## 2022-02-15 ENCOUNTER — ANTI-COAG VISIT (OUTPATIENT)
Dept: CARDIOLOGY | Facility: CLINIC | Age: 87
End: 2022-02-15
Payer: MEDICARE

## 2022-02-15 DIAGNOSIS — Z79.01 LONG TERM (CURRENT) USE OF ANTICOAGULANTS: ICD-10-CM

## 2022-02-15 LAB — INR PPP: 6

## 2022-02-15 PROCEDURE — 93793 PR ANTICOAGULANT MGMT FOR PT TAKING WARFARIN: ICD-10-PCS | Mod: S$GLB,,,

## 2022-02-15 PROCEDURE — 93793 ANTICOAG MGMT PT WARFARIN: CPT | Mod: S$GLB,,,

## 2022-02-15 NOTE — PROGRESS NOTES
CC reached out to [facility] this AM as INR was overdue from yesterday.  Abrahan reports that patient became ill over the weekend and she did not test her yesterday due to illness.  No eating or drinking + vomiting.  I have asked Abrahan to report to the ED, but she states that the patient is bed bound and they do not want to bring her in.  She denies any bleeding currently.  She did take in smoothies Sunday and yesterday, added pedialyte to fluids.  Abrahan feels that she is improved today in color and alertness.  I have again asked them to report to the ER for evaluation/hydration, etc. We discussed her bleeding risk without proper hydration and nutrition, including the concern for internal bleeding.   Abrahan states that she will call EMS should she deteriorate.  She will add spinach to her smoothie today.  We will hold warfarin until further notice.  She will retest tomorrow on her meter.  I have asked her to please call 911 with any s/s of bleeding.  She reports understanding and that she and her brother are watching her closely.

## 2022-02-16 ENCOUNTER — ANTI-COAG VISIT (OUTPATIENT)
Dept: CARDIOLOGY | Facility: CLINIC | Age: 87
End: 2022-02-16
Payer: MEDICARE

## 2022-02-16 DIAGNOSIS — Z79.01 LONG TERM (CURRENT) USE OF ANTICOAGULANTS: Primary | ICD-10-CM

## 2022-02-16 LAB — INR PPP: 6.3

## 2022-02-16 PROCEDURE — 93793 PR ANTICOAGULANT MGMT FOR PT TAKING WARFARIN: ICD-10-PCS | Mod: S$GLB,,,

## 2022-02-16 PROCEDURE — 93793 ANTICOAG MGMT PT WARFARIN: CPT | Mod: S$GLB,,,

## 2022-02-16 RX ORDER — ZINC GLUCONATE 100 MG
2.5 TABLET ORAL ONCE
Qty: 1 TABLET | Refills: 0 | Status: SHIPPED | OUTPATIENT
Start: 2022-02-16 | End: 2022-02-16

## 2022-02-16 NOTE — PROGRESS NOTES
INR not at goal. Medications, chart, and patient findings reviewed. See calendar for adjustments to dose and follow up plan.  Abrahan reports another elevated INR today, we will advised a small dose - 2.5 mg of Vitamin K in tablet form today.  I have again advised ER with any change in condition, bleeding, etc.  Abrahan confirms no bleeding at this time. She states that she is feeling stronger today as well.  She has given her spinach for lunch and will give another smoothie tonight with spinach.  She will recheck INR tomorrow.  No warfarin until further notice.

## 2022-02-17 ENCOUNTER — HOSPITAL ENCOUNTER (EMERGENCY)
Facility: HOSPITAL | Age: 87
Discharge: HOME | End: 2022-02-17
Attending: EMERGENCY MEDICINE
Payer: MEDICARE

## 2022-02-17 ENCOUNTER — ANTI-COAG VISIT (OUTPATIENT)
Dept: CARDIOLOGY | Facility: CLINIC | Age: 87
End: 2022-02-17
Payer: MEDICARE

## 2022-02-17 VITALS
TEMPERATURE: 99 F | HEART RATE: 75 BPM | SYSTOLIC BLOOD PRESSURE: 122 MMHG | OXYGEN SATURATION: 97 % | RESPIRATION RATE: 18 BRPM | DIASTOLIC BLOOD PRESSURE: 59 MMHG

## 2022-02-17 DIAGNOSIS — R79.1 ELEVATED INR: Primary | ICD-10-CM

## 2022-02-17 DIAGNOSIS — Z79.01 LONG TERM (CURRENT) USE OF ANTICOAGULANTS: ICD-10-CM

## 2022-02-17 DIAGNOSIS — D64.9 ANEMIA, UNSPECIFIED TYPE: ICD-10-CM

## 2022-02-17 LAB
ABO GROUP BLD: NORMAL
ALBUMIN SERPL BCP-MCNC: 2.8 G/DL (ref 3.5–5.2)
ALP SERPL-CCNC: 57 U/L (ref 55–135)
ALT SERPL W/O P-5'-P-CCNC: 6 U/L (ref 10–44)
ANION GAP SERPL CALC-SCNC: 11 MMOL/L (ref 8–16)
APTT BLDCRRT: 57.6 SEC (ref 21–32)
AST SERPL-CCNC: 20 U/L (ref 10–40)
BASOPHILS # BLD AUTO: 0.03 K/UL (ref 0–0.2)
BASOPHILS NFR BLD: 0.4 % (ref 0–1.9)
BILIRUB SERPL-MCNC: 0.4 MG/DL (ref 0.1–1)
BLD GP AB SCN CELLS X3 SERPL QL: NORMAL
BUN SERPL-MCNC: 25 MG/DL (ref 10–30)
CALCIUM SERPL-MCNC: 7.5 MG/DL (ref 8.7–10.5)
CHLORIDE SERPL-SCNC: 106 MMOL/L (ref 95–110)
CO2 SERPL-SCNC: 21 MMOL/L (ref 23–29)
CREAT SERPL-MCNC: 1.1 MG/DL (ref 0.5–1.4)
DIFFERENTIAL METHOD: ABNORMAL
EOSINOPHIL # BLD AUTO: 0.2 K/UL (ref 0–0.5)
EOSINOPHIL NFR BLD: 1.9 % (ref 0–8)
ERYTHROCYTE [DISTWIDTH] IN BLOOD BY AUTOMATED COUNT: 13.7 % (ref 11.5–14.5)
EST. GFR  (AFRICAN AMERICAN): 49 ML/MIN/1.73 M^2
EST. GFR  (NON AFRICAN AMERICAN): 42 ML/MIN/1.73 M^2
GLUCOSE SERPL-MCNC: 76 MG/DL (ref 70–110)
HCT VFR BLD AUTO: 35.9 % (ref 37–48.5)
HGB BLD-MCNC: 11.7 G/DL (ref 12–16)
IMM GRANULOCYTES # BLD AUTO: 0.03 K/UL (ref 0–0.04)
IMM GRANULOCYTES NFR BLD AUTO: 0.4 % (ref 0–0.5)
INR PPP: 4.6 (ref 0.8–1.2)
INR PPP: 7.3
LYMPHOCYTES # BLD AUTO: 2.4 K/UL (ref 1–4.8)
LYMPHOCYTES NFR BLD: 29.6 % (ref 18–48)
MCH RBC QN AUTO: 30.5 PG (ref 27–31)
MCHC RBC AUTO-ENTMCNC: 32.6 G/DL (ref 32–36)
MCV RBC AUTO: 94 FL (ref 82–98)
MONOCYTES # BLD AUTO: 0.7 K/UL (ref 0.3–1)
MONOCYTES NFR BLD: 9.3 % (ref 4–15)
NEUTROPHILS # BLD AUTO: 4.7 K/UL (ref 1.8–7.7)
NEUTROPHILS NFR BLD: 58.4 % (ref 38–73)
NRBC BLD-RTO: 0 /100 WBC
PLATELET # BLD AUTO: 276 K/UL (ref 150–450)
PMV BLD AUTO: 11.7 FL (ref 9.2–12.9)
POTASSIUM SERPL-SCNC: 4.5 MMOL/L (ref 3.5–5.1)
PROT SERPL-MCNC: 6 G/DL (ref 6–8.4)
PROTHROMBIN TIME: 47.1 SEC (ref 9–12.5)
RBC # BLD AUTO: 3.84 M/UL (ref 4–5.4)
RH BLD: NORMAL
SODIUM SERPL-SCNC: 138 MMOL/L (ref 136–145)
WBC # BLD AUTO: 7.97 K/UL (ref 3.9–12.7)

## 2022-02-17 PROCEDURE — 86850 RBC ANTIBODY SCREEN: CPT | Mod: HCNC | Performed by: EMERGENCY MEDICINE

## 2022-02-17 PROCEDURE — 99283 EMERGENCY DEPT VISIT LOW MDM: CPT | Mod: HCNC

## 2022-02-17 PROCEDURE — 86900 BLOOD TYPING SEROLOGIC ABO: CPT | Mod: HCNC | Performed by: EMERGENCY MEDICINE

## 2022-02-17 PROCEDURE — 86901 BLOOD TYPING SEROLOGIC RH(D): CPT | Mod: HCNC | Performed by: EMERGENCY MEDICINE

## 2022-02-17 PROCEDURE — 85025 COMPLETE CBC W/AUTO DIFF WBC: CPT | Mod: HCNC | Performed by: EMERGENCY MEDICINE

## 2022-02-17 PROCEDURE — 85730 THROMBOPLASTIN TIME PARTIAL: CPT | Mod: HCNC | Performed by: EMERGENCY MEDICINE

## 2022-02-17 PROCEDURE — 93793 ANTICOAG MGMT PT WARFARIN: CPT | Mod: S$GLB,,,

## 2022-02-17 PROCEDURE — 85610 PROTHROMBIN TIME: CPT | Mod: HCNC | Performed by: EMERGENCY MEDICINE

## 2022-02-17 PROCEDURE — 80053 COMPREHEN METABOLIC PANEL: CPT | Mod: HCNC | Performed by: EMERGENCY MEDICINE

## 2022-02-17 PROCEDURE — 93793 PR ANTICOAGULANT MGMT FOR PT TAKING WARFARIN: ICD-10-PCS | Mod: S$GLB,,,

## 2022-02-17 NOTE — PROGRESS NOTES
Abrahan reports INR is 7.3 per their meter, increased from yesterday.  She was not able to obtain the Vitamin K yesterday as the pharmacy had to order, but did pick it this afternoon.  I have asked them to report to the ER now as this is a unexpected increase in INR despite holding and large intake of dark green leafy vegetables (spinach) x 2 days. Abrahan denies any bleeding or change in status.   Abrahan reports that she will call an ambulance, heading to [facility].

## 2022-02-18 NOTE — ED PROVIDER NOTES
SCRIBE #1 NOTE: I, Amanda Verde, am scribing for, and in the presence of, Nickie Linton DO. I have scribed the entire note.       History     Chief Complaint   Patient presents with    hypercoagulation     Pt states MD wanted the pt to come to the ED to get vitamin K     Review of patient's allergies indicates:   Allergen Reactions    Sulfa (sulfonamide antibiotics) Anaphylaxis     Other reaction(s): Angioedema    Morphine Other (See Comments)     Pt's daughter states she received morphine in recovery after surgery then went into cardiac arrest.    Statins-hmg-coa reductase inhibitors Other (See Comments)     Elevated LFTs    Dronedarone Diarrhea, Nausea Only and Rash     DIZZINESS           History of Present Illness     HPI    2/17/2022, 6:49 PM  History obtained from the daughter.       History of Present Illness: Josy Posey is a 89 y.o. female patient with a PMHx of A-fib, CHF, CAD, DVT, who presents to the Emergency Department for evaluation of elevation in INR which onset gradually this week. Daughter denies any recent fall. Pt was brought to the ED to make sure she was not bleeding internally. Symptoms are constant and moderate in severity. No mitigating or exacerbating factors reported. No associated sxs reported. Daughter denies any vomiting, blood in stool, hematuria, bruises, nosebleeds, fever. Pt was taking Warfarin, but stopped 4 days ago. No further complaints or concerns at this time.       Arrival mode: Ambulance Service    PCP: Geoff Kaur NP        Past Medical History:  Past Medical History:   Diagnosis Date    *Atrial fibrillation     Anemia     Angina pectoris     Arthritis     Atrial fibrillation 9/27/2013    Atrioventricular block, complete     CAD (coronary artery disease) 5/6/2015    Cardiac pacemaker 9/27/2013    Carotid artery occlusion     CHF (congestive heart failure)     Coronary artery disease     Cystocele     prior pessary use    Depression      "Disorder of kidney and ureter     DVT (deep venous thrombosis) 3/1/10 approx    s/p rt embolectomy    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 fuCT 1/3- stable,  CT's later - no residual    Lens replaced by other means 6/25/2014    Melena     Memory loss     PET scan consistent with Alzzheimers.   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    1 vessel LIMA to LAD    S/P MVR (mitral valve replacement) 9/27/2013    Skin ulcer     Squamous cell carcinoma     skin cancer X 2    Stroke     Syncope and collapse     Unspecified essential hypertension 9/16/2013    Unspecified transient cerebral ischemia     Urinary incontinence     wears briefs       Past Surgical History:  Past Surgical History:   Procedure Laterality Date    ADENOIDECTOMY      APPENDECTOMY      BREAST SURGERY  1950    right lumpectomy     CARDIAC VALVE SURGERY  10/04/2007    MVR    CATARACT EXTRACTION      CHOLECYSTECTOMY      CORONARY ARTERY BYPASS GRAFT      EYE SURGERY      cataracts bilateral    HYSTERECTOMY      for hydatiform mole    INSERT / REPLACE / REMOVE PACEMAKER  09/11/2001    TONSILLECTOMY           Family History:  Family History   Problem Relation Age of Onset    Tuberculosis Mother     Tuberculosis Father     Stroke Sister     Hypertension Sister     Stroke Brother     Hypertension Daughter     COPD Brother        Social History:  Social History     Tobacco Use    " Smoking status: Never Smoker    Smokeless tobacco: Never Used   Substance and Sexual Activity    Alcohol use: No     Alcohol/week: 0.0 standard drinks    Drug use: No    Sexual activity: Never        Review of Systems     Review of Systems   Unable to perform ROS: Age   Constitutional: Negative for fever.   HENT: Negative for nosebleeds.    Gastrointestinal: Negative for blood in stool and vomiting.   Genitourinary: Negative for hematuria.   Hematological: Does not bruise/bleed easily.        Physical Exam     Initial Vitals   BP Pulse Resp Temp SpO2   02/17/22 1658 02/17/22 1658 02/17/22 1658 02/17/22 1701 02/17/22 1658   118/76 88 16 98 °F (36.7 °C) 97 %      MAP       --                 Physical Exam  Nursing Notes and Vital Signs Reviewed.  Constitutional: Patient is in no acute distress. Thin.  Head: Atraumatic. Normocephalic.  Eyes: PERRL. EOM intact. Conjunctivae are not pale. No scleral icterus.  ENT: Mucous membranes are moist. Oropharynx is clear and symmetric.    Neck: Supple. Full ROM. No lymphadenopathy.  Cardiovascular: Regular rate. Regular rhythm. No murmurs, rubs, or gallops. Distal pulses are 2+ and symmetric.  Pulmonary/Chest: No respiratory distress. Clear to auscultation bilaterally. No wheezing or rales.  Abdominal: Soft and non-distended.  There is no tenderness.  No rebound, guarding, or rigidity. Good bowel sounds.  Genitourinary: No CVA tenderness  Musculoskeletal: Moves all extremities. No obvious deformities. No edema. No calf tenderness.  Skin: Warm and dry.  Neurological:  Alert, awake, and appropriate.  No acute focal neurological deficits are appreciated.  Psychiatric: Normal affect. Good eye contact. Appropriate in content.     ED Course   Procedures  ED Vital Signs:  Vitals:    02/17/22 1658 02/17/22 1701 02/17/22 1800   BP: 118/76  (!) 156/77   Pulse: 88  75   Resp: 16  13   Temp:  98 °F (36.7 °C)    SpO2: 97%  98%       Abnormal Lab Results:  Labs Reviewed   PROTIME-INR -  Abnormal; Notable for the following components:       Result Value    Prothrombin Time 47.1 (*)     INR 4.6 (*)     All other components within normal limits   APTT - Abnormal; Notable for the following components:    aPTT 57.6 (*)     All other components within normal limits   CBC W/ AUTO DIFFERENTIAL - Abnormal; Notable for the following components:    RBC 3.84 (*)     Hemoglobin 11.7 (*)     Hematocrit 35.9 (*)     All other components within normal limits   COMPREHENSIVE METABOLIC PANEL - Abnormal; Notable for the following components:    CO2 21 (*)     Calcium 7.5 (*)     Albumin 2.8 (*)     ALT 6 (*)     eGFR if  49 (*)     eGFR if non  42 (*)     All other components within normal limits   TYPE & SCREEN        All Lab Results:  Results for orders placed or performed during the hospital encounter of 02/17/22   Protime-INR   Result Value Ref Range    Prothrombin Time 47.1 (H) 9.0 - 12.5 sec    INR 4.6 (H) 0.8 - 1.2   APTT   Result Value Ref Range    aPTT 57.6 (H) 21.0 - 32.0 sec   CBC Auto Differential   Result Value Ref Range    WBC 7.97 3.90 - 12.70 K/uL    RBC 3.84 (L) 4.00 - 5.40 M/uL    Hemoglobin 11.7 (L) 12.0 - 16.0 g/dL    Hematocrit 35.9 (L) 37.0 - 48.5 %    MCV 94 82 - 98 fL    MCH 30.5 27.0 - 31.0 pg    MCHC 32.6 32.0 - 36.0 g/dL    RDW 13.7 11.5 - 14.5 %    Platelets 276 150 - 450 K/uL    MPV 11.7 9.2 - 12.9 fL    Immature Granulocytes 0.4 0.0 - 0.5 %    Gran # (ANC) 4.7 1.8 - 7.7 K/uL    Immature Grans (Abs) 0.03 0.00 - 0.04 K/uL    Lymph # 2.4 1.0 - 4.8 K/uL    Mono # 0.7 0.3 - 1.0 K/uL    Eos # 0.2 0.0 - 0.5 K/uL    Baso # 0.03 0.00 - 0.20 K/uL    nRBC 0 0 /100 WBC    Gran % 58.4 38.0 - 73.0 %    Lymph % 29.6 18.0 - 48.0 %    Mono % 9.3 4.0 - 15.0 %    Eosinophil % 1.9 0.0 - 8.0 %    Basophil % 0.4 0.0 - 1.9 %    Differential Method Automated    Comprehensive Metabolic Panel   Result Value Ref Range    Sodium 138 136 - 145 mmol/L    Potassium 4.5 3.5 - 5.1 mmol/L     Chloride 106 95 - 110 mmol/L    CO2 21 (L) 23 - 29 mmol/L    Glucose 76 70 - 110 mg/dL    BUN 25 10 - 30 mg/dL    Creatinine 1.1 0.5 - 1.4 mg/dL    Calcium 7.5 (L) 8.7 - 10.5 mg/dL    Total Protein 6.0 6.0 - 8.4 g/dL    Albumin 2.8 (L) 3.5 - 5.2 g/dL    Total Bilirubin 0.4 0.1 - 1.0 mg/dL    Alkaline Phosphatase 57 55 - 135 U/L    AST 20 10 - 40 U/L    ALT 6 (L) 10 - 44 U/L    Anion Gap 11 8 - 16 mmol/L    eGFR if African American 49 (A) >60 mL/min/1.73 m^2    eGFR if non African American 42 (A) >60 mL/min/1.73 m^2     *Note: Due to a large number of results and/or encounters for the requested time period, some results have not been displayed. A complete set of results can be found in Results Review.       Imaging Results:  Imaging Results    None                     The Emergency Provider reviewed the vital signs and test results, which are outlined above.     ED Discussion       7:13 PM: Reassessed pt at this time. Discussed with pt's daughter all pertinent ED information and results. Discussed pt dx and plan of tx. Gave the daughter all of pt's f/u and return to the ED instructions. All questions and concerns were addressed at this time. Daughter expresses understanding of information and instructions, and is comfortable with plan to discharge. Pt is stable for discharge.    I discussed with patient and/or family/caretaker that evaluation in the ED does not suggest any emergent or life threatening medical conditions requiring immediate intervention beyond what was provided in the ED, and I believe patient is safe for discharge.  Regardless, an unremarkable evaluation in the ED does not preclude the development or presence of a serious of life threatening condition. As such, patient/daughter were instructed to return immediately for any worsening or change in current symptoms.    ED Course as of 02/17/22 1920 Thu Feb 17, 2022 1845 Results for TEVIN LAWS (MRN [id number]) as of 2/17/2022  18:44    10/22/2019 10:50  Hemoglobin: 12.1  Hematocrit: 37.5    1/27/2020 08:40  Hemoglobin: 12.2  Hematocrit: 37.7    1/28/2020 05:03  Hemoglobin: 12.2  Hematocrit: 38.3    1/29/2021 11:29  Hemoglobin: 9.2 (L)  Hematocrit: 29.4 (L)    2/17/2022 18:00  Hemoglobin: 11.7 (L)  Hematocrit: 35.9 (L)   [LB]   1845 INR(!): 4.6 [LB]   1847 Results for TEVIN LAWS (MRN [id number]) as of 2/17/2022 18:44    1/23/2020 13:41  BUN: 32 (H)  Creatinine: 1.3    1/27/2020 08:40  BUN: 23  Creatinine: 1.3    1/28/2020 05:03  BUN: 23  Creatinine: 1.2    1/29/2021 11:29  BUN: 25  Creatinine: 1.2    2/17/2022 18:00  BUN: 25  Creatinine: 1.1   [LB]   1902 INR <4.5 without bleeding - If the INR is above the therapeutic range but <4.5 and no clinically significant bleeding is apparent, the next dose of warfarin should be omitted and/or the maintenance dose of warfarin reduced slightly (table 1) [31,37]. Often, the maintenance dose does not need to be reduced at all, especially if the INR elevation is minimal and/or expected to be transient [55]. Additional therapies such as vitamin K are not indicated in this setting. Increased monitoring (eg, INR testing once or twice a week) during the period of dose adjustment is appropriate.         [LB]      ED Course User Index  [LB] Nickie Linton DO     Medical Decision Making:   Clinical Tests:   Lab Tests: Ordered and Reviewed           ED Medication(s):  Medications - No data to display    New Prescriptions    No medications on file        Follow-up Information     [facility] - Coumadin In 2 days.    Specialty: Cardiology  Why: Return to emergency department for:  Black tarry stools, blood in stool, nose bleeds, blood in urine, falling, or other concerns  Contact information:  [address] [facility] [id number] [phone]  Additional information:  Please take [address] to the Medical Office Building. Check in on the 4th floor.                           Scribe  Attestation:   Scribe #1: I performed the above scribed service and the documentation accurately describes the services I performed. I attest to the accuracy of the note.     Attending:   Physician Attestation Statement for Scribe #1: I, Nickie Linton DO, personally performed the services described in this documentation, as scribed by Amanda Verde, in my presence, and it is both accurate and complete.           Clinical Impression       ICD-10-CM ICD-9-CM   1. Elevated INR  R79.1 790.92   2. Anemia, unspecified type  D64.9 285.9       Disposition:   Disposition: Discharged  Condition: Stable       Nickie Linton DO  02/17/22 1920

## 2022-02-18 NOTE — PROGRESS NOTES
ER repeat INR shows 4.6 - much lower than meter indicated.  H/H stable, no signs of bleeding.  We will resume dose with an every other day schedule for now.  We will need to determine if there are any indicators or error codes that need to be addressed with her meter.  Abrahan would also like to discuss continuing warfarin or possiblity of d/c due to risk benefit as this was mentioned by ER MD.  I have messaged Dr Alvarado for a virtual visit.  Until we are comfortable with how she is metabolizing warfarin, we are moving to an other other day dose of warfarin.  We also discussed a lower goal range to lower the risk of bleeding, will discuss with Dr Alvarado.  Abrahan will keep spinach in smoothies consistent 1/4 cup x 2 week or 1/2 cup once a week.  We will repeat INR on Monday.  I have also asked Ro to notify the CC should Mrs Posey go longer than 2 days without eating again so that we can hold warfarin immediately and test.

## 2022-02-22 ENCOUNTER — ANTI-COAG VISIT (OUTPATIENT)
Dept: CARDIOLOGY | Facility: CLINIC | Age: 87
End: 2022-02-22
Payer: MEDICARE

## 2022-02-22 DIAGNOSIS — Z79.01 LONG TERM (CURRENT) USE OF ANTICOAGULANTS: ICD-10-CM

## 2022-02-22 LAB — INR PPP: 2.5

## 2022-02-22 PROCEDURE — 99499 NO LOS: ICD-10-PCS | Mod: S$GLB,,,

## 2022-02-22 PROCEDURE — 99499 UNLISTED E&M SERVICE: CPT | Mod: S$GLB,,,

## 2022-02-22 NOTE — PROGRESS NOTES
Fax received [facility] - 2.5 - in range.  [place] reports no changes.  We will continue warfarin 0.5 mg QOD and spinach 1/4 twice weekly.  Retest in 1 week, Abrahan confirms understanding.

## 2022-03-01 ENCOUNTER — ANTI-COAG VISIT (OUTPATIENT)
Dept: CARDIOLOGY | Facility: CLINIC | Age: 87
End: 2022-03-01
Payer: MEDICARE

## 2022-03-01 DIAGNOSIS — Z79.01 LONG TERM (CURRENT) USE OF ANTICOAGULANTS: ICD-10-CM

## 2022-03-01 LAB — INR PPP: 1.7

## 2022-03-01 PROCEDURE — 99499 NO LOS: ICD-10-PCS | Mod: S$GLB,,,

## 2022-03-01 PROCEDURE — 99499 UNLISTED E&M SERVICE: CPT | Mod: S$GLB,,,

## 2022-03-01 NOTE — PROGRESS NOTES
Fax from [facility] - 1.7  - subtherapeutic.  She has been lowered significantly due to extreme INR elevations lately.  She is taking 0.5 mg QOD, but opposite of what we had scheudled.  Calendar updated to reported dosing.  Abrahan reports very little food and water intake.  We will not boost given her circumstance and condition.  I do not want to make her warfarin toxic.  Abrahan is having a difficult timing obtaining a sample as well.  She would like to test again later this week to see if there is any increase.  No change to dose right now, await result later this week and will c/s visit with Dr Alvarado this Thursday.

## 2022-03-04 ENCOUNTER — ANTI-COAG VISIT (OUTPATIENT)
Dept: CARDIOLOGY | Facility: CLINIC | Age: 87
End: 2022-03-04
Payer: MEDICARE

## 2022-03-04 DIAGNOSIS — F41.9 ANXIETY: ICD-10-CM

## 2022-03-04 DIAGNOSIS — Z79.01 LONG TERM (CURRENT) USE OF ANTICOAGULANTS: ICD-10-CM

## 2022-03-04 DIAGNOSIS — F32.89 OTHER DEPRESSION: ICD-10-CM

## 2022-03-04 LAB — INR PPP: 1.7

## 2022-03-04 PROCEDURE — G0250 MD INR TEST REVIE INTER MGMT: HCPCS | Mod: S$GLB,,, | Performed by: INTERNAL MEDICINE

## 2022-03-04 PROCEDURE — G0250 PR MD REVIEW INTERPRET OF TEST: ICD-10-PCS | Mod: S$GLB,,, | Performed by: INTERNAL MEDICINE

## 2022-03-04 RX ORDER — ESCITALOPRAM OXALATE 10 MG/1
TABLET ORAL
Qty: 90 TABLET | Refills: 3 | Status: SHIPPED | OUTPATIENT
Start: 2022-03-04

## 2022-03-04 NOTE — PROGRESS NOTES
Abrahan report that  -INR - 1.7, test date 3/4/22- remains subtherapeutic.  We will boost on Saturday with a 0.5 mg dose, repeat INR on Monday.  Dosing provided to Abrahan.

## 2022-03-08 ENCOUNTER — ANTI-COAG VISIT (OUTPATIENT)
Dept: CARDIOLOGY | Facility: CLINIC | Age: 87
End: 2022-03-08
Payer: MEDICARE

## 2022-03-08 DIAGNOSIS — Z79.01 LONG TERM (CURRENT) USE OF ANTICOAGULANTS: ICD-10-CM

## 2022-03-08 LAB — INR PPP: 2

## 2022-03-08 PROCEDURE — 99499 UNLISTED E&M SERVICE: CPT | Mod: S$GLB,,,

## 2022-03-08 PROCEDURE — 99499 NO LOS: ICD-10-PCS | Mod: S$GLB,,,

## 2022-03-08 NOTE — PROGRESS NOTES
Abrahan reports an INR of 2.0.  Slowly climbing into range.  We will give 0.5 mg tonight and alternate QOD until recheck Friday.  Abrahan confirms understanding.  Keep spinach intake the same.

## 2022-03-11 LAB — INR PPP: 2.3

## 2022-03-14 ENCOUNTER — ANTI-COAG VISIT (OUTPATIENT)
Dept: CARDIOLOGY | Facility: CLINIC | Age: 87
End: 2022-03-14
Payer: MEDICARE

## 2022-03-14 DIAGNOSIS — Z79.01 LONG TERM (CURRENT) USE OF ANTICOAGULANTS: ICD-10-CM

## 2022-03-14 PROCEDURE — 99499 NO LOS: ICD-10-PCS | Mod: S$GLB,,,

## 2022-03-14 PROCEDURE — 99499 UNLISTED E&M SERVICE: CPT | Mod: S$GLB,,,

## 2022-03-15 ENCOUNTER — ANTI-COAG VISIT (OUTPATIENT)
Dept: CARDIOLOGY | Facility: CLINIC | Age: 87
End: 2022-03-15
Payer: MEDICARE

## 2022-03-15 ENCOUNTER — OFFICE VISIT (OUTPATIENT)
Dept: CARDIOLOGY | Facility: CLINIC | Age: 87
End: 2022-03-15
Payer: MEDICARE

## 2022-03-15 DIAGNOSIS — I65.23 BILATERAL CAROTID ARTERY STENOSIS: ICD-10-CM

## 2022-03-15 DIAGNOSIS — Z79.01 CHRONIC ANTICOAGULATION: Primary | ICD-10-CM

## 2022-03-15 DIAGNOSIS — Z45.018 PACEMAKER END OF LIFE: ICD-10-CM

## 2022-03-15 DIAGNOSIS — E78.00 PURE HYPERCHOLESTEROLEMIA: Chronic | ICD-10-CM

## 2022-03-15 DIAGNOSIS — I27.9 PULMONARY HEART DISEASE: ICD-10-CM

## 2022-03-15 DIAGNOSIS — I51.89 LEFT VENTRICULAR DIASTOLIC DYSFUNCTION WITH PRESERVED SYSTOLIC FUNCTION: Chronic | ICD-10-CM

## 2022-03-15 DIAGNOSIS — I44.2 THIRD DEGREE AV BLOCK: Chronic | ICD-10-CM

## 2022-03-15 DIAGNOSIS — Z86.73 H/O: CVA (CEREBROVASCULAR ACCIDENT): Chronic | ICD-10-CM

## 2022-03-15 DIAGNOSIS — I48.0 PAROXYSMAL ATRIAL FIBRILLATION: Chronic | ICD-10-CM

## 2022-03-15 DIAGNOSIS — G62.9 POLYNEUROPATHY: ICD-10-CM

## 2022-03-15 DIAGNOSIS — T82.837S: ICD-10-CM

## 2022-03-15 DIAGNOSIS — I95.1 ORTHOSTATIC HYPOTENSION: ICD-10-CM

## 2022-03-15 DIAGNOSIS — Z79.01 LONG TERM (CURRENT) USE OF ANTICOAGULANTS: ICD-10-CM

## 2022-03-15 DIAGNOSIS — I70.0 ATHEROSCLEROSIS OF AORTA: ICD-10-CM

## 2022-03-15 DIAGNOSIS — N18.30 STAGE 3 CHRONIC KIDNEY DISEASE, UNSPECIFIED WHETHER STAGE 3A OR 3B CKD: Chronic | ICD-10-CM

## 2022-03-15 DIAGNOSIS — Z95.2 S/P MITRAL VALVE REPLACEMENT: Chronic | ICD-10-CM

## 2022-03-15 DIAGNOSIS — G57.11 MERALGIA PARESTHETICA OF RIGHT SIDE: ICD-10-CM

## 2022-03-15 DIAGNOSIS — Z86.718 HISTORY OF DVT OF LOWER EXTREMITY: ICD-10-CM

## 2022-03-15 LAB — INR PPP: 2.2

## 2022-03-15 PROCEDURE — 1160F PR REVIEW ALL MEDS BY PRESCRIBER/CLIN PHARMACIST DOCUMENTED: ICD-10-PCS | Mod: CPTII,95,, | Performed by: INTERNAL MEDICINE

## 2022-03-15 PROCEDURE — 99441 PR PHYSICIAN TELEPHONE EVALUATION 5-10 MIN: CPT | Mod: 95,,, | Performed by: INTERNAL MEDICINE

## 2022-03-15 PROCEDURE — 99441 PR PHYSICIAN TELEPHONE EVALUATION 5-10 MIN: ICD-10-PCS | Mod: 95,,, | Performed by: INTERNAL MEDICINE

## 2022-03-15 PROCEDURE — 1160F RVW MEDS BY RX/DR IN RCRD: CPT | Mod: CPTII,95,, | Performed by: INTERNAL MEDICINE

## 2022-03-15 PROCEDURE — 99499 NO LOS: ICD-10-PCS | Mod: S$GLB,,,

## 2022-03-15 PROCEDURE — 1159F PR MEDICATION LIST DOCUMENTED IN MEDICAL RECORD: ICD-10-PCS | Mod: CPTII,95,, | Performed by: INTERNAL MEDICINE

## 2022-03-15 PROCEDURE — 99499 UNLISTED E&M SERVICE: CPT | Mod: S$GLB,,,

## 2022-03-15 PROCEDURE — 1159F MED LIST DOCD IN RCRD: CPT | Mod: CPTII,95,, | Performed by: INTERNAL MEDICINE

## 2022-03-15 NOTE — PROGRESS NOTES
Established Patient - Audio Only Telehealth Visit     The patient location is: home   The chief complaint leading to consultation is: discuss anticoagulation   Visit type: Virtual visit with audio only (telephone)  Total time spent with patient: 7 minutes       The reason for the audio only service rather than synchronous audio and video virtual visit was related to technical difficulties or patient preference/necessity.     Each patient to whom I provide medical services by telemedicine is:  (1) informed of the relationship between the physician and patient and the respective role of any other health care provider with respect to management of the patient; and (2) notified that they may decline to receive medical services by telemedicine and may withdraw from such care at any time. Patient verbally consented to receive this service via voice-only telephone call.       HPI: this was a phone discussion about coumadin therapy   This  Phone visit was made with the daughter. The concern is fluctuation in coumadin therapy and its dangers to the patient discussed the fact that consistent eating habits specially green lefy vegetables could be addressed as long as the diet is consistent so no fluctuation in INR would occur on the high or low side. The patient comorbidities and her heart condition requires anticoagulation to avoid complications.and cns event.  The patient is not otherwise of risk of bleeding and has no risk for falling.  The plan was to continue coumadn therapy even out her green leafy vegetable intake  intake and adjust coumadin therapy accordingly . Any issues  arise with anticoagulation will discuss cessation of anticoagulation.  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  If any further issue aise the patient daughter will contact me.     Assessment and plan:    As per above.                        This service was not originating from a related E/M service provided within the previous 7 days nor  will  to an E/M service or procedure within the next 24 hours or my soonest available appointment.  Prevailing standard of care was able to be met in this audio-only visit.

## 2022-03-15 NOTE — PROGRESS NOTES
Abrahan reports 2.2 INR.  We will dose per calendar for the remainder of the week.  Plan on retesting Monday 3/21.  If she retests in between and is concerned, I have asked her to reach out to me for further instruction.  Has appt with Dr Alvarado today, will review for goal change/warfarin d/c etc.

## 2022-03-18 ENCOUNTER — PES CALL (OUTPATIENT)
Dept: ADMINISTRATIVE | Facility: CLINIC | Age: 87
End: 2022-03-18
Payer: MEDICARE

## 2022-03-22 ENCOUNTER — ANTI-COAG VISIT (OUTPATIENT)
Dept: CARDIOLOGY | Facility: CLINIC | Age: 87
End: 2022-03-22
Payer: MEDICARE

## 2022-03-22 DIAGNOSIS — Z79.01 LONG TERM (CURRENT) USE OF ANTICOAGULANTS: ICD-10-CM

## 2022-03-22 LAB — INR PPP: 2.2

## 2022-03-22 PROCEDURE — 99499 UNLISTED E&M SERVICE: CPT | Mod: S$GLB,,,

## 2022-03-22 PROCEDURE — 99499 NO LOS: ICD-10-PCS | Mod: S$GLB,,,

## 2022-03-22 NOTE — PROGRESS NOTES
Abrahan reports INR at 2.2, unchanged.  We will boost with 0.5 mg (Monday/today) then return to 0.5mg every other day.

## 2022-03-29 ENCOUNTER — ANTI-COAG VISIT (OUTPATIENT)
Dept: CARDIOLOGY | Facility: CLINIC | Age: 87
End: 2022-03-29
Payer: MEDICARE

## 2022-03-29 DIAGNOSIS — Z79.01 LONG TERM (CURRENT) USE OF ANTICOAGULANTS: ICD-10-CM

## 2022-03-29 LAB — INR PPP: 1.8

## 2022-03-29 PROCEDURE — 99499 NO LOS: ICD-10-PCS | Mod: S$GLB,,,

## 2022-03-29 PROCEDURE — 99499 UNLISTED E&M SERVICE: CPT | Mod: S$GLB,,,

## 2022-03-29 NOTE — PROGRESS NOTES
Per Abrahan, INR has declined to 1.8.  We will add back 0.5mg daily of warfarin and retest on Friday.  [place] confirms understanding.

## 2022-04-01 ENCOUNTER — ANTI-COAG VISIT (OUTPATIENT)
Dept: CARDIOLOGY | Facility: CLINIC | Age: 87
End: 2022-04-01
Payer: MEDICARE

## 2022-04-01 DIAGNOSIS — Z79.01 LONG TERM (CURRENT) USE OF ANTICOAGULANTS: ICD-10-CM

## 2022-04-01 LAB — INR PPP: 2.1

## 2022-04-01 PROCEDURE — 99499 UNLISTED E&M SERVICE: CPT | Mod: S$GLB,,,

## 2022-04-01 PROCEDURE — 99499 NO LOS: ICD-10-PCS | Mod: S$GLB,,,

## 2022-04-01 NOTE — PROGRESS NOTES
Abrahan reports INR at 2.1, improving.  We will continue 0.5 mg daily over the weekend and test again on Monday.  Dosing given to Abrahan, confirms understanding.

## 2022-04-04 LAB — INR PPP: 2.2

## 2022-04-05 ENCOUNTER — ANTI-COAG VISIT (OUTPATIENT)
Dept: CARDIOLOGY | Facility: CLINIC | Age: 87
End: 2022-04-05
Payer: MEDICARE

## 2022-04-05 DIAGNOSIS — Z79.01 LONG TERM (CURRENT) USE OF ANTICOAGULANTS: ICD-10-CM

## 2022-04-05 PROCEDURE — G0250 PR MD REVIEW INTERPRET OF TEST: ICD-10-PCS | Mod: S$GLB,,,

## 2022-04-05 PROCEDURE — G0250 MD INR TEST REVIE INTER MGMT: HCPCS | Mod: S$GLB,,,

## 2022-04-05 NOTE — PROGRESS NOTES
Abrahan reports INR is 2.2, we will give daily doses until INR reaches 2.5, then add in 0 mg days if needed.  Abrahan confirms understanding.  She wants to test again Tuesday 5/5 as to not overshoot our goal and will let us know those results.  INR unchanged at 2.1, we will continue daily dosing and retest on Friday 4.8.22.

## 2022-04-12 ENCOUNTER — ANTI-COAG VISIT (OUTPATIENT)
Dept: CARDIOLOGY | Facility: CLINIC | Age: 87
End: 2022-04-12
Payer: MEDICARE

## 2022-04-12 DIAGNOSIS — Z79.01 LONG TERM (CURRENT) USE OF ANTICOAGULANTS: ICD-10-CM

## 2022-04-12 LAB — INR PPP: 2.1

## 2022-04-12 PROCEDURE — 93793 PR ANTICOAGULANT MGMT FOR PT TAKING WARFARIN: ICD-10-PCS | Mod: S$GLB,,,

## 2022-04-12 PROCEDURE — 93793 ANTICOAG MGMT PT WARFARIN: CPT | Mod: S$GLB,,,

## 2022-04-12 NOTE — PROGRESS NOTES
INR not at goal. Medications, chart, and patient findings reviewed. See calendar for adjustments to dose and follow up plan.  Abrahan reports INR of 2.1 - we will boost with 1 mg today only then return to 0.5 mg daily.  Keep 1/4 cup of spinach in diet twice weekly.  Repeat on Thursday.

## 2022-04-16 LAB — INR PPP: 2.8

## 2022-04-18 ENCOUNTER — ANTI-COAG VISIT (OUTPATIENT)
Dept: CARDIOLOGY | Facility: CLINIC | Age: 87
End: 2022-04-18
Payer: MEDICARE

## 2022-04-18 DIAGNOSIS — Z79.01 LONG TERM (CURRENT) USE OF ANTICOAGULANTS: ICD-10-CM

## 2022-04-18 PROCEDURE — 99499 NO LOS: ICD-10-PCS | Mod: S$GLB,,,

## 2022-04-18 PROCEDURE — 99499 UNLISTED E&M SERVICE: CPT | Mod: S$GLB,,,

## 2022-04-18 NOTE — PROGRESS NOTES
Abrahan was unable to get a good sample on Friday 4/15, retested again 4/16 - INR was in range - 2.8.  We will continue 0.5 mg QD and retest on Thursday 4/21/22.

## 2022-04-20 ENCOUNTER — ANTI-COAG VISIT (OUTPATIENT)
Dept: CARDIOLOGY | Facility: CLINIC | Age: 87
End: 2022-04-20
Payer: MEDICARE

## 2022-04-20 DIAGNOSIS — Z79.01 LONG TERM (CURRENT) USE OF ANTICOAGULANTS: ICD-10-CM

## 2022-04-20 LAB — INR PPP: 3.5

## 2022-04-20 PROCEDURE — 99499 UNLISTED E&M SERVICE: CPT | Mod: S$GLB,,,

## 2022-04-20 PROCEDURE — 99499 NO LOS: ICD-10-PCS | Mod: S$GLB,,,

## 2022-04-20 NOTE — PROGRESS NOTES
Fax received [facility] - INR testing one day early.  Abrahan reports no change in health.  We will give spinach today and continue 0.5 mg QD of warfarin.  Repeat in 1 week.  Abrahan confirms understanding.

## 2022-04-25 PROBLEM — Z09 FOLLOW UP: Status: RESOLVED | Noted: 2022-01-18 | Resolved: 2022-04-25

## 2022-04-25 LAB — INR PPP: 4.3

## 2022-04-26 ENCOUNTER — ANTI-COAG VISIT (OUTPATIENT)
Dept: CARDIOLOGY | Facility: CLINIC | Age: 87
End: 2022-04-26
Payer: MEDICARE

## 2022-04-26 DIAGNOSIS — Z79.01 LONG TERM (CURRENT) USE OF ANTICOAGULANTS: ICD-10-CM

## 2022-04-26 LAB — INR PPP: 3.2

## 2022-04-26 PROCEDURE — 93793 ANTICOAG MGMT PT WARFARIN: CPT | Mod: S$GLB,,,

## 2022-04-26 PROCEDURE — 93793 PR ANTICOAGULANT MGMT FOR PT TAKING WARFARIN: ICD-10-PCS | Mod: S$GLB,,,

## 2022-04-26 NOTE — PROGRESS NOTES
Per Abrahan, INR has improved to 3.2 today from 4.4 yesterday (extra spinach, dose held 4/25 per CC).  We will resume her dose of 0.5 mg QD and repeat in 1 week.  Return to regular spinach intake.  Abrahan will text in between if she needs assistance.  Will  fax upon arrival.  Abrahan will call in results.

## 2022-05-03 LAB — INR PPP: 3.6

## 2022-05-04 ENCOUNTER — ANTI-COAG VISIT (OUTPATIENT)
Dept: CARDIOLOGY | Facility: CLINIC | Age: 87
End: 2022-05-04
Payer: MEDICARE

## 2022-05-04 DIAGNOSIS — Z79.01 LONG TERM (CURRENT) USE OF ANTICOAGULANTS: ICD-10-CM

## 2022-05-04 PROCEDURE — 99499 UNLISTED E&M SERVICE: CPT | Mod: S$GLB,,,

## 2022-05-04 PROCEDURE — 99499 NO LOS: ICD-10-PCS | Mod: S$GLB,,,

## 2022-05-04 NOTE — PROGRESS NOTES
[facility] fax - INR is 3.6 - supra therapeutic.  [place] will provide an extra serving of spinach.  No other changes reported.  If INR does not improve, we will hold one dose.  Abrahan confirms understanding.  She will retest later in the week and if she needs dosing assistance, will reach out to coumadin clinic.

## 2022-05-09 DIAGNOSIS — E78.00 PURE HYPERCHOLESTEROLEMIA: Chronic | ICD-10-CM

## 2022-05-09 DIAGNOSIS — E03.8 OTHER SPECIFIED HYPOTHYROIDISM: ICD-10-CM

## 2022-05-09 DIAGNOSIS — E83.42 HYPOMAGNESEMIA: ICD-10-CM

## 2022-05-09 DIAGNOSIS — N18.30 STAGE 3 CHRONIC KIDNEY DISEASE, UNSPECIFIED WHETHER STAGE 3A OR 3B CKD: Primary | Chronic | ICD-10-CM

## 2022-05-10 ENCOUNTER — ANTI-COAG VISIT (OUTPATIENT)
Dept: CARDIOLOGY | Facility: CLINIC | Age: 87
End: 2022-05-10
Payer: MEDICARE

## 2022-05-10 ENCOUNTER — CLINICAL SUPPORT (OUTPATIENT)
Dept: CARDIOLOGY | Facility: HOSPITAL | Age: 87
End: 2022-05-10
Payer: MEDICARE

## 2022-05-10 DIAGNOSIS — Z95.0 PRESENCE OF CARDIAC PACEMAKER: ICD-10-CM

## 2022-05-10 DIAGNOSIS — Z79.01 LONG TERM (CURRENT) USE OF ANTICOAGULANTS: ICD-10-CM

## 2022-05-10 LAB — INR PPP: 3

## 2022-05-10 PROCEDURE — 93793 PR ANTICOAGULANT MGMT FOR PT TAKING WARFARIN: ICD-10-PCS | Mod: S$GLB,,,

## 2022-05-10 PROCEDURE — 93296 REM INTERROG EVL PM/IDS: CPT | Performed by: INTERNAL MEDICINE

## 2022-05-10 PROCEDURE — 93294 REM INTERROG EVL PM/LDLS PM: CPT | Mod: S$GLB,,, | Performed by: INTERNAL MEDICINE

## 2022-05-10 PROCEDURE — 93294 CARDIAC DEVICE CHECK - REMOTE: ICD-10-PCS | Mod: S$GLB,,, | Performed by: INTERNAL MEDICINE

## 2022-05-10 PROCEDURE — 93793 ANTICOAG MGMT PT WARFARIN: CPT | Mod: S$GLB,,,

## 2022-05-10 RX ORDER — LEVOTHYROXINE SODIUM 50 UG/1
TABLET ORAL
Qty: 135 TABLET | Refills: 1 | Status: SHIPPED | OUTPATIENT
Start: 2022-05-10

## 2022-05-10 NOTE — PROGRESS NOTES
Abrahan reports INR of 3.0 - will send to [facility].    INR at goal. Medications and chart reviewed. No changes noted to necessitate adjustment of warfarin or follow-up plan. See calendar.  Continue same spinach intake with warfarin 0.5 mg QD.

## 2022-05-10 NOTE — TELEPHONE ENCOUNTER
[facility]X sent. Patient needs labs and follow up appointment.   clear bilaterally.  Pupils equal, round, and reactive to light. no jaundice

## 2022-05-10 NOTE — TELEPHONE ENCOUNTER
Refill Routing Note   Medication(s) are not appropriate for processing by [facility] for the following reason(s):      - Unclear if patient follows with you     [facility] action(s):  Route          Medication reconciliation completed: No     Appointments  past 12m or future 3m with PCP    Date Provider   Last Visit   12/1/2020 FAM Rosales Jr., MD   Next Visit   Visit date not found FAM Rosales Jr., MD   ED visits in past 90 days: 1        Note composed:10:51 AM 05/10/2022

## 2022-05-11 ENCOUNTER — TELEPHONE (OUTPATIENT)
Dept: INTERNAL MEDICINE | Facility: CLINIC | Age: 87
End: 2022-05-11
Payer: MEDICARE

## 2022-05-12 ENCOUNTER — TELEPHONE (OUTPATIENT)
Dept: INTERNAL MEDICINE | Facility: CLINIC | Age: 87
End: 2022-05-12

## 2022-05-12 NOTE — TELEPHONE ENCOUNTER
Advised pt's dtr christopher Levothyroxine refill approved by Dr. Rosales, dtr lan. Since pt bedbound and under care of [facility], needs appt w/ new [facility] since NP Denicola no longer with [facility] advised dtr would send their office a message requesting they contact dtr to sched pt's appt w/ new [facility]. Yesenia montenegro.

## 2022-05-17 LAB — INR PPP: 3.1

## 2022-05-18 ENCOUNTER — ANTI-COAG VISIT (OUTPATIENT)
Dept: CARDIOLOGY | Facility: CLINIC | Age: 87
End: 2022-05-18
Payer: MEDICARE

## 2022-05-18 DIAGNOSIS — Z86.718 HISTORY OF DVT OF LOWER EXTREMITY: ICD-10-CM

## 2022-05-18 DIAGNOSIS — Z79.01 LONG TERM (CURRENT) USE OF ANTICOAGULANTS: ICD-10-CM

## 2022-05-18 DIAGNOSIS — I48.0 PAROXYSMAL ATRIAL FIBRILLATION: Chronic | ICD-10-CM

## 2022-05-18 DIAGNOSIS — Z95.2 S/P MITRAL VALVE REPLACEMENT: Chronic | ICD-10-CM

## 2022-05-18 PROCEDURE — 93793 ANTICOAG MGMT PT WARFARIN: CPT | Mod: S$GLB,,,

## 2022-05-18 PROCEDURE — 93793 PR ANTICOAGULANT MGMT FOR PT TAKING WARFARIN: ICD-10-PCS | Mod: S$GLB,,,

## 2022-05-18 NOTE — PROGRESS NOTES
Fax result received from [facility].  INR: 3.1.  Test date: 5/17/2022.  INR remains therapeutic.  No change in warfarin dose - 0.5 mg every day.  INR is to be rechecked in 1 week.

## 2022-05-24 ENCOUNTER — ANTI-COAG VISIT (OUTPATIENT)
Dept: CARDIOLOGY | Facility: CLINIC | Age: 87
End: 2022-05-24
Payer: MEDICARE

## 2022-05-24 DIAGNOSIS — Z79.01 LONG TERM (CURRENT) USE OF ANTICOAGULANTS: ICD-10-CM

## 2022-05-24 LAB — INR PPP: 1.8

## 2022-05-24 PROCEDURE — 93793 PR ANTICOAGULANT MGMT FOR PT TAKING WARFARIN: ICD-10-PCS | Mod: S$GLB,,,

## 2022-05-24 PROCEDURE — 93793 ANTICOAG MGMT PT WARFARIN: CPT | Mod: S$GLB,,,

## 2022-05-24 NOTE — PROGRESS NOTES
Fax received [facility] - test date 5/24/22 - INR 1.8. - a large drop from last week.  Abrahan denies any change in medications or health.  No missed doses.  In case of test abnormality, we will not boost since her INR response is delayed and steep.  Instead, Abrahan will reduce spinach intake to one serving this week to see if there is any improvement in INR over the week.  Repeat INR next Tuesday.  She was informed to call to [facility] as I will be out of the office.

## 2022-05-31 LAB — INR PPP: 2

## 2022-06-01 ENCOUNTER — ANTI-COAG VISIT (OUTPATIENT)
Dept: CARDIOLOGY | Facility: CLINIC | Age: 87
End: 2022-06-01
Payer: MEDICARE

## 2022-06-01 ENCOUNTER — PATIENT MESSAGE (OUTPATIENT)
Dept: CARDIOLOGY | Facility: CLINIC | Age: 87
End: 2022-06-01
Payer: MEDICARE

## 2022-06-01 DIAGNOSIS — Z79.01 LONG TERM (CURRENT) USE OF ANTICOAGULANTS: ICD-10-CM

## 2022-06-01 DIAGNOSIS — I48.0 PAROXYSMAL ATRIAL FIBRILLATION: Chronic | ICD-10-CM

## 2022-06-01 DIAGNOSIS — Z86.718 HISTORY OF DVT OF LOWER EXTREMITY: ICD-10-CM

## 2022-06-01 DIAGNOSIS — Z95.2 S/P MITRAL VALVE REPLACEMENT: Chronic | ICD-10-CM

## 2022-06-01 NOTE — PROGRESS NOTES
Fax result received from [facility].  INR: 2.0 - subtherapeutic.  Unable to reach patient's daughter (Ms Gauthier) via telephone - sent portal message.  Instructions given:  Warfarin 1 mg today (Wednesday), then resume 0.5 mg (1/2 tablet) daily - will rechallenge.  INR is to be rechecked in 1 week.

## 2022-06-07 LAB — INR PPP: 2

## 2022-06-08 ENCOUNTER — ANTI-COAG VISIT (OUTPATIENT)
Dept: CARDIOLOGY | Facility: CLINIC | Age: 87
End: 2022-06-08
Payer: MEDICARE

## 2022-06-08 DIAGNOSIS — Z79.01 LONG TERM (CURRENT) USE OF ANTICOAGULANTS: ICD-10-CM

## 2022-06-08 PROCEDURE — G0250 MD INR TEST REVIE INTER MGMT: HCPCS | Mod: S$GLB,,,

## 2022-06-08 PROCEDURE — G0250 PR MD REVIEW INTERPRET OF TEST: ICD-10-PCS | Mod: S$GLB,,,

## 2022-06-08 NOTE — PROGRESS NOTES
Abrahan reports INR result of 2.0 - she reports that she will call into [facility].  She did not boost Mrs Kalpesh' dose last week.  We will boost dose with week with 1 mg and leave her spinach intake the same.  Repeat INR in 1 week.  Abrahan confirms understanding.

## 2022-06-14 LAB — INR PPP: 2.3

## 2022-06-15 ENCOUNTER — ANTI-COAG VISIT (OUTPATIENT)
Dept: CARDIOLOGY | Facility: CLINIC | Age: 87
End: 2022-06-15
Payer: MEDICARE

## 2022-06-15 DIAGNOSIS — Z79.01 LONG TERM (CURRENT) USE OF ANTICOAGULANTS: ICD-10-CM

## 2022-06-15 NOTE — PROGRESS NOTES
Fax received from [facility] - INR-2.3 is just below goal, we will resume dose without any change this week.  If INR remains low next week, we will attempt another boosted dose of 1 mg.  Abrahan notified to continue same dose and spinach and repeat INR in 1 week.

## 2022-06-21 ENCOUNTER — ANTI-COAG VISIT (OUTPATIENT)
Dept: CARDIOLOGY | Facility: CLINIC | Age: 87
End: 2022-06-21
Payer: MEDICARE

## 2022-06-21 DIAGNOSIS — Z79.01 LONG TERM (CURRENT) USE OF ANTICOAGULANTS: ICD-10-CM

## 2022-06-21 LAB — INR PPP: 2.3

## 2022-06-21 PROCEDURE — 99499 UNLISTED E&M SERVICE: CPT | Mod: S$GLB,,,

## 2022-06-21 PROCEDURE — 99499 NO LOS: ICD-10-PCS | Mod: S$GLB,,,

## 2022-06-21 NOTE — PROGRESS NOTES
INR not at goal. Medications, chart, and patient findings reviewed. See calendar for adjustments to dose and follow up plan.  INR remains just below goal at 2.3.  We will boost dose today since we did not attempt a boost last week and Abrahan will repeat in 1 week.  They will continue the same amount of spinach intake.  Will attach [facility] result.

## 2022-06-28 LAB — INR PPP: 3.2

## 2022-06-29 ENCOUNTER — ANTI-COAG VISIT (OUTPATIENT)
Dept: CARDIOLOGY | Facility: CLINIC | Age: 87
End: 2022-06-29
Payer: MEDICARE

## 2022-06-29 DIAGNOSIS — Z79.01 LONG TERM (CURRENT) USE OF ANTICOAGULANTS: ICD-10-CM

## 2022-06-29 PROCEDURE — 93793 PR ANTICOAGULANT MGMT FOR PT TAKING WARFARIN: ICD-10-PCS | Mod: S$GLB,,,

## 2022-06-29 PROCEDURE — 93793 ANTICOAG MGMT PT WARFARIN: CPT | Mod: S$GLB,,,

## 2022-06-29 NOTE — PROGRESS NOTES
INR reported by Abrahan - 3.2.  We will resume dose at 0.5 mg and continue the same amount of weekly spinach intake.  Abrahan confirms understanding.

## 2022-07-05 LAB — INR PPP: 3.7

## 2022-07-06 ENCOUNTER — ANTI-COAG VISIT (OUTPATIENT)
Dept: CARDIOLOGY | Facility: CLINIC | Age: 87
End: 2022-07-06
Payer: MEDICARE

## 2022-07-06 DIAGNOSIS — Z79.01 LONG TERM (CURRENT) USE OF ANTICOAGULANTS: ICD-10-CM

## 2022-07-06 PROCEDURE — 93793 PR ANTICOAGULANT MGMT FOR PT TAKING WARFARIN: ICD-10-PCS | Mod: S$GLB,,,

## 2022-07-06 PROCEDURE — 93793 ANTICOAG MGMT PT WARFARIN: CPT | Mod: S$GLB,,,

## 2022-07-06 NOTE — PROGRESS NOTES
[facility] INR -3.7, supratherapeutic.  She was boosted 2 weeks ago for continued low INRs.  We will continue dose with extra serving of spinach this week.  Repeat INR in 1 week.  Bleeding precautions in place.

## 2022-07-12 ENCOUNTER — ANTI-COAG VISIT (OUTPATIENT)
Dept: CARDIOLOGY | Facility: CLINIC | Age: 87
End: 2022-07-12
Payer: MEDICARE

## 2022-07-12 DIAGNOSIS — Z79.01 LONG TERM (CURRENT) USE OF ANTICOAGULANTS: ICD-10-CM

## 2022-07-12 LAB — INR PPP: 5.7

## 2022-07-12 PROCEDURE — 93793 PR ANTICOAGULANT MGMT FOR PT TAKING WARFARIN: ICD-10-PCS | Mod: S$GLB,,,

## 2022-07-12 PROCEDURE — 93793 ANTICOAG MGMT PT WARFARIN: CPT | Mod: S$GLB,,,

## 2022-07-15 ENCOUNTER — ANTI-COAG VISIT (OUTPATIENT)
Dept: CARDIOLOGY | Facility: CLINIC | Age: 87
End: 2022-07-15
Payer: MEDICARE

## 2022-07-15 LAB — INR PPP: 3.5

## 2022-07-15 PROCEDURE — 93793 PR ANTICOAGULANT MGMT FOR PT TAKING WARFARIN: ICD-10-PCS | Mod: S$GLB,,,

## 2022-07-15 PROCEDURE — 93793 ANTICOAG MGMT PT WARFARIN: CPT | Mod: S$GLB,,,

## 2022-07-15 NOTE — PROGRESS NOTES
Abrahan reports INR at 3.5.  She will call in result to meter company.  We will resume regular spinach intake and regular warfarin dose of 0.5 mg QD.  She will repeat INR on Tuesday, normal testing day.  Abrahan confirms understanding.

## 2022-07-19 ENCOUNTER — ANTI-COAG VISIT (OUTPATIENT)
Dept: CARDIOLOGY | Facility: CLINIC | Age: 87
End: 2022-07-19
Payer: MEDICARE

## 2022-07-19 DIAGNOSIS — Z79.01 LONG TERM (CURRENT) USE OF ANTICOAGULANTS: ICD-10-CM

## 2022-07-19 LAB — INR PPP: 2.5

## 2022-07-19 PROCEDURE — G0250 PR MD REVIEW INTERPRET OF TEST: ICD-10-PCS | Mod: S$GLB,,, | Performed by: INTERNAL MEDICINE

## 2022-07-19 PROCEDURE — G0250 MD INR TEST REVIE INTER MGMT: HCPCS | Mod: S$GLB,,, | Performed by: INTERNAL MEDICINE

## 2022-07-19 NOTE — PROGRESS NOTES
Fax received from [facility] - INR is in range - 2.5.  Patient will continue 0.5 mg QD.  Repeat INR in 1 week.

## 2022-07-26 LAB — INR PPP: 3

## 2022-07-27 ENCOUNTER — ANTI-COAG VISIT (OUTPATIENT)
Dept: CARDIOLOGY | Facility: CLINIC | Age: 87
End: 2022-07-27
Payer: MEDICARE

## 2022-07-27 DIAGNOSIS — Z86.718 HISTORY OF DVT OF LOWER EXTREMITY: Primary | ICD-10-CM

## 2022-07-27 DIAGNOSIS — Z79.01 LONG TERM (CURRENT) USE OF ANTICOAGULANTS: ICD-10-CM

## 2022-07-27 DIAGNOSIS — I48.0 PAROXYSMAL ATRIAL FIBRILLATION: ICD-10-CM

## 2022-07-27 DIAGNOSIS — Z95.2 S/P MITRAL VALVE REPLACEMENT: ICD-10-CM

## 2022-07-27 PROCEDURE — 93793 PR ANTICOAGULANT MGMT FOR PT TAKING WARFARIN: ICD-10-PCS | Mod: S$GLB,,,

## 2022-07-27 PROCEDURE — 93793 ANTICOAG MGMT PT WARFARIN: CPT | Mod: S$GLB,,,

## 2022-07-27 NOTE — PROGRESS NOTES
Fax received from [facility].  INR is in range at 3.0.  Test date: 7/26/2022.  Patient will continue 0.5 mg daily.  Repeat INR in 1 week.

## 2022-08-02 LAB — INR PPP: 3.4

## 2022-08-03 ENCOUNTER — ANTI-COAG VISIT (OUTPATIENT)
Dept: CARDIOLOGY | Facility: CLINIC | Age: 87
End: 2022-08-03
Payer: MEDICARE

## 2022-08-03 DIAGNOSIS — Z86.718 HISTORY OF DVT OF LOWER EXTREMITY: ICD-10-CM

## 2022-08-03 DIAGNOSIS — I48.0 PAROXYSMAL ATRIAL FIBRILLATION: Chronic | ICD-10-CM

## 2022-08-03 DIAGNOSIS — Z95.2 S/P MITRAL VALVE REPLACEMENT: Chronic | ICD-10-CM

## 2022-08-03 DIAGNOSIS — Z79.01 LONG TERM (CURRENT) USE OF ANTICOAGULANTS: ICD-10-CM

## 2022-08-03 NOTE — PROGRESS NOTES
Fax received from [facility] [employer].  INR: 3.4 - remains at goal.  Test date: 8/02/2022.  No change in warfarin dose - continue 0.5 mg daily.  Retest INR in 1 week.

## 2022-08-08 ENCOUNTER — CLINICAL SUPPORT (OUTPATIENT)
Dept: CARDIOLOGY | Facility: HOSPITAL | Age: 87
End: 2022-08-08
Payer: MEDICARE

## 2022-08-08 DIAGNOSIS — Z95.0 PRESENCE OF CARDIAC PACEMAKER: ICD-10-CM

## 2022-08-08 PROCEDURE — 93296 REM INTERROG EVL PM/IDS: CPT | Performed by: INTERNAL MEDICINE

## 2022-08-09 ENCOUNTER — ANTI-COAG VISIT (OUTPATIENT)
Dept: CARDIOLOGY | Facility: CLINIC | Age: 87
End: 2022-08-09
Payer: MEDICARE

## 2022-08-09 DIAGNOSIS — Z79.01 LONG TERM (CURRENT) USE OF ANTICOAGULANTS: ICD-10-CM

## 2022-08-09 LAB — INR PPP: 3.2

## 2022-08-09 PROCEDURE — G0250 PR MD REVIEW INTERPRET OF TEST: ICD-10-PCS | Mod: S$GLB,,, | Performed by: INTERNAL MEDICINE

## 2022-08-09 PROCEDURE — G0250 MD INR TEST REVIE INTER MGMT: HCPCS | Mod: S$GLB,,, | Performed by: INTERNAL MEDICINE

## 2022-08-09 NOTE — PROGRESS NOTES
Abarhan reports INR at 3.2, remains in goal range.  I have requested she report to meter company.  No change in warfarin dose-0.5 mg QD or spinach intake required.

## 2022-08-16 ENCOUNTER — ANTI-COAG VISIT (OUTPATIENT)
Dept: CARDIOLOGY | Facility: CLINIC | Age: 87
End: 2022-08-16
Payer: MEDICARE

## 2022-08-16 DIAGNOSIS — Z79.01 LONG TERM (CURRENT) USE OF ANTICOAGULANTS: ICD-10-CM

## 2022-08-16 LAB — INR PPP: 4.1

## 2022-08-16 PROCEDURE — 93793 ANTICOAG MGMT PT WARFARIN: CPT | Mod: S$GLB,,,

## 2022-08-16 PROCEDURE — 93793 PR ANTICOAGULANT MGMT FOR PT TAKING WARFARIN: ICD-10-PCS | Mod: S$GLB,,,

## 2022-08-16 NOTE — PROGRESS NOTES
Abrahan reports INR is 4.0 today - we will hold her warfarin today and give an additional serving spinach as well (Thurs and Saturday also).  Repeat INR this Friday.

## 2022-08-17 ENCOUNTER — ANTI-COAG VISIT (OUTPATIENT)
Dept: CARDIOLOGY | Facility: CLINIC | Age: 87
End: 2022-08-17
Payer: MEDICARE

## 2022-08-17 DIAGNOSIS — Z79.01 LONG TERM (CURRENT) USE OF ANTICOAGULANTS: ICD-10-CM

## 2022-08-17 LAB — INR PPP: 4.1

## 2022-08-17 PROCEDURE — 93793 ANTICOAG MGMT PT WARFARIN: CPT | Mod: S$GLB,,,

## 2022-08-17 PROCEDURE — 93793 PR ANTICOAGULANT MGMT FOR PT TAKING WARFARIN: ICD-10-PCS | Mod: S$GLB,,,

## 2022-08-17 NOTE — PROGRESS NOTES
Fax received from [employer] [employer] - INR is from yesterday 4.1 - patient was told to hold a dose yesterday, likely have not seen that effect.  Abrahan contacted,  - I have clarified that the result reported to us today was from yesterday.  She will retest later today and send the result. Repeat is 4.1 again today per Abrahan - hold dose tonight as well.

## 2022-08-19 ENCOUNTER — ANTI-COAG VISIT (OUTPATIENT)
Dept: CARDIOLOGY | Facility: CLINIC | Age: 87
End: 2022-08-19
Payer: MEDICARE

## 2022-08-19 DIAGNOSIS — Z79.01 LONG TERM (CURRENT) USE OF ANTICOAGULANTS: ICD-10-CM

## 2022-08-19 LAB — INR PPP: 3

## 2022-08-19 NOTE — PROGRESS NOTES
INR has improved.  Held doses x 3, + spinach with 1/2 can of Boost has corrected INR.  I feel we should begin warfarin again tonight at 0.5 mg QD since additional Vit K was administered over the last 2 days.  Repeat INR on Tuesday and report result.  Abrahan was notified.

## 2022-08-23 ENCOUNTER — ANTI-COAG VISIT (OUTPATIENT)
Dept: CARDIOLOGY | Facility: CLINIC | Age: 87
End: 2022-08-23
Payer: MEDICARE

## 2022-08-23 DIAGNOSIS — Z79.01 LONG TERM (CURRENT) USE OF ANTICOAGULANTS: ICD-10-CM

## 2022-08-23 DIAGNOSIS — Z86.73 H/O: CVA (CEREBROVASCULAR ACCIDENT): Chronic | ICD-10-CM

## 2022-08-23 LAB — INR PPP: 3.1

## 2022-08-23 PROCEDURE — 93793 ANTICOAG MGMT PT WARFARIN: CPT | Mod: S$GLB,,,

## 2022-08-23 PROCEDURE — 93793 PR ANTICOAGULANT MGMT FOR PT TAKING WARFARIN: ICD-10-PCS | Mod: S$GLB,,,

## 2022-08-23 NOTE — PROGRESS NOTES
Fax received from [employer].   INR therapeutic at 3.1.     No changes in patient dosing.  Continue warfarin 0.5 mg every day.  Recheck in one week on 8/30/22.    Attempted to contact patient's daughter Chary and voice mail picked up.

## 2022-08-30 ENCOUNTER — ANTI-COAG VISIT (OUTPATIENT)
Dept: CARDIOLOGY | Facility: CLINIC | Age: 87
End: 2022-08-30
Payer: MEDICARE

## 2022-08-30 LAB — INR PPP: 3.3

## 2022-08-30 NOTE — PROGRESS NOTES
Abrahan reports INR of 3.3, in range - she has also reported to [facility] for documentation and will attach fax.  We will continue warfarin 0.5 mg QD.  No change in dose or spinach intake.  Repeat INR in 1 week.

## 2022-09-06 ENCOUNTER — ANTI-COAG VISIT (OUTPATIENT)
Dept: CARDIOLOGY | Facility: CLINIC | Age: 87
End: 2022-09-06
Payer: MEDICARE

## 2022-09-06 DIAGNOSIS — Z79.01 LONG TERM (CURRENT) USE OF ANTICOAGULANTS: Primary | ICD-10-CM

## 2022-09-06 LAB — INR PPP: 2.2

## 2022-09-06 PROCEDURE — 93793 PR ANTICOAGULANT MGMT FOR PT TAKING WARFARIN: ICD-10-PCS | Mod: S$GLB,,,

## 2022-09-06 PROCEDURE — 93793 ANTICOAG MGMT PT WARFARIN: CPT | Mod: S$GLB,,,

## 2022-09-13 ENCOUNTER — ANTI-COAG VISIT (OUTPATIENT)
Dept: CARDIOLOGY | Facility: CLINIC | Age: 87
End: 2022-09-13
Payer: MEDICARE

## 2022-09-13 DIAGNOSIS — Z79.01 LONG TERM (CURRENT) USE OF ANTICOAGULANTS: Primary | ICD-10-CM

## 2022-09-13 LAB — INR PPP: 3.7

## 2022-09-13 PROCEDURE — 93793 ANTICOAG MGMT PT WARFARIN: CPT | Mod: S$GLB,,,

## 2022-09-13 PROCEDURE — 93793 PR ANTICOAGULANT MGMT FOR PT TAKING WARFARIN: ICD-10-PCS | Mod: S$GLB,,,

## 2022-09-13 NOTE — PROGRESS NOTES
Abrahan reports an INR of 3.7.  This is a significant increase from last week's subtherapeutic level without a warfarin boost to explain increase.  We will hold a dose tonight and continue the same spinach intake.  Repeat INR in 1 week.

## 2022-09-20 ENCOUNTER — ANTI-COAG VISIT (OUTPATIENT)
Dept: CARDIOLOGY | Facility: CLINIC | Age: 87
End: 2022-09-20
Payer: MEDICARE

## 2022-09-20 DIAGNOSIS — Z79.01 LONG TERM (CURRENT) USE OF ANTICOAGULANTS: Primary | ICD-10-CM

## 2022-09-20 LAB — INR PPP: 4.7

## 2022-09-20 PROCEDURE — G0250 MD INR TEST REVIE INTER MGMT: HCPCS | Mod: S$GLB,,,

## 2022-09-20 PROCEDURE — G0250 PR MD REVIEW INTERPRET OF TEST: ICD-10-PCS | Mod: S$GLB,,,

## 2022-09-20 NOTE — PROGRESS NOTES
INR not at goal. Medications, chart, and patient findings reviewed. See calendar for adjustments to dose and follow up plan.  Abrahan reports INR of 4.7 today  - higher reading over the weekend (not reported) - 5.8.  They did not hold warfarin, but double spinach intake.  INR then has technically improved. We will hold x 2 doses today and tomorrow then I have asked for a repeat INR today and reminded Abrahan to submit results to meter company.

## 2022-09-22 ENCOUNTER — ANTI-COAG VISIT (OUTPATIENT)
Dept: CARDIOLOGY | Facility: CLINIC | Age: 87
End: 2022-09-22
Payer: MEDICARE

## 2022-09-22 DIAGNOSIS — Z79.01 LONG TERM (CURRENT) USE OF ANTICOAGULANTS: Primary | ICD-10-CM

## 2022-09-22 LAB — INR PPP: 3.1

## 2022-09-22 PROCEDURE — 93793 PR ANTICOAGULANT MGMT FOR PT TAKING WARFARIN: ICD-10-PCS | Mod: S$GLB,,,

## 2022-09-22 PROCEDURE — 93793 ANTICOAG MGMT PT WARFARIN: CPT | Mod: S$GLB,,,

## 2022-09-22 NOTE — PROGRESS NOTES
INR at goal. Medications and chart reviewed. No changes noted to necessitate adjustment of warfarin or follow-up plan. See calendar.  Abrahan reports an INR retest this week of 3.1 - she is back in range after 2 held doses.  We will resume her regular warfarin dose of 0.5 mg QD and continue her normal greens intake.  Retest on meter day - Tuesday next week.

## 2022-09-27 ENCOUNTER — ANTI-COAG VISIT (OUTPATIENT)
Dept: CARDIOLOGY | Facility: CLINIC | Age: 87
End: 2022-09-27
Payer: MEDICARE

## 2022-09-27 DIAGNOSIS — Z79.01 LONG TERM (CURRENT) USE OF ANTICOAGULANTS: Primary | ICD-10-CM

## 2022-09-27 LAB — INR PPP: 2.2

## 2022-09-27 PROCEDURE — 93793 PR ANTICOAGULANT MGMT FOR PT TAKING WARFARIN: ICD-10-PCS | Mod: S$GLB,,,

## 2022-09-27 PROCEDURE — 93793 ANTICOAG MGMT PT WARFARIN: CPT | Mod: S$GLB,,,

## 2022-09-27 NOTE — PROGRESS NOTES
Fax received [facility] - INR is 2.2- slightly belwo goal.  Abrahan was contacted.  She will continue her current dose and current spinach/greens intake.  INR is recovering from 2 held doses last week.  Repeat INR in 1 week.

## 2022-10-04 LAB — INR PPP: 2.2

## 2022-10-05 ENCOUNTER — ANTI-COAG VISIT (OUTPATIENT)
Dept: CARDIOLOGY | Facility: CLINIC | Age: 87
End: 2022-10-05
Payer: MEDICARE

## 2022-10-05 DIAGNOSIS — Z79.01 LONG TERM (CURRENT) USE OF ANTICOAGULANTS: Primary | ICD-10-CM

## 2022-10-05 NOTE — PROGRESS NOTES
Fax from [facility] - INR remains subtherapeutic at 2.2-I am concerned about a boost since she is near goal and responds late with INR increases and recently had a large elevation in INR.  I have asked Abrahan to decrease (not eliminate) the amount of spinach this week only and we will recheck INR in 1 week.

## 2022-10-11 ENCOUNTER — ANTI-COAG VISIT (OUTPATIENT)
Dept: CARDIOLOGY | Facility: CLINIC | Age: 87
End: 2022-10-11
Payer: MEDICARE

## 2022-10-11 DIAGNOSIS — Z79.01 LONG TERM (CURRENT) USE OF ANTICOAGULANTS: Primary | ICD-10-CM

## 2022-10-11 LAB — INR PPP: 2

## 2022-10-11 PROCEDURE — 93793 ANTICOAG MGMT PT WARFARIN: CPT | Mod: S$GLB,,,

## 2022-10-11 PROCEDURE — 93793 PR ANTICOAGULANT MGMT FOR PT TAKING WARFARIN: ICD-10-PCS | Mod: S$GLB,,,

## 2022-10-11 NOTE — PROGRESS NOTES
Report per Abrahan - INR has declined to 2.0.  Will await faxed result from [email or web address], reports transmission delay, but will send 10/12/22.  We will need to boost dose today for INR decline.  Continue spinach at same intake and repeat INR in 1 week.  Abrahan reports no changes or missed doses.  Confirms understanding of directions.

## 2022-10-18 ENCOUNTER — ANTI-COAG VISIT (OUTPATIENT)
Dept: CARDIOLOGY | Facility: CLINIC | Age: 87
End: 2022-10-18
Payer: MEDICARE

## 2022-10-18 DIAGNOSIS — I48.0 PAROXYSMAL ATRIAL FIBRILLATION: Chronic | ICD-10-CM

## 2022-10-18 DIAGNOSIS — Z79.01 LONG TERM (CURRENT) USE OF ANTICOAGULANTS: ICD-10-CM

## 2022-10-18 DIAGNOSIS — Z86.718 HISTORY OF DVT OF LOWER EXTREMITY: Primary | ICD-10-CM

## 2022-10-18 DIAGNOSIS — Z95.2 S/P MITRAL VALVE REPLACEMENT: Chronic | ICD-10-CM

## 2022-10-18 LAB — INR PPP: 3.4

## 2022-10-18 PROCEDURE — 93793 ANTICOAG MGMT PT WARFARIN: CPT | Mod: S$GLB,,,

## 2022-10-18 PROCEDURE — 93793 PR ANTICOAGULANT MGMT FOR PT TAKING WARFARIN: ICD-10-PCS | Mod: S$GLB,,,

## 2022-10-18 NOTE — PROGRESS NOTES
INR at goal. Medications and chart reviewed. No changes noted to necessitate adjustment of warfarin or follow-up plan. See calendar.  Abrahan reports an improved INR to 3.4.  We will resume 0.5 mg QD and retest INR in 1 week.

## 2022-10-25 ENCOUNTER — ANTI-COAG VISIT (OUTPATIENT)
Dept: CARDIOLOGY | Facility: CLINIC | Age: 87
End: 2022-10-25
Payer: MEDICARE

## 2022-10-25 DIAGNOSIS — Z86.718 HISTORY OF DVT OF LOWER EXTREMITY: Primary | ICD-10-CM

## 2022-10-25 DIAGNOSIS — Z79.01 LONG TERM (CURRENT) USE OF ANTICOAGULANTS: ICD-10-CM

## 2022-10-25 DIAGNOSIS — I48.0 PAROXYSMAL ATRIAL FIBRILLATION: Chronic | ICD-10-CM

## 2022-10-25 DIAGNOSIS — Z95.2 S/P MITRAL VALVE REPLACEMENT: Chronic | ICD-10-CM

## 2022-10-25 LAB — INR PPP: 3.4

## 2022-10-25 PROCEDURE — 93793 PR ANTICOAGULANT MGMT FOR PT TAKING WARFARIN: ICD-10-PCS | Mod: S$GLB,,,

## 2022-10-25 PROCEDURE — 93793 ANTICOAG MGMT PT WARFARIN: CPT | Mod: S$GLB,,,

## 2022-10-25 NOTE — PROGRESS NOTES
Fax received from [facility] - INR -3.4.  INR at goal. Medications and chart reviewed. No changes noted to necessitate adjustment of warfarin or follow-up plan. See calendar.   Patient will continue 0.5 mg QD and retest in 1 week.

## 2022-11-01 ENCOUNTER — ANTI-COAG VISIT (OUTPATIENT)
Dept: CARDIOLOGY | Facility: CLINIC | Age: 87
End: 2022-11-01
Payer: MEDICARE

## 2022-11-01 DIAGNOSIS — Z95.2 S/P MITRAL VALVE REPLACEMENT: Chronic | ICD-10-CM

## 2022-11-01 DIAGNOSIS — Z79.01 LONG TERM (CURRENT) USE OF ANTICOAGULANTS: ICD-10-CM

## 2022-11-01 DIAGNOSIS — I48.0 PAROXYSMAL ATRIAL FIBRILLATION: Chronic | ICD-10-CM

## 2022-11-01 DIAGNOSIS — Z86.718 HISTORY OF DVT OF LOWER EXTREMITY: Primary | ICD-10-CM

## 2022-11-01 LAB — INR PPP: 4.7

## 2022-11-01 PROCEDURE — 93793 PR ANTICOAGULANT MGMT FOR PT TAKING WARFARIN: ICD-10-PCS | Mod: S$GLB,,,

## 2022-11-01 PROCEDURE — 93793 ANTICOAG MGMT PT WARFARIN: CPT | Mod: S$GLB,,,

## 2022-11-01 NOTE — PROGRESS NOTES
INR reported by Abrahan - 4.7 - we will plan on holding 2 warfarin doses today and tomorrow.  We will keep spinach intake the same, but possibly give tonight rather than different day of the week.  LVM with dosing for Abrahan.  I asked her to callback and report any changes to health or diet.  She will repeat INR next Tuesday.  May need to consider a dose decrease for recent INR spikes.

## 2022-11-06 ENCOUNTER — CLINICAL SUPPORT (OUTPATIENT)
Dept: CARDIOLOGY | Facility: HOSPITAL | Age: 87
End: 2022-11-06
Payer: MEDICARE

## 2022-11-06 DIAGNOSIS — Z95.0 PRESENCE OF CARDIAC PACEMAKER: ICD-10-CM

## 2022-11-06 PROCEDURE — 93296 REM INTERROG EVL PM/IDS: CPT | Performed by: INTERNAL MEDICINE

## 2022-11-06 PROCEDURE — 93294 CARDIAC DEVICE CHECK - REMOTE: ICD-10-PCS | Mod: S$GLB,,, | Performed by: INTERNAL MEDICINE

## 2022-11-06 PROCEDURE — 93294 REM INTERROG EVL PM/LDLS PM: CPT | Mod: S$GLB,,, | Performed by: INTERNAL MEDICINE

## 2022-11-08 ENCOUNTER — ANTI-COAG VISIT (OUTPATIENT)
Dept: CARDIOLOGY | Facility: CLINIC | Age: 87
End: 2022-11-08
Payer: MEDICARE

## 2022-11-08 DIAGNOSIS — I48.0 PAROXYSMAL ATRIAL FIBRILLATION: Chronic | ICD-10-CM

## 2022-11-08 DIAGNOSIS — Z86.718 HISTORY OF DVT OF LOWER EXTREMITY: Primary | ICD-10-CM

## 2022-11-08 DIAGNOSIS — Z95.2 S/P MITRAL VALVE REPLACEMENT: Chronic | ICD-10-CM

## 2022-11-08 DIAGNOSIS — Z79.01 LONG TERM (CURRENT) USE OF ANTICOAGULANTS: ICD-10-CM

## 2022-11-08 LAB — INR PPP: 3.5

## 2022-11-08 NOTE — PROGRESS NOTES
Abrahan reports INR 3.5 - it has corrected but on upper end of goal.  We will lower overall dose to try and maintain goal range.  Lower dose to 0 mg Tuesdays and 0.5 mg on all other days.  Repeat INR in 1 week.

## 2022-11-15 ENCOUNTER — ANTI-COAG VISIT (OUTPATIENT)
Dept: CARDIOLOGY | Facility: CLINIC | Age: 87
End: 2022-11-15
Payer: MEDICARE

## 2022-11-15 DIAGNOSIS — Z79.01 LONG TERM (CURRENT) USE OF ANTICOAGULANTS: ICD-10-CM

## 2022-11-15 DIAGNOSIS — I48.0 PAROXYSMAL ATRIAL FIBRILLATION: Chronic | ICD-10-CM

## 2022-11-15 DIAGNOSIS — Z95.2 S/P MITRAL VALVE REPLACEMENT: Chronic | ICD-10-CM

## 2022-11-15 DIAGNOSIS — Z86.718 HISTORY OF DVT OF LOWER EXTREMITY: Primary | ICD-10-CM

## 2022-11-15 LAB — INR PPP: 5.2

## 2022-11-15 PROCEDURE — 93793 PR ANTICOAGULANT MGMT FOR PT TAKING WARFARIN: ICD-10-PCS | Mod: S$GLB,,,

## 2022-11-15 PROCEDURE — 93793 ANTICOAG MGMT PT WARFARIN: CPT | Mod: S$GLB,,,

## 2022-11-15 NOTE — PROGRESS NOTES
INR not at goal. Medications, chart, and patient findings reviewed. See calendar for adjustments to dose and follow up plan.  Abrahan reports an INR of 5.2.  We will hold warfarin x 3 days.  Abrahan reported an appetite change last elevation, but tried to explain the importance of eating to Mrs Posey.  Her dose was lowered last week due to previous elevation and decrease appetite, but despite this, she has increased.  We will likely need to lower dose again next week after INR recovers.

## 2022-11-16 NOTE — PROGRESS NOTES
Fax from [employer] reports an INR 5.7 result.  I have reconfirmed with Ms Abrahan - INR was 5.2.  The 5.7 was an error dialed into the meter company.

## 2022-11-22 ENCOUNTER — ANTI-COAG VISIT (OUTPATIENT)
Dept: CARDIOLOGY | Facility: CLINIC | Age: 87
End: 2022-11-22
Payer: MEDICARE

## 2022-11-22 DIAGNOSIS — I48.0 PAROXYSMAL ATRIAL FIBRILLATION: Chronic | ICD-10-CM

## 2022-11-22 DIAGNOSIS — Z95.2 S/P MITRAL VALVE REPLACEMENT: Chronic | ICD-10-CM

## 2022-11-22 DIAGNOSIS — Z86.718 HISTORY OF DVT OF LOWER EXTREMITY: Primary | ICD-10-CM

## 2022-11-22 DIAGNOSIS — Z79.01 LONG TERM (CURRENT) USE OF ANTICOAGULANTS: ICD-10-CM

## 2022-11-22 LAB — INR PPP: 5.5

## 2022-11-22 PROCEDURE — 93793 ANTICOAG MGMT PT WARFARIN: CPT | Mod: S$GLB,,,

## 2022-11-22 PROCEDURE — 93793 PR ANTICOAGULANT MGMT FOR PT TAKING WARFARIN: ICD-10-PCS | Mod: S$GLB,,,

## 2022-11-22 NOTE — PROGRESS NOTES
Abrahan is reporting an INR of  5.5.  She will report to [facility] as well.  We will STOP warfarin until Friday.  She reports that her mother has not been feeling well and has not eaten one meal since last Monday.  Today however, she ate her full breakfast and is feeling better per Abrahan.  ER with any signs of bleeding, no current reports of bleeding.  She will retest INR this Friday and try to include more Vit K in diet.

## 2022-11-25 ENCOUNTER — ANTI-COAG VISIT (OUTPATIENT)
Dept: CARDIOLOGY | Facility: CLINIC | Age: 87
End: 2022-11-25
Payer: MEDICARE

## 2022-11-25 DIAGNOSIS — Z79.01 LONG TERM (CURRENT) USE OF ANTICOAGULANTS: ICD-10-CM

## 2022-11-25 DIAGNOSIS — I48.0 PAROXYSMAL ATRIAL FIBRILLATION: Chronic | ICD-10-CM

## 2022-11-25 DIAGNOSIS — Z95.2 S/P MITRAL VALVE REPLACEMENT: Chronic | ICD-10-CM

## 2022-11-25 DIAGNOSIS — Z86.718 HISTORY OF DVT OF LOWER EXTREMITY: Primary | ICD-10-CM

## 2022-11-25 LAB — INR PPP: 2.3

## 2022-11-25 PROCEDURE — 93793 PR ANTICOAGULANT MGMT FOR PT TAKING WARFARIN: ICD-10-PCS | Mod: S$GLB,,,

## 2022-11-25 PROCEDURE — 93793 ANTICOAG MGMT PT WARFARIN: CPT | Mod: S$GLB,,,

## 2022-11-25 NOTE — PROGRESS NOTES
Ms Gauthier reports an INR  of 2.3 today.  INR has declined below goal.  We will resume at 0.5 mg every other days for now since she had a large spike in INR.  Her appetite has waxed and waned recently, patient is very sensitive to dose and appetite changes.  We will repeat INR in  a few days to see if there is an improvement in INR without a large jump.  Dosing reviewed with Abrahan.

## 2022-11-29 LAB — INR PPP: 3.1

## 2022-11-30 ENCOUNTER — ANTI-COAG VISIT (OUTPATIENT)
Dept: CARDIOLOGY | Facility: CLINIC | Age: 87
End: 2022-11-30
Payer: MEDICARE

## 2022-11-30 DIAGNOSIS — I48.0 PAROXYSMAL ATRIAL FIBRILLATION: Chronic | ICD-10-CM

## 2022-11-30 DIAGNOSIS — Z86.718 HISTORY OF DVT OF LOWER EXTREMITY: Primary | ICD-10-CM

## 2022-11-30 DIAGNOSIS — Z95.2 S/P MITRAL VALVE REPLACEMENT: Chronic | ICD-10-CM

## 2022-11-30 DIAGNOSIS — Z79.01 LONG TERM (CURRENT) USE OF ANTICOAGULANTS: ICD-10-CM

## 2022-11-30 PROCEDURE — G0250 MD INR TEST REVIE INTER MGMT: HCPCS | Mod: S$GLB,,, | Performed by: INTERNAL MEDICINE

## 2022-11-30 PROCEDURE — G0250 PR MD REVIEW INTERPRET OF TEST: ICD-10-PCS | Mod: S$GLB,,, | Performed by: INTERNAL MEDICINE

## 2022-11-30 NOTE — PROGRESS NOTES
Fax result received from [employer].  INR: 3.1 - therapeutic.  Test date: 11/29/2022.     Patient/Caregiver provided printed discharge information.

## 2022-11-30 NOTE — PROGRESS NOTES
"INR at goal. Medications and chart reviewed. No changes noted to necessitate adjustment of warfarin or follow-up plan. See calendar.  Abrahan reports appetite improved.  Family members are in town, reports she is hopefully a little more "perky".  We will continue weekly INR checks as her appetite/intake greatly affects warfarin.  We will continue warfarin 0.5 mg QOD.  Repeat INR in 1 week.    "

## 2022-12-06 ENCOUNTER — ANTI-COAG VISIT (OUTPATIENT)
Dept: CARDIOLOGY | Facility: CLINIC | Age: 87
End: 2022-12-06
Payer: MEDICARE

## 2022-12-06 DIAGNOSIS — I48.0 PAROXYSMAL ATRIAL FIBRILLATION: Chronic | ICD-10-CM

## 2022-12-06 DIAGNOSIS — Z79.01 LONG TERM (CURRENT) USE OF ANTICOAGULANTS: ICD-10-CM

## 2022-12-06 DIAGNOSIS — Z86.718 HISTORY OF DVT OF LOWER EXTREMITY: Primary | ICD-10-CM

## 2022-12-06 DIAGNOSIS — Z95.2 S/P MITRAL VALVE REPLACEMENT: Chronic | ICD-10-CM

## 2022-12-06 LAB — INR PPP: 3.6

## 2022-12-06 NOTE — PROGRESS NOTES
INR not at goal. Medications, chart, and patient findings reviewed. See calendar for adjustments to dose and follow up plan.  INR reported by Abrahan is elevated once more.  We will hold one dose which equates to 3 days with NO warfarin.  Return to 0.5 mg QOD start on Friday 12/9/22.  Retest on 12/13.

## 2022-12-12 ENCOUNTER — TELEPHONE (OUTPATIENT)
Dept: INTERNAL MEDICINE | Facility: CLINIC | Age: 87
End: 2022-12-12
Payer: MEDICARE

## 2022-12-12 DIAGNOSIS — G62.9 POLYNEUROPATHY: ICD-10-CM

## 2022-12-12 DIAGNOSIS — Z79.01 CHRONIC ANTICOAGULATION: ICD-10-CM

## 2022-12-12 DIAGNOSIS — I48.0 PAROXYSMAL ATRIAL FIBRILLATION: Primary | Chronic | ICD-10-CM

## 2022-12-12 DIAGNOSIS — I51.89 LEFT VENTRICULAR DIASTOLIC DYSFUNCTION WITH PRESERVED SYSTOLIC FUNCTION: Chronic | ICD-10-CM

## 2022-12-12 NOTE — TELEPHONE ENCOUNTER
----- Message from Amy Quinn sent at 12/12/2022 10:43 AM CST -----  Daughter is requesting order to have mother placed on Hosp[facility] at home,Please call back at [phone].Thanks

## 2022-12-13 ENCOUNTER — TELEPHONE (OUTPATIENT)
Dept: CARDIOLOGY | Facility: CLINIC | Age: 87
End: 2022-12-13
Payer: MEDICARE

## 2022-12-13 ENCOUNTER — ANTI-COAG VISIT (OUTPATIENT)
Dept: CARDIOLOGY | Facility: CLINIC | Age: 87
End: 2022-12-13
Payer: MEDICARE

## 2022-12-13 DIAGNOSIS — Z79.01 LONG TERM (CURRENT) USE OF ANTICOAGULANTS: ICD-10-CM

## 2022-12-13 DIAGNOSIS — Z95.2 S/P MITRAL VALVE REPLACEMENT: Chronic | ICD-10-CM

## 2022-12-13 DIAGNOSIS — I48.0 PAROXYSMAL ATRIAL FIBRILLATION: Chronic | ICD-10-CM

## 2022-12-13 DIAGNOSIS — Z86.718 HISTORY OF DVT OF LOWER EXTREMITY: Primary | ICD-10-CM

## 2022-12-13 LAB — INR PPP: 3.9

## 2022-12-13 PROCEDURE — 93793 PR ANTICOAGULANT MGMT FOR PT TAKING WARFARIN: ICD-10-PCS | Mod: S$GLB,,,

## 2022-12-13 PROCEDURE — 93793 ANTICOAG MGMT PT WARFARIN: CPT | Mod: S$GLB,,,

## 2022-12-13 NOTE — PROGRESS NOTES
INR not at goal. Medications, chart, and patient findings reviewed. See calendar for adjustments to dose and follow up plan.  Abrahan reports INR is 3.9 - we will hold 2 doses of warfarin - 4 days of no doses.  Abrahan reports that they are requesting hospice for Mrs Posey.  Hospice will  monitoring more than likely when established.  I have asked Abrahan to let us know if we will need to assist them in any way.

## 2022-12-13 NOTE — TELEPHONE ENCOUNTER
LVM for patient's daughter to call me back----- Message from Leslie Alvarado MD sent at 12/12/2022  5:13 PM CST -----  IT IS HER CALL SHE CAN PICK WHO SHE WANTS THE ORDERS ARE IN  ----- Message -----  From: Elzbieta Dowling LPN  Sent: 12/12/2022   4:14 PM CST  To: Leslie Alvarado MD    Her daughter wants to know if it can go through [facility]?  ----- Message -----  From: Leslie Alvarado MD  Sent: 12/12/2022   4:05 PM CST  To: Elzbieta Dowling LPN    I CAN NO PROBLEM CONSULT PLACED  ----- Message -----  From: Elzbieta Dowling LPN  Sent: 12/12/2022   3:30 PM CST  To: Leslie Alvarado MD    Is this something you will do or send to PCP? Last video visit was 3/22 with you  ----- Message -----  From: Amy Quinn  Sent: 12/12/2022  10:45 AM CST  To: [facility]    Daughter is requesting order to have mother placed on [facility]osp[facility] at home,Please call back at [phone].Thanks

## 2022-12-14 ENCOUNTER — TELEPHONE (OUTPATIENT)
Dept: CARDIOLOGY | Facility: CLINIC | Age: 87
End: 2022-12-14
Payer: MEDICARE

## 2022-12-14 NOTE — TELEPHONE ENCOUNTER
Called patient's daughter back and no answer----- Message from Yury Baltazar sent at 12/14/2022  9:12 AM CST -----  Contact: [phone]  Type:  Patient Returning Call    Who Called:Daughter Abrahan Gray   Who Left Message for Patient:nurse   Does the patient know what this is regarding?:yes   Would the patient rather a call back or a response via [facility]? Call back   Best Call Back Number:[phone]  Additional Information:

## 2022-12-15 ENCOUNTER — TELEPHONE (OUTPATIENT)
Dept: CARDIOLOGY | Facility: CLINIC | Age: 87
End: 2022-12-15
Payer: MEDICARE

## 2022-12-15 NOTE — TELEPHONE ENCOUNTER
LVM for patient's daughter to call me back with the name and phone number of the hospice she wants to use so I can fax the referral over----- Message from Radha Coto sent at 12/15/2022  8:53 AM CST -----  Contact: christopher/ daughter  Patients daughter is calling to speak with the nurse regarding questions and concerns. Reports needing to speak with the nurse regarding at home hospice care for the patient. Please give the patients daughter a call back at [phone]  Thanks lydia

## 2022-12-15 NOTE — TELEPHONE ENCOUNTER
Faxed referral to [facility] at [phone] and fax was successful----- Message from Lencho Abbott sent at 12/15/2022  2:21 PM CST -----  Contact: pt daughter  Continued communication       her daughter has the name of the hospice care she would like for her mother the name is [facility] in [place]      Thank you    Luis Miguel

## 2022-12-20 ENCOUNTER — ANTI-COAG VISIT (OUTPATIENT)
Dept: CARDIOLOGY | Facility: CLINIC | Age: 87
End: 2022-12-20
Payer: MEDICARE

## 2022-12-20 DIAGNOSIS — Z86.718 HISTORY OF DVT OF LOWER EXTREMITY: Primary | ICD-10-CM

## 2022-12-20 DIAGNOSIS — Z79.01 LONG TERM (CURRENT) USE OF ANTICOAGULANTS: ICD-10-CM

## 2022-12-20 DIAGNOSIS — I48.0 PAROXYSMAL ATRIAL FIBRILLATION: Chronic | ICD-10-CM

## 2022-12-20 DIAGNOSIS — Z95.2 S/P MITRAL VALVE REPLACEMENT: Chronic | ICD-10-CM

## 2022-12-20 LAB — INR PPP: 2.2

## 2022-12-20 PROCEDURE — 93793 PR ANTICOAGULANT MGMT FOR PT TAKING WARFARIN: ICD-10-PCS | Mod: S$GLB,,,

## 2022-12-20 PROCEDURE — 93793 ANTICOAG MGMT PT WARFARIN: CPT | Mod: S$GLB,,,

## 2022-12-20 NOTE — PROGRESS NOTES
Abrahan reports that patient is on hospice ([facility]) but she will still report INR to [facility] and our coumadin clinic.  She reoprts an INR of 2.2 today.  Her last warfarin dose was several days ago as Mrs Gauthier reports that she is afraid to wake her to take medications.  If possible, we will try to resume at a slightly lower dose.  0.5 mg 3 days per week on Mon/Wed/Fri only.  All other days will remain 0 mg.  I will also try to limit testing if possible.

## 2022-12-28 ENCOUNTER — ANTI-COAG VISIT (OUTPATIENT)
Dept: CARDIOLOGY | Facility: CLINIC | Age: 87
End: 2022-12-28
Payer: MEDICARE

## 2022-12-28 DIAGNOSIS — Z95.2 S/P MITRAL VALVE REPLACEMENT: Chronic | ICD-10-CM

## 2022-12-28 DIAGNOSIS — I48.0 PAROXYSMAL ATRIAL FIBRILLATION: Chronic | ICD-10-CM

## 2022-12-28 DIAGNOSIS — Z79.01 LONG TERM (CURRENT) USE OF ANTICOAGULANTS: ICD-10-CM

## 2022-12-28 DIAGNOSIS — Z86.718 HISTORY OF DVT OF LOWER EXTREMITY: Primary | ICD-10-CM

## 2022-12-28 LAB — INR PPP: 1.5

## 2022-12-28 PROCEDURE — 93793 ANTICOAG MGMT PT WARFARIN: CPT | Mod: S$GLB,,,

## 2022-12-28 PROCEDURE — 93793 PR ANTICOAGULANT MGMT FOR PT TAKING WARFARIN: ICD-10-PCS | Mod: S$GLB,,,

## 2022-12-28 NOTE — PROGRESS NOTES
INR not at goal. Medications, chart, and patient findings reviewed. See calendar for adjustments to dose and follow up plan.  Abrahan reports result of 1.5.  Patient on hospice and is sleeping through doses.  She will try to give her warfarin dose today.  Also recommend on tomorrow if possible then start every other day on Saturday.  Dosing provided to Abrahan.

## 2023-01-03 ENCOUNTER — ANTI-COAG VISIT (OUTPATIENT)
Dept: CARDIOLOGY | Facility: CLINIC | Age: 88
End: 2023-01-03
Payer: MEDICARE

## 2023-01-03 DIAGNOSIS — Z86.718 HISTORY OF DVT OF LOWER EXTREMITY: Primary | ICD-10-CM

## 2023-01-03 DIAGNOSIS — I48.0 PAROXYSMAL ATRIAL FIBRILLATION: Chronic | ICD-10-CM

## 2023-01-03 DIAGNOSIS — Z95.2 S/P MITRAL VALVE REPLACEMENT: Chronic | ICD-10-CM

## 2023-01-03 DIAGNOSIS — Z79.01 LONG TERM (CURRENT) USE OF ANTICOAGULANTS: ICD-10-CM

## 2023-01-03 LAB — INR PPP: 1.3

## 2023-01-03 PROCEDURE — G0250 MD INR TEST REVIE INTER MGMT: HCPCS | Mod: S$GLB,,, | Performed by: INTERNAL MEDICINE

## 2023-01-03 PROCEDURE — G0250 PR MD REVIEW INTERPRET OF TEST: ICD-10-PCS | Mod: S$GLB,,, | Performed by: INTERNAL MEDICINE

## 2023-01-03 NOTE — PROGRESS NOTES
Abrahan reports INR at 1.3 - has not been eating much at all.  We will modify this week's dose and give her daily doses of 0.5 mg Mon-Friday and 0 mg on Sat/Sun.  She will repeat INR on Monday next week.  Hospice is not testing INR at all, Abrahan is unsure if Medicare will pay for continued testing, but she will continue until they have trouble.

## 2023-01-11 ENCOUNTER — ANTI-COAG VISIT (OUTPATIENT)
Dept: CARDIOLOGY | Facility: CLINIC | Age: 88
End: 2023-01-11
Payer: MEDICARE

## 2023-01-11 DIAGNOSIS — I48.0 PAROXYSMAL ATRIAL FIBRILLATION: Chronic | ICD-10-CM

## 2023-01-11 DIAGNOSIS — Z79.01 LONG TERM (CURRENT) USE OF ANTICOAGULANTS: ICD-10-CM

## 2023-01-11 DIAGNOSIS — Z86.718 HISTORY OF DVT OF LOWER EXTREMITY: Primary | ICD-10-CM

## 2023-01-11 DIAGNOSIS — Z95.2 S/P MITRAL VALVE REPLACEMENT: Chronic | ICD-10-CM

## 2023-01-11 LAB — INR PPP: 2.8

## 2023-01-11 NOTE — PROGRESS NOTES
INR at goal. Medications and chart reviewed. No changes noted to necessitate adjustment of warfarin or follow-up plan. See calendar.  Abrahan reports an improved INR - 2.8.  She states that hospice may be taking over her INR monitoring.  She will let us know.  We will plan on continuing an every other day dose for now and follow trend.  Or, we will close chart and resolve episode.

## 2023-02-04 ENCOUNTER — CLINICAL SUPPORT (OUTPATIENT)
Dept: CARDIOLOGY | Facility: HOSPITAL | Age: 88
End: 2023-02-04
Payer: MEDICARE

## 2023-02-04 DIAGNOSIS — Z95.0 PRESENCE OF CARDIAC PACEMAKER: ICD-10-CM

## 2023-02-04 PROCEDURE — 93296 REM INTERROG EVL PM/IDS: CPT | Mod: HCNC | Performed by: INTERNAL MEDICINE

## 2023-03-03 ENCOUNTER — ANTI-COAG VISIT (OUTPATIENT)
Dept: CARDIOLOGY | Facility: CLINIC | Age: 88
End: 2023-03-03
Payer: MEDICARE

## 2023-03-03 DIAGNOSIS — I48.0 PAROXYSMAL ATRIAL FIBRILLATION: Chronic | ICD-10-CM

## 2023-03-03 DIAGNOSIS — Z86.718 HISTORY OF DVT OF LOWER EXTREMITY: Primary | ICD-10-CM

## 2023-03-03 DIAGNOSIS — Z95.2 S/P MITRAL VALVE REPLACEMENT: Chronic | ICD-10-CM

## 2023-05-05 ENCOUNTER — CLINICAL SUPPORT (OUTPATIENT)
Dept: CARDIOLOGY | Facility: HOSPITAL | Age: 88
End: 2023-05-05
Payer: MEDICARE

## 2023-05-05 DIAGNOSIS — Z95.0 PRESENCE OF CARDIAC PACEMAKER: ICD-10-CM

## 2023-05-05 PROCEDURE — 93294 CARDIAC DEVICE CHECK - REMOTE: ICD-10-PCS | Mod: HCNC,S$GLB,, | Performed by: INTERNAL MEDICINE

## 2023-05-05 PROCEDURE — 93294 REM INTERROG EVL PM/LDLS PM: CPT | Mod: HCNC,S$GLB,, | Performed by: INTERNAL MEDICINE

## 2023-05-05 PROCEDURE — 93296 REM INTERROG EVL PM/IDS: CPT | Mod: HCNC | Performed by: INTERNAL MEDICINE

## 2023-05-15 DIAGNOSIS — I95.1 ORTHOSTATIC HYPOTENSION: ICD-10-CM

## 2023-05-15 DIAGNOSIS — Z79.01 LONG TERM (CURRENT) USE OF ANTICOAGULANTS: ICD-10-CM

## 2023-05-15 RX ORDER — WARFARIN 1 MG/1
TABLET ORAL
Qty: 50 TABLET | Refills: 3 | Status: SHIPPED | OUTPATIENT
Start: 2023-05-15

## 2023-08-03 ENCOUNTER — CLINICAL SUPPORT (OUTPATIENT)
Dept: CARDIOLOGY | Facility: HOSPITAL | Age: 88
End: 2023-08-03
Payer: MEDICARE

## 2023-08-03 DIAGNOSIS — Z95.0 PRESENCE OF CARDIAC PACEMAKER: ICD-10-CM

## 2023-08-03 PROCEDURE — 93296 REM INTERROG EVL PM/IDS: CPT | Mod: HCNC | Performed by: INTERNAL MEDICINE

## 2023-11-01 ENCOUNTER — CLINICAL SUPPORT (OUTPATIENT)
Dept: CARDIOLOGY | Facility: HOSPITAL | Age: 88
End: 2023-11-01
Payer: MEDICARE

## 2023-11-01 DIAGNOSIS — Z95.0 PRESENCE OF CARDIAC PACEMAKER: ICD-10-CM

## 2023-11-01 PROCEDURE — 93294 REM INTERROG EVL PM/LDLS PM: CPT | Mod: HCNC,S$GLB,, | Performed by: INTERNAL MEDICINE

## 2023-11-01 PROCEDURE — 93294 CARDIAC DEVICE CHECK - REMOTE: ICD-10-PCS | Mod: HCNC,S$GLB,, | Performed by: INTERNAL MEDICINE

## 2023-11-01 PROCEDURE — 93296 REM INTERROG EVL PM/IDS: CPT | Mod: HCNC | Performed by: INTERNAL MEDICINE

## 2023-11-30 ENCOUNTER — TELEPHONE (OUTPATIENT)
Dept: CARDIOLOGY | Facility: HOSPITAL | Age: 88
End: 2023-11-30
Payer: MEDICARE

## 2023-11-30 NOTE — TELEPHONE ENCOUNTER
[facility] notification:  This is for pt: Josy Posey 09/18/1924    We received an email from Abrahan Coronel (pt's daughter/caretaker) she informed that the [facility] is now in [facility] and is no longer in need of the service. Please confirm if we can go ahead and release the patient in remote monitoring.      [facility]  Returned call to daughter to confirm request.  Pt has [employer] biventricular pacemaker, remote check shows 45% battery remaining, Ap 33%, Bp 97%.  Will discontinue home monitoring per family request.  Pt will be removed from [employer] website and no further remote checks will be done.      No answer, left message for daughter to return call to clinic.

## 2024-01-05 NOTE — TELEPHONE ENCOUNTER
Fwd to disability pool      Please advise on pt's message below about needing paperwork for short term disability benefits to continue. After speaking with pt, Dr. Garcia's office received the same form as from 12/7/23 from [facility] one.   Second call to daughter, confirmed that they no longer want home monitoring.  Instructed her to unplug monitor and dispose of per local protocols.  Shirley notified, removed from vendor portal.

## 2024-01-11 DIAGNOSIS — Z00.00 ENCOUNTER FOR MEDICARE ANNUAL WELLNESS EXAM: ICD-10-CM

## 2024-04-21 DIAGNOSIS — I95.1 ORTHOSTATIC HYPOTENSION: ICD-10-CM

## 2024-04-21 DIAGNOSIS — Z79.01 LONG TERM (CURRENT) USE OF ANTICOAGULANTS: ICD-10-CM

## 2024-04-22 RX ORDER — WARFARIN 1 MG/1
TABLET ORAL
Qty: 50 TABLET | Refills: 3 | Status: SHIPPED | OUTPATIENT
Start: 2024-04-22

## 2025-01-21 NOTE — HOSPITAL COURSE
89 year old female admitted for dizziness and UTI. CT head negative for acute findings. +[facility]. Patient being treated with gentle IV hydration and IV rocephin. Patient reports dizziness is associated with head movement and loss of balance. Meclizine was given and symptoms resolved. Urine culture pending. As of 1/29, symptoms have resolved. Patient reports feeling better and requesting to be discharged. Social work consult to arrange home health (SN/ PT/OT). Patient ready for discharge. Will follow-up on urine cx. Home meds reconciled. New prescription given for augmentin and meclizine. Patient to follow-up with PCP in 3 days for hospital follow-up of chronic UTI and vertigo. Patient to follow-up with ENT outpatient for further evaluation of vertigo. Patient seen and examined on the date of discharge and found suitable for discharge.   
none